# Patient Record
Sex: MALE | Race: WHITE | NOT HISPANIC OR LATINO | Employment: UNEMPLOYED | URBAN - METROPOLITAN AREA
[De-identification: names, ages, dates, MRNs, and addresses within clinical notes are randomized per-mention and may not be internally consistent; named-entity substitution may affect disease eponyms.]

---

## 2022-05-13 ENCOUNTER — ANESTHESIA EVENT (OUTPATIENT)
Dept: SURGERY | Facility: OTHER | Age: 82
End: 2022-05-13
Payer: MEDICARE

## 2022-05-13 ENCOUNTER — HOSPITAL ENCOUNTER (OUTPATIENT)
Dept: PREADMISSION TESTING | Facility: OTHER | Age: 82
Discharge: HOME | End: 2022-05-13
Attending: ORTHOPAEDIC SURGERY
Payer: MEDICARE

## 2022-05-13 VITALS
HEIGHT: 70 IN | WEIGHT: 215 LBS | HEART RATE: 68 BPM | OXYGEN SATURATION: 97 % | RESPIRATION RATE: 16 BRPM | SYSTOLIC BLOOD PRESSURE: 139 MMHG | TEMPERATURE: 99 F | BODY MASS INDEX: 30.78 KG/M2 | DIASTOLIC BLOOD PRESSURE: 78 MMHG

## 2022-05-13 RX ORDER — GLIPIZIDE 10 MG/1
10 TABLET ORAL
COMMUNITY
Start: 2022-05-02 | End: 2022-08-08 | Stop reason: SDUPTHER

## 2022-05-13 RX ORDER — SODIUM CHLORIDE, SODIUM LACTATE, POTASSIUM CHLORIDE, CALCIUM CHLORIDE 600; 310; 30; 20 MG/100ML; MG/100ML; MG/100ML; MG/100ML
INJECTION, SOLUTION INTRAVENOUS CONTINUOUS
Status: CANCELLED | OUTPATIENT
Start: 2022-05-13

## 2022-05-13 RX ORDER — TRIAMTERENE AND HYDROCHLOROTHIAZIDE 75; 50 MG/1; MG/1
0.5 TABLET ORAL
COMMUNITY
Start: 2022-05-02 | End: 2022-07-27

## 2022-05-13 RX ORDER — METFORMIN HYDROCHLORIDE 1000 MG/1
1000 TABLET ORAL 2 TIMES DAILY WITH MEALS
COMMUNITY
Start: 2022-05-02 | End: 2022-08-08 | Stop reason: SDUPTHER

## 2022-05-13 RX ORDER — EZETIMIBE 10 MG/1
10 TABLET ORAL
COMMUNITY
Start: 2022-01-26 | End: 2022-07-19

## 2022-05-13 RX ORDER — ACETAMINOPHEN 500 MG
1000 TABLET ORAL
Status: CANCELLED | OUTPATIENT
Start: 2022-05-13 | End: 2022-05-13

## 2022-05-13 RX ORDER — ACETAMINOPHEN 325 MG/1
650 TABLET ORAL EVERY 6 HOURS PRN
Status: ON HOLD | COMMUNITY
End: 2022-09-30 | Stop reason: SDUPTHER

## 2022-05-13 RX ORDER — ASPIRIN 81 MG/1
81 TABLET ORAL
Status: ON HOLD | COMMUNITY
Start: 2022-05-02 | End: 2022-09-28 | Stop reason: HOSPADM

## 2022-05-13 RX ORDER — LIDOCAINE HYDROCHLORIDE 10 MG/ML
0.5 INJECTION, SOLUTION EPIDURAL; INFILTRATION; INTRACAUDAL; PERINEURAL ONCE
Status: CANCELLED | OUTPATIENT
Start: 2022-05-13 | End: 2022-05-13

## 2022-05-13 RX ORDER — ATORVASTATIN CALCIUM 40 MG/1
40 TABLET, FILM COATED ORAL DAILY
COMMUNITY
Start: 2022-01-26

## 2022-05-13 RX ORDER — FEBUXOSTAT 40 MG/1
40 TABLET, FILM COATED ORAL
Status: ON HOLD | COMMUNITY
End: 2022-09-28 | Stop reason: HOSPADM

## 2022-05-13 NOTE — ANESTHESIA PREPROCEDURE EVALUATION
05/13/2022  Jacques Arellano is a 81 y.o., male.      Pre-op Assessment    I have reviewed the Patient Summary Reports.     I have reviewed the Nursing Notes.    I have reviewed the Medications.     Review of Systems  Anesthesia Hx:  Denies Family Hx of Anesthesia complications.   Denies Personal Hx of Anesthesia complications.   Social:  Non-Smoker    Hematology/Oncology:  Hematology Normal   Oncology Normal     EENT/Dental:EENT/Dental Normal   Cardiovascular:   Pacemaker Hypertension Dysrhythmias atrial fibrillation CHF (Diastolic) Watchman device placed at [facility] 3/22    Cardiology rec NOT stopping eliquis   Pulmonary:   Sleep Apnea (not using CPAP)    Renal/:  Renal/ Normal     Hepatic/GI:  Hepatic/GI Normal    Musculoskeletal:  Musculoskeletal Normal    Neurological:   TIA, Denies CVA. Very frequent Falls!!   Endocrine:   Diabetes, well controlled, type 2    Dermatological:  Skin Normal    Psych:  Psychiatric Normal           Physical Exam  General: Cooperative, Alert and Oriented    Airway:  Mallampati: II   TM Distance: Normal  Neck ROM: Normal ROM    Dental:  Dentures        Anesthesia Plan  Type of Anesthesia, risks & benefits discussed:    Anesthesia Type: Regional  Intra-op Monitoring Plan: Standard ASA Monitors  Post Op Pain Control Plan: multimodal analgesia and peripheral nerve block  Informed Consent: Informed consent signed with the Patient and all parties understand the risks and agree with anesthesia plan.  All questions answered.   ASA Score: 3  Anesthesia Plan Notes: Plan regional, but will be on eliquis  GA back up  Labs/EKG in care everywhere    Dr. Wilkins cardiology, rec NOT stopping eliquis or ASA    5/17/22 update:    Dr. Kate does not want block       Ready For Surgery From Anesthesia Perspective.     .

## 2022-05-13 NOTE — PRE ADMISSION SCREENING
Dr Garrett aware of pacemaker-no orders received. Cardiac testing reviewed per Dr Garrett in care everywhere-states OK.

## 2022-05-13 NOTE — DISCHARGE INSTRUCTIONS
Information to Prepare you for your Surgery    PRE-ADMIT TESTING -  [phone]    [address] [facility]          Your surgery has been scheduled at [facility]. We are pleased to have the opportunity to serve you. For Further Information please call [phone].    On the day of surgery please report to the Information Desk on the 1st floor.    CONTACT YOUR PHYSICIAN'S OFFICE THE DAY PRIOR TO YOUR SURGERY TO OBTAIN YOUR ARRIVAL TIME.     The evening before surgery do not eat anything after 9 p.m. ( this includes hard candy, chewing gum and mints).  You may only have GATORADE, POWERADE AND WATER  from 9 p.m. until you leave your home.   DO NOT DRINK ANY LIQUIDS ON THE WAY TO THE HOSPITAL.      Why does your anesthesiologist allow you to drink Gatorade/Powerade before surgery?  Gatorade/Powerade helps to increase your comfort before surgery and to decrease your nausea after surgery. The carbohydrates in Gatorade/Powerade help reduce your body's stress response to surgery.  If you are a diabetic-drink only water prior to surgery.      Current Visitor policy(12/27/2021) - Patients may have 2 visitors pre and post procedure. Only 2 visitors will be allowed in the Surgical building with the patient.     SPECIAL MEDICATION INSTRUCTIONS: TAKE medications checked off by the Anesthesiologist on your Medication List.    Angiogram Patients: Take medications as instructed by your physician, including aspirin.     Surgery Patients:    If you take ASPIRIN - Your PHYSICIAN/SURGEON will need to inform you IF/OR when you need to stop taking aspirin prior to your surgery.     Do Not take any medications containing IBUPROFEN.    Do Not Wear any make-up (especially eye make-up) to surgery. Please remove any false eyelashes or eyelash extensions. If you arrive the day of surgery with makeup/eyelashes on you will be required to remove prior to surgery. (There is a risk of corneal  abrasions if eye makeup/eyelash extensions are not removed)      Leave all valuables at home.   Do Not wear any jewelry or watches, including any metal in body piercings. Jewelry must be removed prior to coming to the hospital.  There is a possibility that rings that are unable to be removed may be cut off if they are on the surgical extremity.    Please remove all hair extensions, wigs, clips and any other metal accessories/ ornaments from your hair.  These items may pose a flammable/fire risk in Surgery and must be removed.    Do not shave your surgical area at least 5 days prior to your surgery. The surgical prep will be performed at the hospital according to Infection Control regulations.    Contact Lens must be removed before surgery. Either do not wear the contact lens or bring a case and solution for storage.  Please bring a container for eyeglasses or dentures as required.  Bring any paperwork your physician has provided, such as consent forms,  history and physicals, doctor's orders, etc.   Bring comfortable clothes that are loose fitting to wear upon discharge. Take into consideration the type of surgery being performed.  Maintain your diet as advised per your physician the day prior to surgery.      Adequate rest the night before surgery is advised.   Park in the Parking lot behind the hospital or in the [facility] across the street from the parking lot. Parking is complimentary.  If you will be discharged the same day as your procedure, please arrange for a responsible adult to drive you home or to accompany you if traveling by taxi.   YOU WILL NOT BE PERMITTED TO DRIVE OR TO LEAVE THE HOSPITAL ALONE AFTER SURGERY.   If you are being discharged the same day, it is strongly recommended that you arrange for someone to remain with you for the first 24 hrs following your surgery.    The Surgeon will speak to your family/visitor after your surgery regarding the outcome of your surgery and post op  care.  The Surgeon may speak to you after your surgery, but there is a possibility you may not remember the details.  Please check with your family members regarding the conversation with the Surgeon.    We strongly recommend whoever is bringing you home be present for discharge instructions.  This will ensure a thorough understanding for your post op home care.    ALL CHILDREN MUST ALWAYS BE ACCOMPANIED BY AN ADULT.    Visitors-Refer to current Visitor policy handouts.    Thank you for your cooperation.  The Staff of [facility].            Bathing Instructions with Hibiclens    Shower the evening before and morning of your procedure with Hibiclens:  Wash your face with water and your regular face wash/soap  Apply Hibiclens directly on your skin or on a wet washcloth and wash gently. When showering: Move away from the shower stream when applying Hibiclens to avoid rinsing off too soon.  Rinse thoroughly with warm water  Do not dilute Hibiclens        Dry off as usual, do not use any deodorant, powder, body lotions, perfume, after shave or cologne.

## 2022-05-17 ENCOUNTER — HOSPITAL ENCOUNTER (OUTPATIENT)
Facility: OTHER | Age: 82
Discharge: HOME | End: 2022-05-17
Attending: ORTHOPAEDIC SURGERY | Admitting: ORTHOPAEDIC SURGERY
Payer: MEDICARE

## 2022-05-17 ENCOUNTER — ANESTHESIA (OUTPATIENT)
Dept: SURGERY | Facility: OTHER | Age: 82
End: 2022-05-17
Payer: MEDICARE

## 2022-05-17 VITALS
SYSTOLIC BLOOD PRESSURE: 103 MMHG | TEMPERATURE: 97 F | BODY MASS INDEX: 30.78 KG/M2 | OXYGEN SATURATION: 97 % | DIASTOLIC BLOOD PRESSURE: 55 MMHG | HEIGHT: 70 IN | HEART RATE: 69 BPM | WEIGHT: 215 LBS | RESPIRATION RATE: 15 BRPM

## 2022-05-17 DIAGNOSIS — S52.501A CLOSED FRACTURE OF DISTAL END OF RIGHT RADIUS, UNSPECIFIED FRACTURE MORPHOLOGY, INITIAL ENCOUNTER: Primary | ICD-10-CM

## 2022-05-17 DIAGNOSIS — S52.501A CLOSED FRACTURE OF RIGHT DISTAL RADIUS: ICD-10-CM

## 2022-05-17 PROCEDURE — 63600175 PHARM REV CODE 636 W HCPCS: Performed by: ANESTHESIOLOGY

## 2022-05-17 PROCEDURE — 25000003 PHARM REV CODE 250: Performed by: NURSE ANESTHETIST, CERTIFIED REGISTERED

## 2022-05-17 PROCEDURE — 25000003 PHARM REV CODE 250: Performed by: ANESTHESIOLOGY

## 2022-05-17 PROCEDURE — 63600175 PHARM REV CODE 636 W HCPCS: Performed by: ORTHOPAEDIC SURGERY

## 2022-05-17 PROCEDURE — 71000033 HC RECOVERY, INTIAL HOUR: Performed by: ORTHOPAEDIC SURGERY

## 2022-05-17 PROCEDURE — 71000015 HC POSTOP RECOV 1ST HR: Performed by: ORTHOPAEDIC SURGERY

## 2022-05-17 PROCEDURE — 37000008 HC ANESTHESIA 1ST 15 MINUTES: Performed by: ORTHOPAEDIC SURGERY

## 2022-05-17 PROCEDURE — 71000016 HC POSTOP RECOV ADDL HR: Performed by: ORTHOPAEDIC SURGERY

## 2022-05-17 PROCEDURE — 36000710: Performed by: ORTHOPAEDIC SURGERY

## 2022-05-17 PROCEDURE — 37000009 HC ANESTHESIA EA ADD 15 MINS: Performed by: ORTHOPAEDIC SURGERY

## 2022-05-17 PROCEDURE — 27201423 OPTIME MED/SURG SUP & DEVICES STERILE SUPPLY: Performed by: ORTHOPAEDIC SURGERY

## 2022-05-17 PROCEDURE — 71000039 HC RECOVERY, EACH ADD'L HOUR: Performed by: ORTHOPAEDIC SURGERY

## 2022-05-17 PROCEDURE — C1713 ANCHOR/SCREW BN/BN,TIS/BN: HCPCS | Performed by: ORTHOPAEDIC SURGERY

## 2022-05-17 PROCEDURE — 36000711: Performed by: ORTHOPAEDIC SURGERY

## 2022-05-17 PROCEDURE — 63600175 PHARM REV CODE 636 W HCPCS: Performed by: NURSE ANESTHETIST, CERTIFIED REGISTERED

## 2022-05-17 PROCEDURE — 25000003 PHARM REV CODE 250: Performed by: ORTHOPAEDIC SURGERY

## 2022-05-17 DEVICE — SCREW CORTICAL 3.5 X 14: Type: IMPLANTABLE DEVICE | Site: WRIST | Status: FUNCTIONAL

## 2022-05-17 DEVICE — PEG THREADED 2.5 X 24: Type: IMPLANTABLE DEVICE | Site: WRIST | Status: FUNCTIONAL

## 2022-05-17 DEVICE — PLATE DISTAL VOLAR SHORT RT: Type: IMPLANTABLE DEVICE | Site: WRIST | Status: FUNCTIONAL

## 2022-05-17 DEVICE — PEG THREADED 2.5 X 20: Type: IMPLANTABLE DEVICE | Site: WRIST | Status: FUNCTIONAL

## 2022-05-17 DEVICE — IMPLANTABLE DEVICE: Type: IMPLANTABLE DEVICE | Site: WRIST | Status: FUNCTIONAL

## 2022-05-17 DEVICE — PEG SUPPORT SUBCHONDRAL 2.0X24: Type: IMPLANTABLE DEVICE | Site: WRIST | Status: FUNCTIONAL

## 2022-05-17 DEVICE — PEG FULLY THRD 2.5X20: Type: IMPLANTABLE DEVICE | Site: WRIST | Status: FUNCTIONAL

## 2022-05-17 RX ORDER — BUPIVACAINE HYDROCHLORIDE AND EPINEPHRINE 2.5; 5 MG/ML; UG/ML
INJECTION, SOLUTION EPIDURAL; INFILTRATION; INTRACAUDAL; PERINEURAL
Status: DISCONTINUED | OUTPATIENT
Start: 2022-05-17 | End: 2022-05-17 | Stop reason: HOSPADM

## 2022-05-17 RX ORDER — SODIUM CHLORIDE 0.9 % (FLUSH) 0.9 %
3 SYRINGE (ML) INJECTION
Status: DISCONTINUED | OUTPATIENT
Start: 2022-05-17 | End: 2022-05-17 | Stop reason: HOSPADM

## 2022-05-17 RX ORDER — HYDROMORPHONE HYDROCHLORIDE 2 MG/ML
0.4 INJECTION, SOLUTION INTRAMUSCULAR; INTRAVENOUS; SUBCUTANEOUS EVERY 5 MIN PRN
Status: DISCONTINUED | OUTPATIENT
Start: 2022-05-17 | End: 2022-05-17 | Stop reason: HOSPADM

## 2022-05-17 RX ORDER — HYDROCODONE BITARTRATE AND ACETAMINOPHEN 5; 325 MG/1; MG/1
1 TABLET ORAL EVERY 6 HOURS PRN
Qty: 20 TABLET | Refills: 0 | Status: ON HOLD | OUTPATIENT
Start: 2022-05-17 | End: 2022-09-30 | Stop reason: HOSPADM

## 2022-05-17 RX ORDER — PROCHLORPERAZINE EDISYLATE 5 MG/ML
5 INJECTION INTRAMUSCULAR; INTRAVENOUS EVERY 30 MIN PRN
Status: DISCONTINUED | OUTPATIENT
Start: 2022-05-17 | End: 2022-05-17 | Stop reason: HOSPADM

## 2022-05-17 RX ORDER — IBUPROFEN 400 MG/1
400 TABLET ORAL ONCE
Status: COMPLETED | OUTPATIENT
Start: 2022-05-17 | End: 2022-05-17

## 2022-05-17 RX ORDER — PHENYLEPHRINE HYDROCHLORIDE 10 MG/ML
INJECTION INTRAVENOUS
Status: DISCONTINUED | OUTPATIENT
Start: 2022-05-17 | End: 2022-05-17

## 2022-05-17 RX ORDER — ACETAMINOPHEN 500 MG
1000 TABLET ORAL
Status: COMPLETED | OUTPATIENT
Start: 2022-05-17 | End: 2022-05-17

## 2022-05-17 RX ORDER — LIDOCAINE HYDROCHLORIDE 10 MG/ML
0.5 INJECTION, SOLUTION EPIDURAL; INFILTRATION; INTRACAUDAL; PERINEURAL ONCE
Status: DISCONTINUED | OUTPATIENT
Start: 2022-05-17 | End: 2022-05-17 | Stop reason: HOSPADM

## 2022-05-17 RX ORDER — CEFAZOLIN SODIUM 1 G/3ML
2 INJECTION, POWDER, FOR SOLUTION INTRAMUSCULAR; INTRAVENOUS
Status: COMPLETED | OUTPATIENT
Start: 2022-05-17 | End: 2022-05-17

## 2022-05-17 RX ORDER — PROPOFOL 10 MG/ML
VIAL (ML) INTRAVENOUS
Status: DISCONTINUED | OUTPATIENT
Start: 2022-05-17 | End: 2022-05-17

## 2022-05-17 RX ORDER — SODIUM CHLORIDE, SODIUM LACTATE, POTASSIUM CHLORIDE, CALCIUM CHLORIDE 600; 310; 30; 20 MG/100ML; MG/100ML; MG/100ML; MG/100ML
INJECTION, SOLUTION INTRAVENOUS CONTINUOUS
Status: DISCONTINUED | OUTPATIENT
Start: 2022-05-17 | End: 2022-05-17 | Stop reason: HOSPADM

## 2022-05-17 RX ORDER — FENTANYL CITRATE 50 UG/ML
INJECTION, SOLUTION INTRAMUSCULAR; INTRAVENOUS
Status: DISCONTINUED | OUTPATIENT
Start: 2022-05-17 | End: 2022-05-17

## 2022-05-17 RX ORDER — LIDOCAINE HYDROCHLORIDE 20 MG/ML
INJECTION INTRAVENOUS
Status: DISCONTINUED | OUTPATIENT
Start: 2022-05-17 | End: 2022-05-17

## 2022-05-17 RX ORDER — ONDANSETRON 2 MG/ML
INJECTION INTRAMUSCULAR; INTRAVENOUS
Status: DISCONTINUED | OUTPATIENT
Start: 2022-05-17 | End: 2022-05-17

## 2022-05-17 RX ORDER — DIPHENHYDRAMINE HYDROCHLORIDE 50 MG/ML
25 INJECTION INTRAMUSCULAR; INTRAVENOUS EVERY 6 HOURS PRN
Status: DISCONTINUED | OUTPATIENT
Start: 2022-05-17 | End: 2022-05-17 | Stop reason: HOSPADM

## 2022-05-17 RX ORDER — OXYCODONE HYDROCHLORIDE 5 MG/1
5 TABLET ORAL
Status: DISCONTINUED | OUTPATIENT
Start: 2022-05-17 | End: 2022-05-17 | Stop reason: HOSPADM

## 2022-05-17 RX ORDER — MEPERIDINE HYDROCHLORIDE 25 MG/ML
12.5 INJECTION INTRAMUSCULAR; INTRAVENOUS; SUBCUTANEOUS ONCE AS NEEDED
Status: DISCONTINUED | OUTPATIENT
Start: 2022-05-17 | End: 2022-05-17 | Stop reason: HOSPADM

## 2022-05-17 RX ADMIN — PROPOFOL 50 MG: 10 INJECTION, EMULSION INTRAVENOUS at 03:05

## 2022-05-17 RX ADMIN — ACETAMINOPHEN 1000 MG: 500 TABLET, FILM COATED ORAL at 12:05

## 2022-05-17 RX ADMIN — IBUPROFEN 400 MG: 400 TABLET, FILM COATED ORAL at 07:05

## 2022-05-17 RX ADMIN — PROPOFOL 150 MG: 10 INJECTION, EMULSION INTRAVENOUS at 03:05

## 2022-05-17 RX ADMIN — PHENYLEPHRINE HYDROCHLORIDE 100 MCG: 10 INJECTION INTRAVENOUS at 04:05

## 2022-05-17 RX ADMIN — LIDOCAINE HYDROCHLORIDE 50 MG: 20 INJECTION, SOLUTION INTRAVENOUS at 04:05

## 2022-05-17 RX ADMIN — CEFAZOLIN 2 G: 330 INJECTION, POWDER, FOR SOLUTION INTRAMUSCULAR; INTRAVENOUS at 03:05

## 2022-05-17 RX ADMIN — PHENYLEPHRINE HYDROCHLORIDE 200 MCG: 10 INJECTION INTRAVENOUS at 03:05

## 2022-05-17 RX ADMIN — LIDOCAINE HYDROCHLORIDE 100 MG: 20 INJECTION, SOLUTION INTRAVENOUS at 03:05

## 2022-05-17 RX ADMIN — FENTANYL CITRATE 50 MCG: 50 INJECTION, SOLUTION INTRAMUSCULAR; INTRAVENOUS at 03:05

## 2022-05-17 RX ADMIN — SODIUM CHLORIDE, SODIUM LACTATE, POTASSIUM CHLORIDE, AND CALCIUM CHLORIDE: .6; .31; .03; .02 INJECTION, SOLUTION INTRAVENOUS at 02:05

## 2022-05-17 RX ADMIN — PHENYLEPHRINE HYDROCHLORIDE 0.5 MCG/KG/MIN: 10 INJECTION INTRAVENOUS at 03:05

## 2022-05-17 RX ADMIN — ONDANSETRON HYDROCHLORIDE 4 MG: 2 INJECTION INTRAMUSCULAR; INTRAVENOUS at 04:05

## 2022-05-17 NOTE — OR NURSING
VSS on RA. Pt denies pain. Dressings and PIV C/D/I. Meets Phase I discharge criteria. Ready for transfer to Phase II.

## 2022-05-17 NOTE — INTERVAL H&P NOTE
The patient has been examined and the H&P has been reviewed:    I concur with the findings and no changes have occurred since H&P was written.    Surgery risks, benefits and alternative options discussed and understood by patient/family. Specifically risks of comorbidities, bleeding, hematoma, infection. Need for reoperation.

## 2022-05-17 NOTE — ANESTHESIA PROCEDURE NOTES
Intubation    Date/Time: 5/17/2022 2:50 PM  Performed by: Rachel Ya CRNA  Authorized by: Gregory Beck MD     Intubation:     Induction:  Intravenous    Intubated:  Postinduction    Mask Ventilation:  Moderately difficult with oral airway    Attempts:  1    Attempted By:  CRNA    Difficult Airway Encountered?: No      Complications:  None    Airway Device:  Supraglottic airway/LMA (inflatable)    Airway Device Size:  4.0    Secured at:  The lips    Placement Verified By:  Capnometry    Findings Post-Intubation:  Atraumatic/condition of teeth unchanged

## 2022-05-17 NOTE — TRANSFER OF CARE
"Anesthesia Transfer of Care Note    Patient: Jacques Arellano    Procedure(s) Performed: Procedure(s) (LRB):  ORIF, FRACTURE, RADIUS, DISTAL (Right)    Patient location: PACU    Anesthesia Type: general    Transport from OR: Transported from OR on 2-3 L/min O2 by NC with adequate spontaneous ventilation    Post pain: adequate analgesia    Post assessment: no apparent anesthetic complications    Post vital signs: stable    Level of consciousness: awake    Nausea/Vomiting: no nausea/vomiting    Complications: none    Transfer of care protocol was followed      Last vitals:   Visit Vitals  BP (!) 142/72 (BP Location: Left arm, Patient Position: Lying)   Pulse 72   Temp 36.8 °C (98.3 °F) (Skin)   Resp 16   Ht 5' 10" (1.778 m)   Wt 97.5 kg (215 lb)   SpO2 100%   BMI 30.85 kg/m²     "

## 2022-05-17 NOTE — ANESTHESIA POSTPROCEDURE EVALUATION
Anesthesia Post Evaluation    Patient: Jacques Arellano    Procedure(s) Performed: Procedure(s) (LRB):  ORIF, FRACTURE, RADIUS, DISTAL (Right)    Final Anesthesia Type: general      Patient location during evaluation: PACU  Patient participation: Yes- Able to Participate  Level of consciousness: awake and alert  Post-procedure vital signs: reviewed and stable  Pain management: adequate  Airway patency: patent    PONV status at discharge: No PONV  Anesthetic complications: no      Cardiovascular status: blood pressure returned to baseline and stable  Respiratory status: room air  Hydration status: euvolemic  Follow-up not needed.          Vitals Value Taken Time   /63 05/17/22 1735   Temp 36.8 °C (98.3 °F) 05/17/22 1702   Pulse 70 05/17/22 1745   Resp 16 05/17/22 1745   SpO2 100 % 05/17/22 1745   Vitals shown include unvalidated device data.      No case tracking events are documented in the log.      Pain/Juanjose Score: Pain Rating Prior to Med Admin: 0 (5/17/2022 12:35 PM)  Juanjose Score: 10 (5/17/2022  5:30 PM)

## 2022-05-17 NOTE — BRIEF OP NOTE
DATE OF PROCEDURE: 5/17/22  PREOPERATIVE DIAGNOSIS: Distal radius fracture, right  wrist.   POSTOPERATIVE DIAGNOSIS: Distal radius fracture, rught wrist.   PROCEDURE PERFORMED: Open reduction and internal fixation of right distal   radius fracture.   INDICATIONS: This patient is a 80yo man who sustained an injury the left   wrist. Radiographs have demonstrated a fracture of the distal radius with some   displacement. After the pros, cons, risks and benefits of operative   intervention were reviewed, the patient has elected to undergo the above procedure.     PROCEDURE IN DETAIL: The patient was brought to the Operating Suite, placed   supine on the operating table. General endotracheal anesthesia was administered   per the anesthesiologist without difficulty. The right upper extremity was   thoroughly prepped with alcohol and Betadine and draped in the usual sterile   fashion.   A longitudinal incision was then made over the volar aspect of the right wrist   and careful dissection was carried down through the subdermal tissue using   bipolar cautery for hemostasis. The flexor carpi radialis tendon was identified   and it was retracted toward the ulnar aspect of the wrist. The pronator   quadratus was taken down from the distal radius and the fracture site was   identified, cleaned of soft tissue and periosteum and was then reduced   anatomically. Definitive fixation was then carried out using a DVR distal   radius locking plate, which was placed in appropriate position and secured to   the shaft with a bicortical screw of appropriate length. The fracture was then   reduced. Using the image intensifier, it was confirmed to be in appropriate   position and the distal peg holes were filled with a combination of smooth and   partially threaded locking pegs of appropriate length. The remainder of the   proximal holes was then filled with bicortical screws and the tourniquet was   deflated. Hemostasis was confirmed. The  image intensifier confirmed again in   the AP and lateral planes appropriate position of the fracture and hardware.   The wound was then closed in layers with Vicryl and nylon suture. A sterile   soft dressing and volar splint was then applied. The patient was awakened in   the Operating Suite and taken to the recovery area in stable condition. She   tolerated the procedure very well. Lap, instrument and needle counts were   correct at the end of the procedure x2.   BLOOD LOSS: Minimal.   COMPLICATIONS: None.     Discharge Note    SUMMARY     Admit Date: 5/17/2022    Discharge Date and Time:  05/17/2022 4:57 PM    Hospital Course (synopsis of major diagnoses, care, treatment, and services provided during the course of the hospital stay): uneventful     Final Diagnosis: Post-Op Diagnosis Codes:     * Closed fracture of distal end of right radius, unspecified fracture morphology, initial encounter [S52.501A]    Disposition: Home or Self Care    Follow Up/Patient Instructions:     Medications:  Reconciled Home Medications:      Medication List      START taking these medications    HYDROcodone-acetaminophen 5-325 mg per tablet  Commonly known as: NORCO  Take 1 tablet by mouth every 6 (six) hours as needed for Pain.        CONTINUE taking these medications    acetaminophen 325 MG tablet  Commonly known as: TYLENOL  Take 650 mg by mouth every 6 (six) hours as needed.     apixaban 5 mg Tab  Commonly known as: ELIQUIS  Take 5 mg by mouth 2 (two) times daily.     aspirin 81 MG EC tablet  Commonly known as: ECOTRIN  Take 81 mg by mouth.     atorvastatin 40 MG tablet  Commonly known as: LIPITOR  Take 40 mg by mouth once daily.     ezetimibe 10 mg tablet  Commonly known as: ZETIA  Take 10 mg by mouth.     febuxostat 40 mg Tab  Commonly known as: ULORIC  Take 40 mg by mouth.     glipiZIDE 10 MG tablet  Commonly known as: GLUCOTROL  Take 10 mg by mouth.     metFORMIN 1000 MG tablet  Commonly known as: GLUCOPHAGE  Take 1,000 mg  by mouth 2 (two) times daily with meals.     triamterene-hydrochlorothiazide 75-50 mg 75-50 mg per tablet  Commonly known as: MAXZIDE  Take 0.5 tablets by mouth.          Discharge Procedure Orders   Diet general     Leave dressing on - Keep it clean, dry, and intact until clinic visit     Call MD for:  temperature >100.4     Call MD for:  persistent nausea and vomiting     Call MD for:  severe uncontrolled pain     Call MD for:  difficulty breathing, headache or visual disturbances     Call MD for:  redness, tenderness, or signs of infection (pain, swelling, redness, odor or green/yellow discharge around incision site)     Call MD for:  hives     Call MD for:  persistent dizziness or light-headedness     Call MD for:  extreme fatigue     Keep surgical extremity elevated     Lifting restrictions     No driving, operating heavy equipment or signing legal documents while taking pain medication      Follow-up Information     Claude S. Williams Iv, MD Follow up.    Specialty: Orthopedic Surgery  Contact information:  [address]  [facility] [address]  [phone]                           Claude S. Williams, MD

## 2022-05-17 NOTE — DISCHARGE INSTRUCTIONS

## 2022-05-18 LAB — POCT GLUCOSE: 74 MG/DL (ref 70–110)

## 2022-05-18 NOTE — PLAN OF CARE
Jacques Arellano has met all discharge criteria from Phase II. Vital Signs are stable, ambulating  without difficulty. Discharge instructions given, patient verbalized understanding. Discharged from facility via wheelchair in stable condition.

## 2022-07-13 ENCOUNTER — TELEPHONE (OUTPATIENT)
Dept: PRIMARY CARE CLINIC | Facility: CLINIC | Age: 82
End: 2022-07-13
Payer: MEDICARE

## 2022-07-13 NOTE — TELEPHONE ENCOUNTER
----- Message from [facility] sent at 7/13/2022  2:42 PM CDT -----  Type:  Needs Medical Advice    Who Called: PT   Would the patient rather a call back or a response via [facility]? Callback   Best Call Back Number: [phone]  Additional Information: Pt requesting callback from office to discuss possibly moving appt up. Pt is complaining of extreme fluid retention.

## 2022-07-19 ENCOUNTER — LAB VISIT (OUTPATIENT)
Dept: LAB | Facility: HOSPITAL | Age: 82
End: 2022-07-19
Attending: INTERNAL MEDICINE
Payer: MEDICARE

## 2022-07-19 ENCOUNTER — OFFICE VISIT (OUTPATIENT)
Dept: PRIMARY CARE CLINIC | Facility: CLINIC | Age: 82
End: 2022-07-19
Payer: MEDICARE

## 2022-07-19 VITALS
OXYGEN SATURATION: 97 % | BODY MASS INDEX: 31.35 KG/M2 | SYSTOLIC BLOOD PRESSURE: 100 MMHG | RESPIRATION RATE: 18 BRPM | DIASTOLIC BLOOD PRESSURE: 58 MMHG | HEART RATE: 72 BPM | TEMPERATURE: 98 F | WEIGHT: 219 LBS | HEIGHT: 70 IN

## 2022-07-19 DIAGNOSIS — D64.9 ANEMIA, UNSPECIFIED TYPE: Primary | ICD-10-CM

## 2022-07-19 DIAGNOSIS — E11.9 TYPE 2 DIABETES MELLITUS WITHOUT COMPLICATION, WITHOUT LONG-TERM CURRENT USE OF INSULIN: ICD-10-CM

## 2022-07-19 DIAGNOSIS — E78.2 MIXED HYPERLIPIDEMIA: ICD-10-CM

## 2022-07-19 DIAGNOSIS — Z95.818 PRESENCE OF WATCHMAN LEFT ATRIAL APPENDAGE CLOSURE DEVICE: ICD-10-CM

## 2022-07-19 DIAGNOSIS — I65.23 BILATERAL CAROTID ARTERY STENOSIS: ICD-10-CM

## 2022-07-19 DIAGNOSIS — I50.32 CHRONIC DIASTOLIC HEART FAILURE: ICD-10-CM

## 2022-07-19 DIAGNOSIS — I10 HYPERTENSION, UNSPECIFIED TYPE: ICD-10-CM

## 2022-07-19 DIAGNOSIS — I50.33 ACUTE ON CHRONIC DIASTOLIC HEART FAILURE: ICD-10-CM

## 2022-07-19 DIAGNOSIS — D64.9 ANEMIA, UNSPECIFIED TYPE: ICD-10-CM

## 2022-07-19 DIAGNOSIS — I48.21 PERMANENT ATRIAL FIBRILLATION: ICD-10-CM

## 2022-07-19 DIAGNOSIS — Z79.01 CHRONIC ANTICOAGULATION: ICD-10-CM

## 2022-07-19 PROBLEM — G93.9 BRAIN LESION: Status: ACTIVE | Noted: 2022-04-04

## 2022-07-19 PROBLEM — Z95.0 PRESENCE OF CARDIAC PACEMAKER: Status: ACTIVE | Noted: 2022-03-24

## 2022-07-19 PROBLEM — I50.30 DIASTOLIC HEART FAILURE: Status: ACTIVE | Noted: 2022-03-24

## 2022-07-19 PROBLEM — R29.6 FALLS FREQUENTLY: Status: ACTIVE | Noted: 2022-02-15

## 2022-07-19 PROBLEM — M1A.9XX0 CHRONIC GOUT: Status: ACTIVE | Noted: 2022-03-24

## 2022-07-19 LAB
ALBUMIN SERPL BCP-MCNC: 2.3 G/DL (ref 3.5–5.2)
ALP SERPL-CCNC: 172 U/L (ref 55–135)
ALT SERPL W/O P-5'-P-CCNC: 14 U/L (ref 10–44)
ANION GAP SERPL CALC-SCNC: 11 MMOL/L (ref 8–16)
AST SERPL-CCNC: 18 U/L (ref 10–40)
BASOPHILS # BLD AUTO: 0.03 K/UL (ref 0–0.2)
BASOPHILS NFR BLD: 0.1 % (ref 0–1.9)
BILIRUB SERPL-MCNC: 1.1 MG/DL (ref 0.1–1)
BUN SERPL-MCNC: 31 MG/DL (ref 8–23)
CALCIUM SERPL-MCNC: 8.6 MG/DL (ref 8.7–10.5)
CHLORIDE SERPL-SCNC: 98 MMOL/L (ref 95–110)
CO2 SERPL-SCNC: 24 MMOL/L (ref 23–29)
CREAT SERPL-MCNC: 1.1 MG/DL (ref 0.5–1.4)
DIFFERENTIAL METHOD: ABNORMAL
EOSINOPHIL # BLD AUTO: 0 K/UL (ref 0–0.5)
EOSINOPHIL NFR BLD: 0 % (ref 0–8)
ERYTHROCYTE [DISTWIDTH] IN BLOOD BY AUTOMATED COUNT: 15.6 % (ref 11.5–14.5)
EST. GFR  (AFRICAN AMERICAN): >60 ML/MIN/1.73 M^2
EST. GFR  (NON AFRICAN AMERICAN): >60 ML/MIN/1.73 M^2
ESTIMATED AVG GLUCOSE: 128 MG/DL (ref 68–131)
FERRITIN SERPL-MCNC: 157 NG/ML (ref 20–300)
GLUCOSE SERPL-MCNC: 183 MG/DL (ref 70–110)
HBA1C MFR BLD: 6.1 % (ref 4–5.6)
HCT VFR BLD AUTO: 30.5 % (ref 40–54)
HGB BLD-MCNC: 9.9 G/DL (ref 14–18)
IMM GRANULOCYTES # BLD AUTO: 0.22 K/UL (ref 0–0.04)
IMM GRANULOCYTES NFR BLD AUTO: 1 % (ref 0–0.5)
IRON SERPL-MCNC: 10 UG/DL (ref 45–160)
LYMPHOCYTES # BLD AUTO: 0.8 K/UL (ref 1–4.8)
LYMPHOCYTES NFR BLD: 3.6 % (ref 18–48)
MCH RBC QN AUTO: 28.1 PG (ref 27–31)
MCHC RBC AUTO-ENTMCNC: 32.5 G/DL (ref 32–36)
MCV RBC AUTO: 87 FL (ref 82–98)
MONOCYTES # BLD AUTO: 1 K/UL (ref 0.3–1)
MONOCYTES NFR BLD: 4.8 % (ref 4–15)
NEUTROPHILS # BLD AUTO: 19 K/UL (ref 1.8–7.7)
NEUTROPHILS NFR BLD: 90.5 % (ref 38–73)
NRBC BLD-RTO: 0 /100 WBC
PLATELET # BLD AUTO: 114 K/UL (ref 150–450)
PMV BLD AUTO: 12.8 FL (ref 9.2–12.9)
POTASSIUM SERPL-SCNC: 3.5 MMOL/L (ref 3.5–5.1)
PROT SERPL-MCNC: 5.6 G/DL (ref 6–8.4)
RBC # BLD AUTO: 3.52 M/UL (ref 4.6–6.2)
SATURATED IRON: 4 % (ref 20–50)
SODIUM SERPL-SCNC: 133 MMOL/L (ref 136–145)
TOTAL IRON BINDING CAPACITY: 278 UG/DL (ref 250–450)
TRANSFERRIN SERPL-MCNC: 188 MG/DL (ref 200–375)
TSH SERPL DL<=0.005 MIU/L-ACNC: 0.56 UIU/ML (ref 0.4–4)
WBC # BLD AUTO: 21.05 K/UL (ref 3.9–12.7)

## 2022-07-19 PROCEDURE — 80053 COMPREHEN METABOLIC PANEL: CPT | Performed by: INTERNAL MEDICINE

## 2022-07-19 PROCEDURE — 84443 ASSAY THYROID STIM HORMONE: CPT | Performed by: INTERNAL MEDICINE

## 2022-07-19 PROCEDURE — 99205 PR OFFICE/OUTPT VISIT, NEW, LEVL V, 60-74 MIN: ICD-10-PCS | Mod: S$PBB,,, | Performed by: INTERNAL MEDICINE

## 2022-07-19 PROCEDURE — 99214 OFFICE O/P EST MOD 30 MIN: CPT | Mod: PBBFAC,PN | Performed by: INTERNAL MEDICINE

## 2022-07-19 PROCEDURE — 84466 ASSAY OF TRANSFERRIN: CPT | Performed by: INTERNAL MEDICINE

## 2022-07-19 PROCEDURE — 99999 PR PBB SHADOW E&M-EST. PATIENT-LVL IV: CPT | Mod: PBBFAC,,, | Performed by: INTERNAL MEDICINE

## 2022-07-19 PROCEDURE — 36415 COLL VENOUS BLD VENIPUNCTURE: CPT | Mod: PN | Performed by: INTERNAL MEDICINE

## 2022-07-19 PROCEDURE — 82728 ASSAY OF FERRITIN: CPT | Performed by: INTERNAL MEDICINE

## 2022-07-19 PROCEDURE — 83036 HEMOGLOBIN GLYCOSYLATED A1C: CPT | Performed by: INTERNAL MEDICINE

## 2022-07-19 PROCEDURE — 99205 OFFICE O/P NEW HI 60 MIN: CPT | Mod: S$PBB,,, | Performed by: INTERNAL MEDICINE

## 2022-07-19 PROCEDURE — 85025 COMPLETE CBC W/AUTO DIFF WBC: CPT | Performed by: INTERNAL MEDICINE

## 2022-07-19 PROCEDURE — 99999 PR PBB SHADOW E&M-EST. PATIENT-LVL IV: ICD-10-PCS | Mod: PBBFAC,,, | Performed by: INTERNAL MEDICINE

## 2022-07-19 RX ORDER — ESCITALOPRAM OXALATE 10 MG/1
10 TABLET ORAL DAILY
Qty: 30 TABLET | Refills: 5 | Status: SHIPPED | OUTPATIENT
Start: 2022-07-19 | End: 2023-07-19

## 2022-07-19 RX ORDER — POTASSIUM CHLORIDE 750 MG/1
10 CAPSULE, EXTENDED RELEASE ORAL 2 TIMES DAILY
Qty: 60 CAPSULE | Refills: 2 | Status: ON HOLD | OUTPATIENT
Start: 2022-07-19 | End: 2022-09-28 | Stop reason: HOSPADM

## 2022-07-19 RX ORDER — FUROSEMIDE 20 MG/1
20 TABLET ORAL 2 TIMES DAILY
Qty: 60 TABLET | Refills: 2 | Status: SHIPPED | OUTPATIENT
Start: 2022-07-19 | End: 2022-08-09 | Stop reason: SDUPTHER

## 2022-07-19 NOTE — PROGRESS NOTES
[facility] Progress Note  7/19/2022          Reason for Visit:      had concerns including Establish Care (2-3 falls within the month. /Injured right wrist /Stinging in both legs/Fluid in feet is making him hard to move around /Low blood pressure/After he eats pt have extreme body chills /Tried putting heating pad and it didn't warm patient up at all. /Dizzy spells/Constantly falling /All of this has just started ( daughter)/See significant change in patient / patient was fine driving (2months)/Pt would like medication for anxiety ).       History of Present Illness:     50 minutes spent face to face today  30 minutes spent in documentation and review of records in care everywhere today  Total time spent on visit today was 80 minutes    Pt is here today to establish care.  Today's visit was quite long, and challenging in that we did not have full previous records for review, and pt has quite extensive past medical history including several significant recent events.  Daughter from [place] was here with patient to help provide some clarification of history.    The biggest complaint today is bilateral leg swelling.  There is discrepancy in how long this has been going on.  Patient says it has been ongoing many months, but daughter reports it has became very significant in the past 2 weeks, and has gained about 20 pounds per daily weights at home during that time.  There is a diagnosis of diastolic heart failure in patient's problem list from [facility], but we are unable to see full records to clarify/elaborate on this.  I can see an echocardiogram done on 3/31/22 that showed EF of 55%, but diastolic function was indeterminate.  Pt reports he was on lasix up until a few months ago, when it was stopped, but he is unsure why.  He is still on maxzide.  HE is not having shortness of breath or orthopnea.  The leg swelling is causing some weeping of the extremities.  Also it appears that patient has a  pacemaker as I can see a pacemaker interrogation done on 4/27/22.  Unsure of initial indication for pacemaker.  He follows with Dr. Wilkins, but patient has not followed up with him regarding this recent worsening of leg swelling.  Pt states he has a friend who is a retired ER physician from [facility] who told him he should get all new doctors at [facility] rather than continue at [facility], and that is why he is here and also requesting referral to cardiology at [facility].  We encouraged him to follow up with his cardiology at [facility] in meantime given this new concerning development.    Pt also has atrial fibrillation.  He recently had closure of his TANYA via Watchman device.  He is still on eliquis for anticoagulation.  There appears to have been some concern for a TIA after a hospitalization in April for expressive aphasia (which it says resolved during admission).  Carotid US at that time showed only 0-49% stenosis, scattered plaque.   A CT scan at that time showed a lesion in the right frontal bone that pt states was related to an older injury when he was in the .    Pt reports he has had some recent anxiety/confusion at times.  He has been dealing with a lot- selling 2 houses in [place] and also some cars.  Daughter states he has always handled his own affairs and done a good job, but in the past few weeks his mental status has seemed off - confused at times.   She and he both think this is related to anxiety.  He is interested in taking anxiety.  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  He apparently still has a supply of this and takes it perioidcally as needed for anxiety.  We discussed concerns about use of benzodiazepines in older patients/risks and recommended against taking this.  After discussing SSRI, he was agreeable to try starting lexapro.    He has had some falls.  One fall in May resulted in a wrist fracture requiring surgery.  He had another more recent fall in July which  resulted in pain in back and hip pains and fracture of fingers on the right hand.  He had initial xray that was concerning for fracture of right hip, but subsequent CT scan did not demonstrate fracture.  An xray of lumbar spine showed possible old compression fracture at T12, but nothing acute.    His labs from [facility] show a normocytic anemia that appears to be slightly worse lately.    Pt also has diabetes.  Previously had been relatively well controlled, but recently sugars have been elevated.  Daughter says that after he eats lately, he has been having shaking/convulsions - he is not febrile at those times, and she checked blood sugar and it was in 300s (rather than low which was her concern).    His last hemoglobin a1c there was 6.4 on 4/5/22 but we discussed that the anemia may make the interpretation of this difficult.         Problem List:     Patient Active Problem List   Diagnosis    Permanent atrial fibrillation    Diastolic heart failure    Falls frequently    Hypertension    Mixed hyperlipidemia    Presence of cardiac pacemaker    Presence of Watchman left atrial appendage closure device    Type 2 diabetes mellitus    Chronic gout    Chronic anticoagulation    Brain lesion    Bilateral carotid artery stenosis         Surgical History:     He has a past surgical history that includes Insertion of pacemaker; Tonsillectomy; Cardiac surgery (03/30/2022); Kidney stone surgery; and Open reduction and internal fixation (ORIF) of fracture of distal radius (Right, 5/17/2022).      Family History:      His family history is not on file.      Social History:     He reports that he has never smoked. He has never used smokeless tobacco. He reports previous alcohol use.      Medications:       Current Outpatient Medications:     acetaminophen (TYLENOL) 325 MG tablet, Take 650 mg by mouth every 6 (six) hours as needed., Disp: , Rfl:     apixaban (ELIQUIS) 5 mg Tab, Take 5 mg by mouth 2 (two) times daily.,  Disp: , Rfl:     aspirin (ECOTRIN) 81 MG EC tablet, Take 81 mg by mouth., Disp: , Rfl:     atorvastatin (LIPITOR) 40 MG tablet, Take 40 mg by mouth once daily., Disp: , Rfl:     EScitalopram oxalate (LEXAPRO) 10 MG tablet, Take 1 tablet (10 mg total) by mouth once daily., Disp: 30 tablet, Rfl: 5    febuxostat (ULORIC) 40 mg Tab, Take 40 mg by mouth., Disp: , Rfl:     furosemide (LASIX) 20 MG tablet, Take 1 tablet (20 mg total) by mouth 2 (two) times daily., Disp: 60 tablet, Rfl: 2    glipiZIDE (GLUCOTROL) 10 MG tablet, Take 10 mg by mouth., Disp: , Rfl:     HYDROcodone-acetaminophen (NORCO) 5-325 mg per tablet, Take 1 tablet by mouth every 6 (six) hours as needed for Pain., Disp: 20 tablet, Rfl: 0    metFORMIN (GLUCOPHAGE) 1000 MG tablet, Take 1,000 mg by mouth 2 (two) times daily with meals., Disp: , Rfl:     potassium chloride (MICRO-K) 10 MEQ CpSR, Take 1 capsule (10 mEq total) by mouth 2 (two) times daily., Disp: 60 capsule, Rfl: 2    triamterene-hydrochlorothiazide 75-50 mg (MAXZIDE) 75-50 mg per tablet, Take 0.5 tablets by mouth., Disp: , Rfl:         Allergies:   He has No Known Allergies.      Review of Systems:     Review of Systems   Constitutional: Positive for fatigue. Negative for chills and fever.   HENT: Negative for ear pain and sore throat.    Respiratory: Negative for cough, shortness of breath and wheezing.    Cardiovascular: Positive for leg swelling. Negative for chest pain.   Gastrointestinal: Negative for constipation, diarrhea, nausea and vomiting.   Genitourinary: Negative for dysuria and hematuria.   Musculoskeletal: Positive for joint swelling and leg pain.   Neurological: Positive for tremors, light-headedness, disturbances in coordination and coordination difficulties. Negative for dizziness and weakness.   Psychiatric/Behavioral: Positive for confusion. The patient is nervous/anxious.            Physical Exam:     Temp:    98.1 °F (36.7 °C) (Oral)        Blood Pressure:  (!)  "100/58        Pulse:   72     Respirations:  18  Weight:   99.3 kg (219 lb)  Height:   5' 10" (1.778 m)  BMI:     Body mass index is 31.42 kg/m².    Physical Exam  Constitutional:       General: He is not in acute distress.  HENT:      Nose: No congestion or rhinorrhea.      Mouth/Throat:      Pharynx: No oropharyngeal exudate or posterior oropharyngeal erythema.   Cardiovascular:      Rate and Rhythm: Normal rate.      Comments: Irregulalry irregular  Pulmonary:      Effort: Pulmonary effort is normal. No respiratory distress.      Breath sounds: No wheezing.   Abdominal:      Palpations: Abdomen is soft.      Tenderness: There is no abdominal tenderness.   Musculoskeletal:      Right lower leg: Edema present.      Left lower leg: Edema present.      Comments: Severe edema bilateral LE extending to thighs.  There is some mild stasis change of skin of the shins.  There is a little weeping from this area.     Skin:     General: Skin is warm.   Neurological:      General: No focal deficit present.      Mental Status: He is alert and oriented to person, place, and time.           Labs/Imaging/Testing:     Reviewed labs from care everywhere  Lab Results   Component Value Date   WBC 4.9 04/05/2022   HGB 10.5 (L) 04/05/2022   MCV 89.1 04/05/2022    (L) 04/05/2022    04/05/2022   K 3.9 04/05/2022    04/05/2022   BUN 11.0 04/05/2022   CREATININE 0.76 04/05/2022   GFRNONAA 85 04/05/2022   GFRAA 98 04/05/2022           Assessment and Plan:     1. Acute on chronic diastolic heart failure  Start lasix 20 bid + potassium 10 meq bid  Check daily weights.  Will schedule 1 week telemedicine visit to review response  Labs today - check kidney function - on most recent labs visible from Dr. Ko, patient's creatinine had increased from baseline 0.7 to 1.2  [facility] home health  Outpatient case management requested (pt living with son, but daughter will soon return to [place] - daughter is concerned about " how to address needs once she leaves)    2. Anemia, unspecified type  Normocytic  Recheck labs including iron studies  Suspect probably anemia of chronic disease    - CBC Auto Differential; Future  - Ferritin; Future  - Iron and TIBC; Future    3. Type 2 diabetes mellitus without complication, without long-term current use of insulin  A1c has been ok, but anemic so interpretation in that light  Sugar 300s recnetly (post-prandial) - but unsure if this is just a fluke  Recheck labs  - Comprehensive Metabolic Panel; Future  - Hemoglobin A1C; Future  - TSH; Future    4. Mixed hyperlipidemia  Continue atorvastatin    5. Hypertension, unspecified type  Has been running low - unsure if possible due to volume overload.  Will monitor after starting diruetic    6. Chronic diastolic heart failure  - Ambulatory referral/consult to Cardiology; Future  - Ambulatory referral/consult to Home Health; Future  - Ambulatory referral/consult to Outpatient Case Management    7. Permanent atrial fibrillation  S/p watchman device for close of TANYA  Still on eliquis for now    8. Chronic anticoagulation    9. Presence of Watchman left atrial appendage closure device    10. Bilateral carotid artery stenosis  0-49% on most recnet US     1 week telemed follow up to review daily weights, response to devan Garsia MD  7/19/2022    This note was prepared using voice-recognition software.  Although efforts are made to proofread the note, some errors may persist in the final document.

## 2022-07-21 DIAGNOSIS — D72.829 LEUKOCYTOSIS, UNSPECIFIED TYPE: Primary | ICD-10-CM

## 2022-07-21 DIAGNOSIS — R30.0 DYSURIA: ICD-10-CM

## 2022-07-22 ENCOUNTER — TELEPHONE (OUTPATIENT)
Dept: PRIMARY CARE CLINIC | Facility: CLINIC | Age: 82
End: 2022-07-22
Payer: MEDICARE

## 2022-07-22 ENCOUNTER — LAB VISIT (OUTPATIENT)
Dept: LAB | Facility: HOSPITAL | Age: 82
End: 2022-07-22
Attending: INTERNAL MEDICINE
Payer: MEDICARE

## 2022-07-22 ENCOUNTER — PATIENT MESSAGE (OUTPATIENT)
Dept: PRIMARY CARE CLINIC | Facility: CLINIC | Age: 82
End: 2022-07-22
Payer: MEDICARE

## 2022-07-22 DIAGNOSIS — E11.9 DIABETES MELLITUS WITHOUT COMPLICATION: Primary | ICD-10-CM

## 2022-07-22 PROCEDURE — 87186 SC STD MICRODIL/AGAR DIL: CPT | Performed by: INTERNAL MEDICINE

## 2022-07-22 PROCEDURE — 87088 URINE BACTERIA CULTURE: CPT | Performed by: INTERNAL MEDICINE

## 2022-07-22 PROCEDURE — 87086 URINE CULTURE/COLONY COUNT: CPT | Performed by: INTERNAL MEDICINE

## 2022-07-22 PROCEDURE — 87077 CULTURE AEROBIC IDENTIFY: CPT | Performed by: INTERNAL MEDICINE

## 2022-07-22 NOTE — TELEPHONE ENCOUNTER
----- Message from Matty Garsia MD sent at 7/21/2022 12:47 PM CDT -----  I spoke to patient about lab results.  I would like to arrange Urinalysis/culture.  Pt is not a great historian.  Son will be home at 3 PM - can you call him to try to arrange after that time.  Please also let him know that if the urine is unremarkable and he continues to have the chills after eating and/or diarrhea, I may want to get a CT scan of his abdomen.  GIO

## 2022-07-25 ENCOUNTER — PATIENT MESSAGE (OUTPATIENT)
Dept: PRIMARY CARE CLINIC | Facility: CLINIC | Age: 82
End: 2022-07-25
Payer: MEDICARE

## 2022-07-25 DIAGNOSIS — D72.829 LEUKOCYTOSIS, UNSPECIFIED TYPE: Primary | ICD-10-CM

## 2022-07-25 LAB — BACTERIA UR CULT: ABNORMAL

## 2022-07-25 RX ORDER — AMOXICILLIN 500 MG/1
500 CAPSULE ORAL 3 TIMES DAILY
Qty: 21 CAPSULE | Refills: 0 | Status: SHIPPED | OUTPATIENT
Start: 2022-07-25 | End: 2022-07-25 | Stop reason: SDUPTHER

## 2022-07-25 RX ORDER — AMOXICILLIN 500 MG/1
500 CAPSULE ORAL 3 TIMES DAILY
Qty: 21 CAPSULE | Refills: 0 | Status: ON HOLD | OUTPATIENT
Start: 2022-07-25 | End: 2022-09-28 | Stop reason: HOSPADM

## 2022-07-25 NOTE — TELEPHONE ENCOUNTER
Spoke with pts daughter Марина on the phone. Pt cant use [facility] bc of insurance would like it resent to [facility].    New rx pended, [facility] removed from chart

## 2022-07-27 ENCOUNTER — OFFICE VISIT (OUTPATIENT)
Dept: CARDIOLOGY | Facility: CLINIC | Age: 82
End: 2022-07-27
Payer: MEDICARE

## 2022-07-27 VITALS
HEIGHT: 70 IN | BODY MASS INDEX: 31.35 KG/M2 | DIASTOLIC BLOOD PRESSURE: 79 MMHG | WEIGHT: 219 LBS | SYSTOLIC BLOOD PRESSURE: 132 MMHG | HEART RATE: 72 BPM | RESPIRATION RATE: 20 BRPM

## 2022-07-27 DIAGNOSIS — Z95.0 PRESENCE OF CARDIAC PACEMAKER: Primary | ICD-10-CM

## 2022-07-27 DIAGNOSIS — I10 HYPERTENSION, UNSPECIFIED TYPE: ICD-10-CM

## 2022-07-27 DIAGNOSIS — Z95.818 PRESENCE OF WATCHMAN LEFT ATRIAL APPENDAGE CLOSURE DEVICE: ICD-10-CM

## 2022-07-27 DIAGNOSIS — R29.6 FALLS FREQUENTLY: ICD-10-CM

## 2022-07-27 DIAGNOSIS — I87.2 VENOUS INSUFFICIENCY: ICD-10-CM

## 2022-07-27 DIAGNOSIS — R53.81 DEBILITY: ICD-10-CM

## 2022-07-27 DIAGNOSIS — I50.32 CHRONIC DIASTOLIC HEART FAILURE: ICD-10-CM

## 2022-07-27 DIAGNOSIS — E11.9 TYPE 2 DIABETES MELLITUS WITHOUT COMPLICATION, WITHOUT LONG-TERM CURRENT USE OF INSULIN: ICD-10-CM

## 2022-07-27 DIAGNOSIS — I48.21 PERMANENT ATRIAL FIBRILLATION: ICD-10-CM

## 2022-07-27 DIAGNOSIS — E78.2 MIXED HYPERLIPIDEMIA: ICD-10-CM

## 2022-07-27 PROCEDURE — 93010 EKG 12-LEAD: ICD-10-PCS | Mod: S$PBB,,, | Performed by: INTERNAL MEDICINE

## 2022-07-27 PROCEDURE — 93005 ELECTROCARDIOGRAM TRACING: CPT | Mod: PBBFAC,PO | Performed by: INTERNAL MEDICINE

## 2022-07-27 PROCEDURE — 99999 PR PBB SHADOW E&M-EST. PATIENT-LVL IV: ICD-10-PCS | Mod: PBBFAC,,, | Performed by: INTERNAL MEDICINE

## 2022-07-27 PROCEDURE — 99999 PR PBB SHADOW E&M-EST. PATIENT-LVL IV: CPT | Mod: PBBFAC,,, | Performed by: INTERNAL MEDICINE

## 2022-07-27 PROCEDURE — 93010 ELECTROCARDIOGRAM REPORT: CPT | Mod: S$PBB,,, | Performed by: INTERNAL MEDICINE

## 2022-07-27 PROCEDURE — 99205 OFFICE O/P NEW HI 60 MIN: CPT | Mod: S$PBB,,, | Performed by: INTERNAL MEDICINE

## 2022-07-27 PROCEDURE — 99214 OFFICE O/P EST MOD 30 MIN: CPT | Mod: PBBFAC,PO | Performed by: INTERNAL MEDICINE

## 2022-07-27 PROCEDURE — 99205 PR OFFICE/OUTPT VISIT, NEW, LEVL V, 60-74 MIN: ICD-10-PCS | Mod: S$PBB,,, | Performed by: INTERNAL MEDICINE

## 2022-07-27 NOTE — ASSESSMENT & PLAN NOTE
Bps controlled with significant orthostatic symptoms. Okay to stop HCTZ-triamterene. Continue furosemide. Permissive hypertension acceptable.

## 2022-07-27 NOTE — ASSESSMENT & PLAN NOTE
Back pain x months that has resulted in significant debility. Pt is non-ambulatory at this time. This is a marked change from a year ago.

## 2022-07-27 NOTE — ASSESSMENT & PLAN NOTE
The patient appears to have permanent afib s/p PM placement. Low heart rates off of AVB agents. Patient also s/p Watchman and now off of apixiban. Curiously, he seemed to have a TIA presentation in April.     Will schedule for a ANNE to assess for complete closure. Will also refer to device clinic for PM interrogation.

## 2022-07-27 NOTE — ASSESSMENT & PLAN NOTE
Not clear to me that he is in HF at this time. Okay to stay on furosemide 20x2. LVEF preserved. Will repeat TTE.

## 2022-07-27 NOTE — PROGRESS NOTES
HPI:  This is an 81-year-old male with a history of chronic atrial fibrillation status post Watchman March 2022, pacemaker placement '19,  hypertension, hyperlipidemia, possible TIA April 2022, back pain, right wrist fracture, and frequent falls presenting for initial evaluation by me.    Patient comes in to establish care with a new cardiologist.  His cardiology care was previously at [facility].    The patient denies any chest pain, SOB, or STRATTON. Activity levels are minimal and limited by balance issues/dizziness with resultant falls as of late. He does not appear to ambulate by himself. He gives a h/o orthostatic symptoms mainly. These symptoms have been present for months.     He currently tolerates triameterene-hctz 75-50, furosemide 20x2, atorvastatin 40x1, and asa 81x1.     He denies any h/o MI, CAD, PCI, CHF, or CMP. He has chronic afib and had a Watchman placed in March for what sounds like elevated bleeding risk. Patient is not sure. He did have a presentation to the  ED in April and a possible TIA with a negative CT head was documented. He has no residual/focal neuro deficit. Unclear when apixiban was stopped.    PHYSICAL EXAM:  Vitals:    07/27/22 1452   BP: 132/79   Pulse: 72   Resp: 20       Physical Exam  Constitutional:       Appearance: Normal appearance.   Neck:      Vascular: No carotid bruit or JVD.   Cardiovascular:      Rate and Rhythm: Normal rate and regular rhythm.      Pulses:           Radial pulses are 2+ on the right side and 2+ on the left side.        Dorsalis pedis pulses are 1+ on the right side and 1+ on the left side.      Heart sounds: S1 normal and S2 normal. No murmur heard.    No S3 sounds.      Comments: Bilateral lower extremity edema.  Pulmonary:      Effort: Pulmonary effort is normal.      Breath sounds: Normal breath sounds. No rales.   Neurological:      Mental Status: He is alert.         LABS/CARDIAC TESTS (imaging reviewed during clinic visit):  July 2022  hemoglobin 9.9 with MCV of 87. Platelets 114. WBC 21. Iron 10/TIBC 278 with a ferritin of 157. CMP with a creatinine 1.1 and a BUN of 31. Albumin 2.3.  A1c 6.1 and TSH normal.  EKG today atrial fibrillation with a ventricularly paced rhythm.    Outside hospital reports  TTE March 2022 normal LV size and systolic function.  Normal RV systolic function.  Mildly elevated pulmonary artery pressure.  Mild MAC.  Atrial fibrillation.  TTE March 2022 atrial fibrillation with normal LV size and function.  No left atrial appendage thrombus.  Postprocedure ANNE demonstrates a well-seated left atrial appendage occluder device without leak.  Cardiac catheterization March 2022 status post 27 mm watchman implant.  Carotid ultrasound April 2022 shows no evidence of significant ICA disease bilaterally.    ASSESSMENT & PLAN:    Permanent atrial fibrillation  The patient appears to have permanent afib s/p PM placement. Low heart rates off of AVB agents. Patient also s/p Watchman and now off of apixiban. Curiously, he seemed to have a TIA presentation in April.     Will schedule for a ANNE to assess for complete closure. Will also refer to device clinic for PM interrogation.      Hypertension  Bps controlled with significant orthostatic symptoms. Okay to stop HCTZ-triamterene. Continue furosemide. Permissive hypertension acceptable.     Mixed hyperlipidemia  On atorvastatin 40x1.     Diastolic heart failure  Not clear to me that he is in HF at this time. Okay to stay on furosemide 20x2. LVEF preserved. Will repeat TTE.     Debility  Back pain x months that has resulted in significant debility. Pt is non-ambulatory at this time. This is a marked change from a year ago.     Venous insufficiency  Patient with bilateral lower extremity edema likely in large part related to immobility.       Presence of cardiac pacemaker    Chronic diastolic heart failure  -     Ambulatory referral/consult to Cardiology  -     Echo; Future  -     X-Ray Chest  PA And Lateral; Future; Expected date: 07/27/2022    Permanent atrial fibrillation  -     Transesophageal echo (ANNE); Future    Hypertension, unspecified type    Mixed hyperlipidemia    Presence of Watchman left atrial appendage closure device  -     Transesophageal echo (ANNE); Future    Type 2 diabetes mellitus without complication, without long-term current use of insulin    Falls frequently    Debility    Venous insufficiency        Kalyani Bowen MD

## 2022-07-28 ENCOUNTER — TELEPHONE (OUTPATIENT)
Dept: CARDIOLOGY | Facility: CLINIC | Age: 82
End: 2022-07-28
Payer: MEDICARE

## 2022-07-28 DIAGNOSIS — I48.21 PERMANENT ATRIAL FIBRILLATION: Primary | ICD-10-CM

## 2022-07-28 NOTE — TELEPHONE ENCOUNTER
----- Message from Flory Moeller MA sent at 7/28/2022  3:29 PM CDT -----  Regarding: FW: 7/27 EKG Order    ----- Message -----  From: Richelle Adams RN  Sent: 7/28/2022   3:21 PM CDT  To: [facility] Clinical Support Staff  Subject: 7/27 EKG Order                                   The EKG performed on 7/27/22 is missing an order.  Please place an order so that the EKG can be read.    Thank You,  Richelle Adams RN  [facility]  [phone]

## 2022-08-05 ENCOUNTER — PATIENT MESSAGE (OUTPATIENT)
Dept: ADMINISTRATIVE | Facility: HOSPITAL | Age: 82
End: 2022-08-05
Payer: MEDICARE

## 2022-08-05 ENCOUNTER — PATIENT OUTREACH (OUTPATIENT)
Dept: ADMINISTRATIVE | Facility: HOSPITAL | Age: 82
End: 2022-08-05
Payer: MEDICARE

## 2022-08-05 NOTE — LETTER
AUTHORIZATION FOR RELEASE OF   CONFIDENTIAL INFORMATION      We are seeing Jacques Arellano, date of birth 1940, in the clinic at [facility]. Matty Garsia MD is the patient's PCP. Jacques Arellano has an outstanding lab/procedure at the time we reviewed his chart. In order to help keep his health information updated, he has authorized us to request the following medical record(s):        (  )  MAMMOGRAM                                      (  )  COLONOSCOPY      (  )  PAP SMEAR                                          (  )  OUTSIDE LAB RESULTS     (  )  DEXA SCAN                                          (  )  EYE EXAM            (  )  FOOT EXAM                                          ( x )  ENTIRE RECORD     (  )  OUTSIDE IMMUNIZATIONS                 (  )  _______________         Please fax records to [facility], Joshua E Mizell, MD, [phone]     If you have any questions, please contact Alem at [phone]          Patient Name: Jacques Arellano  : 1940  Patient Phone #: [phone]

## 2022-08-05 NOTE — LETTER
AUTHORIZATION FOR RELEASE OF   CONFIDENTIAL INFORMATION        We are seeing Jacques Arellano, date of birth 1940, in the clinic at [facility]. Matty Garsia MD is the patient's PCP. Jacques Arellano has an outstanding lab/procedure at the time we reviewed his chart. In order to help keep his health information updated, he has authorized us to request the following medical record(s):        (  )  MAMMOGRAM                                      ( x )  COLONOSCOPY      (  )  PAP SMEAR                                          (  )  OUTSIDE LAB RESULTS     (  )  DEXA SCAN                                          (  )  EYE EXAM            (  )  FOOT EXAM                                          (  )  ENTIRE RECORD     (  )  OUTSIDE IMMUNIZATIONS                 (  )  _______________         Please fax records to [facility], Joshua E Mizell, MD, [phone]     If you have any questions, please contact Alem at [phone]          Patient Name: Jacques Arellano  : 1940  Patient Phone #: [phone]

## 2022-08-08 ENCOUNTER — EXTERNAL HOME HEALTH (OUTPATIENT)
Dept: HOME HEALTH SERVICES | Facility: HOSPITAL | Age: 82
End: 2022-08-08
Payer: MEDICARE

## 2022-08-09 ENCOUNTER — TELEPHONE (OUTPATIENT)
Dept: PRIMARY CARE CLINIC | Facility: CLINIC | Age: 82
End: 2022-08-09
Payer: MEDICARE

## 2022-08-09 ENCOUNTER — OFFICE VISIT (OUTPATIENT)
Dept: PRIMARY CARE CLINIC | Facility: CLINIC | Age: 82
End: 2022-08-09
Payer: MEDICARE

## 2022-08-09 VITALS — HEIGHT: 70 IN | WEIGHT: 204 LBS | BODY MASS INDEX: 29.2 KG/M2

## 2022-08-09 DIAGNOSIS — I10 HYPERTENSION, UNSPECIFIED TYPE: Primary | ICD-10-CM

## 2022-08-09 DIAGNOSIS — R60.0 PERIPHERAL EDEMA: ICD-10-CM

## 2022-08-09 DIAGNOSIS — E11.9 TYPE 2 DIABETES MELLITUS WITHOUT COMPLICATION, WITHOUT LONG-TERM CURRENT USE OF INSULIN: ICD-10-CM

## 2022-08-09 PROCEDURE — 99213 OFFICE O/P EST LOW 20 MIN: CPT | Mod: 95,,, | Performed by: INTERNAL MEDICINE

## 2022-08-09 PROCEDURE — 99213 PR OFFICE/OUTPT VISIT, EST, LEVL III, 20-29 MIN: ICD-10-PCS | Mod: 95,,, | Performed by: INTERNAL MEDICINE

## 2022-08-09 RX ORDER — FUROSEMIDE 20 MG/1
20 TABLET ORAL 2 TIMES DAILY
Qty: 60 TABLET | Refills: 4 | Status: ON HOLD | OUTPATIENT
Start: 2022-08-09 | End: 2022-09-28 | Stop reason: HOSPADM

## 2022-08-09 RX ORDER — CLORAZEPATE DIPOTASSIUM 7.5 MG/1
7.5 TABLET ORAL 3 TIMES DAILY
Status: ON HOLD | COMMUNITY
End: 2022-09-28 | Stop reason: HOSPADM

## 2022-08-09 RX ORDER — TRIAMTERENE AND HYDROCHLOROTHIAZIDE 75; 50 MG/1; MG/1
TABLET ORAL
Status: ON HOLD | COMMUNITY
Start: 2022-07-29 | End: 2022-09-28 | Stop reason: HOSPADM

## 2022-08-09 RX ORDER — GLIPIZIDE 10 MG/1
10 TABLET ORAL
Qty: 90 TABLET | Refills: 3 | Status: ON HOLD | OUTPATIENT
Start: 2022-08-09 | End: 2022-09-30 | Stop reason: HOSPADM

## 2022-08-09 RX ORDER — METFORMIN HYDROCHLORIDE 1000 MG/1
1000 TABLET ORAL 2 TIMES DAILY WITH MEALS
Qty: 180 TABLET | Refills: 3 | Status: ON HOLD | OUTPATIENT
Start: 2022-08-09 | End: 2022-09-30 | Stop reason: HOSPADM

## 2022-08-09 NOTE — PROGRESS NOTES
[facility] Progress Note  8/9/2022    This was a virtual visit conducted via webcam.      Reason for Visit:      had concerns including Follow-up (Tried going over medication with patient, pt was unknown of medication ).      History of Present Illness:     Pt is seen today as a follow up from his recent visit.  There were several concerns at that visit we are following up on.    The biggest concern was weight gain and increased swelling of his legs.  He was started on lasix at that time, and he says the swelling has improved a great deal.  His legs are no longer weeping.  He is unsure if his weight has decreased.      He was concerned about some unclear thinking/confusion at the last viist.  This has also improved.  At the time of that visit, his urine showed a UTI which has been treated with antibitoics.  We speculated this might be the cause of that confusion.     He says that he has started home health - we are going to try to see if they can repeat a urinalysis and culture on him.  He did develop some diarrhea while on the antibiotics but that has improved in the past 2 days since he has finished them.    He has no other new complaints today.  We reviewed the cardiology note from the visit that occurred since his last visit with us -      Problem List:     Problem List:  2022-07: Debility  2022-07: Venous insufficiency  2022-07: Hypertension  2022-07: Bilateral carotid artery stenosis  2022-04: Presence of Watchman left atrial appendage closure device  2022-04: Chronic anticoagulation  2022-04: Brain lesion  2022-03: Diastolic heart failure  2022-03: Mixed hyperlipidemia  2022-03: Presence of cardiac pacemaker  2022-03: Type 2 diabetes mellitus  2022-03: Chronic gout  2022-02: Permanent atrial fibrillation  2022-02: Falls frequently        Surgical History:     He has a past surgical history that includes Insertion of pacemaker; Tonsillectomy; Cardiac surgery (03/30/2022); Kidney stone  surgery; and Open reduction and internal fixation (ORIF) of fracture of distal radius (Right, 5/17/2022).      Family History      His family history is not on file.      Social History:     He reports that he has never smoked. He has never used smokeless tobacco. He reports previous alcohol use. He reports that he does not use drugs.      Medications:       Current Outpatient Medications:     clorazepate (TRANXENE) 7.5 MG Tab, Take 7.5 mg by mouth 3 (three) times daily., Disp: , Rfl:     febuxostat (ULORIC) 40 mg Tab, Take 40 mg by mouth., Disp: , Rfl:     HYDROcodone-acetaminophen (NORCO) 5-325 mg per tablet, Take 1 tablet by mouth every 6 (six) hours as needed for Pain., Disp: 20 tablet, Rfl: 0    triamterene-hydrochlorothiazide 75-50 mg (MAXZIDE) 75-50 mg per tablet, TAKE 1/2 (ONE-HALF) TABLET BY MOUTH TWICE DAILY, Disp: , Rfl:     acetaminophen (TYLENOL) 325 MG tablet, Take 650 mg by mouth every 6 (six) hours as needed., Disp: , Rfl:     amoxicillin (AMOXIL) 500 MG capsule, Take 1 capsule (500 mg total) by mouth 3 (three) times daily., Disp: 21 capsule, Rfl: 0    aspirin (ECOTRIN) 81 MG EC tablet, Take 81 mg by mouth., Disp: , Rfl:     atorvastatin (LIPITOR) 40 MG tablet, Take 40 mg by mouth once daily., Disp: , Rfl:     EScitalopram oxalate (LEXAPRO) 10 MG tablet, Take 1 tablet (10 mg total) by mouth once daily., Disp: 30 tablet, Rfl: 5    furosemide (LASIX) 20 MG tablet, Take 1 tablet (20 mg total) by mouth 2 (two) times daily., Disp: 60 tablet, Rfl: 4    glipiZIDE (GLUCOTROL) 10 MG tablet, Take 1 tablet (10 mg total) by mouth daily with breakfast., Disp: 90 tablet, Rfl: 3    metFORMIN (GLUCOPHAGE) 1000 MG tablet, Take 1 tablet (1,000 mg total) by mouth 2 (two) times daily with meals., Disp: 180 tablet, Rfl: 3    potassium chloride (MICRO-K) 10 MEQ CpSR, Take 1 capsule (10 mEq total) by mouth 2 (two) times daily., Disp: 60 capsule, Rfl: 2        Allergies:   He has No Known Allergies.      Review  of Systems:     Review of Systems   Constitutional: Positive for unexpected weight change. Negative for activity change.   HENT: Negative for hearing loss, rhinorrhea and trouble swallowing.    Eyes: Positive for visual disturbance. Negative for discharge.   Respiratory: Negative for chest tightness and wheezing.    Cardiovascular: Negative for chest pain and palpitations.   Gastrointestinal: Negative for blood in stool, constipation and vomiting.   Endocrine: Negative for polydipsia and polyuria.   Genitourinary: Negative for difficulty urinating, hematuria and urgency.   Musculoskeletal: Positive for arthralgias. Negative for joint swelling and neck pain.   Neurological: Positive for weakness. Negative for headaches.   Psychiatric/Behavioral: Positive for confusion and dysphoric mood.     Physical Exam:     Pt is alert and oriented.  Speech is clear and coherent.  Speaking in complete sentences.  No distress.  Mood and affect are normal.      Labs/Imaging/Testing:     Most recent CBC:  Lab Results   Component Value Date    WBC 21.05 (H) 07/19/2022    HGB 9.9 (L) 07/19/2022     (L) 07/19/2022    MCV 87 07/19/2022       Most recent Lipids:  No results found for: CHOL, HDL, LDLCALC, TRIG    Most recent Chemistry:  Lab Results   Component Value Date     (L) 07/19/2022    K 3.5 07/19/2022    CL 98 07/19/2022    CO2 24 07/19/2022    BUN 31 (H) 07/19/2022    CREATININE 1.1 07/19/2022     (H) 07/19/2022    CALCIUM 8.6 (L) 07/19/2022    ALT 14 07/19/2022    AST 18 07/19/2022    ALKPHOS 172 (H) 07/19/2022    PROT 5.6 (L) 07/19/2022    ALBUMIN 2.3 (L) 07/19/2022       Other tests:  Lab Results   Component Value Date    TSH 0.555 07/19/2022    HGBA1C 6.1 (H) 07/19/2022    IRON 10 (L) 07/19/2022    FERRITIN 157 07/19/2022             Assessment and Plan:     1. Peripheral edema  Improved after starting lasix-  Will continue current dose  - furosemide (LASIX) 20 MG tablet; Take 1 tablet (20 mg total) by  mouth 2 (two) times daily.  Dispense: 60 tablet; Refill: 4  2. Type 2 diabetes mellitus without complic  ation, without long-term current use of insulin  Continue current meds  He hasn't been checking sugars regularly at home, but his recent a1c was 6.1 - the anemia may affect this some.    - glipiZIDE (GLUCOTROL) 10 MG tablet; Take 1 tablet (10 mg total) by mouth daily with breakfast.  Dispense: 90 tablet; Refill: 3  - metFORMIN (GLUCOPHAGE) 1000 MG tablet; Take 1 tablet (1,000 mg total) by mouth 2 (two) times daily with meals.  Dispense: 180 tablet; Refill: 3    3. Hypertension, unspecified type  Stable    RTC 1 month or sooner prn  Will request home health nurse to collect a UA and culture.    Matty Garsia MD  8/9/2022    This note was prepared using voice-recognition software.  Although efforts are made to proofread the note, some errors may persist in the final document.    Dr. Garsia's [place] License number is [id number]  This was a virtual (audio/video visit) in lieu of in-person visit in context of the coronavirus emergency.      Patient/Family members identity was confirmed, and confidentiality/privacy confirmed prior to visit. Verbal informed consent was obtained from the patient's legal guardian or patient when appropriate to conduct this virtual visit.  They authorized me to provide medical care and voiced understanding of the risks, benefits, and alternatives of virtual care.  Guardian understands the limitations inherent of a virtual visit, that they may choose to be seen in person if desired or needed, and that they may halt the virtual visit at any time for any reason.     Originating Site: Patient's home   Distant Site:  [facility]     I certify that this visit was done via secure two-way simultaneous audio and video transmission with informed consent of the patient and/or guardian. Over 50% of the time was counseling or coordinating care.

## 2022-08-09 NOTE — TELEPHONE ENCOUNTER
Spoke with Ramila at [facility].    They will be out to see Mr. Arellano on Thursday and will collect a UA AND CULTURE from pt.

## 2022-08-16 ENCOUNTER — TELEPHONE (OUTPATIENT)
Dept: CARDIOLOGY | Facility: CLINIC | Age: 82
End: 2022-08-16
Payer: MEDICARE

## 2022-08-18 ENCOUNTER — TELEPHONE (OUTPATIENT)
Dept: PRIMARY CARE CLINIC | Facility: CLINIC | Age: 82
End: 2022-08-18
Payer: MEDICARE

## 2022-08-18 NOTE — TELEPHONE ENCOUNTER
Spoke with pts daughter, she said he lost his balance did not hit his head. Has a small tear on his shoulder. No drainage.     She sent a picture of it to the  nurse. She will reach out if they need wound care orders.

## 2022-08-18 NOTE — TELEPHONE ENCOUNTER
----- Message from Mary Campbell sent at 8/18/2022  3:35 PM CDT -----  Contact: Ashely/[facility]/ [phone]  FYI: Patient had a fall about 3 days ago  in his home skin tear over left shoulder blade .

## 2022-08-23 ENCOUNTER — TELEPHONE (OUTPATIENT)
Dept: PRIMARY CARE CLINIC | Facility: CLINIC | Age: 82
End: 2022-08-23
Payer: MEDICARE

## 2022-08-23 ENCOUNTER — TELEPHONE (OUTPATIENT)
Dept: CARDIOLOGY | Facility: CLINIC | Age: 82
End: 2022-08-23
Payer: MEDICARE

## 2022-08-23 NOTE — TELEPHONE ENCOUNTER
Serjio from  called, pt would like to know can he take anything OTC over the counter? I dont know of any.    Also states the patient is non compliant with taking his lasix and triamterene-hctz and his legs are swollen. I asked if anyone was assisting him with taking medication. She said yes, he just refuses.      Cayv-[phone]

## 2022-08-23 NOTE — TELEPHONE ENCOUNTER
----- Message from Yuliya Mercado sent at 8/23/2022  2:44 PM CDT -----  Contact: Serjio with [facility] @ [phone]  Patient fell and had a skin tear. She requesting wound care orders.

## 2022-08-25 NOTE — TELEPHONE ENCOUNTER
There is an over the counter medication (emitrol) that can be used, but i'm not sure how helpful that would be.  If they want me to send in some zofran I can do that.

## 2022-09-14 ENCOUNTER — PATIENT MESSAGE (OUTPATIENT)
Dept: PRIMARY CARE CLINIC | Facility: CLINIC | Age: 82
End: 2022-09-14
Payer: MEDICARE

## 2022-09-15 ENCOUNTER — OFFICE VISIT (OUTPATIENT)
Dept: URGENT CARE | Facility: CLINIC | Age: 82
End: 2022-09-15
Payer: MEDICARE

## 2022-09-15 VITALS
HEIGHT: 70 IN | BODY MASS INDEX: 29.2 KG/M2 | WEIGHT: 204 LBS | HEART RATE: 72 BPM | SYSTOLIC BLOOD PRESSURE: 147 MMHG | TEMPERATURE: 99 F | OXYGEN SATURATION: 99 % | DIASTOLIC BLOOD PRESSURE: 76 MMHG | RESPIRATION RATE: 20 BRPM

## 2022-09-15 DIAGNOSIS — R11.14 BILIOUS VOMITING WITH NAUSEA: ICD-10-CM

## 2022-09-15 DIAGNOSIS — N39.0 COMPLICATED UTI (URINARY TRACT INFECTION): Primary | ICD-10-CM

## 2022-09-15 LAB
BILIRUB UR QL STRIP: NEGATIVE
GLUCOSE UR QL STRIP: NEGATIVE
KETONES UR QL STRIP: NEGATIVE
LEUKOCYTE ESTERASE UR QL STRIP: POSITIVE
PH, POC UA: 5
POC BLOOD, URINE: POSITIVE
POC NITRATES, URINE: NEGATIVE
PROT UR QL STRIP: POSITIVE
SP GR UR STRIP: 1.02 (ref 1–1.03)
UROBILINOGEN UR STRIP-ACNC: ABNORMAL (ref 0.3–2.2)

## 2022-09-15 PROCEDURE — 96372 PR INJECTION,THERAP/PROPH/DIAG2ST, IM OR SUBCUT: ICD-10-PCS | Mod: S$GLB,,, | Performed by: STUDENT IN AN ORGANIZED HEALTH CARE EDUCATION/TRAINING PROGRAM

## 2022-09-15 PROCEDURE — 96372 THER/PROPH/DIAG INJ SC/IM: CPT | Mod: S$GLB,,, | Performed by: STUDENT IN AN ORGANIZED HEALTH CARE EDUCATION/TRAINING PROGRAM

## 2022-09-15 PROCEDURE — 74018 XR KUB: ICD-10-PCS | Mod: FY,S$GLB,, | Performed by: RADIOLOGY

## 2022-09-15 PROCEDURE — 74018 RADEX ABDOMEN 1 VIEW: CPT | Mod: FY,S$GLB,, | Performed by: RADIOLOGY

## 2022-09-15 PROCEDURE — 99215 PR OFFICE/OUTPT VISIT, EST, LEVL V, 40-54 MIN: ICD-10-PCS | Mod: 25,S$GLB,, | Performed by: STUDENT IN AN ORGANIZED HEALTH CARE EDUCATION/TRAINING PROGRAM

## 2022-09-15 PROCEDURE — 87186 SC STD MICRODIL/AGAR DIL: CPT | Performed by: STUDENT IN AN ORGANIZED HEALTH CARE EDUCATION/TRAINING PROGRAM

## 2022-09-15 PROCEDURE — 81003 POCT URINALYSIS, DIPSTICK, AUTOMATED, W/O SCOPE: ICD-10-PCS | Mod: QW,S$GLB,, | Performed by: STUDENT IN AN ORGANIZED HEALTH CARE EDUCATION/TRAINING PROGRAM

## 2022-09-15 PROCEDURE — 81003 URINALYSIS AUTO W/O SCOPE: CPT | Mod: QW,S$GLB,, | Performed by: STUDENT IN AN ORGANIZED HEALTH CARE EDUCATION/TRAINING PROGRAM

## 2022-09-15 PROCEDURE — 87077 CULTURE AEROBIC IDENTIFY: CPT | Performed by: STUDENT IN AN ORGANIZED HEALTH CARE EDUCATION/TRAINING PROGRAM

## 2022-09-15 PROCEDURE — 87088 URINE BACTERIA CULTURE: CPT | Performed by: STUDENT IN AN ORGANIZED HEALTH CARE EDUCATION/TRAINING PROGRAM

## 2022-09-15 PROCEDURE — 87086 URINE CULTURE/COLONY COUNT: CPT | Performed by: STUDENT IN AN ORGANIZED HEALTH CARE EDUCATION/TRAINING PROGRAM

## 2022-09-15 PROCEDURE — 99215 OFFICE O/P EST HI 40 MIN: CPT | Mod: 25,S$GLB,, | Performed by: STUDENT IN AN ORGANIZED HEALTH CARE EDUCATION/TRAINING PROGRAM

## 2022-09-15 RX ORDER — CIPROFLOXACIN 500 MG/1
500 TABLET ORAL EVERY 12 HOURS
Qty: 20 TABLET | Refills: 0 | Status: ON HOLD | OUTPATIENT
Start: 2022-09-15 | End: 2022-09-28 | Stop reason: HOSPADM

## 2022-09-15 RX ORDER — ONDANSETRON 4 MG/1
4 TABLET, FILM COATED ORAL EVERY 6 HOURS PRN
Qty: 15 TABLET | Refills: 0 | Status: ON HOLD | OUTPATIENT
Start: 2022-09-15 | End: 2022-09-28 | Stop reason: HOSPADM

## 2022-09-15 RX ORDER — ONDANSETRON 4 MG/1
4 TABLET, ORALLY DISINTEGRATING ORAL
Status: COMPLETED | OUTPATIENT
Start: 2022-09-15 | End: 2022-09-15

## 2022-09-15 RX ORDER — CEFTRIAXONE 1 G/1
1 INJECTION, POWDER, FOR SOLUTION INTRAMUSCULAR; INTRAVENOUS
Status: COMPLETED | OUTPATIENT
Start: 2022-09-15 | End: 2022-09-15

## 2022-09-15 RX ADMIN — CEFTRIAXONE 1 G: 1 INJECTION, POWDER, FOR SOLUTION INTRAMUSCULAR; INTRAVENOUS at 06:09

## 2022-09-15 RX ADMIN — ONDANSETRON 4 MG: 4 TABLET, ORALLY DISINTEGRATING ORAL at 04:09

## 2022-09-15 NOTE — PROGRESS NOTES
"Subjective:       Patient ID: Jacques Arellano is a 82 y.o. male.    Vitals:  height is 5' 10" (1.778 m) and weight is 92.5 kg (204 lb). His temperature is 98.8 °F (37.1 °C). His blood pressure is 147/76 (abnormal) and his pulse is 72. His respiration is 20 and oxygen saturation is 99%.     Chief Complaint: Emesis    Pt reports nausea and vomiting that started 4 days ago. He states it is watery and dark dark brown. He feels nauseas right now. He has thrown up once yesterday and twice today. Also, patient's urine has been dark brown for the past few days. He had a UTI approx 2 months ago. He has not taken any OTC meds. He has not eaten the last 4 days. He denies eating anything that made him ill. No recent travel or known sick contacts. No abdominal pain, fever, or chills.     Emesis   This is a new problem. The current episode started in the past 7 days. The problem has been unchanged. The emesis has an appearance of stomach contents. There has been no fever. Pertinent negatives include no abdominal pain, chills or fever. He has tried nothing for the symptoms.     Constitution: Negative for chills and fever.   Gastrointestinal:  Positive for vomiting. Negative for abdominal pain and nausea.   Genitourinary:  Negative for dysuria.        Chronic urinary hesitancy and incontinence intermittently. New onset dark brown urine     Objective:      Physical Exam   Constitutional: He is oriented to person, place, and time. No distress.   Pulmonary/Chest: No respiratory distress.   Abdominal: Soft. Bowel sounds are decreased. protuberant There is no abdominal tenderness. A hernia is present. Hernia confirmed positive in the ventral area.   Neurological: He is alert and oriented to person, place, and time.   Skin: Skin is warm and dry.   Nursing note and vitals reviewed.      Assessment:       1. Complicated UTI (urinary tract infection)    2. Bilious vomiting with nausea        Results for orders placed or performed in visit " on 09/15/22   POCT Urinalysis, Dipstick, Automated, W/O Scope   Result Value Ref Range    POC Blood, Urine Positive (A) Negative    POC Bilirubin, Urine Negative Negative    POC Urobilinogen, Urine Abnormal 0.3 - 2.2    POC Ketones, Urine Negative Negative    POC Protein, Urine Positive (A) Negative    POC Nitrates, Urine Negative Negative    POC Glucose, Urine Negative Negative    pH, UA 5.0     POC Specific Gravity, Urine 1.020 1.003 - 1.029    POC Leukocytes, Urine Positive (A) Negative     EXAMINATION:  XR KUB     CLINICAL HISTORY:  Abdominal pain.     TECHNIQUE:  Single KUB view submitted.     COMPARISON:  None.     FINDINGS:  A nonspecific bowel gas pattern is detected. There are advanced vascular calcifications involving the splenic artery.  There is a 7 x 16 mm ovoid density projecting over the left lower abdomen, which is nonspecific.  There are scoliotic and spondylitic changes.  Advanced vascular calcifications are seen.  At the lung bases, pacer leads are seen projecting over the right atrium and right ventricle.     Impression:     Nonspecific bowel gas pattern.     Nonspecific ovoid hyperdensity projecting over the left lower abdomen.        Electronically signed by: Bebe Smyth  Date:                                            09/15/2022  Time:                                           17:27  Plan:         Complicated UTI (urinary tract infection)  -     CULTURE, URINE  -     cefTRIAXone injection 1 g  -     ciprofloxacin HCl (CIPRO) 500 MG tablet; Take 1 tablet (500 mg total) by mouth every 12 (twelve) hours. for 10 days  Dispense: 20 tablet; Refill: 0    Bilious vomiting with nausea  -     XR KUB; Future; Expected date: 09/15/2022  -     POCT Urinalysis, Dipstick, Automated, W/O Scope  -     ondansetron disintegrating tablet 4 mg  -     ondansetron (ZOFRAN) 4 MG tablet; Take 1 tablet (4 mg total) by mouth every 6 (six) hours as needed for Nausea.  Dispense: 15 tablet; Refill: 0       Patient  refuses COVID and influenza testing.  Results as documented.  Due to concern for urinary tract infection based on urinalysis result, patient was treated with IM Rocephin and a 10 day course of ciprofloxacin was prescribed.  Urine culture ordered and pending.  I discussed with the patient that other diagnoses (including acute intra-abdominal pathology) are not completely ruled out and he should go to the emergency department for any severe or worsening symptoms.  Patient verbalized understanding of the current diagnosis and plan of care.  He did not have any questions or concerns prior to discharge.

## 2022-09-15 NOTE — PATIENT INSTRUCTIONS
Your urinalysis was abnormal and concerning for a urinary tract infection.  Therefore, you have been treated with an antibiotic injection today.  Antibiotic pills were sent to your pharmacy.  You can begin these pills tomorrow.  Please complete all of the medication.    I also sent to a medication to help with the nausea and vomiting.  However, a short supply was sent.  If you continue to have nausea and vomiting, or developed any severe symptoms like fever, chills, back pain, confusion, etc. you should go to the emergency department or call 911 for further evaluation.    The urine culture has been ordered and the results will be available within the next 2-4 days.  You will be notified via [employer] or phone call when results are back.  If your medication needs to be changed for any reason based on the report, we will discuss this with you.

## 2022-09-17 LAB — BACTERIA UR CULT: ABNORMAL

## 2022-09-20 ENCOUNTER — TELEPHONE (OUTPATIENT)
Dept: URGENT CARE | Facility: CLINIC | Age: 82
End: 2022-09-20
Payer: MEDICARE

## 2022-09-20 DIAGNOSIS — N39.0 URINARY TRACT INFECTION WITH HEMATURIA, SITE UNSPECIFIED: Primary | ICD-10-CM

## 2022-09-20 DIAGNOSIS — R31.9 URINARY TRACT INFECTION WITH HEMATURIA, SITE UNSPECIFIED: Primary | ICD-10-CM

## 2022-09-20 PROCEDURE — G0179 PR HOME HEALTH MD RECERTIFICATION: ICD-10-PCS | Mod: ,,, | Performed by: INTERNAL MEDICINE

## 2022-09-20 PROCEDURE — G0179 MD RECERTIFICATION HHA PT: HCPCS | Mod: ,,, | Performed by: INTERNAL MEDICINE

## 2022-09-20 NOTE — TELEPHONE ENCOUNTER
Spoke to pt's daughter, who reports pt has slightly improved. No further vomiting. Dysuria slowly improving. Pt has h/o fall with pain--in which he does not go to bathroom as often as he used to. Also has h/o hernia and renal calcifications, per daughter.  Will place referral for urology.

## 2022-09-21 ENCOUNTER — TELEPHONE (OUTPATIENT)
Dept: PRIMARY CARE CLINIC | Facility: CLINIC | Age: 82
End: 2022-09-21
Payer: MEDICARE

## 2022-09-21 NOTE — TELEPHONE ENCOUNTER
Jemma from  [phone] called, pt had a fall and has a tear to his left arm and knee. Needed a verbal to start wound care and will fax over an order to be signed.

## 2022-09-23 ENCOUNTER — HOSPITAL ENCOUNTER (INPATIENT)
Facility: HOSPITAL | Age: 82
LOS: 9 days | Discharge: NURSING, REHAB OR LONG-TERM CARE | DRG: 871 | End: 2022-10-03
Attending: STUDENT IN AN ORGANIZED HEALTH CARE EDUCATION/TRAINING PROGRAM | Admitting: INTERNAL MEDICINE
Payer: MEDICARE

## 2022-09-23 DIAGNOSIS — I10 HYPERTENSION, UNSPECIFIED TYPE: Chronic | ICD-10-CM

## 2022-09-23 DIAGNOSIS — R07.9 CHEST PAIN: ICD-10-CM

## 2022-09-23 DIAGNOSIS — T14.8XXA MULTIPLE SKIN TEARS: ICD-10-CM

## 2022-09-23 DIAGNOSIS — Z91.89 AT RISK FOR PROLONGED QT INTERVAL SYNDROME: ICD-10-CM

## 2022-09-23 DIAGNOSIS — R00.1 BRADYCARDIA: ICD-10-CM

## 2022-09-23 DIAGNOSIS — S59.901A ELBOW INJURY, RIGHT, INITIAL ENCOUNTER: ICD-10-CM

## 2022-09-23 DIAGNOSIS — R29.6 FALLS FREQUENTLY: Chronic | ICD-10-CM

## 2022-09-23 DIAGNOSIS — N39.0 COMPLICATED UTI (URINARY TRACT INFECTION): ICD-10-CM

## 2022-09-23 DIAGNOSIS — I49.9 ARRHYTHMIA: ICD-10-CM

## 2022-09-23 DIAGNOSIS — E87.20 LACTIC ACIDOSIS: ICD-10-CM

## 2022-09-23 DIAGNOSIS — A41.9 SEPSIS: ICD-10-CM

## 2022-09-23 DIAGNOSIS — R42 LIGHTHEADEDNESS: Primary | ICD-10-CM

## 2022-09-23 DIAGNOSIS — G93.9 BRAIN LESION: ICD-10-CM

## 2022-09-23 DIAGNOSIS — R18.8 OTHER ASCITES: ICD-10-CM

## 2022-09-23 DIAGNOSIS — S22.41XA CLOSED FRACTURE OF MULTIPLE RIBS OF RIGHT SIDE, INITIAL ENCOUNTER: ICD-10-CM

## 2022-09-23 LAB
ALBUMIN SERPL BCP-MCNC: 2.3 G/DL (ref 3.5–5.2)
ALP SERPL-CCNC: 99 U/L (ref 55–135)
ALT SERPL W/O P-5'-P-CCNC: 17 U/L (ref 10–44)
ANION GAP SERPL CALC-SCNC: 10 MMOL/L (ref 8–16)
AST SERPL-CCNC: 41 U/L (ref 10–40)
BASOPHILS # BLD AUTO: 0.02 K/UL (ref 0–0.2)
BASOPHILS NFR BLD: 0.2 % (ref 0–1.9)
BILIRUB SERPL-MCNC: 1.7 MG/DL (ref 0.1–1)
BUN SERPL-MCNC: 23 MG/DL (ref 8–23)
CALCIUM SERPL-MCNC: 8.3 MG/DL (ref 8.7–10.5)
CHLORIDE SERPL-SCNC: 101 MMOL/L (ref 95–110)
CO2 SERPL-SCNC: 23 MMOL/L (ref 23–29)
CREAT SERPL-MCNC: 1.6 MG/DL (ref 0.5–1.4)
DIFFERENTIAL METHOD: ABNORMAL
EOSINOPHIL # BLD AUTO: 0 K/UL (ref 0–0.5)
EOSINOPHIL NFR BLD: 0.1 % (ref 0–8)
ERYTHROCYTE [DISTWIDTH] IN BLOOD BY AUTOMATED COUNT: 17.2 % (ref 11.5–14.5)
EST. GFR  (NO RACE VARIABLE): 42.5 ML/MIN/1.73 M^2
GLUCOSE SERPL-MCNC: 79 MG/DL (ref 70–110)
HCT VFR BLD AUTO: 35 % (ref 40–54)
HCV AB SERPL QL IA: NORMAL
HGB BLD-MCNC: 11.6 G/DL (ref 14–18)
HIV 1+2 AB+HIV1 P24 AG SERPL QL IA: NORMAL
IMM GRANULOCYTES # BLD AUTO: 0.1 K/UL (ref 0–0.04)
IMM GRANULOCYTES NFR BLD AUTO: 0.8 % (ref 0–0.5)
INR PPP: 1.5 (ref 0.8–1.2)
LACTATE SERPL-SCNC: 3.2 MMOL/L (ref 0.5–2.2)
LYMPHOCYTES # BLD AUTO: 0.8 K/UL (ref 1–4.8)
LYMPHOCYTES NFR BLD: 6 % (ref 18–48)
MCH RBC QN AUTO: 31 PG (ref 27–31)
MCHC RBC AUTO-ENTMCNC: 33.1 G/DL (ref 32–36)
MCV RBC AUTO: 94 FL (ref 82–98)
MONOCYTES # BLD AUTO: 0.8 K/UL (ref 0.3–1)
MONOCYTES NFR BLD: 6.1 % (ref 4–15)
NEUTROPHILS # BLD AUTO: 11.1 K/UL (ref 1.8–7.7)
NEUTROPHILS NFR BLD: 86.8 % (ref 38–73)
NRBC BLD-RTO: 0 /100 WBC
PLATELET # BLD AUTO: 102 K/UL (ref 150–450)
PMV BLD AUTO: 10.1 FL (ref 9.2–12.9)
POCT GLUCOSE: 85 MG/DL (ref 70–110)
POTASSIUM SERPL-SCNC: 3.1 MMOL/L (ref 3.5–5.1)
PROT SERPL-MCNC: 5.1 G/DL (ref 6–8.4)
PROTHROMBIN TIME: 15.5 SEC (ref 9–12.5)
RBC # BLD AUTO: 3.74 M/UL (ref 4.6–6.2)
SODIUM SERPL-SCNC: 134 MMOL/L (ref 136–145)
TROPONIN I SERPL DL<=0.01 NG/ML-MCNC: 0.17 NG/ML (ref 0–0.03)
WBC # BLD AUTO: 12.76 K/UL (ref 3.9–12.7)

## 2022-09-23 PROCEDURE — 84484 ASSAY OF TROPONIN QUANT: CPT | Performed by: STUDENT IN AN ORGANIZED HEALTH CARE EDUCATION/TRAINING PROGRAM

## 2022-09-23 PROCEDURE — 85025 COMPLETE CBC W/AUTO DIFF WBC: CPT | Performed by: STUDENT IN AN ORGANIZED HEALTH CARE EDUCATION/TRAINING PROGRAM

## 2022-09-23 PROCEDURE — 96375 TX/PRO/DX INJ NEW DRUG ADDON: CPT

## 2022-09-23 PROCEDURE — 94761 N-INVAS EAR/PLS OXIMETRY MLT: CPT

## 2022-09-23 PROCEDURE — 63600175 PHARM REV CODE 636 W HCPCS: Performed by: STUDENT IN AN ORGANIZED HEALTH CARE EDUCATION/TRAINING PROGRAM

## 2022-09-23 PROCEDURE — 96367 TX/PROPH/DG ADDL SEQ IV INF: CPT

## 2022-09-23 PROCEDURE — 80053 COMPREHEN METABOLIC PANEL: CPT | Performed by: STUDENT IN AN ORGANIZED HEALTH CARE EDUCATION/TRAINING PROGRAM

## 2022-09-23 PROCEDURE — 87150 DNA/RNA AMPLIFIED PROBE: CPT | Performed by: STUDENT IN AN ORGANIZED HEALTH CARE EDUCATION/TRAINING PROGRAM

## 2022-09-23 PROCEDURE — 99285 PR EMERGENCY DEPT VISIT,LEVEL V: ICD-10-PCS | Mod: GC,,, | Performed by: STUDENT IN AN ORGANIZED HEALTH CARE EDUCATION/TRAINING PROGRAM

## 2022-09-23 PROCEDURE — 99285 EMERGENCY DEPT VISIT HI MDM: CPT | Mod: 25

## 2022-09-23 PROCEDURE — 86803 HEPATITIS C AB TEST: CPT | Performed by: PHYSICIAN ASSISTANT

## 2022-09-23 PROCEDURE — 96361 HYDRATE IV INFUSION ADD-ON: CPT

## 2022-09-23 PROCEDURE — 99285 EMERGENCY DEPT VISIT HI MDM: CPT | Mod: GC,,, | Performed by: STUDENT IN AN ORGANIZED HEALTH CARE EDUCATION/TRAINING PROGRAM

## 2022-09-23 PROCEDURE — 96365 THER/PROPH/DIAG IV INF INIT: CPT

## 2022-09-23 PROCEDURE — 25000003 PHARM REV CODE 250: Performed by: STUDENT IN AN ORGANIZED HEALTH CARE EDUCATION/TRAINING PROGRAM

## 2022-09-23 PROCEDURE — 96366 THER/PROPH/DIAG IV INF ADDON: CPT

## 2022-09-23 PROCEDURE — 87389 HIV-1 AG W/HIV-1&-2 AB AG IA: CPT | Performed by: PHYSICIAN ASSISTANT

## 2022-09-23 PROCEDURE — 85610 PROTHROMBIN TIME: CPT | Performed by: STUDENT IN AN ORGANIZED HEALTH CARE EDUCATION/TRAINING PROGRAM

## 2022-09-23 PROCEDURE — 93010 ELECTROCARDIOGRAM REPORT: CPT | Mod: ,,, | Performed by: INTERNAL MEDICINE

## 2022-09-23 PROCEDURE — 87040 BLOOD CULTURE FOR BACTERIA: CPT | Mod: 59 | Performed by: STUDENT IN AN ORGANIZED HEALTH CARE EDUCATION/TRAINING PROGRAM

## 2022-09-23 PROCEDURE — 93010 EKG 12-LEAD: ICD-10-PCS | Mod: ,,, | Performed by: INTERNAL MEDICINE

## 2022-09-23 PROCEDURE — 83605 ASSAY OF LACTIC ACID: CPT | Performed by: STUDENT IN AN ORGANIZED HEALTH CARE EDUCATION/TRAINING PROGRAM

## 2022-09-23 PROCEDURE — 93005 ELECTROCARDIOGRAM TRACING: CPT

## 2022-09-23 RX ORDER — ACETAMINOPHEN 500 MG
1000 TABLET ORAL
Status: COMPLETED | OUTPATIENT
Start: 2022-09-23 | End: 2022-09-23

## 2022-09-23 RX ADMIN — SODIUM CHLORIDE 250 ML: 0.9 INJECTION, SOLUTION INTRAVENOUS at 10:09

## 2022-09-23 RX ADMIN — ACETAMINOPHEN 1000 MG: 500 TABLET ORAL at 10:09

## 2022-09-23 RX ADMIN — PIPERACILLIN SODIUM AND TAZOBACTAM SODIUM 4.5 G: 4; .5 INJECTION, POWDER, LYOPHILIZED, FOR SOLUTION INTRAVENOUS at 11:09

## 2022-09-23 RX ADMIN — DEXTROSE 250 ML: 10 SOLUTION INTRAVENOUS at 11:09

## 2022-09-23 NOTE — Clinical Note
Diagnosis: Lightheadedness [958194]   Admitting Provider:: MYAH UMANA [[id number]]   Future Attending Provider: MYAH UMANA [[id number]]   Reason for IP Medical Treatment  (Clinical interventions that can only be accomplished in the IP setting? ) :: rib fractures, lactatic acidosis   Estimated Length of Stay:: 2 midnights   I certify that Inpatient services for greater than or equal to 2 midnights are medically necessary:: Yes   Plans for Post-Acute care--if anticipated (pick the single best option):: A. No post acute care anticipated at this time

## 2022-09-24 PROBLEM — A41.9 SEPSIS: Status: ACTIVE | Noted: 2022-09-24

## 2022-09-24 PROBLEM — R57.9 SHOCK: Status: ACTIVE | Noted: 2022-09-24

## 2022-09-24 PROBLEM — E87.6 HYPOKALEMIA: Status: ACTIVE | Noted: 2022-09-24

## 2022-09-24 PROBLEM — E78.2 MIXED HYPERLIPIDEMIA: Chronic | Status: ACTIVE | Noted: 2022-03-24

## 2022-09-24 PROBLEM — I10 HYPERTENSION: Chronic | Status: ACTIVE | Noted: 2022-07-19

## 2022-09-24 PROBLEM — R29.6 FALLS FREQUENTLY: Chronic | Status: ACTIVE | Noted: 2022-02-15

## 2022-09-24 PROBLEM — S22.39XA RIB FRACTURE: Status: ACTIVE | Noted: 2022-09-24

## 2022-09-24 PROBLEM — I48.21 PERMANENT ATRIAL FIBRILLATION: Chronic | Status: ACTIVE | Noted: 2022-02-15

## 2022-09-24 PROBLEM — R53.81 DEBILITY: Chronic | Status: ACTIVE | Noted: 2022-07-27

## 2022-09-24 PROBLEM — M1A.9XX0 CHRONIC GOUT: Chronic | Status: ACTIVE | Noted: 2022-03-24

## 2022-09-24 PROBLEM — Z95.818 PRESENCE OF WATCHMAN LEFT ATRIAL APPENDAGE CLOSURE DEVICE: Chronic | Status: ACTIVE | Noted: 2022-04-08

## 2022-09-24 PROBLEM — N17.9 AKI (ACUTE KIDNEY INJURY): Status: ACTIVE | Noted: 2022-09-24

## 2022-09-24 LAB
ALBUMIN SERPL BCP-MCNC: 2.2 G/DL (ref 3.5–5.2)
ALP SERPL-CCNC: 90 U/L (ref 55–135)
ALT SERPL W/O P-5'-P-CCNC: 16 U/L (ref 10–44)
ANION GAP SERPL CALC-SCNC: 10 MMOL/L (ref 8–16)
ANION GAP SERPL CALC-SCNC: 16 MMOL/L (ref 8–16)
AST SERPL-CCNC: 39 U/L (ref 10–40)
BACTERIA #/AREA URNS AUTO: ABNORMAL /HPF
BASOPHILS # BLD AUTO: 0.02 K/UL (ref 0–0.2)
BASOPHILS NFR BLD: 0.2 % (ref 0–1.9)
BILIRUB SERPL-MCNC: 1.5 MG/DL (ref 0.1–1)
BILIRUB UR QL STRIP: NEGATIVE
BUN SERPL-MCNC: 23 MG/DL (ref 8–23)
BUN SERPL-MCNC: 23 MG/DL (ref 8–23)
CA PHOS CRY UR QL COMP ASSIST: ABNORMAL
CALCIUM SERPL-MCNC: 7.3 MG/DL (ref 8.7–10.5)
CALCIUM SERPL-MCNC: 8 MG/DL (ref 8.7–10.5)
CHLORIDE SERPL-SCNC: 103 MMOL/L (ref 95–110)
CHLORIDE SERPL-SCNC: 104 MMOL/L (ref 95–110)
CLARITY UR REFRACT.AUTO: ABNORMAL
CO2 SERPL-SCNC: 22 MMOL/L (ref 23–29)
CO2 SERPL-SCNC: 22 MMOL/L (ref 23–29)
COLOR UR AUTO: YELLOW
CREAT SERPL-MCNC: 1.4 MG/DL (ref 0.5–1.4)
CREAT SERPL-MCNC: 1.4 MG/DL (ref 0.5–1.4)
DIFFERENTIAL METHOD: ABNORMAL
EOSINOPHIL # BLD AUTO: 0 K/UL (ref 0–0.5)
EOSINOPHIL NFR BLD: 0.2 % (ref 0–8)
ERYTHROCYTE [DISTWIDTH] IN BLOOD BY AUTOMATED COUNT: 17.2 % (ref 11.5–14.5)
EST. GFR  (NO RACE VARIABLE): 49.9 ML/MIN/1.73 M^2
EST. GFR  (NO RACE VARIABLE): 49.9 ML/MIN/1.73 M^2
GLUCOSE SERPL-MCNC: 60 MG/DL (ref 70–110)
GLUCOSE SERPL-MCNC: 97 MG/DL (ref 70–110)
GLUCOSE UR QL STRIP: NEGATIVE
HCT VFR BLD AUTO: 34.1 % (ref 40–54)
HGB BLD-MCNC: 11.1 G/DL (ref 14–18)
HGB UR QL STRIP: ABNORMAL
IMM GRANULOCYTES # BLD AUTO: 0.04 K/UL (ref 0–0.04)
IMM GRANULOCYTES NFR BLD AUTO: 0.5 % (ref 0–0.5)
KETONES UR QL STRIP: NEGATIVE
LACTATE SERPL-SCNC: 2 MMOL/L (ref 0.5–2.2)
LEUKOCYTE ESTERASE UR QL STRIP: ABNORMAL
LYMPHOCYTES # BLD AUTO: 0.8 K/UL (ref 1–4.8)
LYMPHOCYTES NFR BLD: 9.1 % (ref 18–48)
MAGNESIUM SERPL-MCNC: 1.2 MG/DL (ref 1.6–2.6)
MCH RBC QN AUTO: 30.7 PG (ref 27–31)
MCHC RBC AUTO-ENTMCNC: 32.6 G/DL (ref 32–36)
MCV RBC AUTO: 95 FL (ref 82–98)
MICROSCOPIC COMMENT: ABNORMAL
MONOCYTES # BLD AUTO: 0.6 K/UL (ref 0.3–1)
MONOCYTES NFR BLD: 7.6 % (ref 4–15)
MRSA ID BY PCR: NEGATIVE
NEUTROPHILS # BLD AUTO: 6.8 K/UL (ref 1.8–7.7)
NEUTROPHILS NFR BLD: 82.4 % (ref 38–73)
NITRITE UR QL STRIP: NEGATIVE
NRBC BLD-RTO: 0 /100 WBC
PH UR STRIP: 7 [PH] (ref 5–8)
PHOSPHATE SERPL-MCNC: 2.1 MG/DL (ref 2.7–4.5)
PLATELET # BLD AUTO: 74 K/UL (ref 150–450)
PMV BLD AUTO: 10.8 FL (ref 9.2–12.9)
POCT GLUCOSE: 101 MG/DL (ref 70–110)
POCT GLUCOSE: 103 MG/DL (ref 70–110)
POCT GLUCOSE: 106 MG/DL (ref 70–110)
POCT GLUCOSE: 50 MG/DL (ref 70–110)
POCT GLUCOSE: 55 MG/DL (ref 70–110)
POCT GLUCOSE: 56 MG/DL (ref 70–110)
POTASSIUM SERPL-SCNC: 2.9 MMOL/L (ref 3.5–5.1)
POTASSIUM SERPL-SCNC: 3.3 MMOL/L (ref 3.5–5.1)
PROCALCITONIN SERPL IA-MCNC: 0.14 NG/ML
PROT SERPL-MCNC: 4.8 G/DL (ref 6–8.4)
PROT UR QL STRIP: ABNORMAL
RBC # BLD AUTO: 3.61 M/UL (ref 4.6–6.2)
RBC #/AREA URNS AUTO: >100 /HPF (ref 0–4)
SODIUM SERPL-SCNC: 135 MMOL/L (ref 136–145)
SODIUM SERPL-SCNC: 142 MMOL/L (ref 136–145)
SP GR UR STRIP: 1.02 (ref 1–1.03)
STAPH AUREUS ID BY PCR: NEGATIVE
TROPONIN I SERPL DL<=0.01 NG/ML-MCNC: 0.15 NG/ML (ref 0–0.03)
URN SPEC COLLECT METH UR: ABNORMAL
WBC # BLD AUTO: 8.27 K/UL (ref 3.9–12.7)
WBC #/AREA URNS AUTO: >100 /HPF (ref 0–5)

## 2022-09-24 PROCEDURE — 25000003 PHARM REV CODE 250: Performed by: EMERGENCY MEDICINE

## 2022-09-24 PROCEDURE — 80048 BASIC METABOLIC PNL TOTAL CA: CPT | Mod: XB | Performed by: STUDENT IN AN ORGANIZED HEALTH CARE EDUCATION/TRAINING PROGRAM

## 2022-09-24 PROCEDURE — 84145 PROCALCITONIN (PCT): CPT

## 2022-09-24 PROCEDURE — 83605 ASSAY OF LACTIC ACID: CPT | Performed by: EMERGENCY MEDICINE

## 2022-09-24 PROCEDURE — 99291 PR CRITICAL CARE, E/M 30-74 MINUTES: ICD-10-PCS | Mod: GC,,, | Performed by: INTERNAL MEDICINE

## 2022-09-24 PROCEDURE — 94761 N-INVAS EAR/PLS OXIMETRY MLT: CPT

## 2022-09-24 PROCEDURE — 87086 URINE CULTURE/COLONY COUNT: CPT | Performed by: STUDENT IN AN ORGANIZED HEALTH CARE EDUCATION/TRAINING PROGRAM

## 2022-09-24 PROCEDURE — 99291 CRITICAL CARE FIRST HOUR: CPT | Mod: GC,,, | Performed by: INTERNAL MEDICINE

## 2022-09-24 PROCEDURE — 84100 ASSAY OF PHOSPHORUS: CPT

## 2022-09-24 PROCEDURE — 25500020 PHARM REV CODE 255: Performed by: STUDENT IN AN ORGANIZED HEALTH CARE EDUCATION/TRAINING PROGRAM

## 2022-09-24 PROCEDURE — 83735 ASSAY OF MAGNESIUM: CPT

## 2022-09-24 PROCEDURE — 25000003 PHARM REV CODE 250

## 2022-09-24 PROCEDURE — 63600175 PHARM REV CODE 636 W HCPCS

## 2022-09-24 PROCEDURE — 87040 BLOOD CULTURE FOR BACTERIA: CPT

## 2022-09-24 PROCEDURE — 25000003 PHARM REV CODE 250: Performed by: STUDENT IN AN ORGANIZED HEALTH CARE EDUCATION/TRAINING PROGRAM

## 2022-09-24 PROCEDURE — 63600175 PHARM REV CODE 636 W HCPCS: Performed by: STUDENT IN AN ORGANIZED HEALTH CARE EDUCATION/TRAINING PROGRAM

## 2022-09-24 PROCEDURE — 84484 ASSAY OF TROPONIN QUANT: CPT | Performed by: STUDENT IN AN ORGANIZED HEALTH CARE EDUCATION/TRAINING PROGRAM

## 2022-09-24 PROCEDURE — 20000000 HC ICU ROOM

## 2022-09-24 PROCEDURE — 85025 COMPLETE CBC W/AUTO DIFF WBC: CPT

## 2022-09-24 PROCEDURE — 81001 URINALYSIS AUTO W/SCOPE: CPT | Performed by: STUDENT IN AN ORGANIZED HEALTH CARE EDUCATION/TRAINING PROGRAM

## 2022-09-24 PROCEDURE — 80053 COMPREHEN METABOLIC PANEL: CPT

## 2022-09-24 RX ORDER — GLUCAGON 1 MG
1 KIT INJECTION
Status: DISCONTINUED | OUTPATIENT
Start: 2022-09-24 | End: 2022-10-03 | Stop reason: HOSPADM

## 2022-09-24 RX ORDER — INSULIN ASPART 100 [IU]/ML
0-5 INJECTION, SOLUTION INTRAVENOUS; SUBCUTANEOUS
Status: DISCONTINUED | OUTPATIENT
Start: 2022-09-24 | End: 2022-10-03 | Stop reason: HOSPADM

## 2022-09-24 RX ORDER — PROCHLORPERAZINE MALEATE 5 MG
10 TABLET ORAL EVERY 6 HOURS PRN
Status: DISCONTINUED | OUTPATIENT
Start: 2022-09-24 | End: 2022-09-24

## 2022-09-24 RX ORDER — SODIUM CHLORIDE 0.9 % (FLUSH) 0.9 %
10 SYRINGE (ML) INJECTION EVERY 12 HOURS PRN
Status: DISCONTINUED | OUTPATIENT
Start: 2022-09-24 | End: 2022-10-03 | Stop reason: HOSPADM

## 2022-09-24 RX ORDER — TALC
6 POWDER (GRAM) TOPICAL NIGHTLY PRN
Status: DISCONTINUED | OUTPATIENT
Start: 2022-09-24 | End: 2022-10-03 | Stop reason: HOSPADM

## 2022-09-24 RX ORDER — ATORVASTATIN CALCIUM 20 MG/1
40 TABLET, FILM COATED ORAL DAILY
Status: DISCONTINUED | OUTPATIENT
Start: 2022-09-24 | End: 2022-10-03 | Stop reason: HOSPADM

## 2022-09-24 RX ORDER — POLYETHYLENE GLYCOL 3350 17 G/17G
17 POWDER, FOR SOLUTION ORAL 2 TIMES DAILY PRN
Status: DISCONTINUED | OUTPATIENT
Start: 2022-09-24 | End: 2022-09-24

## 2022-09-24 RX ORDER — NOREPINEPHRINE BITARTRATE/D5W 4MG/250ML
0-.2 PLASTIC BAG, INJECTION (ML) INTRAVENOUS CONTINUOUS
Status: DISCONTINUED | OUTPATIENT
Start: 2022-09-24 | End: 2022-09-25

## 2022-09-24 RX ORDER — ACETAMINOPHEN 325 MG/1
650 TABLET ORAL EVERY 4 HOURS PRN
Status: DISCONTINUED | OUTPATIENT
Start: 2022-09-24 | End: 2022-10-03 | Stop reason: HOSPADM

## 2022-09-24 RX ORDER — MORPHINE SULFATE 4 MG/ML
4 INJECTION, SOLUTION INTRAMUSCULAR; INTRAVENOUS
Status: DISCONTINUED | OUTPATIENT
Start: 2022-09-24 | End: 2022-09-24

## 2022-09-24 RX ORDER — POTASSIUM CHLORIDE 20 MEQ/1
40 TABLET, EXTENDED RELEASE ORAL 2 TIMES DAILY
Status: COMPLETED | OUTPATIENT
Start: 2022-09-24 | End: 2022-09-25

## 2022-09-24 RX ORDER — LIDOCAINE HYDROCHLORIDE 10 MG/ML
20 INJECTION INFILTRATION; PERINEURAL ONCE
Status: DISCONTINUED | OUTPATIENT
Start: 2022-09-24 | End: 2022-10-03

## 2022-09-24 RX ORDER — ALLOPURINOL 100 MG/1
100 TABLET ORAL DAILY
Status: DISCONTINUED | OUTPATIENT
Start: 2022-09-24 | End: 2022-10-03 | Stop reason: HOSPADM

## 2022-09-24 RX ORDER — POTASSIUM CHLORIDE 20 MEQ/1
40 TABLET, EXTENDED RELEASE ORAL
Status: DISCONTINUED | OUTPATIENT
Start: 2022-09-24 | End: 2022-09-24

## 2022-09-24 RX ORDER — ESCITALOPRAM OXALATE 10 MG/1
10 TABLET ORAL DAILY
Status: DISCONTINUED | OUTPATIENT
Start: 2022-09-24 | End: 2022-10-03 | Stop reason: HOSPADM

## 2022-09-24 RX ORDER — NOREPINEPHRINE BITARTRATE/D5W 4MG/250ML
0-.2 PLASTIC BAG, INJECTION (ML) INTRAVENOUS CONTINUOUS
Status: DISCONTINUED | OUTPATIENT
Start: 2022-09-24 | End: 2022-09-24

## 2022-09-24 RX ORDER — IBUPROFEN 200 MG
24 TABLET ORAL
Status: DISCONTINUED | OUTPATIENT
Start: 2022-09-24 | End: 2022-10-03 | Stop reason: HOSPADM

## 2022-09-24 RX ORDER — LIDOCAINE 50 MG/G
1 PATCH TOPICAL
Status: DISCONTINUED | OUTPATIENT
Start: 2022-09-24 | End: 2022-10-03 | Stop reason: HOSPADM

## 2022-09-24 RX ORDER — NALOXONE HCL 0.4 MG/ML
0.02 VIAL (ML) INJECTION
Status: DISCONTINUED | OUTPATIENT
Start: 2022-09-24 | End: 2022-09-24

## 2022-09-24 RX ORDER — MAG HYDROX/ALUMINUM HYD/SIMETH 200-200-20
30 SUSPENSION, ORAL (FINAL DOSE FORM) ORAL 4 TIMES DAILY PRN
Status: DISCONTINUED | OUTPATIENT
Start: 2022-09-24 | End: 2022-09-24

## 2022-09-24 RX ORDER — NOREPINEPHRINE BITARTRATE/D5W 4MG/250ML
0-3 PLASTIC BAG, INJECTION (ML) INTRAVENOUS CONTINUOUS
Status: DISCONTINUED | OUTPATIENT
Start: 2022-09-24 | End: 2022-09-24 | Stop reason: SDUPTHER

## 2022-09-24 RX ORDER — MAGNESIUM SULFATE HEPTAHYDRATE 40 MG/ML
2 INJECTION, SOLUTION INTRAVENOUS ONCE
Status: COMPLETED | OUTPATIENT
Start: 2022-09-24 | End: 2022-09-24

## 2022-09-24 RX ORDER — AMOXICILLIN 250 MG
1 CAPSULE ORAL DAILY PRN
Status: DISCONTINUED | OUTPATIENT
Start: 2022-09-24 | End: 2022-09-24

## 2022-09-24 RX ORDER — IBUPROFEN 200 MG
16 TABLET ORAL
Status: DISCONTINUED | OUTPATIENT
Start: 2022-09-24 | End: 2022-10-03 | Stop reason: HOSPADM

## 2022-09-24 RX ORDER — SODIUM,POTASSIUM PHOSPHATES 280-250MG
2 POWDER IN PACKET (EA) ORAL
Status: COMPLETED | OUTPATIENT
Start: 2022-09-24 | End: 2022-09-24

## 2022-09-24 RX ORDER — IPRATROPIUM BROMIDE AND ALBUTEROL SULFATE 2.5; .5 MG/3ML; MG/3ML
3 SOLUTION RESPIRATORY (INHALATION) EVERY 6 HOURS PRN
Status: DISCONTINUED | OUTPATIENT
Start: 2022-09-24 | End: 2022-09-24

## 2022-09-24 RX ORDER — LIDOCAINE HYDROCHLORIDE 10 MG/ML
INJECTION INFILTRATION; PERINEURAL
Status: DISCONTINUED
Start: 2022-09-24 | End: 2022-09-24 | Stop reason: WASHOUT

## 2022-09-24 RX ADMIN — SODIUM CHLORIDE 500 ML: 0.9 INJECTION, SOLUTION INTRAVENOUS at 03:09

## 2022-09-24 RX ADMIN — POTASSIUM BICARBONATE 50 MEQ: 977.5 TABLET, EFFERVESCENT ORAL at 10:09

## 2022-09-24 RX ADMIN — POTASSIUM & SODIUM PHOSPHATES POWDER PACK 280-160-250 MG 2 PACKET: 280-160-250 PACK at 06:09

## 2022-09-24 RX ADMIN — MAGNESIUM SULFATE 2 G: 2 INJECTION INTRAVENOUS at 06:09

## 2022-09-24 RX ADMIN — Medication 16 G: at 06:09

## 2022-09-24 RX ADMIN — PIPERACILLIN SODIUM AND TAZOBACTAM SODIUM 4.5 G: 4; .5 INJECTION, POWDER, LYOPHILIZED, FOR SOLUTION INTRAVENOUS at 06:09

## 2022-09-24 RX ADMIN — PIPERACILLIN SODIUM AND TAZOBACTAM SODIUM 4.5 G: 4; .5 INJECTION, POWDER, LYOPHILIZED, FOR SOLUTION INTRAVENOUS at 09:09

## 2022-09-24 RX ADMIN — ALLOPURINOL 100 MG: 100 TABLET ORAL at 10:09

## 2022-09-24 RX ADMIN — IOHEXOL 100 ML: 350 INJECTION, SOLUTION INTRAVENOUS at 12:09

## 2022-09-24 RX ADMIN — POTASSIUM & SODIUM PHOSPHATES POWDER PACK 280-160-250 MG 2 PACKET: 280-160-250 PACK at 10:09

## 2022-09-24 RX ADMIN — POTASSIUM CHLORIDE 40 MEQ: 1500 TABLET, EXTENDED RELEASE ORAL at 10:09

## 2022-09-24 RX ADMIN — PIPERACILLIN SODIUM AND TAZOBACTAM SODIUM 4.5 G: 4; .5 INJECTION, POWDER, LYOPHILIZED, FOR SOLUTION INTRAVENOUS at 02:09

## 2022-09-24 RX ADMIN — SODIUM CHLORIDE 1000 ML: 0.9 INJECTION, SOLUTION INTRAVENOUS at 05:09

## 2022-09-24 RX ADMIN — NOREPINEPHRINE BITARTRATE 0.02 MCG/KG/MIN: 4 INJECTION, SOLUTION INTRAVENOUS at 05:09

## 2022-09-24 RX ADMIN — SODIUM CHLORIDE 250 ML: 0.9 INJECTION, SOLUTION INTRAVENOUS at 01:09

## 2022-09-24 RX ADMIN — VANCOMYCIN HYDROCHLORIDE 2000 MG: 500 INJECTION, POWDER, LYOPHILIZED, FOR SOLUTION INTRAVENOUS at 01:09

## 2022-09-24 RX ADMIN — Medication 6 MG: at 08:09

## 2022-09-24 RX ADMIN — DEXTROSE 125 ML: 10 SOLUTION INTRAVENOUS at 08:09

## 2022-09-24 RX ADMIN — SODIUM CHLORIDE 250 ML: 0.9 INJECTION, SOLUTION INTRAVENOUS at 02:09

## 2022-09-24 RX ADMIN — POTASSIUM BICARBONATE 50 MEQ: 977.5 TABLET, EFFERVESCENT ORAL at 05:09

## 2022-09-24 RX ADMIN — SODIUM CHLORIDE 500 ML: 0.9 INJECTION, SOLUTION INTRAVENOUS at 04:09

## 2022-09-24 RX ADMIN — ESCITALOPRAM 10 MG: 5 TABLET, FILM COATED ORAL at 10:09

## 2022-09-24 RX ADMIN — ATORVASTATIN CALCIUM 40 MG: 40 TABLET, FILM COATED ORAL at 10:09

## 2022-09-24 NOTE — ASSESSMENT & PLAN NOTE
81 yo M presenting to ED with multiple history of falls w/o LOC / head trauma, light-headedness. Arrived with slight leukocytosis 12.7, tachycardia, hypotension (MAP low 60s), lactate 3.2. CXR unremarkable, CT without acute pulmonary or abdominal findings to suggest infection. UA pending collection. Patient appears hypovolemic on arrival. Mentation intact and at baseline. S/P sepsis fluid bolus (combined). Previously seen outpatient with pan-sensitive E Coli UTI for which he was prescribed a ciprofloxacin. CT head negative for acute abnormality (though suspicious bone finding needs OP f/u), CTAP with myriad of findings without acute infectious nidus, though cirrhotic morphology.     Etiology: Hypotension may be 2/2 infection (though unclear source as patient would have been adequately treated with OP abx for UTI) vs cardiac dysfunction given syncope and pacemaker placement. Recent EKG without pacer spikes concerning his pacer is not capturing.      -- Vanc / Zosyn empiric abx  -- Trend fever curve, trend CBC  -- Maintain MAP > 65  -- UA / UCx pending  -- BCx pending  -- Lactate now normalized following fluid bolus  -- Investigate for further cause of falls including: cardiac telemetry

## 2022-09-24 NOTE — ASSESSMENT & PLAN NOTE
Recurrent Falls  Previous H/o UTI  Light-headedness  Lactic acidosis    83 yo M presenting to ED with multiple history of falls w/o LOC / head trauma, light-headedness. Arrived with slight leukocytosis 12.7, tachycardia, hypotension (MAP low 60s), lactate 3.2. CXR unremarkable, CT without acute pulmonary or abdominal findings to suggest infection. UA pending collection. Patient appears hypovolemic on arrival. Mentation intact and at baseline. S/P sepsis fluid bolus (combined). Previously seen outpatient with pan-sensitive E Coli UTI for which he was prescribed a ciprofloxacin. CT head negative for acute abnormality (though suspicious bone finding needs OP f/u), CTAP with myriad of findings without acute infectious nidus.    Etiology: Hypotension may be 2/2 infection (though unclear source as patient would have been adequately treated with OP abx for UTI) vs HRS vs hypovolemia d/t questionable PO intake?     -- Vanc / Zosyn empiric abx  -- Trend fever curve, trend CBC  -- Maintain MAP > 65; IVF PRN, patient is very fluid responsive  -- UA / UCx pending  -- BCx showed contaminate of CN Staph   -- Lactate now normalized following fluid bolus  -- Investigate for further cause of falls including: orthostatic vitals, cardiac telemetry

## 2022-09-24 NOTE — SUBJECTIVE & OBJECTIVE
Past Medical History:   Diagnosis Date    Anticoagulant long-term use     Atrial fibrillation     Diabetes mellitus     Diastolic heart failure     Frequent falls     Hypertension     Renal disorder     eswl    Sleep apnea     20 yrs ago    Stroke     TIA    UTI (urinary tract infection)     Vertigo        Past Surgical History:   Procedure Laterality Date    CARDIAC SURGERY  03/30/2022    watchmen    INSERTION OF PACEMAKER      KIDNEY STONE SURGERY      OPEN REDUCTION AND INTERNAL FIXATION (ORIF) OF FRACTURE OF DISTAL RADIUS Right 5/17/2022    Procedure: ORIF, FRACTURE, RADIUS, DISTAL;  Surgeon: Claude S. Williams IV, MD;  Location: [facility];  Service: Orthopedics;  Laterality: Right;    TONSILLECTOMY         Review of patient's allergies indicates:  No Known Allergies    No current facility-administered medications on file prior to encounter.     Current Outpatient Medications on File Prior to Encounter   Medication Sig    acetaminophen (TYLENOL) 325 MG tablet Take 650 mg by mouth every 6 (six) hours as needed.    amoxicillin (AMOXIL) 500 MG capsule Take 1 capsule (500 mg total) by mouth 3 (three) times daily. (Patient not taking: Reported on 9/15/2022)    aspirin (ECOTRIN) 81 MG EC tablet Take 81 mg by mouth.    atorvastatin (LIPITOR) 40 MG tablet Take 40 mg by mouth once daily.    ciprofloxacin HCl (CIPRO) 500 MG tablet Take 1 tablet (500 mg total) by mouth every 12 (twelve) hours. for 10 days    clorazepate (TRANXENE) 7.5 MG Tab Take 7.5 mg by mouth 3 (three) times daily.    EScitalopram oxalate (LEXAPRO) 10 MG tablet Take 1 tablet (10 mg total) by mouth once daily.    febuxostat (ULORIC) 40 mg Tab Take 40 mg by mouth.    furosemide (LASIX) 20 MG tablet Take 1 tablet (20 mg total) by mouth 2 (two) times daily. (Patient not taking: Reported on 9/15/2022)    glipiZIDE (GLUCOTROL) 10 MG tablet Take 1 tablet (10 mg total) by mouth daily with breakfast.    HYDROcodone-acetaminophen (NORCO) 5-325 mg per tablet Take 1  tablet by mouth every 6 (six) hours as needed for Pain.    metFORMIN (GLUCOPHAGE) 1000 MG tablet Take 1 tablet (1,000 mg total) by mouth 2 (two) times daily with meals.    ondansetron (ZOFRAN) 4 MG tablet Take 1 tablet (4 mg total) by mouth every 6 (six) hours as needed for Nausea.    potassium chloride (MICRO-K) 10 MEQ CpSR Take 1 capsule (10 mEq total) by mouth 2 (two) times daily.    triamterene-hydrochlorothiazide 75-50 mg (MAXZIDE) 75-50 mg per tablet TAKE 1/2 (ONE-HALF) TABLET BY MOUTH TWICE DAILY     Family History    None       Tobacco Use    Smoking status: Never    Smokeless tobacco: Never   Substance and Sexual Activity    Alcohol use: Not Currently     Comment: SOCIAL    Drug use: Never    Sexual activity: Never     Review of Systems   Constitutional:  Positive for fatigue. Negative for chills, diaphoresis and fever.   HENT:  Negative for rhinorrhea, sneezing, sore throat and trouble swallowing.    Respiratory:  Negative for cough and shortness of breath.    Cardiovascular:  Negative for chest pain and palpitations.   Gastrointestinal:  Negative for constipation, diarrhea, nausea and vomiting.   Genitourinary:  Negative for dysuria, frequency and urgency.   Musculoskeletal:  Negative for arthralgias and myalgias.   Skin:  Positive for wound. Negative for rash.   Neurological:  Positive for light-headedness (w/ fall). Negative for weakness and headaches.   Psychiatric/Behavioral:  Negative for agitation and confusion.    Objective:     Vital Signs (Most Recent):  Temp: 98 °F (36.7 °C) (09/24/22 0330)  Pulse: 69 (09/24/22 0354)  Resp: 15 (09/24/22 0350)  BP: (!) 93/52 (09/24/22 0354)  SpO2: 100 % (09/24/22 0354)   Vital Signs (24h Range):  Temp:  [98 °F (36.7 °C)-98.8 °F (37.1 °C)] 98 °F (36.7 °C)  Pulse:  [] 69  Resp:  [15-24] 15  SpO2:  [98 %-100 %] 100 %  BP: ()/(42-69) 93/52     Weight: 93 kg (205 lb)  Body mass index is 29.41 kg/m².    Physical Exam  Constitutional:       Appearance:  Normal appearance.   HENT:      Head: Normocephalic.   Eyes:      Extraocular Movements: Extraocular movements intact.      Pupils: Pupils are equal, round, and reactive to light.   Cardiovascular:      Rate and Rhythm: Normal rate and regular rhythm.      Pulses: Normal pulses.      Heart sounds: Normal heart sounds. No murmur heard.    No friction rub. No gallop.   Pulmonary:      Effort: Pulmonary effort is normal. No respiratory distress.      Breath sounds: No rales.   Chest:      Chest wall: No tenderness.   Abdominal:      General: Abdomen is flat.      Palpations: Abdomen is soft.      Tenderness: There is no abdominal tenderness. There is no guarding or rebound.   Musculoskeletal:      Cervical back: Normal range of motion and neck supple.      Right lower leg: No edema.      Left lower leg: No edema.   Skin:     General: Skin is warm and dry.      Findings: Bruising (Diffuse bruising to upper extremities) and lesion (Multiple skin abrasions to bilateral knees, lower extremities, arms) present.   Neurological:      General: No focal deficit present.      Mental Status: He is alert and oriented to person, place, and time.      Comments: Mentation intact, though will lose track of conversation and be distracted by other conversation topics         CRANIAL NERVES     CN III, IV, VI   Pupils are equal, round, and reactive to light.     Significant Labs: All pertinent labs within the past 24 hours have been reviewed.    Significant Imaging: I have reviewed all pertinent imaging results/findings within the past 24 hours.

## 2022-09-24 NOTE — HPI
82 y.o. male with documented notation of CHF but TTE 03/2022 demonstrated preserved EF without diastolic dysfunction, HTN, AF on Eliquis, history of TIA, recently diagnosed UTI presents via EMS for multiple falls. Patient states he has been unsteady on his feet and lightheaded for several weeks, with multiple falls and resulting abrasions on bilateral knees, arms, lower legs. He denies fever, but has had a few episodes of chills. Patient denies LOC or head trauma, but has scraped his head against the wall while trying to get up. Denies changes in medication recently. Denies chest pain, shortness of breath, abdominal pain, constipation, dysuria, changes in urinary frequency, cough, fever, malaise. Overall, feels well but for his intermittent light-headedness. Denies new substance use. Lives at home with son, who was at bedside on initial evaluation by ED and stated patient was at baseline mentation.    On EMS arrival, patient was hypotensive in 70s / 40s, with a blood glucose of 54. S/P 1.5 L total in ED with recovery of SBP, very fluid responsive. Patient tachycardic with slight leukocytosis, lactate 3.2. Troponin 0.166 without chest pain. Given dextrose to recover BG. Admitted to Hospital Medicine for concerns of sepsis of unknown source.

## 2022-09-24 NOTE — ASSESSMENT & PLAN NOTE
Patient with documented history of permanent Afib s/p watchman, no longer on AC or rate controlling agents.  [id number] 5   HAS-BLED 4     -- Patient rate controlled, no indication for further agents at this time  -- No full AC required s/p Watchman; will hold AC ppx and APT s/o active bleeding from abrasion sites  -- Cardiac telemetry  -- Maintain K > 4, Mg > 2, Ca wnl; replete PRN  -- STAT cardiology consult if hemodynamic instability for DCCV evaluation

## 2022-09-24 NOTE — ASSESSMENT & PLAN NOTE
Patient with documented history of permanent Afib s/p watchman, no longer on AC or rate controlling agents.  [id number] 5   HAS-BLED 4      -- Patient rate controlled, no indication for further agents at this time  -- No full AC required s/p Watchman; will hold AC ppx and APT s/o active bleeding from abrasion sites  -- Cardiac telemetry  -- Maintain K > 4, Mg > 2, Ca wnl; replete PRN

## 2022-09-24 NOTE — PLAN OF CARE
CM[facility] DAILY GOALS       A: Awake    RASS: Goal -    Actual -     Restraint necessity:    B: Breathe   SBT: Not intubated   C: Coordinate A & B, analgesics/sedatives   Pain: managed    SAT: Not intubated  D: Delirium   CAM-ICU: Overall CAM-ICU: Negative  E: Early(intubated/ Progressive (non-intubated) Mobility   MOVE Screen: Pass   Activity: Activity Management: Arm raise - L1  FAS: Feeding/Nutrition   Diet order: Diet/Nutrition Received: consistent carb/diabetic diet,    T: Thrombus   DVT prophylaxis: VTE Required Core Measure: Pharmacological prophylaxis initiated/maintained  H: HOB Elevation   Head of Bed (HOB) Positioning: HOB at 30 degrees  U: Ulcer Prophylaxis   GI: no  G: Glucose control   managed Glycemic Management: blood glucose monitored  S: Skin   Bathing/Skin Care: bath, complete, dressed/undressed, electrode patches/site rotation, incontinence care, linen changed  Device Skin Pressure Protection: absorbent pad utilized/changed, adhesive use limited  Pressure Reduction Devices: foam padding utilized, heel offloading device utilized, specialty bed utilized  Pressure Reduction Techniques: frequent weight shift encouraged  Skin Protection: adhesive use limited, tubing/devices free from skin contact  B: Bowel Function   no issues   I: Indwelling Catheters   Childers necessity:     CVC necessity: No  D: De-escalation Antibiotics   Yes    Family/Goals of care/Code Status   Code Status: Full Code    24H Vital Sign Range  Temp:  [96.9 °F (36.1 °C)-98.8 °F (37.1 °C)]   Pulse:  []   Resp:  [6-24]   BP: ()/(42-74)   SpO2:  [98 %-100 %]      Shift Events   No acute events throughout shift. Levophed gtt stopped at 1130. MAP>65 throughout the rest of the shift. Attempted paracentesis postponed due to fluid site not being adequately accessible. Son at bedside throughout day, updated regarding plan of care.     VS and assessment per flow sheet, patient progressing towards goals as tolerated, plan of care  reviewed with family, all concerns addressed, will continue to monitor.    Husam Johns

## 2022-09-24 NOTE — CONSULTS
Pt seen and examined. Will be admitted to the MICU for shock. Full H&P to follow.     Daisy Moyer MD  Internal Medicine, PGY-3  [facility]

## 2022-09-24 NOTE — ED NOTES
Attending and resident notified of pt hypotensive episode and RN interventions. No new orders at this time.

## 2022-09-24 NOTE — PROGRESS NOTES
Pharmacokinetic Initial Assessment: IV Vancomycin    Assessment/Plan:    Mr. Reis received vancomycin with loading dose of 2000 mg once in the ED.  - He has an TRENT. Scr down-trending from initial Scr on admit, but still elevated above baseline. Will pulse dose for now given TRENT.   - Will not re-dose now given that he just received his LD, but will re-dose with subsequent doses when random concentrations are less than 20 mcg/mL,  - Desired empiric serum trough concentration is 10 to 20 mcg/mL.  - Draw vancomycin random level on 9/25 with AM labs.    Pharmacy will continue to follow and monitor vancomycin.      Please contact pharmacy at extension [phone] with any questions regarding this assessment.     Thank you for the consult,   Rashida Azar       Patient brief summary:  Jacques Arellano is a 82 y.o. male initiated on antimicrobial therapy with IV Vancomycin for treatment of suspected urinary tract infection    Actual Body Weight:   93 kg    Renal Function:   Estimated Creatinine Clearance: 45.8 mL/min (based on SCr of 1.4 mg/dL).     Dialysis Method (if applicable):  N/A

## 2022-09-24 NOTE — ASSESSMENT & PLAN NOTE
Patient presenting with TRENT with admission sCr 1.6 (baseline sCr wnl).   DDx: Pre-renal azotemia s/o sepsis vs questionable PO intake    Serum creatinine: 1.6 mg/dL (H) 09/23/22 2220  Estimated creatinine clearance: 40.1 mL/min (A)    -- IVF per Sepsis A/P  -- Trend sCr  -- Strict I&Os  -- Avoid nephrotoxic agents  -- Renally adjust medications

## 2022-09-24 NOTE — PROVIDER PROGRESS NOTES - EMERGENCY DEPT.
"Encounter Date: 9/23/2022    ED Physician Progress Notes        ED Resident HAND-OFF NOTE:  2:02 AM 9/24/2022  Jacques Arellano is a 82 y.o. male who presented to the ED on 9/24/2022 and has been managed by , who reports patient C/O AMS. I assumed care of patient from off-going ED physician team at 11:00 PM pending labs, and trauma work-up.    On my evaluation, Jacques Arellano appears well, hemodynamically stable and in NAD. Thus far, Jacques Arellano has received:  Medications   vancomycin 2 g in dextrose 5 % 500 mL IVPB (2,000 mg Intravenous New Bag 9/24/22 0127)   Tdap (BOOSTRIX) vaccine injection 0.5 mL (has no administration in time range)   acetaminophen tablet 1,000 mg (1,000 mg Oral Given 9/23/22 2255)   sodium chloride 0.9% bolus 250 mL (0 mLs Intravenous Stopped 9/23/22 2303)   dextrose 10% bolus 250 mL (0 mLs Intravenous Stopped 9/24/22 0100)   piperacillin-tazobactam 4.5 g in sodium chloride 0.9% 100 mL IVPB (ready to mix system) (0 g Intravenous Stopped 9/23/22 2330)   iohexoL (OMNIPAQUE 350) injection 100 mL (100 mLs Intravenous Given 9/24/22 0021)   sodium chloride 0.9% bolus 250 mL (0 mLs Intravenous Stopped 9/24/22 0152)       On my exam, I appreciate:  /61   Pulse 70   Temp 98.8 °F (37.1 °C) (Axillary)   Resp 16   Ht 5' 10" (1.778 m)   Wt 93 kg (205 lb)   SpO2 100%   BMI 29.41 kg/m²     ED Course as of 09/24/22 0202   Sat Sep 24, 2022   0011  Patient care handed off to oncoming team pending trauma evaluation and likely admit [OK]      ED Course User Index  [OK] Pardeep Steen MD        Disposition: Admit to hospital medicine  I have discussed and counseled Jacques Arellano regarding exam, results, diagnosis, treatment, and plan.  ______________________  Jhonathan Lopez MD   Emergency Medicine Resident  9/24/2022       "

## 2022-09-24 NOTE — ASSESSMENT & PLAN NOTE
Questionable history of diastolic CHF. Documented in chart, but previous TTE in Care Everywhere 03/2022 demonstrated normal EF without noted diastolic dysfunction. Patient also not complaining of HF symptoms (STRATTON, orthopnea, new BLE swelling, anasarca).     -- Repeat TTE

## 2022-09-24 NOTE — ASSESSMENT & PLAN NOTE
Patient presenting with TRENT with admission sCr 1.6 (baseline sCr wnl).      Serum creatinine: 1.6 mg/dL (H) 09/23/22 2220  Estimated creatinine clearance: 40.1 mL/min (A)     -- s/p IVFs with improvement in renal function  -- Trend sCr  -- Strict I&Os  -- Avoid nephrotoxic agents  -- Renally adjust medications

## 2022-09-24 NOTE — NURSING
POCT BG 50, repeated after some juice and 15 minutes resulted as 55.   Pt asymptomatic.  4 x 4mg glucose tabs given to patient. Will re-check glucose.

## 2022-09-24 NOTE — ASSESSMENT & PLAN NOTE
2x acute mildly displaced right anterior ribs fractures seen on CT chest  Analgesia prn  Incentive spirometry

## 2022-09-24 NOTE — ED NOTES
Care assumed from AYLA Herring    LOC/ APPEARANCE: Pt is AAOx4, but confused. Pt is speaking and following commands appropriately, no slurred speech. Pt changed into hospital gown. Remains on cont cardiac monitor, cont pulse ox, and auto BP cuff. Pt updated on POC. Bed low and locked with side rails up x2, call light in pt reach and instructed on how to use.   SKIN: Diffuse skin tears, rash, and ecchymosis noted to bue, bilateral knees, and bilateral heels secondary to pt's multiple falls. 18G PIV at left AC; 20G PIV @ left hand.  RESPIRATORY: Airway is open and patent w/ no c/o SOB. RR even, unlabored, equal bilaterally on inspiration and expiration. Sating 98% on RA.   CARDIAC: Pt has regular HR - pacemaker @ left chest wall. Denies c/o chest pain or discomfort. +1 peripheral edema noted to ble. +1 PT pulses bilaterally; +2 DP pulses bilaterally.   ABDOMEN: Soft and non-tender to palpation with no distention noted. Denies c/o N/V/D.   NEUROLOGIC: Eyes open spontaneously and facial expression symmetrical. Pt behavior appropriate to situation, and pt follows commands. Pt reports sensation present in all extremities when touched with a finger, denies any numbness or tingling.   MUSCULOSKELETAL: Generalized weakness. No visible swelling or deformities noted to extremities.   : Pt voids independently; expresses recent difficulty voiding.

## 2022-09-24 NOTE — H&P
[facility] - Emergency Dept  Hospital Medicine  History & Physical    Patient Name: Jacques Arellano  MRN: [id number]  Patient Class: IP- Inpatient  Admission Date: 9/23/2022  Attending Physician: Cali Fatima MD   Primary Care Provider: Matty Garsia MD         Patient information was obtained from patient and ER records.     Subjective:     Principal Problem:Sepsis    Chief Complaint:   Chief Complaint   Patient presents with    Altered Mental Status     Pt from home via EMS for AMS and multiple falls. Initial CBG with EMS 54, given amp D50, recovered to 138. Pt also hypotensive on EMS arrival 72/48. Numerous skin tears incurred from falls.        HPI: 82 y.o. male with documented notation of CHF but TTE 03/2022 demonstrated preserved EF without diastolic dysfunction, HTN, AF on Eliquis, history of TIA, recently diagnosed UTI presents via EMS for multiple falls. Patient states he has been unsteady on his feet and lightheaded for several weeks, with multiple falls and resulting abrasions on bilateral knees, arms, lower legs. He denies fever, but has had a few episodes of chills. Patient denies LOC or head trauma, but has scraped his head against the wall while trying to get up. Denies changes in medication recently. Denies chest pain, shortness of breath, abdominal pain, constipation, dysuria, changes in urinary frequency, cough, fever, malaise. Overall, feels well but for his intermittent light-headedness. Denies new substance use. Lives at home with son, who was at bedside on initial evaluation by ED and stated patient was at baseline mentation.    On EMS arrival, patient was hypotensive in 70s / 40s, with a blood glucose of 54. S/P 1.5 L total in ED with recovery of SBP, very fluid responsive. Patient tachycardic with slight leukocytosis, lactate 3.2. Troponin 0.166 without chest pain. Given dextrose to recover BG. Admitted to Hospital Medicine for concerns of sepsis of unknown source.      Past Medical  History:   Diagnosis Date    Anticoagulant long-term use     Atrial fibrillation     Diabetes mellitus     Diastolic heart failure     Frequent falls     Hypertension     Renal disorder     eswl    Sleep apnea     20 yrs ago    Stroke     TIA    UTI (urinary tract infection)     Vertigo        Past Surgical History:   Procedure Laterality Date    CARDIAC SURGERY  03/30/2022    watchmen    INSERTION OF PACEMAKER      KIDNEY STONE SURGERY      OPEN REDUCTION AND INTERNAL FIXATION (ORIF) OF FRACTURE OF DISTAL RADIUS Right 5/17/2022    Procedure: ORIF, FRACTURE, RADIUS, DISTAL;  Surgeon: Claude S. Williams IV, MD;  Location: [facility];  Service: Orthopedics;  Laterality: Right;    TONSILLECTOMY         Review of patient's allergies indicates:  No Known Allergies    No current facility-administered medications on file prior to encounter.     Current Outpatient Medications on File Prior to Encounter   Medication Sig    acetaminophen (TYLENOL) 325 MG tablet Take 650 mg by mouth every 6 (six) hours as needed.    amoxicillin (AMOXIL) 500 MG capsule Take 1 capsule (500 mg total) by mouth 3 (three) times daily. (Patient not taking: Reported on 9/15/2022)    aspirin (ECOTRIN) 81 MG EC tablet Take 81 mg by mouth.    atorvastatin (LIPITOR) 40 MG tablet Take 40 mg by mouth once daily.    ciprofloxacin HCl (CIPRO) 500 MG tablet Take 1 tablet (500 mg total) by mouth every 12 (twelve) hours. for 10 days    clorazepate (TRANXENE) 7.5 MG Tab Take 7.5 mg by mouth 3 (three) times daily.    EScitalopram oxalate (LEXAPRO) 10 MG tablet Take 1 tablet (10 mg total) by mouth once daily.    febuxostat (ULORIC) 40 mg Tab Take 40 mg by mouth.    furosemide (LASIX) 20 MG tablet Take 1 tablet (20 mg total) by mouth 2 (two) times daily. (Patient not taking: Reported on 9/15/2022)    glipiZIDE (GLUCOTROL) 10 MG tablet Take 1 tablet (10 mg total) by mouth daily with breakfast.    HYDROcodone-acetaminophen (NORCO) 5-325 mg per tablet Take 1 tablet by  mouth every 6 (six) hours as needed for Pain.    metFORMIN (GLUCOPHAGE) 1000 MG tablet Take 1 tablet (1,000 mg total) by mouth 2 (two) times daily with meals.    ondansetron (ZOFRAN) 4 MG tablet Take 1 tablet (4 mg total) by mouth every 6 (six) hours as needed for Nausea.    potassium chloride (MICRO-K) 10 MEQ CpSR Take 1 capsule (10 mEq total) by mouth 2 (two) times daily.    triamterene-hydrochlorothiazide 75-50 mg (MAXZIDE) 75-50 mg per tablet TAKE 1/2 (ONE-HALF) TABLET BY MOUTH TWICE DAILY     Family History    None       Tobacco Use    Smoking status: Never    Smokeless tobacco: Never   Substance and Sexual Activity    Alcohol use: Not Currently     Comment: SOCIAL    Drug use: Never    Sexual activity: Never     Review of Systems   Constitutional:  Positive for fatigue. Negative for chills, diaphoresis and fever.   HENT:  Negative for rhinorrhea, sneezing, sore throat and trouble swallowing.    Respiratory:  Negative for cough and shortness of breath.    Cardiovascular:  Negative for chest pain and palpitations.   Gastrointestinal:  Negative for constipation, diarrhea, nausea and vomiting.   Genitourinary:  Negative for dysuria, frequency and urgency.   Musculoskeletal:  Negative for arthralgias and myalgias.   Skin:  Positive for wound. Negative for rash.   Neurological:  Positive for light-headedness (w/ fall). Negative for weakness and headaches.   Psychiatric/Behavioral:  Negative for agitation and confusion.    Objective:     Vital Signs (Most Recent):  Temp: 98 °F (36.7 °C) (09/24/22 0330)  Pulse: 69 (09/24/22 0354)  Resp: 15 (09/24/22 0350)  BP: (!) 93/52 (09/24/22 0354)  SpO2: 100 % (09/24/22 0354)   Vital Signs (24h Range):  Temp:  [98 °F (36.7 °C)-98.8 °F (37.1 °C)] 98 °F (36.7 °C)  Pulse:  [] 69  Resp:  [15-24] 15  SpO2:  [98 %-100 %] 100 %  BP: ()/(42-69) 93/52     Weight: 93 kg (205 lb)  Body mass index is 29.41 kg/m².    Physical Exam  Constitutional:       Appearance: Normal  appearance.   HENT:      Head: Normocephalic.   Eyes:      Extraocular Movements: Extraocular movements intact.      Pupils: Pupils are equal, round, and reactive to light.   Cardiovascular:      Rate and Rhythm: Normal rate and regular rhythm.      Pulses: Normal pulses.      Heart sounds: Normal heart sounds. No murmur heard.    No friction rub. No gallop.   Pulmonary:      Effort: Pulmonary effort is normal. No respiratory distress.      Breath sounds: No rales.   Chest:      Chest wall: No tenderness.   Abdominal:      General: Abdomen is flat.      Palpations: Abdomen is soft.      Tenderness: There is no abdominal tenderness. There is no guarding or rebound.   Musculoskeletal:      Cervical back: Normal range of motion and neck supple.      Right lower leg: No edema.      Left lower leg: No edema.   Skin:     General: Skin is warm and dry.      Findings: Bruising (Diffuse bruising to upper extremities) and lesion (Multiple skin abrasions to bilateral knees, lower extremities, arms) present.   Neurological:      General: No focal deficit present.      Mental Status: He is alert and oriented to person, place, and time.      Comments: Mentation intact, though will lose track of conversation and be distracted by other conversation topics         CRANIAL NERVES     CN III, IV, VI   Pupils are equal, round, and reactive to light.     Significant Labs: All pertinent labs within the past 24 hours have been reviewed.    Significant Imaging: I have reviewed all pertinent imaging results/findings within the past 24 hours.    Assessment/Plan:     * Sepsis  Recurrent Falls  Previous H/o UTI  Light-headedness  Lactic acidosis    81 yo M presenting to ED with multiple history of falls w/o LOC / head trauma, light-headedness. Arrived with slight leukocytosis 12.7, tachycardia, hypotension (MAP low 60s), lactate 3.2. CXR unremarkable, CT without acute pulmonary or abdominal findings to suggest infection. UA pending collection.  Patient appears hypovolemic on arrival. Mentation intact and at baseline. S/P sepsis fluid bolus (combined). Previously seen outpatient with pan-sensitive E Coli UTI for which he was prescribed a ciprofloxacin. CT head negative for acute abnormality (though suspicious bone finding needs OP f/u), CTAP with myriad of findings without acute infectious nidus, though cirrhotic morphology.    Etiology: Hypotension may be 2/2 infection (though unclear source as patient would have been adequately treated with OP abx for UTI) vs HRS vs hypovolemia d/t questionable PO intake?     -- Vanc / Zosyn empiric abx  -- Trend fever curve, trend CBC  -- Maintain MAP > 65; IVF PRN, patient is very fluid responsive  -- UA / UCx pending  -- BCx pending  -- Lactate now normalized following fluid bolus  -- Investigate for further cause of falls including: orthostatic vitals, cardiac telemetry    TRENT (acute kidney injury)  Patient presenting with TRENT with admission sCr 1.6 (baseline sCr wnl).   DDx: Pre-renal azotemia s/o sepsis vs questionable PO intake    Serum creatinine: 1.6 mg/dL (H) 09/23/22 2220  Estimated creatinine clearance: 40.1 mL/min (A)    -- IVF per Sepsis A/P  -- Trend sCr  -- Strict I&Os  -- Avoid nephrotoxic agents  -- Renally adjust medications      Mixed hyperlipidemia  -- Continue home statin      Hypertension    Home medications: triamterene-HCTZ, Lasix    -- Hold home medications s/o hypotension    Diastolic heart failure  Questionable history of diastolic CHF. Documented in chart, but previous TTE in Care Everywhere 03/2022 demonstrated normal EF without noted diastolic dysfunction. Patient also not complaining of HF symptoms (STRATTON, orthopnea, new BLE swelling, anasarca).    -- Repeat TTE    Permanent atrial fibrillation  Patient with documented history of permanent Afib s/p watchman, no longer on AC or rate controlling agents.  [id number] 5   HAS-BLED 4     -- Patient rate controlled, no indication for further agents  at this time  -- No full AC required s/p Watchman; will hold AC ppx and APT s/o active bleeding from abrasion sites  -- Cardiac telemetry  -- Maintain K > 4, Mg > 2, Ca wnl; replete PRN  -- STAT cardiology consult if hemodynamic instability for DCCV evaluation    VTE Risk Mitigation (From admission, onward)           Ordered     Reason for No Pharmacological VTE Prophylaxis  Once        Question:  Reasons:  Answer:  Active Bleeding    09/24/22 0332     IP VTE HIGH RISK PATIENT  Once         09/24/22 0332     Place sequential compression device  Until discontinued         09/24/22 0332                       Rajinder-Edil Saavedra MD  Department of Hospital Medicine   [facility] - Emergency Dept

## 2022-09-24 NOTE — ASSESSMENT & PLAN NOTE
K 2.9 on admission with concomitant hypomagnesemia.     -- Replete potassium to achieve goal of >4  --CMP daily  -- Replete Mg

## 2022-09-24 NOTE — SUBJECTIVE & OBJECTIVE
Past Medical History:   Diagnosis Date    Anticoagulant long-term use     Atrial fibrillation     Diabetes mellitus     Diastolic heart failure     Frequent falls     Hypertension     Renal disorder     eswl    Sleep apnea     20 yrs ago    Stroke     TIA    UTI (urinary tract infection)     Vertigo      Past Surgical History:   Procedure Laterality Date    CARDIAC SURGERY  03/30/2022    watchmen    INSERTION OF PACEMAKER      KIDNEY STONE SURGERY      OPEN REDUCTION AND INTERNAL FIXATION (ORIF) OF FRACTURE OF DISTAL RADIUS Right 5/17/2022    Procedure: ORIF, FRACTURE, RADIUS, DISTAL;  Surgeon: Claude S. Williams IV, MD;  Location: [facility];  Service: Orthopedics;  Laterality: Right;    TONSILLECTOMY       Review of patient's allergies indicates:  No Known Allergies    Family History    None       Tobacco Use    Smoking status: Never    Smokeless tobacco: Never   Substance and Sexual Activity    Alcohol use: Not Currently     Comment: SOCIAL    Drug use: Never    Sexual activity: Never      Review of Systems   Constitutional:  Positive for activity change. Negative for appetite change, chills, diaphoresis and fever.   HENT:  Negative for congestion and rhinorrhea.    Eyes:  Negative for photophobia and visual disturbance.   Respiratory:  Negative for cough, shortness of breath and wheezing.    Cardiovascular:  Negative for chest pain, palpitations and leg swelling.   Gastrointestinal:  Positive for diarrhea. Negative for abdominal pain, nausea and vomiting.   Genitourinary:  Negative for difficulty urinating, dysuria and hematuria.   Musculoskeletal:  Positive for arthralgias (left big toe). Negative for myalgias.   Skin:  Positive for wound. Negative for rash.   Neurological:  Negative for dizziness, light-headedness and headaches.   Hematological:  Bruises/bleeds easily.   Psychiatric/Behavioral:  Negative for agitation and confusion.    Objective:     Vital Signs (Most Recent):  Temp: 98 °F (36.7 °C) (09/24/22  0330)  Pulse: 69 (09/24/22 0536)  Resp: (!) 8 (09/24/22 0536)  BP: (!) 80/50 (09/24/22 0536)  SpO2: 100 % (09/24/22 0536)   Vital Signs (24h Range):  Temp:  [98 °F (36.7 °C)-98.8 °F (37.1 °C)] 98 °F (36.7 °C)  Pulse:  [] 69  Resp:  [6-24] 8  SpO2:  [98 %-100 %] 100 %  BP: ()/(42-69) 80/50   Weight: 93 kg (205 lb)  Body mass index is 29.41 kg/m².      Intake/Output Summary (Last 24 hours) at 9/24/2022 0557  Last data filed at 9/24/2022 0516  Gross per 24 hour   Intake 3350 ml   Output --   Net 3350 ml       Physical Exam  Vitals and nursing note reviewed.   Constitutional:       General: He is not in acute distress.  HENT:      Head: Normocephalic and atraumatic.      Comments: Bilateral temporal wasting.     Mouth/Throat:      Mouth: Mucous membranes are moist.   Eyes:      General: No scleral icterus.     Extraocular Movements: Extraocular movements intact.      Pupils: Pupils are equal, round, and reactive to light.   Cardiovascular:      Rate and Rhythm: Normal rate and regular rhythm.      Pulses: Normal pulses.      Heart sounds: Normal heart sounds. No murmur heard.  Pulmonary:      Effort: Pulmonary effort is normal.      Breath sounds: Normal breath sounds. No wheezing, rhonchi or rales.   Abdominal:      General: Bowel sounds are normal. There is no distension.      Palpations: Abdomen is soft.      Tenderness: There is no abdominal tenderness.   Musculoskeletal:         General: Signs of injury present. No tenderness.      Right lower leg: No edema.      Left lower leg: No edema.   Skin:     General: Skin is warm.      Capillary Refill: Capillary refill takes less than 2 seconds.      Findings: Bruising present.   Neurological:      General: No focal deficit present.      Mental Status: He is alert and oriented to person, place, and time.   Psychiatric:         Mood and Affect: Mood normal.         Thought Content: Thought content normal.       Vents:     Lines/Drains/Airways       Peripheral  Intravenous Line  Duration                  Peripheral IV - Single Lumen 09/23/22 18 G Left Antecubital 1 day         Peripheral IV - Single Lumen 09/24/22 0445 20 G Left;Posterior Hand <1 day                  Significant Labs:    CBC/Anemia Profile:  Recent Labs   Lab 09/23/22 2220 09/24/22  0440   WBC 12.76* 8.27   HGB 11.6* 11.1*   HCT 35.0* 34.1*   * 74*   MCV 94 95   RDW 17.2* 17.2*        Chemistries:  Recent Labs   Lab 09/23/22 2220 09/24/22  0440   * 135*   K 3.1* 2.9*    103   CO2 23 22*   BUN 23 23   CREATININE 1.6* 1.4   CALCIUM 8.3* 8.0*   ALBUMIN 2.3* 2.2*   PROT 5.1* 4.8*   BILITOT 1.7* 1.5*   ALKPHOS 99 90   ALT 17 16   AST 41* 39   MG  --  1.2*   PHOS  --  2.1*       Blood Culture:   Recent Labs   Lab 09/23/22 2257   LABBLOO No Growth to date     CMP:   Recent Labs   Lab 09/23/22 2220 09/24/22  0440   * 135*   K 3.1* 2.9*    103   CO2 23 22*   GLU 79 60*   BUN 23 23   CREATININE 1.6* 1.4   CALCIUM 8.3* 8.0*   PROT 5.1* 4.8*   ALBUMIN 2.3* 2.2*   BILITOT 1.7* 1.5*   ALKPHOS 99 90   AST 41* 39   ALT 17 16   ANIONGAP 10 10     Lactic Acid:   Recent Labs   Lab 09/23/22 2220 09/24/22  0232   LACTATE 3.2* 2.0     Troponin:   Recent Labs   Lab 09/23/22 2220   TROPONINI 0.166*     Urine Studies: No results for input(s): COLORU, APPEARANCEUA, PHUR, SPECGRAV, PROTEINUA, GLUCUA, KETONESU, BILIRUBINUA, OCCULTUA, NITRITE, UROBILINOGEN, LEUKOCYTESUR, RBCUA, WBCUA, BACTERIA, SQUAMEPITHEL, HYALINECASTS in the last 48 hours.    Invalid input(s): WRIGHTSUR  Recent Lab Results  (Last 5 results in the past 24 hours)        09/24/22  0440 09/24/22  0232   09/24/22  0122   09/23/22  2257   09/23/22  2220        Procalcitonin 0.14  Comment: A concentration < 0.25 ng/mL represents a low risk of bacterial   infection.  Procalcitonin may not be accurate among patients with localized   infection, recent trauma or major surgery, immunosuppressed state,   invasive fungal infection, renal  dysfunction. Decisions regarding   initiation or continuation of antibiotic therapy should not be based   solely on procalcitonin levels.                 Albumin 2.2         2.3       Alkaline Phosphatase 90         99       ALT 16         17       Anion Gap 10         10       AST 39         41       Baso # 0.02         0.02       Basophil % 0.2         0.2       BILIRUBIN TOTAL 1.5  Comment: For infants and newborns, interpretation of results should be based  on gestational age, weight and in agreement with clinical  observations.    Premature Infant recommended reference ranges:  Up to 24 hours.............<8.0 mg/dL  Up to 48 hours............<12.0 mg/dL  3-5 days..................<15.0 mg/dL  6-29 days.................<15.0 mg/dL           1.7  Comment: For infants and newborns, interpretation of results should be based  on gestational age, weight and in agreement with clinical  observations.    Premature Infant recommended reference ranges:  Up to 24 hours.............<8.0 mg/dL  Up to 48 hours............<12.0 mg/dL  3-5 days..................<15.0 mg/dL  6-29 days.................<15.0 mg/dL         Blood Culture, Routine       No Growth to date  [P]         BUN 23         23       Calcium 8.0         8.3       Chloride 103         101       CO2 22         23       Creatinine 1.4         1.6       Differential Method Automated         Automated       eGFR 49.9         42.5       Eos # 0.0         0.0       Eosinophil % 0.2         0.1       Glucose 60         79       Gran # (ANC) 6.8         11.1       Gran % 82.4         86.8       Hematocrit 34.1         35.0       Hemoglobin 11.1         11.6       Hepatitis C Ab         Non-reactive       HIV 1/2 Ag/Ab         Non-reactive       Immature Grans (Abs) 0.04  Comment: Mild elevation in immature granulocytes is non specific and   can be seen in a variety of conditions including stress response,   acute inflammation, trauma and pregnancy. Correlation with  other   laboratory and clinical findings is essential.           0.10  Comment: Mild elevation in immature granulocytes is non specific and   can be seen in a variety of conditions including stress response,   acute inflammation, trauma and pregnancy. Correlation with other   laboratory and clinical findings is essential.         Immature Granulocytes 0.5         0.8       INR         1.5  Comment: Coumadin Therapy:  2.0 - 3.0 for INR for all indicators except mechanical heart valves  and antiphospholipid syndromes which should use 2.5 - 3.5.         Lactate, Mohamud   2.0  Comment: Falsely low lactic acid results can be found in samples   containing >=13.0 mg/dL total bilirubin and/or >=3.5 mg/dL   direct bilirubin.         3.2  Comment: Falsely low lactic acid results can be found in samples   containing >=13.0 mg/dL total bilirubin and/or >=3.5 mg/dL   direct bilirubin.         Lymph # 0.8         0.8       Lymph % 9.1         6.0       Magnesium 1.2               MCH 30.7         31.0       MCHC 32.6         33.1       MCV 95         94       Mono # 0.6         0.8       Mono % 7.6         6.1       MPV 10.8         10.1       nRBC 0         0       Phosphorus 2.1               Platelets 74         102       POCT Glucose     103           Potassium 2.9         3.1       PROTEIN TOTAL 4.8         5.1       Protime         15.5       RBC 3.61         3.74       RDW 17.2         17.2       Sodium 135         134       Troponin I         0.166  Comment: The reference interval for Troponin I represents the 99th percentile   cutoff   for our facility and is consistent with 3rd generation assay   performance.         WBC 8.27         12.76                               [P] - Preliminary Result               Significant Imaging: I have reviewed all pertinent imaging results/findings within the past 24 hours.

## 2022-09-24 NOTE — HPI
82 y.o. male with documented notation of CHF but TTE 03/2022 demonstrated preserved EF without diastolic dysfunction, HTN, AF status post Watchman March 2022, and TIA who presents via EMS for multiple falls. States he has been unsteady and feeling lightheaded for several weeks; subsequently has had multiple falls resulting abrasions on bilateral knees, arms, lower legs. Last fall was two days ago. Denies fever, but has had a few episodes of chills and was recently treated for a pan-sensitive e coli UTI with ciprofloxacin. Denies chest pain or dyspnea. Has intermittent palpitations. Denies abdominal pain, nausea, or vomiting but has had watery stools for the past month. Denies dysuria, hematuria, or frequency. Denies cough or rhinorrhea. Lives at home with son and ambulates with a walker. Pt reportedly at baseline mental status per son via HM note. Denies substance use. Denies recent medication changes.     On EMS arrival, patient was hypotensive in 70s/40s, with a blood glucose of 54. On arrival to ED, afebrile, tachycardic HR 96, /66, RR 22 saturating 98% on RA. Hemoglobin 11.6, leukocytosis WBC 12.7, Plt 102. Na 134, K 3.1, sCr 1.6 (improved to 1.4 post fluids). No transaminitis, INR 1.3. Initial troponin 0.166, repeat pending. Initial lacate 3.2 (improved to 2.0 post fluids). Procalcitonin negative. BCx pending. CXR showing some perihilar opacities but no obvious consolidation. 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 CT cervical spine without fracture. 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 Given 3L IV fluids and started on broad spectrum antibiotics. Admitted to Hospital Medicine for concerns of sepsis of unknown source. [facility] consulted for shock.

## 2022-09-24 NOTE — ED NOTES
Pt refused SC at this time. States he had surgery via urethra and is unable to be catheterized due to the trauma from previous procedure. Pt verbalized understanding of need for urine sample. Urinal at bedside.

## 2022-09-24 NOTE — ASSESSMENT & PLAN NOTE
Last A1c 6.1 2 months ago.     -- Home home medications while inpatient  -- POCT glucose ACHS   -- LDSSI to maintain -180

## 2022-09-24 NOTE — ASSESSMENT & PLAN NOTE
Recurrent Falls  Previous H/o UTI  Light-headedness  Lactic acidosis    81 yo M presenting to ED with multiple history of falls w/o LOC / head trauma, light-headedness. Arrived with slight leukocytosis 12.7, tachycardia, hypotension (MAP low 60s), lactate 3.2. CXR unremarkable, CT without acute pulmonary or abdominal findings to suggest infection. UA pending collection. Patient appears hypovolemic on arrival. Mentation intact and at baseline. S/P sepsis fluid bolus (combined). Previously seen outpatient with pan-sensitive E Coli UTI for which he was prescribed a ciprofloxacin.     Etiology: Suspect ongoing UTI vs hypoperfusion due to hypovolemia s/p questionable PO intake. WBC elevation may be reactive or hemoconcentration.    -- Vanc / Zosyn empiric abx  -- Trend fever curve, trend CBC  -- Maintain MAP > 65; IVF PRN, patient is very fluid responsive  -- UA / UCx pending  -- BCx pending  -- Lactate now normalized following fluid bolus  -- Investigate for further cause

## 2022-09-24 NOTE — H&P
[facility] - Emergency Dept  Critical Care Medicine  History & Physical    Patient Name: Jacques Arellano  MRN: [id number]  Admission Date: 9/23/2022  Hospital Length of Stay: 0 days  Code Status: No Order  Attending Physician: Neftaly Lopez*   Primary Care Provider: Matty Garsia MD   Principal Problem: Shock    Subjective:     HPI:  82 y.o. male with documented notation of CHF but TTE 03/2022 demonstrated preserved EF without diastolic dysfunction, HTN, AF status post Watchman March 2022, and TIA who presents via EMS for multiple falls. States he has been unsteady and feeling lightheaded for several weeks; subsequently has had multiple falls resulting abrasions on bilateral knees, arms, lower legs. Last fall was two days ago. Denies fever, but has had a few episodes of chills and was recently treated for a pan-sensitive e coli UTI with ciprofloxacin. Denies chest pain or dyspnea. Has intermittent palpitations. Denies abdominal pain, nausea, or vomiting but has had watery stools for the past month. Denies dysuria, hematuria, or frequency. Denies cough or rhinorrhea. Lives at home with son and ambulates with a walker. Pt reportedly at baseline mental status per son via HM note. Denies substance use. Denies recent medication changes.     On EMS arrival, patient was hypotensive in 70s/40s, with a blood glucose of 54. On arrival to ED, afebrile, tachycardic HR 96, /66, RR 22 saturating 98% on RA. Hemoglobin 11.6, leukocytosis WBC 12.7, Plt 102. Na 134, K 3.1, sCr 1.6 (improved to 1.4 post fluids). No transaminitis, INR 1.3. Initial troponin 0.166, repeat pending. Initial lacate 3.2 (improved to 2.0 post fluids). Procalcitonin negative. BCx pending. CXR showing some perihilar opacities but no obvious consolidation. 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 CT cervical spine without fracture. 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 Given 3L IV fluids and started on broad spectrum antibiotics. Admitted to Hospital Medicine for concerns of sepsis of unknown source. [facility] consulted for shock.       Hospital/ICU Course:  No notes on file     Past Medical History:   Diagnosis Date    Anticoagulant long-term use     Atrial fibrillation     Diabetes mellitus     Diastolic heart failure     Frequent falls     Hypertension     Renal disorder     eswl    Sleep apnea     20 yrs ago    Stroke     TIA    UTI (urinary tract infection)     Vertigo      Past Surgical History:   Procedure Laterality Date    CARDIAC SURGERY  03/30/2022    watchmen    INSERTION OF PACEMAKER      KIDNEY STONE SURGERY      OPEN REDUCTION AND INTERNAL FIXATION (ORIF) OF FRACTURE OF DISTAL RADIUS Right 5/17/2022    Procedure: ORIF, FRACTURE, RADIUS, DISTAL;  Surgeon: Claude S. Williams IV, MD;  Location: [facility];  Service: Orthopedics;  Laterality: Right;    TONSILLECTOMY       Review of patient's allergies indicates:  No Known Allergies    Family History    None       Tobacco Use    Smoking status: Never    Smokeless tobacco: Never   Substance and Sexual Activity    Alcohol use: Not Currently     Comment: SOCIAL    Drug use: Never    Sexual activity: Never      Review of Systems   Constitutional:  Positive for activity change. Negative for appetite change, chills, diaphoresis and fever.   HENT:  Negative for congestion and rhinorrhea.    Eyes:  Negative for photophobia and visual disturbance.   Respiratory:  Negative for cough, shortness of breath and wheezing.    Cardiovascular:  Negative for chest  pain, palpitations and leg swelling.   Gastrointestinal:  Positive for diarrhea. Negative for abdominal pain, nausea and vomiting.   Genitourinary:  Negative for difficulty urinating, dysuria and hematuria.   Musculoskeletal:  Positive for arthralgias (left big toe). Negative for myalgias.   Skin:  Positive for wound. Negative for rash.   Neurological:  Negative for dizziness, light-headedness and headaches.   Hematological:  Bruises/bleeds easily.   Psychiatric/Behavioral:  Negative for agitation and confusion.    Objective:     Vital Signs (Most Recent):  Temp: 98 °F (36.7 °C) (09/24/22 0330)  Pulse: 69 (09/24/22 0536)  Resp: (!) 8 (09/24/22 0536)  BP: (!) 80/50 (09/24/22 0536)  SpO2: 100 % (09/24/22 0536)   Vital Signs (24h Range):  Temp:  [98 °F (36.7 °C)-98.8 °F (37.1 °C)] 98 °F (36.7 °C)  Pulse:  [] 69  Resp:  [6-24] 8  SpO2:  [98 %-100 %] 100 %  BP: ()/(42-69) 80/50   Weight: 93 kg (205 lb)  Body mass index is 29.41 kg/m².      Intake/Output Summary (Last 24 hours) at 9/24/2022 0557  Last data filed at 9/24/2022 0516  Gross per 24 hour   Intake 3350 ml   Output --   Net 3350 ml       Physical Exam  Vitals and nursing note reviewed.   Constitutional:       General: He is not in acute distress.  HENT:      Head: Normocephalic and atraumatic.      Comments: Bilateral temporal wasting.     Mouth/Throat:      Mouth: Mucous membranes are moist.   Eyes:      General: No scleral icterus.     Extraocular Movements: Extraocular movements intact.      Pupils: Pupils are equal, round, and reactive to light.   Cardiovascular:      Rate and Rhythm: Normal rate and regular rhythm.      Pulses: Normal pulses.      Heart sounds: Normal heart sounds. No murmur heard.  Pulmonary:      Effort: Pulmonary effort is normal.      Breath sounds: Normal breath sounds. No wheezing, rhonchi or rales.   Abdominal:      General: Bowel sounds are normal. There is no distension.      Palpations: Abdomen is soft.      Tenderness:  There is no abdominal tenderness.   Musculoskeletal:         General: Signs of injury present. No tenderness.      Right lower leg: No edema.      Left lower leg: No edema.   Skin:     General: Skin is warm.      Capillary Refill: Capillary refill takes less than 2 seconds.      Findings: Bruising present.   Neurological:      General: No focal deficit present.      Mental Status: He is alert and oriented to person, place, and time.   Psychiatric:         Mood and Affect: Mood normal.         Thought Content: Thought content normal.       Vents:     Lines/Drains/Airways       Peripheral Intravenous Line  Duration                  Peripheral IV - Single Lumen 09/23/22 18 G Left Antecubital 1 day         Peripheral IV - Single Lumen 09/24/22 0445 20 G Left;Posterior Hand <1 day                  Significant Labs:    CBC/Anemia Profile:  Recent Labs   Lab 09/23/22 2220 09/24/22  0440   WBC 12.76* 8.27   HGB 11.6* 11.1*   HCT 35.0* 34.1*   * 74*   MCV 94 95   RDW 17.2* 17.2*        Chemistries:  Recent Labs   Lab 09/23/22 2220 09/24/22  0440   * 135*   K 3.1* 2.9*    103   CO2 23 22*   BUN 23 23   CREATININE 1.6* 1.4   CALCIUM 8.3* 8.0*   ALBUMIN 2.3* 2.2*   PROT 5.1* 4.8*   BILITOT 1.7* 1.5*   ALKPHOS 99 90   ALT 17 16   AST 41* 39   MG  --  1.2*   PHOS  --  2.1*       Blood Culture:   Recent Labs   Lab 09/23/22 2257   LABBLOO No Growth to date     CMP:   Recent Labs   Lab 09/23/22 2220 09/24/22  0440   * 135*   K 3.1* 2.9*    103   CO2 23 22*   GLU 79 60*   BUN 23 23   CREATININE 1.6* 1.4   CALCIUM 8.3* 8.0*   PROT 5.1* 4.8*   ALBUMIN 2.3* 2.2*   BILITOT 1.7* 1.5*   ALKPHOS 99 90   AST 41* 39   ALT 17 16   ANIONGAP 10 10     Lactic Acid:   Recent Labs   Lab 09/23/22 2220 09/24/22  0232   LACTATE 3.2* 2.0     Troponin:   Recent Labs   Lab 09/23/22  2220   TROPONINI 0.166*     Urine Studies: No results for input(s): COLORU, APPEARANCEUA, PHUR, SPECGRAV, PROTEINUA, GLUCUA, KETONESU,  BILIRUBINUA, OCCULTUA, NITRITE, UROBILINOGEN, LEUKOCYTESUR, RBCUA, WBCUA, BACTERIA, SQUAMEPITHEL, HYALINECASTS in the last 48 hours.    Invalid input(s): ROMERO  Recent Lab Results  (Last 5 results in the past 24 hours)        09/24/22  0440   09/24/22  0232   09/24/22  0122   09/23/22  2257   09/23/22  2220        Procalcitonin 0.14  Comment: A concentration < 0.25 ng/mL represents a low risk of bacterial   infection.  Procalcitonin may not be accurate among patients with localized   infection, recent trauma or major surgery, immunosuppressed state,   invasive fungal infection, renal dysfunction. Decisions regarding   initiation or continuation of antibiotic therapy should not be based   solely on procalcitonin levels.                 Albumin 2.2         2.3       Alkaline Phosphatase 90         99       ALT 16         17       Anion Gap 10         10       AST 39         41       Baso # 0.02         0.02       Basophil % 0.2         0.2       BILIRUBIN TOTAL 1.5  Comment: For infants and newborns, interpretation of results should be based  on gestational age, weight and in agreement with clinical  observations.    Premature Infant recommended reference ranges:  Up to 24 hours.............<8.0 mg/dL  Up to 48 hours............<12.0 mg/dL  3-5 days..................<15.0 mg/dL  6-29 days.................<15.0 mg/dL           1.7  Comment: For infants and newborns, interpretation of results should be based  on gestational age, weight and in agreement with clinical  observations.    Premature Infant recommended reference ranges:  Up to 24 hours.............<8.0 mg/dL  Up to 48 hours............<12.0 mg/dL  3-5 days..................<15.0 mg/dL  6-29 days.................<15.0 mg/dL         Blood Culture, Routine       No Growth to date  [P]         BUN 23         23       Calcium 8.0         8.3       Chloride 103         101       CO2 22         23       Creatinine 1.4         1.6       Differential Method  Automated         Automated       eGFR 49.9         42.5       Eos # 0.0         0.0       Eosinophil % 0.2         0.1       Glucose 60         79       Gran # (ANC) 6.8         11.1       Gran % 82.4         86.8       Hematocrit 34.1         35.0       Hemoglobin 11.1         11.6       Hepatitis C Ab         Non-reactive       HIV 1/2 Ag/Ab         Non-reactive       Immature Grans (Abs) 0.04  Comment: Mild elevation in immature granulocytes is non specific and   can be seen in a variety of conditions including stress response,   acute inflammation, trauma and pregnancy. Correlation with other   laboratory and clinical findings is essential.           0.10  Comment: Mild elevation in immature granulocytes is non specific and   can be seen in a variety of conditions including stress response,   acute inflammation, trauma and pregnancy. Correlation with other   laboratory and clinical findings is essential.         Immature Granulocytes 0.5         0.8       INR         1.5  Comment: Coumadin Therapy:  2.0 - 3.0 for INR for all indicators except mechanical heart valves  and antiphospholipid syndromes which should use 2.5 - 3.5.         Lactate, Mohamud   2.0  Comment: Falsely low lactic acid results can be found in samples   containing >=13.0 mg/dL total bilirubin and/or >=3.5 mg/dL   direct bilirubin.         3.2  Comment: Falsely low lactic acid results can be found in samples   containing >=13.0 mg/dL total bilirubin and/or >=3.5 mg/dL   direct bilirubin.         Lymph # 0.8         0.8       Lymph % 9.1         6.0       Magnesium 1.2               MCH 30.7         31.0       MCHC 32.6         33.1       MCV 95         94       Mono # 0.6         0.8       Mono % 7.6         6.1       MPV 10.8         10.1       nRBC 0         0       Phosphorus 2.1               Platelets 74         102       POCT Glucose     103           Potassium 2.9         3.1       PROTEIN TOTAL 4.8         5.1       Protime         15.5        RBC 3.61         3.74       RDW 17.2         17.2       Sodium 135         134       Troponin I         0.166  Comment: The reference interval for Troponin I represents the 99th percentile   cutoff   for our facility and is consistent with 3rd generation assay   performance.         WBC 8.27         12.76                               [P] - Preliminary Result               Significant Imaging: I have reviewed all pertinent imaging results/findings within the past 24 hours.    Assessment/Plan:     Cardiac/Vascular  * Shock  81 yo M presenting to ED with multiple history of falls w/o LOC / head trauma, light-headedness. Arrived with slight leukocytosis 12.7, tachycardia, hypotension (MAP low 60s), lactate 3.2. CXR unremarkable, CT without acute pulmonary or abdominal findings to suggest infection. UA pending collection. Patient appears hypovolemic on arrival. Mentation intact and at baseline. S/P sepsis fluid bolus (combined). Previously seen outpatient with pan-sensitive E Coli UTI for which he was prescribed a ciprofloxacin. CT head negative for acute abnormality (though suspicious bone finding needs OP f/u), CTAP with myriad of findings without acute infectious nidus, though cirrhotic morphology.     Etiology: Hypotension may be 2/2 infection (though unclear source as patient would have been adequately treated with OP abx for UTI) vs cardiac dysfunction given syncope and pacemaker placement. Recent EKG without pacer spikes concerning his pacer is not capturing.      -- Vanc / Zosyn empiric abx  -- Trend fever curve, trend CBC  -- Maintain MAP > 65  -- UA / UCx pending  -- BCx pending  -- Lactate now normalized following fluid bolus  -- Investigate for further cause of falls including: cardiac telemetry    Mixed hyperlipidemia  -- Continue home statin    Hypertension  Home medications: triamterene-HCTZ, Lasix     -- Hold home medications in the setting of shock    Diastolic heart failure  Questionable history of  diastolic CHF. Documented in chart, but previous TTE in Care Everywhere 03/2022 demonstrated normal EF without noted diastolic dysfunction. Patient also not complaining of HF symptoms (STRATTON, orthopnea, new BLE swelling, anasarca).     -- Repeat TTE      Permanent atrial fibrillation  Patient with documented history of permanent Afib s/p watchman, no longer on AC or rate controlling agents.  [id number]   HAS-BLED 4      -- Patient rate controlled, no indication for further agents at this time  -- No full AC required s/p Watchman; will hold AC ppx and APT s/o active bleeding from abrasion sites  -- Cardiac telemetry  -- Maintain K > 4, Mg > 2, Ca wnl; replete PRN      Renal/  Hypokalemia  K 2.9 on admission with concomitant hypomagnesemia.     -- Replete potassium to achieve goal of >4  --CMP daily  -- Replete Mg    TRENT (acute kidney injury)  Patient presenting with TRENT with admission sCr 1.6 (baseline sCr wnl).      Serum creatinine: 1.6 mg/dL (H) 09/23/22 2220  Estimated creatinine clearance: 40.1 mL/min (A)     -- s/p IVFs with improvement in renal function  -- Trend sCr  -- Strict I&Os  -- Avoid nephrotoxic agents  -- Renally adjust medications         Endocrine  Type 2 diabetes mellitus, without long-term current use of insulin  Last A1c 6.1 2 months ago.     -- Home home medications while inpatient  -- POCT glucose ACHS   -- LDSSI to maintain -180    Orthopedic  Rib fracture  2x acute mildly displaced right anterior ribs fractures seen on CT chest  Analgesia prn  Incentive spirometry    Other  Falls frequently  -- PT/OT consulted        Critical Care Daily Checklist:    A: Awake: RASS Goal/Actual Goal:    Actual:     B: Spontaneous Breathing Trial Performed?     C: SAT & SBT Coordinated?  N/A                      D: Delirium: CAM-ICU     E: Early Mobility Performed? No   F: Feeding Goal:    Status:     Current Diet Order   Procedures    Diet diabetic [facility]; 2000 Calorie; Cardiac (Low  Na/Chol)     Order Specific Question:   Indicate patient location for additional diet options:     Answer:   [facility]     Order Specific Question:   Total calories:     Answer:   2000 Calorie     Order Specific Question:   Additional Diet Options:     Answer:   Cardiac (Low Na/Chol)      AS: Analgesia/Sedation N/A   T: Thromboembolic Prophylaxis Hold for now   H: HOB > 300 Yes   U: Stress Ulcer Prophylaxis (if needed) N/A   G: Glucose Control improved   B: Bowel Function     I: Indwelling Catheter (Lines & Childers) Necessity PIVC   D: De-escalation of Antimicrobials/Pharmacotherapies Broad spectrum abx    Plan for the day/ETD Pressors, monitor BP, TTE, cardiac device check    Code Status:  Family/Goals of Care: Full Code       Critical secondary to Patient has a condition that poses threat to life and bodily function: Shock     Critical care was time spent personally by me on the following activities: development of treatment plan with patient or surrogate and bedside caregivers, discussions with consultants, evaluation of patient's response to treatment, examination of patient, ordering and performing treatments and interventions, ordering and review of laboratory studies, ordering and review of radiographic studies, pulse oximetry, re-evaluation of patient's condition. This critical care time did not overlap with that of any other provider or involve time for any procedures.     Andie Soliman MD  Critical Care Medicine  [facility] - Emergency Dept

## 2022-09-24 NOTE — ED NOTES
Jacques Arellano, a 82 y.o. male presents to the ED w/ complaint of multiple falls resulting in many skin  tears on right forearm and some through out right side. Raised area with bruising noted on right side of forehead. Pt family states pt recently dx with UTI and is taking abx. Report confusion and lightheadedness prior to fall. Per pt and family, pt intermittent confusion is baseline at time of ED arrival. Pt denied hitting head or LOC. H/o cardiac procedure 3/2022. PERRLA intact an 3mm bilaterally    Triage note:  Chief Complaint   Patient presents with    Altered Mental Status     Pt from home via EMS for AMS and multiple falls. Initial CBG with EMS 54, given amp D50, recovered to 138. Pt also hypotensive on EMS arrival 72/48. Numerous skin tears incurred from falls.     Review of patient's allergies indicates:  No Known Allergies  Past Medical History:   Diagnosis Date    Anticoagulant long-term use     Atrial fibrillation     Diabetes mellitus     Diastolic heart failure     Frequent falls     Hypertension     Renal disorder     eswl    Sleep apnea     20 yrs ago    Stroke     TIA    UTI (urinary tract infection)     Vertigo

## 2022-09-24 NOTE — ED NOTES
Pt educated on need for urine specimen- pt and caregiver/family verbalized understanding. Urinal provided.

## 2022-09-24 NOTE — ED PROVIDER NOTES
Encounter Date: 9/23/2022       History     Chief Complaint   Patient presents with    Altered Mental Status     Pt from home via EMS for AMS and multiple falls. Initial CBG with EMS 54, given amp D50, recovered to 138. Pt also hypotensive on EMS arrival 72/48. Numerous skin tears incurred from falls.     82 y.o. male with  CHF, HTN, Afib on Eliquis, history of TIA, recently diagnosed UTI presents via EMS for multiple falls.  On EMS arrival, patient was hypotensive in 70s over 40s, with a blood glucose of 54.  Patient was given D50 with improvement in his blood glucose, he also received approximately 400 cc bolus with improvement in his blood pressure to 102/62 prior to arrival.  Patient reports he feels lightheaded which has been intermittent for the last several weeks.  Patient denies loss of consciousness.  Patient complains of multiple skin tears and wounds to the upper and lower extremities. Patient denies fever/ chills, chest pain,  headache.  There was some concern for confusion, however son at bedside reports that patient is behaving as his normal self    The history is provided by the patient, the EMS personnel and medical records.   Review of patient's allergies indicates:  No Known Allergies  Past Medical History:   Diagnosis Date    Anticoagulant long-term use     Atrial fibrillation     Diabetes mellitus     Diastolic heart failure     Frequent falls     Hypertension     Renal disorder     eswl    Sleep apnea     20 yrs ago    Stroke     TIA    UTI (urinary tract infection)     Vertigo      Past Surgical History:   Procedure Laterality Date    CARDIAC SURGERY  03/30/2022    [facility]    INSERTION OF PACEMAKER      KIDNEY STONE SURGERY      OPEN REDUCTION AND INTERNAL FIXATION (ORIF) OF FRACTURE OF DISTAL RADIUS Right 5/17/2022    Procedure: ORIF, FRACTURE, RADIUS, DISTAL;  Surgeon: Claude S. Williams IV, MD;  Location: [facility];  Service: Orthopedics;  Laterality: Right;    TONSILLECTOMY       Family  History   Problem Relation Age of Onset    Fainting Neg Hx     Heart attack Neg Hx     Heart disease Neg Hx     Heart failure Neg Hx     Hypertension Neg Hx     Atrial Septal Defect Neg Hx      Social History     Tobacco Use    Smoking status: Never    Smokeless tobacco: Never   Substance Use Topics    Alcohol use: Not Currently     Comment: SOCIAL    Drug use: Never     Review of Systems   Constitutional:  Negative for fatigue and fever.   HENT:  Negative for rhinorrhea and sore throat.    Eyes:  Negative for discharge and redness.   Respiratory:  Negative for cough and shortness of breath.    Cardiovascular:  Negative for chest pain and palpitations.   Gastrointestinal:  Negative for diarrhea, nausea and vomiting.   Endocrine: Negative for cold intolerance and heat intolerance.   Genitourinary:  Negative for dysuria and frequency.   Musculoskeletal:  Positive for arthralgias and myalgias. Negative for neck stiffness.   Skin:  Positive for rash and wound. Negative for pallor.   Neurological:  Positive for dizziness. Negative for headaches.   Psychiatric/Behavioral:  Negative for agitation and confusion.      Physical Exam     Initial Vitals [09/23/22 2132]   BP Pulse Resp Temp SpO2   108/66 96 (!) 22 98.1 °F (36.7 °C) 98 %      MAP       --         Physical Exam    Nursing note and vitals reviewed.  Constitutional:    Alert, frail appearing, speaking full sentences, no acute distress   HENT:   Head: Normocephalic and atraumatic.   Oropharynx dry  Bony prominence of the right brow   No facial bone tenderness or deformity   Eyes: Conjunctivae and EOM are normal. Pupils are equal, round, and reactive to light. No scleral icterus.   Cardiovascular:  Normal rate, regular rhythm, normal heart sounds and intact distal pulses.           Pulmonary/Chest: Breath sounds normal. No stridor. No respiratory distress.   Abdominal: Abdomen is soft. He exhibits no distension. There is no abdominal tenderness.   Musculoskeletal:          General: No tenderness or edema.      Comments: No tenderness, step-offs, deformities, or tenderness to the C, T, or L-spine  Normal range of motion of all extremities without pain  No bony tenderness or deformity of the trunk or extremities  Mild discomfort with range of motion of the right elbow  Innumerable skin tears and ecchymoses to bilateral upper and lower extremities       Neurological: He is alert and oriented to person, place, and time. He has normal strength. No cranial nerve deficit or sensory deficit.   Skin: Skin is warm and dry. No rash noted.   Psychiatric: He has a normal mood and affect. Thought content normal.       ED Course   Procedures  Labs Reviewed   CBC W/ AUTO DIFFERENTIAL - Abnormal; Notable for the following components:       Result Value    WBC 12.76 (*)     RBC 3.74 (*)     Hemoglobin 11.6 (*)     Hematocrit 35.0 (*)     RDW 17.2 (*)     Platelets 102 (*)     Immature Granulocytes 0.8 (*)     Gran # (ANC) 11.1 (*)     Immature Grans (Abs) 0.10 (*)     Lymph # 0.8 (*)     Gran % 86.8 (*)     Lymph % 6.0 (*)     All other components within normal limits   COMPREHENSIVE METABOLIC PANEL - Abnormal; Notable for the following components:    Sodium 134 (*)     Potassium 3.1 (*)     Creatinine 1.6 (*)     Calcium 8.3 (*)     Total Protein 5.1 (*)     Albumin 2.3 (*)     Total Bilirubin 1.7 (*)     AST 41 (*)     eGFR 42.5 (*)     All other components within normal limits   TROPONIN I - Abnormal; Notable for the following components:    Troponin I 0.166 (*)     All other components within normal limits   PROTIME-INR - Abnormal; Notable for the following components:    Prothrombin Time 15.5 (*)     INR 1.5 (*)     All other components within normal limits   LACTIC ACID, PLASMA - Abnormal; Notable for the following components:    Lactate (Lactic Acid) 3.2 (*)     All other components within normal limits   CULTURE, BLOOD   CULTURE, BLOOD   HIV 1 / 2 ANTIBODY    Narrative:     Release to  patient->Immediate   HEPATITIS C ANTIBODY    Narrative:     Release to patient->Immediate   B-TYPE NATRIURETIC PEPTIDE   URINALYSIS, REFLEX TO URINE CULTURE   POCT GLUCOSE   ISTAT CHEM8     EKG Readings: (Independently Interpreted)    Left bundle-branch morphology with no discernible pacer spikes, wide complex, regular, rate of 73, Sgarbossa negative, overall similar to previous     Imaging Results              CT Chest Abdomen Pelvis With Contrast (xpd) (In process)                      CT Head Without Contrast (In process)                      CT Cervical Spine Without Contrast (In process)                      X-Ray Elbow Complete Right (Final result)  Result time 09/23/22 23:03:24      Final result by Mark Salinas DO (09/23/22 23:03:24)                   Impression:      No acute osseous abnormality of the right elbow.      Electronically signed by: Mark Salinas  Date:    09/23/2022  Time:    23:03               Narrative:    EXAMINATION:  XR ELBOW COMPLETE 3 VIEW RIGHT    CLINICAL HISTORY:  . Unspecified injury of right elbow, initial encounter    TECHNIQUE:  AP, lateral, and oblique views of the right elbow were performed.    COMPARISON:  None    FINDINGS:  There is diffuse osteopenia.  There is no acute fracture or dislocation of the right elbow.  Alignment is normal.  Joint spaces are preserved.  There is no elbow joint effusion.                                       X-Ray Chest AP Portable (Final result)  Result time 09/23/22 22:56:36      Final result by Gabe Duke MD (09/23/22 22:56:36)                   Impression:      Mild diminished depth of inspiration without evidence for superimposed acute intrathoracic process.      Electronically signed by: Gabe Duke  Date:    09/23/2022  Time:    22:56               Narrative:    EXAMINATION:  XR CHEST AP PORTABLE    CLINICAL HISTORY:  CHF;    TECHNIQUE:  Single frontal view of the chest was performed.    COMPARISON:  None    FINDINGS:  Single  portable chest view is submitted.  Cardiac pacemaker is noted.  There is mild diminished depth of inspiration.  Aortic atherosclerotic change noted.    Mild accentuation of pulmonary bronchovascular markings consistent with diminished depth of inspiration noted.  There is no evidence for confluent infiltrate or consolidation, significant pleural effusion or pneumothorax.  The osseous structures demonstrate chronic change.                                       Medications   vancomycin 2 g in dextrose 5 % 500 mL IVPB (has no administration in time range)   Tdap (BOOSTRIX) vaccine injection 0.5 mL (has no administration in time range)   iohexoL (OMNIPAQUE 350) injection 100 mL (has no administration in time range)   sodium chloride 0.9% bolus 250 mL (has no administration in time range)   acetaminophen tablet 1,000 mg (1,000 mg Oral Given 9/23/22 2255)   sodium chloride 0.9% bolus 250 mL (0 mLs Intravenous Stopped 9/23/22 2303)   dextrose 10% bolus 250 mL (250 mLs Intravenous New Bag 9/23/22 2305)   piperacillin-tazobactam 4.5 g in sodium chloride 0.9% 100 mL IVPB (ready to mix system) (0 g Intravenous Stopped 9/23/22 2330)     Medical Decision Making:   History:   I obtained history from: someone other than patient and EMS provider.  Old Medical Records: I decided to obtain old medical records.  Old Records Summarized: records from previous admission(s) and records from clinic visits.  Initial Assessment:   82 y.o. male with  CHF, HTN, Afib on Eliquis, history of TIA, recently diagnosed UTI presents via EMS for multiple falls  Airway, breathing, circulation intact, pulses intact to all extremities.  He is mildly hypotensive 102/62 on arrival after a  400 cc bolus with EMS as well as amp of D50 for blood glucose of 50 for.  Patient  alert without focal neurological deficits  Patient is on blood thinners  Cause of injury is  fall  in context of intermittent lightheadedness without evidence of LOC  Injuries on secondary  assessment include many skin tears, right elbow with range of motion without deformity, and chest wall tenderness   Differentials include  sepsis/UTI,metabolic derangement, arrhythmia, pneumonia, intracranial injury, C-spine injury, less likely thoracic injury  Imaging ordered includes  extensive CT imaging as patient is on Eliquis and has had multiple falls, does have chest wall tenderness   Tylenol ordered for pain, labs were drawn    Independently Interpreted Test(s):   I have ordered and independently interpreted EKG Reading(s) - see prior notes  Clinical Tests:   Lab Tests: Ordered and Reviewed  Radiological Study: Ordered and Reviewed  Medical Tests: Ordered and Reviewed           ED Course as of 09/24/22 0013   Sat Sep 24, 2022   0011  Patient care handed off to oncoming team pending trauma evaluation and likely admit [OK]      ED Course User Index  [OK] Pardeep Steen MD                 Clinical Impression:   Final diagnoses:  [R42] Lightheadedness (Primary)  [S59.901A] Elbow injury, right, initial encounter  [E87.2] Lactic acidosis  [T14.8XXA] Multiple skin tears        ED Disposition Condition    Admit Stable                Pardeep Steen MD  Resident  09/24/22 0013

## 2022-09-25 PROBLEM — R18.8 ASCITES: Status: ACTIVE | Noted: 2022-09-25

## 2022-09-25 LAB
AFP SERPL-MCNC: 2.3 NG/ML (ref 0–8.4)
ALBUMIN SERPL BCP-MCNC: 2.2 G/DL (ref 3.5–5.2)
ALP SERPL-CCNC: 94 U/L (ref 55–135)
ALT SERPL W/O P-5'-P-CCNC: 18 U/L (ref 10–44)
ANION GAP SERPL CALC-SCNC: 10 MMOL/L (ref 8–16)
AST SERPL-CCNC: 39 U/L (ref 10–40)
BACTERIA UR CULT: NO GROWTH
BASOPHILS # BLD AUTO: 0.02 K/UL (ref 0–0.2)
BASOPHILS NFR BLD: 0.2 % (ref 0–1.9)
BILIRUB SERPL-MCNC: 1.3 MG/DL (ref 0.1–1)
BUN SERPL-MCNC: 24 MG/DL (ref 8–23)
CALCIUM SERPL-MCNC: 7.8 MG/DL (ref 8.7–10.5)
CHLORIDE SERPL-SCNC: 102 MMOL/L (ref 95–110)
CO2 SERPL-SCNC: 22 MMOL/L (ref 23–29)
CORTIS SERPL-MCNC: 4.5 UG/DL (ref 4.3–22.4)
CREAT SERPL-MCNC: 1.4 MG/DL (ref 0.5–1.4)
DIFFERENTIAL METHOD: ABNORMAL
EOSINOPHIL # BLD AUTO: 0.1 K/UL (ref 0–0.5)
EOSINOPHIL NFR BLD: 0.8 % (ref 0–8)
ERYTHROCYTE [DISTWIDTH] IN BLOOD BY AUTOMATED COUNT: 17.2 % (ref 11.5–14.5)
EST. GFR  (NO RACE VARIABLE): 49.9 ML/MIN/1.73 M^2
GLUCOSE SERPL-MCNC: 92 MG/DL (ref 70–110)
HCT VFR BLD AUTO: 33.1 % (ref 40–54)
HGB BLD-MCNC: 10.6 G/DL (ref 14–18)
IMM GRANULOCYTES # BLD AUTO: 0.05 K/UL (ref 0–0.04)
IMM GRANULOCYTES NFR BLD AUTO: 0.6 % (ref 0–0.5)
LYMPHOCYTES # BLD AUTO: 0.7 K/UL (ref 1–4.8)
LYMPHOCYTES NFR BLD: 8.4 % (ref 18–48)
MAGNESIUM SERPL-MCNC: 1.6 MG/DL (ref 1.6–2.6)
MCH RBC QN AUTO: 29.8 PG (ref 27–31)
MCHC RBC AUTO-ENTMCNC: 32 G/DL (ref 32–36)
MCV RBC AUTO: 93 FL (ref 82–98)
MONOCYTES # BLD AUTO: 0.7 K/UL (ref 0.3–1)
MONOCYTES NFR BLD: 8 % (ref 4–15)
NEUTROPHILS # BLD AUTO: 7.2 K/UL (ref 1.8–7.7)
NEUTROPHILS NFR BLD: 82 % (ref 38–73)
NRBC BLD-RTO: 0 /100 WBC
PHOSPHATE SERPL-MCNC: 2.5 MG/DL (ref 2.7–4.5)
PLATELET # BLD AUTO: 75 K/UL (ref 150–450)
PMV BLD AUTO: 10.8 FL (ref 9.2–12.9)
POCT GLUCOSE: 153 MG/DL (ref 70–110)
POCT GLUCOSE: 22 MG/DL (ref 70–110)
POCT GLUCOSE: 79 MG/DL (ref 70–110)
POCT GLUCOSE: 85 MG/DL (ref 70–110)
POTASSIUM SERPL-SCNC: 3.4 MMOL/L (ref 3.5–5.1)
PROT SERPL-MCNC: 4.8 G/DL (ref 6–8.4)
RBC # BLD AUTO: 3.56 M/UL (ref 4.6–6.2)
SODIUM SERPL-SCNC: 134 MMOL/L (ref 136–145)
VANCOMYCIN SERPL-MCNC: 12.7 UG/ML
WBC # BLD AUTO: 8.74 K/UL (ref 3.9–12.7)

## 2022-09-25 PROCEDURE — 20000000 HC ICU ROOM

## 2022-09-25 PROCEDURE — 82533 TOTAL CORTISOL: CPT

## 2022-09-25 PROCEDURE — 83735 ASSAY OF MAGNESIUM: CPT

## 2022-09-25 PROCEDURE — 25000003 PHARM REV CODE 250: Performed by: STUDENT IN AN ORGANIZED HEALTH CARE EDUCATION/TRAINING PROGRAM

## 2022-09-25 PROCEDURE — 82105 ALPHA-FETOPROTEIN SERUM: CPT | Performed by: STUDENT IN AN ORGANIZED HEALTH CARE EDUCATION/TRAINING PROGRAM

## 2022-09-25 PROCEDURE — 97167 OT EVAL HIGH COMPLEX 60 MIN: CPT

## 2022-09-25 PROCEDURE — 97162 PT EVAL MOD COMPLEX 30 MIN: CPT

## 2022-09-25 PROCEDURE — 85025 COMPLETE CBC W/AUTO DIFF WBC: CPT

## 2022-09-25 PROCEDURE — 97535 SELF CARE MNGMENT TRAINING: CPT

## 2022-09-25 PROCEDURE — 80053 COMPREHEN METABOLIC PANEL: CPT

## 2022-09-25 PROCEDURE — 99291 CRITICAL CARE FIRST HOUR: CPT | Mod: GC,,, | Performed by: INTERNAL MEDICINE

## 2022-09-25 PROCEDURE — 80202 ASSAY OF VANCOMYCIN: CPT | Performed by: INTERNAL MEDICINE

## 2022-09-25 PROCEDURE — 94761 N-INVAS EAR/PLS OXIMETRY MLT: CPT

## 2022-09-25 PROCEDURE — 84100 ASSAY OF PHOSPHORUS: CPT

## 2022-09-25 PROCEDURE — 63600175 PHARM REV CODE 636 W HCPCS

## 2022-09-25 PROCEDURE — 25000003 PHARM REV CODE 250

## 2022-09-25 PROCEDURE — 63600175 PHARM REV CODE 636 W HCPCS: Performed by: INTERNAL MEDICINE

## 2022-09-25 PROCEDURE — 97530 THERAPEUTIC ACTIVITIES: CPT

## 2022-09-25 PROCEDURE — 99291 PR CRITICAL CARE, E/M 30-74 MINUTES: ICD-10-PCS | Mod: GC,,, | Performed by: INTERNAL MEDICINE

## 2022-09-25 PROCEDURE — 63600175 PHARM REV CODE 636 W HCPCS: Performed by: STUDENT IN AN ORGANIZED HEALTH CARE EDUCATION/TRAINING PROGRAM

## 2022-09-25 PROCEDURE — 25000003 PHARM REV CODE 250: Performed by: INTERNAL MEDICINE

## 2022-09-25 RX ORDER — LIDOCAINE HYDROCHLORIDE 10 MG/ML
20 INJECTION INFILTRATION; PERINEURAL ONCE
Status: DISCONTINUED | OUTPATIENT
Start: 2022-09-25 | End: 2022-10-03

## 2022-09-25 RX ORDER — MAGNESIUM SULFATE HEPTAHYDRATE 40 MG/ML
2 INJECTION, SOLUTION INTRAVENOUS ONCE
Status: COMPLETED | OUTPATIENT
Start: 2022-09-25 | End: 2022-09-25

## 2022-09-25 RX ADMIN — ATORVASTATIN CALCIUM 40 MG: 40 TABLET, FILM COATED ORAL at 08:09

## 2022-09-25 RX ADMIN — VANCOMYCIN HYDROCHLORIDE 1250 MG: 1.25 INJECTION, POWDER, LYOPHILIZED, FOR SOLUTION INTRAVENOUS at 10:09

## 2022-09-25 RX ADMIN — LIDOCAINE 1 PATCH: 50 PATCH CUTANEOUS at 06:09

## 2022-09-25 RX ADMIN — PIPERACILLIN SODIUM AND TAZOBACTAM SODIUM 4.5 G: 4; .5 INJECTION, POWDER, LYOPHILIZED, FOR SOLUTION INTRAVENOUS at 06:09

## 2022-09-25 RX ADMIN — MAGNESIUM SULFATE 2 G: 2 INJECTION INTRAVENOUS at 08:09

## 2022-09-25 RX ADMIN — POTASSIUM CHLORIDE 40 MEQ: 1500 TABLET, EXTENDED RELEASE ORAL at 08:09

## 2022-09-25 RX ADMIN — PIPERACILLIN SODIUM AND TAZOBACTAM SODIUM 4.5 G: 4; .5 INJECTION, POWDER, LYOPHILIZED, FOR SOLUTION INTRAVENOUS at 10:09

## 2022-09-25 RX ADMIN — ESCITALOPRAM 10 MG: 5 TABLET, FILM COATED ORAL at 08:09

## 2022-09-25 RX ADMIN — ALLOPURINOL 100 MG: 100 TABLET ORAL at 08:09

## 2022-09-25 RX ADMIN — PIPERACILLIN SODIUM AND TAZOBACTAM SODIUM 4.5 G: 4; .5 INJECTION, POWDER, LYOPHILIZED, FOR SOLUTION INTRAVENOUS at 03:09

## 2022-09-25 NOTE — PT/OT/SLP EVAL
Occupational Therapy   Evaluation & Treatment     Name: Jacques Arellano  MRN: [id number]  Admitting Diagnosis:  Shock  Recent Surgery: * No surgery found *      Recommendations:     Discharge Recommendations: nursing facility, skilled  Discharge Equipment Recommendations:   (tbd)  Barriers to discharge:  None    Assessment:     Jacques Arellano is a 82 y.o. male with a medical diagnosis of Shock.  He presents with impairments listed below. Pt displayed limited overall tolerance for therapy on this date. Pt is functioning below baseline at this time and is requiring increased overall assist. Pt is noted w/ impaired cognition causing him to be a high fall risk and overall safety concern. Pt displayed global deconditioning requiring increased assist for ADLs and mobility at this time. Pt would benefit from skilled OT services to improve independence and overall occupational functioning.     Performance deficits affecting function: impaired endurance, weakness, impaired self care skills, impaired functional mobility, gait instability, impaired balance, impaired cognition, decreased coordination, decreased upper extremity function, decreased lower extremity function, decreased safety awareness.      Rehab Prognosis: Good; patient would benefit from acute skilled OT services to address these deficits and reach maximum level of function.       Plan:     Patient to be seen 3 x/week to address the above listed problems via self-care/home management, therapeutic activities, therapeutic exercises, neuromuscular re-education  Plan of Care Expires: 10/25/22  Plan of Care Reviewed with: patient    Subjective     Chief Complaint: No complaints   Patient/Family Comments/goals: Return home    Occupational Profile:  Living Environment: Pt lives in a 2sh w/ son w/ 2 steps to enter.   Previous level of function: Assist w/ ADLs and MOD I for mobility  (RW, w/c).   Roles and Routines: N/A  Equipment Used at Home:  wheelchair,  rollator  Assistance upon Discharge: Pt has assistance upon D/C.    Pain/Comfort:  Pain Rating 1: 0/10  Pain Rating Post-Intervention 1: 0/10    Patients cultural, spiritual, Moravian conflicts given the current situation:      Objective:     Communicated with: RN prior to session.  Patient found HOB elevated with telemetry, pulse ox (continuous) upon OT entry to room.    General Precautions: Standard, fall   Orthopedic Precautions:N/A   Braces: N/A  Respiratory Status: Room air    Occupational Performance:    Bed Mobility:    Patient completed Rolling/Turning to Left with  maximal assistance    Functional Mobility/Transfers:  Attempted mobility but pt w/ 2 bowel movements during trials.     Activities of Daily Living:  Upper Body Dressing: maximal assistance gown management in bed  Toileting: total assistance keri-care in  bed    Cognitive/Visual Perceptual:  Cognitive/Psychosocial Skills:  -       Oriented to: Person and Place   -       Follows Commands/attention:Follows multistep  commands  -       Communication: clear/fluent  -       Memory: pt appears confused  -       Safety awareness/insight to disability: impaired   -       Mood/Affect/Coping skills/emotional control: Appropriate to situation  Visual/Perceptual:  -Intact      Physical Exam:  Balance:    -       unable to formally test due to multiple bowel movements during mobility trials  Postural examination/scapula alignment: -       Rounded shoulders  Skin integrity: Visible skin intact  Upper Extremity Range of Motion:  -       Right Upper Extremity: WFL  -       Left Upper Extremity: WFL  Upper Extremity Strength: -       Right Upper Extremity: WFL  -       Left Upper Extremity: WFL   Strength: -       Right Upper Extremity: WFL  -       Left Upper Extremity: WFL  Fine Motor Coordination: -       Intact  Gross motor coordination: WFL    AMPAC 6 Click ADL:  AMPAC Total Score: 10    Treatment & Education:  Pt educated on:    POC & overall  coordination of care   D/C recs  Early mobility  Importance of oob activities  Importance of participating in therapy  Possible benefits of therapy    Patient left HOB elevated with all lines intact, call button in reach, and RN notified    GOALS:   Multidisciplinary Problems       Occupational Therapy Goals          Problem: Occupational Therapy    Goal Priority Disciplines Outcome Interventions   Occupational Therapy Goal     OT, PT/OT Ongoing, Progressing    Description: Goals to be met by: 10/14/2022     Patient will increase functional independence with ADLs by performing:    UE Dressing with Minimal Assistance.  LE Dressing with Moderate Assistance.  Grooming while seated with Set-up Assistance.  Toileting from bedside commode with Moderate Assistance for hygiene and clothing management.   Toilet transfer to bedside commode with Moderate Assistance.                         History:     Past Medical History:   Diagnosis Date    Anticoagulant long-term use     Atrial fibrillation     Diabetes mellitus     Diastolic heart failure     Frequent falls     Hypertension     Renal disorder     eswl    Sleep apnea     20 yrs ago    Stroke     TIA    UTI (urinary tract infection)     Vertigo          Past Surgical History:   Procedure Laterality Date    CARDIAC SURGERY  03/30/2022    [facility]    INSERTION OF PACEMAKER      KIDNEY STONE SURGERY      OPEN REDUCTION AND INTERNAL FIXATION (ORIF) OF FRACTURE OF DISTAL RADIUS Right 5/17/2022    Procedure: ORIF, FRACTURE, RADIUS, DISTAL;  Surgeon: Claude S. Williams IV, MD;  Location: [facility];  Service: Orthopedics;  Laterality: Right;    TONSILLECTOMY         Time Tracking:     OT Date of Treatment: 09/25/22  OT Start Time: 1031  OT Stop Time: 1055  OT Total Time (min): 24 min    Billable Minutes:Evaluation 16 minutes  Self Care/Home Management 8 minutes    9/25/2022

## 2022-09-25 NOTE — ASSESSMENT & PLAN NOTE
Last A1c 6.1 2 months ago.     -- Hold home medications while inpatient  -- POCT glucose ACHS   -- LDSSI to maintain -180

## 2022-09-25 NOTE — PROGRESS NOTES
[facility] - Cardiac Medical ICU  Critical Care Medicine  Progress Note    Patient Name: Jacques Arellano  MRN: [id number]  Admission Date: 9/23/2022  Hospital Length of Stay: 1 days  Code Status: Full Code  Attending Provider: Neftaly Lopez*  Primary Care Provider: Matty Garsia MD   Principal Problem: Shock    Subjective:     HPI:  82 y.o. male with documented notation of CHF but TTE 03/2022 demonstrated preserved EF without diastolic dysfunction, HTN, AF status post Watchman March 2022, and TIA who presents via EMS for multiple falls. States he has been unsteady and feeling lightheaded for several weeks; subsequently has had multiple falls resulting abrasions on bilateral knees, arms, lower legs. Last fall was two days ago. Denies fever, but has had a few episodes of chills and was recently treated for a pan-sensitive e coli UTI with ciprofloxacin. Denies chest pain or dyspnea. Has intermittent palpitations. Denies abdominal pain, nausea, or vomiting but has had watery stools for the past month. Denies dysuria, hematuria, or frequency. Denies cough or rhinorrhea. Lives at home with son and ambulates with a walker. Pt reportedly at baseline mental status per son via HM note. Denies substance use. Denies recent medication changes.     On EMS arrival, patient was hypotensive in 70s/40s, with a blood glucose of 54. On arrival to ED, afebrile, tachycardic HR 96, /66, RR 22 saturating 98% on RA. Hemoglobin 11.6, leukocytosis WBC 12.7, Plt 102. Na 134, K 3.1, sCr 1.6 (improved to 1.4 post fluids). No transaminitis, INR 1.3. Initial troponin 0.166, repeat pending. Initial lacate 3.2 (improved to 2.0 post fluids). Procalcitonin negative. BCx pending. CXR showing some perihilar opacities but no obvious consolidation. 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 CT cervical spine without fracture. 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 Given 3L IV fluids and started on broad spectrum antibiotics. Admitted to Hospital Medicine for concerns of sepsis of unknown source. [facility] consulted for shock.       Hospital/ICU Course:  No notes on file    Interval History/Significant Events    NAEO, however patients blood culture revealed gram positive cocci in clusters. Repeat Blood cx sent.     This morning, afebrile and HDS. Denies fever, chills, chest pain, shortness of breath. No other complaints.     Review of Systems   Constitutional:  Positive for activity change. Negative for appetite change, chills, diaphoresis and fever.   HENT:  Negative for congestion and rhinorrhea.    Eyes:  Negative for photophobia and visual disturbance.   Respiratory:  Negative for cough, shortness of breath and wheezing.    Cardiovascular:  Negative for chest pain, palpitations and leg swelling.   Gastrointestinal:  Negative for abdominal pain, diarrhea, nausea and vomiting.   Genitourinary:  Negative for difficulty urinating, dysuria and hematuria.   Musculoskeletal:  Negative for arthralgias (left big toe) and myalgias.   Skin:  Positive for wound. Negative for rash.   Neurological:  Negative for dizziness, light-headedness and headaches.   Hematological:  Bruises/bleeds easily.   Psychiatric/Behavioral:  Negative for agitation and confusion.    Objective:     Vital Signs (Most Recent):  Temp: 97.7 °F (36.5 °C) (09/25/22 0400)  Pulse: 69 (09/25/22 0600)  Resp: 14 (09/25/22 0600)  BP: 124/74 (09/25/22 0600)  SpO2: 100 % (09/25/22 0600) Vital Signs (24h Range):  Temp:  [96.9 °F (36.1 °C)-98.4 °F (36.9 °C)] 97.7 °F (36.5  °C)  Pulse:  [69-72] 69  Resp:  [11-25] 14  SpO2:  [98 %-100 %] 100 %  BP: ()/(55-75) 124/74   Weight: 93 kg (205 lb)  Body mass index is 29.41 kg/m².      Intake/Output Summary (Last 24 hours) at 9/25/2022 0718  Last data filed at 9/25/2022 0600  Gross per 24 hour   Intake 747.04 ml   Output 730 ml   Net 17.04 ml       Physical Exam  Vitals and nursing note reviewed.   Constitutional:       General: He is not in acute distress.  HENT:      Head: Normocephalic and atraumatic.      Comments: Bilateral temporal wasting.     Mouth/Throat:      Mouth: Mucous membranes are moist.   Eyes:      General: No scleral icterus.     Extraocular Movements: Extraocular movements intact.      Pupils: Pupils are equal, round, and reactive to light.   Cardiovascular:      Rate and Rhythm: Normal rate and regular rhythm.      Pulses: Normal pulses.      Heart sounds: Normal heart sounds. No murmur heard.  Pulmonary:      Effort: Pulmonary effort is normal.      Breath sounds: Normal breath sounds. No wheezing, rhonchi or rales.   Abdominal:      General: Bowel sounds are normal. There is no distension.      Palpations: Abdomen is soft.      Tenderness: There is no abdominal tenderness.   Musculoskeletal:         General: Signs of injury present. No tenderness.      Right lower leg: No edema.      Left lower leg: No edema.   Skin:     General: Skin is warm.      Capillary Refill: Capillary refill takes less than 2 seconds.      Findings: Bruising present.   Neurological:      General: No focal deficit present.      Mental Status: He is alert and oriented to person, place, and time.   Psychiatric:         Mood and Affect: Mood normal.         Thought Content: Thought content normal.       Vents:     Lines/Drains/Airways       Peripheral Intravenous Line  Duration                  Peripheral IV - Single Lumen 09/24/22 0445 20 G Left;Posterior Hand 1 day         Peripheral IV - Single Lumen 09/24/22 2100 20 G Anterior;Left Forearm  <1 day                  Significant Labs:    CBC/Anemia Profile:  Recent Labs   Lab 09/23/22 2220 09/24/22  0440 09/25/22  0310   WBC 12.76* 8.27 8.74   HGB 11.6* 11.1* 10.6*   HCT 35.0* 34.1* 33.1*   * 74* 75*   MCV 94 95 93   RDW 17.2* 17.2* 17.2*        Chemistries:  Recent Labs   Lab 09/23/22 2220 09/24/22 0440 09/24/22  2148 09/25/22  0310   * 135* 142 134*   K 3.1* 2.9* 3.3* 3.4*    103 104 102   CO2 23 22* 22* 22*   BUN 23 23 23 24*   CREATININE 1.6* 1.4 1.4 1.4   CALCIUM 8.3* 8.0* 7.3* 7.8*   ALBUMIN 2.3* 2.2*  --  2.2*   PROT 5.1* 4.8*  --  4.8*   BILITOT 1.7* 1.5*  --  1.3*   ALKPHOS 99 90  --  94   ALT 17 16  --  18   AST 41* 39  --  39   MG  --  1.2*  --  1.6   PHOS  --  2.1*  --  2.5*       All pertinent labs within the past 24 hours have been reviewed.    Significant Imaging:  I have reviewed all pertinent imaging results/findings within the past 24 hours.      ABG  No results for input(s): PH, PO2, PCO2, HCO3, BE in the last 168 hours.  Assessment/Plan:     Cardiac/Vascular  * Shock  83 yo M presenting to ED with multiple history of falls w/o LOC / head trauma, light-headedness. Arrived with slight leukocytosis 12.7, tachycardia, hypotension (MAP low 60s), lactate 3.2. CXR unremarkable, CT without acute pulmonary or abdominal findings to suggest infection. UA pending collection. Patient appears hypovolemic on arrival. Mentation intact and at baseline. S/P sepsis fluid bolus (combined). Previously seen outpatient with pan-sensitive E Coli UTI for which he was prescribed a ciprofloxacin. CT head negative for acute abnormality (though suspicious bone finding needs OP f/u), CTAP with myriad of findings without acute infectious nidus, though cirrhotic morphology.     Etiology: Hypotension may be 2/2 infection (though unclear source as patient would have been adequately treated with OP abx for UTI) vs cardiac dysfunction given syncope and pacemaker placement. Recent EKG without pacer  spikes concerning his pacer is not capturing in the ER, however upon arrival in the ICU, pace spikes are captured.     Patient with gram positive cocci in clusters on Blood Cx. Attempt to perform diagnostic paracentesis yesterday, however bowel is very close to the abdominal wall. IR consult placed.      -- Vanc / Zosyn empiric abx  -- Trend fever curve, trend CBC  -- Maintain MAP > 65  -- UCx pending  -- 9/23 BCx: gram positive cocci in clusters, repeat Blood cx ordered.  -- Lactate now normalized following fluid bolus  -- Investigate for further cause of falls including: cardiology consulted for pacemaker interrogation. No issues were cardiology fellow    Mixed hyperlipidemia  -- Continue home statin    Hypertension  Home medications: triamterene-HCTZ, Lasix     -- Hold home medications in the setting of shock    Diastolic heart failure  Questionable history of diastolic CHF. Documented in chart, but previous TTE in Care Everywhere 03/2022 demonstrated normal EF without noted diastolic dysfunction. Patient also not complaining of HF symptoms (STRATTON, orthopnea, new BLE swelling, anasarca).     -- Repeat TTE      Permanent atrial fibrillation  Patient with documented history of permanent Afib s/p watchman, no longer on AC or rate controlling agents.  [id number] 5   HAS-BLED 4      -- Patient rate controlled, no indication for further agents at this time  -- No full AC required s/p Watchman; will hold AC ppx and APT s/o active bleeding from abrasion sites  -- Cardiac telemetry  -- Maintain K > 4, Mg > 2, Ca wnl; replete PRN      Renal/  Hypokalemia  K 2.9 on admission with concomitant hypomagnesemia.     -- Replete potassium to achieve goal of >4  --CMP daily  -- Replete Mg    TRENT (acute kidney injury)  Patient presenting with TRENT with admission sCr 1.6 (baseline sCr wnl).      Serum creatinine: 1.6 mg/dL (H) 09/23/22 2220  Estimated creatinine clearance: 40.1 mL/min (A)     -- s/p IVFs with improvement in renal  function  -- Trend sCr  -- Strict I&Os  -- Avoid nephrotoxic agents  -- Renally adjust medications         Endocrine  Type 2 diabetes mellitus, without long-term current use of insulin  Last A1c 6.1 2 months ago.     -- Hold home medications while inpatient  -- POCT glucose ACHS   -- LDSSI to maintain -180    GI  Ascites  --9/23: Ct A/P:Cirrhotic morphology of the liver with sequelae of portal hypertension as evidence by splenomegaly, intra-abdominal ascites, and possible portal hypertensive enteropathy.  --Patient denies hx of alcohol abuse   --LFT with out elevation  --Patient with ascites: diagnostic paracentetics to r/o SBP: unable to perform. IR consulted   --Continue zosyn.     Orthopedic  Rib fracture  2x acute mildly displaced right anterior ribs fractures seen on CT chest  Analgesia prn  Incentive spirometry    Other  Falls frequently  -- PT/OT consulted     Critical Care Daily Checklist:    A: Awake: RASS Goal/Actual Goal: RASS Goal: 0-->alert and calm  Actual: [place] Agitation Sedation Scale (RASS): Alert and calm   B: Spontaneous Breathing Trial Performed?  none   C: SAT & SBT Coordinated?  none                      D: Delirium: CAM-ICU Overall CAM-ICU: Negative   E: Early Mobility Performed? Yes   F: Feeding Goal:    Status:     Current Diet Order   Procedures    Diet diabetic [facility]; 2000 Calorie; Cardiac (Low Na/Chol)     Order Specific Question:   Indicate patient location for additional diet options:     Answer:   [facility]     Order Specific Question:   Total calories:     Answer:   2000 Calorie     Order Specific Question:   Additional Diet Options:     Answer:   Cardiac (Low Na/Chol)      AS: Analgesia/Sedation none   T: Thromboembolic Prophylaxis none   H: HOB > 300 Yes   U: Stress Ulcer Prophylaxis (if needed) none   G: Glucose Control Goal < 180   B: Bowel Function Stool Occurrence: 1   I: Indwelling Catheter (Lines & Childers) Necessity PIV x2, Urinary catheter x 1   D:  De-escalation of Antimicrobials/Pharmacotherapies Vancomycin and zosyn    Plan for the day/ETD Deescalate when able    Code Status:  Family/Goals of Care: Full Code  na       Critical secondary to Patient has a condition that poses threat to life and bodily function: Sepsis of unknown origin     Critical care was time spent personally by me on the following activities: development of treatment plan with patient or surrogate and bedside caregivers, discussions with consultants, evaluation of patient's response to treatment, examination of patient, ordering and performing treatments and interventions, ordering and review of laboratory studies, ordering and review of radiographic studies, pulse oximetry, re-evaluation of patient's condition. This critical care time did not overlap with that of any other provider or involve time for any procedures.     Anthony Stafford MD  Critical Care Medicine  [facility] - Cardiac Medical ICU

## 2022-09-25 NOTE — ASSESSMENT & PLAN NOTE
81 yo M presenting to ED with multiple history of falls w/o LOC / head trauma, light-headedness. Arrived with slight leukocytosis 12.7, tachycardia, hypotension (MAP low 60s), lactate 3.2. CXR unremarkable, CT without acute pulmonary or abdominal findings to suggest infection. UA pending collection. Patient appears hypovolemic on arrival. Mentation intact and at baseline. S/P sepsis fluid bolus (combined). Previously seen outpatient with pan-sensitive E Coli UTI for which he was prescribed a ciprofloxacin. CT head negative for acute abnormality (though suspicious bone finding needs OP f/u), CTAP with myriad of findings without acute infectious nidus, though cirrhotic morphology.     Etiology: Hypotension may be 2/2 infection (though unclear source as patient would have been adequately treated with OP abx for UTI) vs cardiac dysfunction given syncope and pacemaker placement. Recent EKG without pacer spikes concerning his pacer is not capturing in the ER, however upon arrival in the ICU, pace spikes are captured.     Patient with gram positive cocci in clusters on Blood Cx. Attempt to perform diagnostic paracentesis yesterday, however bowel is very close to the abdominal wall. IR consult placed.      -- Vanc / Zosyn empiric abx  -- Trend fever curve, trend CBC  -- Maintain MAP > 65  -- UCx pending  -- 9/23 BCx: gram positive cocci in clusters, repeat Blood cx ordered.  -- Lactate now normalized following fluid bolus  -- Investigate for further cause of falls including: cardiology consulted for pacemaker interrogation. No issues were cardiology fellow

## 2022-09-25 NOTE — ASSESSMENT & PLAN NOTE
Patient with documented history of permanent Afib s/p watchman, no longer on AC or rate controlling agents.  [id number] 5   HAS-BLED 4      -- Patient rate controlled, no indication for further agents at this time  -- No full AC required s/p Watchman; will hold AC ppx and APT s/o active bleeding from abrasion sites  -- Cardiac telemetry  -- Maintain K > 4, Mg > 2, Ca wnl; replete PRN

## 2022-09-25 NOTE — ASSESSMENT & PLAN NOTE
--9/23: Ct A/P:Cirrhotic morphology of the liver with sequelae of portal hypertension as evidence by splenomegaly, intra-abdominal ascites, and possible portal hypertensive enteropathy.  --Patient denies hx of alcohol abuse   --LFT with out elevation  --Patient with ascites: diagnostic paracentetics to r/o SBP: unable to perform. IR consulted   --Continue zosyn.

## 2022-09-25 NOTE — PROGRESS NOTES
Pharmacokinetic Initial Assessment: IV Vancomycin    Assessment/Plan:    Initiate intravenous vancomycin with loading dose of 2000 mg once followed by a maintenance dose of vancomycin 1500mg IV every 24 hours  Desired empiric serum trough concentration is 15 to 20 mcg/mL  Draw vancomycin trough level 60 min prior to third dose on 9/26 at approximately 0700  Pharmacy will continue to follow and monitor vancomycin.      Please contact pharmacy with any questions regarding this assessment.     Thank you for the consult,   JT ARREOLA       Patient brief summary:  Jacques Arellano is a 82 y.o. male initiated on antimicrobial therapy with IV Vancomycin for treatment of suspected bacteremia    Drug Allergies:   Review of patient's allergies indicates:  No Known Allergies    Actual Body Weight:   93kg    Renal Function:   Estimated Creatinine Clearance: 45.8 mL/min (based on SCr of 1.4 mg/dL).,     Dialysis Method (if applicable):  N/A    CBC (last 72 hours):  Recent Labs   Lab Result Units 09/23/22 2220 09/24/22 0440 09/25/22  0310   WBC K/uL 12.76* 8.27 8.74   Hemoglobin g/dL 11.6* 11.1* 10.6*   Hematocrit % 35.0* 34.1* 33.1*   Platelets K/uL 102* 74* 75*   Gran % % 86.8* 82.4* 82.0*   Lymph % % 6.0* 9.1* 8.4*   Mono % % 6.1 7.6 8.0   Eosinophil % % 0.1 0.2 0.8   Basophil % % 0.2 0.2 0.2   Differential Method  Automated Automated Automated       Metabolic Panel (last 72 hours):  Recent Labs   Lab Result Units 09/23/22 2220 09/24/22  0440 09/24/22  1006 09/24/22  2148 09/25/22  0310   Sodium mmol/L 134* 135*  --  142 134*   Potassium mmol/L 3.1* 2.9*  --  3.3* 3.4*   Chloride mmol/L 101 103  --  104 102   CO2 mmol/L 23 22*  --  22* 22*   Glucose mg/dL 79 60*  --  97 92   Glucose, UA   --   --  Negative  --   --    BUN mg/dL 23 23  --  23 24*   Creatinine mg/dL 1.6* 1.4  --  1.4 1.4   Albumin g/dL 2.3* 2.2*  --   --  2.2*   Total Bilirubin mg/dL 1.7* 1.5*  --   --  1.3*   Alkaline Phosphatase U/L 99 90  --   --  94    AST U/L 41* 39  --   --  39   ALT U/L 17 16  --   --  18   Magnesium mg/dL  --  1.2*  --   --  1.6   Phosphorus mg/dL  --  2.1*  --   --  2.5*       Drug levels (last 3 results):  Recent Labs   Lab Result Units 09/25/22  0310   Vancomycin, Random ug/mL 12.7       Microbiologic Results:  Microbiology Results (last 7 days)       Procedure Component Value Units Date/Time    Blood culture #1 **CANNOT BE ORDERED STAT** [[id number]] Collected: 09/23/22 2257    Order Status: Completed Specimen: Blood from Peripheral, Upper Arm, Left Updated: 09/25/22 0613     Blood Culture, Routine No Growth to date      No Growth to date    Blood culture [[id number]] Collected: 09/24/22 2148    Order Status: Completed Specimen: Blood from Peripheral, Hand, Left Updated: 09/25/22 0515     Blood Culture, Routine No Growth to date    Blood culture [[id number]] Collected: 09/24/22 2148    Order Status: Completed Specimen: Blood from Peripheral, Antecubital, Left Updated: 09/25/22 0515     Blood Culture, Routine No Growth to date    MRSA/SA Rapid ID by PCR from Blood culture [[id number]] Collected: 09/23/22 2257    Order Status: Completed Updated: 09/24/22 2136     Staph aureus ID by PCR Negative     MRSA ID by PCR Negative    Blood culture #2 **CANNOT BE ORDERED STAT** [[id number]] Collected: 09/23/22 2257    Order Status: Completed Specimen: Blood from Peripheral, Upper Arm, Left Updated: 09/24/22 2025     Blood Culture, Routine Gram stain aer bottle: Gram positive cocci in clusters resembling Staph      Results called to and read back by:Anton Archuleta RN 09/24/2022  20:24    Urine culture [[id number]] Collected: 09/24/22 1006    Order Status: No result Specimen: Urine Updated: 09/24/22 1110

## 2022-09-25 NOTE — PLAN OF CARE
Problem: Occupational Therapy  Goal: Occupational Therapy Goal  Description: Goals to be met by: 10/14/2022     Patient will increase functional independence with ADLs by performing:    UE Dressing with Minimal Assistance.  LE Dressing with Moderate Assistance.  Grooming while seated with Set-up Assistance.  Toileting from bedside commode with Moderate Assistance for hygiene and clothing management.   Toilet transfer to bedside commode with Moderate Assistance.    Outcome: Ongoing, Progressing    Josiah Valentine OTR/L  9/25/2022

## 2022-09-25 NOTE — PLAN OF CARE
PT eval complete and POC established.    2022    Problem: Physical Therapy  Goal: Physical Therapy Goal  Description: Goals to be met by: 10/9/2022     Patient will increase functional independence with mobility by performin. Supine to sit with Contact Guard Assistance  2. Sit to supine with Contact Guard Assistance  3. Sit to stand transfer with Minimal Assistance and RW  4. Bed to chair transfer with Minimal Assistance using Rolling Walker  5. Gait  x 100 feet with Minimal Assistance using Rolling Walker.   6. Ascend/descend 3 stair with no Handrails Minimal Assistance using No Assistive Device.     Outcome: Ongoing, Progressing

## 2022-09-25 NOTE — PT/OT/SLP EVAL
Physical Therapy Co-Evaluation  Co-evaluation and treatment performed due to acuity and complexity of pt's medical status with 2 skilled disciplines needed to optimize pts functional performance in ICU setting.   Patient Name:  Jacques Arellano   MRN:  [id number]    Recommendations:     Discharge Recommendations:  nursing facility, skilled   Discharge Equipment Recommendations:  (TBD by next level of care)   Barriers to discharge: Inaccessible home and Decreased caregiver support    Assessment:     Jacques Arellano is a 82 y.o. male admitted with a medical diagnosis of Shock.  He presents with the following impairments/functional limitations:  weakness, impaired endurance, impaired self care skills, impaired functional mobility, gait instability, impaired balance, impaired cognition, decreased upper extremity function, decreased lower extremity function, decreased safety awareness, impaired fine motor, impaired coordination. Evaluation limited by large bowel movement and pt reporting he is still going. Pt would benefit from a skilled nursing facility for: Dynamic/static standing/sitting balance through skilled balance training, strengthening with the use of skilled therapeutic exercises interventions, and mobility through adaptive equipment training. Pt continues to benefit from a collaborative PT/OT program to improve quality of life and focus on recovery of impairments.      Rehab Prognosis: Good; patient would benefit from acute skilled PT services to address these deficits and reach maximum level of function.    Recent Surgery: * No surgery found *      Plan:     During this hospitalization, patient to be seen 3 x/week to address the identified rehab impairments via gait training, therapeutic activities, therapeutic exercises, neuromuscular re-education and progress toward the following goals:    Plan of Care Expires:  10/25/22    Subjective     Chief Complaint: none reported  Patient/Family Comments/goals: pt  "reporting a large bowel movement once attempting to mobilize  Pain/Comfort:  Pain Rating 1: 0/10  Pain Rating Post-Intervention 1: 0/10    Patients cultural, spiritual, Uatsdin conflicts given the current situation: no    Living Environment/Occupational Profile: pt questionable historian  Living Environment: Pt lives in a 2sh w/ son w/ 3 steps to enter.   Previous level of function: Assist w/ ADLs and MOD I for mobility  (RW, w/c).   Roles and Routines: N/A  Equipment Used at Home:  wheelchair, rollator  Assistance upon Discharge: Pt has assistance upon D/C.  Objective:     Communicated with RN prior to session.  Patient found HOB elevated with telemetry, pulse ox (continuous), peripheral IV, Condom Catheter, blood pressure cuff  upon PT entry to room.    General Precautions: Standard, fall   Orthopedic Precautions:N/A   Braces: N/A  Respiratory Status: Room air    Exams:  Cognitive Exam:  Patient is oriented to Person and Place  Pt reporting it is the year "2200" and that he is at the hoptal for an "experimental surgery"   Command follow: requires commands to be repeated several times for pt to respond and follow them  Gross Motor Coordination:  WFL  RLE ROM: WFL  RLE Strength: Deficits: 2 to 3/5 observed; command follow decreased  LLE ROM: WFL  LLE Strength: Deficits: 2 to 3/5 observed; command follow decreased    Functional Mobility:  Bed Mobility:     Rolling Left:  maximal assistance  Rolling Right: maximal assistance  Supine to Sit: deferred 2/2 pt continuing to have a bowel movement    Therapeutic Activities and Exercises:  Patient educated on role of therapy, goals of session, and benefits of mobilizing.   Discussed PT plan of care during hospitalization.   Patient educated on calling for assistance.   Patient educated on how their diagnosis impacts their mobility within PT scope of practice.   Communication board up to date.  All questions answered within PT scope of practice.    AM-PAC 6 CLICK " MOBILITY  Total Score:7     Patient left HOB elevated with all lines intact, call button in reach, and RN notified.    GOALS:   Multidisciplinary Problems       Physical Therapy Goals          Problem: Physical Therapy    Goal Priority Disciplines Outcome Goal Variances Interventions   Physical Therapy Goal     PT, PT/OT Ongoing, Progressing     Description: Goals to be met by: 10/9/2022     Patient will increase functional independence with mobility by performin. Supine to sit with Contact Guard Assistance  2. Sit to supine with Contact Guard Assistance  3. Sit to stand transfer with Minimal Assistance and RW  4. Bed to chair transfer with Minimal Assistance using Rolling Walker  5. Gait  x 100 feet with Minimal Assistance using Rolling Walker.   6. Ascend/descend 3 stair with no Handrails Minimal Assistance using No Assistive Device.                          History:     Past Medical History:   Diagnosis Date    Anticoagulant long-term use     Atrial fibrillation     Diabetes mellitus     Diastolic heart failure     Frequent falls     Hypertension     Renal disorder     eswl    Sleep apnea     20 yrs ago    Stroke     TIA    UTI (urinary tract infection)     Vertigo        Past Surgical History:   Procedure Laterality Date    CARDIAC SURGERY  2022    watchmen    INSERTION OF PACEMAKER      KIDNEY STONE SURGERY      OPEN REDUCTION AND INTERNAL FIXATION (ORIF) OF FRACTURE OF DISTAL RADIUS Right 2022    Procedure: ORIF, FRACTURE, RADIUS, DISTAL;  Surgeon: Claude S. Williams IV, MD;  Location: [facility];  Service: Orthopedics;  Laterality: Right;    TONSILLECTOMY         Time Tracking:     PT Received On: 22  PT Start Time: 1030     PT Stop Time: 1053  PT Total Time (min): 23 min     Billable Minutes: Evaluation 10 and Therapeutic Activity 8      2022

## 2022-09-25 NOTE — SUBJECTIVE & OBJECTIVE
Interval History/Significant Events    NAEO, however patients blood culture revealed gram positive cocci in clusters. Repeat Blood cx sent.     This morning, afebrile and HDS. Denies fever, chills, chest pain, shortness of breath. No other complaints.     Review of Systems   Constitutional:  Positive for activity change. Negative for appetite change, chills, diaphoresis and fever.   HENT:  Negative for congestion and rhinorrhea.    Eyes:  Negative for photophobia and visual disturbance.   Respiratory:  Negative for cough, shortness of breath and wheezing.    Cardiovascular:  Negative for chest pain, palpitations and leg swelling.   Gastrointestinal:  Negative for abdominal pain, diarrhea, nausea and vomiting.   Genitourinary:  Negative for difficulty urinating, dysuria and hematuria.   Musculoskeletal:  Negative for arthralgias (left big toe) and myalgias.   Skin:  Positive for wound. Negative for rash.   Neurological:  Negative for dizziness, light-headedness and headaches.   Hematological:  Bruises/bleeds easily.   Psychiatric/Behavioral:  Negative for agitation and confusion.    Objective:     Vital Signs (Most Recent):  Temp: 97.7 °F (36.5 °C) (09/25/22 0400)  Pulse: 69 (09/25/22 0600)  Resp: 14 (09/25/22 0600)  BP: 124/74 (09/25/22 0600)  SpO2: 100 % (09/25/22 0600) Vital Signs (24h Range):  Temp:  [96.9 °F (36.1 °C)-98.4 °F (36.9 °C)] 97.7 °F (36.5 °C)  Pulse:  [69-72] 69  Resp:  [11-25] 14  SpO2:  [98 %-100 %] 100 %  BP: ()/(55-75) 124/74   Weight: 93 kg (205 lb)  Body mass index is 29.41 kg/m².      Intake/Output Summary (Last 24 hours) at 9/25/2022 0718  Last data filed at 9/25/2022 0600  Gross per 24 hour   Intake 747.04 ml   Output 730 ml   Net 17.04 ml       Physical Exam  Vitals and nursing note reviewed.   Constitutional:       General: He is not in acute distress.  HENT:      Head: Normocephalic and atraumatic.      Comments: Bilateral temporal wasting.     Mouth/Throat:      Mouth: Mucous  membranes are moist.   Eyes:      General: No scleral icterus.     Extraocular Movements: Extraocular movements intact.      Pupils: Pupils are equal, round, and reactive to light.   Cardiovascular:      Rate and Rhythm: Normal rate and regular rhythm.      Pulses: Normal pulses.      Heart sounds: Normal heart sounds. No murmur heard.  Pulmonary:      Effort: Pulmonary effort is normal.      Breath sounds: Normal breath sounds. No wheezing, rhonchi or rales.   Abdominal:      General: Bowel sounds are normal. There is no distension.      Palpations: Abdomen is soft.      Tenderness: There is no abdominal tenderness.   Musculoskeletal:         General: Signs of injury present. No tenderness.      Right lower leg: No edema.      Left lower leg: No edema.   Skin:     General: Skin is warm.      Capillary Refill: Capillary refill takes less than 2 seconds.      Findings: Bruising present.   Neurological:      General: No focal deficit present.      Mental Status: He is alert and oriented to person, place, and time.   Psychiatric:         Mood and Affect: Mood normal.         Thought Content: Thought content normal.       Vents:     Lines/Drains/Airways       Peripheral Intravenous Line  Duration                  Peripheral IV - Single Lumen 09/24/22 0445 20 G Left;Posterior Hand 1 day         Peripheral IV - Single Lumen 09/24/22 2100 20 G Anterior;Left Forearm <1 day                  Significant Labs:    CBC/Anemia Profile:  Recent Labs   Lab 09/23/22 2220 09/24/22  0440 09/25/22  0310   WBC 12.76* 8.27 8.74   HGB 11.6* 11.1* 10.6*   HCT 35.0* 34.1* 33.1*   * 74* 75*   MCV 94 95 93   RDW 17.2* 17.2* 17.2*        Chemistries:  Recent Labs   Lab 09/23/22  2220 09/24/22  0440 09/24/22  2148 09/25/22  0310   * 135* 142 134*   K 3.1* 2.9* 3.3* 3.4*    103 104 102   CO2 23 22* 22* 22*   BUN 23 23 23 24*   CREATININE 1.6* 1.4 1.4 1.4   CALCIUM 8.3* 8.0* 7.3* 7.8*   ALBUMIN 2.3* 2.2*  --  2.2*   PROT 5.1*  4.8*  --  4.8*   BILITOT 1.7* 1.5*  --  1.3*   ALKPHOS 99 90  --  94   ALT 17 16  --  18   AST 41* 39  --  39   MG  --  1.2*  --  1.6   PHOS  --  2.1*  --  2.5*       All pertinent labs within the past 24 hours have been reviewed.    Significant Imaging:  I have reviewed all pertinent imaging results/findings within the past 24 hours.

## 2022-09-25 NOTE — RESIDENT HANDOFF
Handoff     Primary Team: [facility] MEDICINE TEAM 2 Room Number: 6095/6095 A     Patient Name: Jacques Arellano MRN: [id number]     Date of Birth: [id number] Allergies: Patient has no known allergies.     Age: 82 y.o. Admit Date: 9/23/2022     Sex: male  BMI: Body mass index is 29.41 kg/m².     Code Status: Full Code        Illness Level (current clinical status): Watcher - Yes    Reason for Admission: Shock    Brief HPI (pertinent PMH and diagnosis or differential diagnosis):   82 y.o. male with documented notation of CHF but TTE 03/2022 demonstrated preserved EF without diastolic dysfunction, HTN, AF status post Watchman March 2022, and TIA who presents via EMS for multiple falls. States he has been unsteady and feeling lightheaded for several weeks; subsequently has had multiple falls resulting abrasions on bilateral knees, arms, lower legs. Last fall was two days ago. Denies fever, but has had a few episodes of chills and was recently treated for a pan-sensitive e coli UTI with ciprofloxacin. Denies chest pain or dyspnea. Has intermittent palpitations. Denies abdominal pain, nausea, or vomiting but has had watery stools for the past month. Denies dysuria, hematuria, or frequency. Denies cough or rhinorrhea. Lives at home with son and ambulates with a walker. Pt reportedly at baseline mental status per son via HM note. Denies substance use. Denies recent medication changes.     On EMS arrival, patient was hypotensive in 70s/40s, with a blood glucose of 54. On arrival to ED, afebrile, tachycardic HR 96, /66, RR 22 saturating 98% on RA. Hemoglobin 11.6, leukocytosis WBC 12.7, Plt 102. Na 134, K 3.1, sCr 1.6 (improved to 1.4 post fluids). No transaminitis, INR 1.3. Initial troponin 0.166, repeat pending. Initial lacate 3.2 (improved to 2.0 post fluids). Procalcitonin negative. BCx pending. CXR showing some perihilar opacities but no obvious consolidation. 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 CT cervical spine without fracture. 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 Given 3L IV fluids and started on broad spectrum antibiotics. Admitted to Hospital Medicine for concerns of sepsis of unknown source. [facility] consulted for shock.     Procedure Date:   Will attempt paracentesis today.    Hospital Course:  Patient admitted to the ICU and started on norepinephrine for pressure control. Initially it was thought that his pacemaker was not functioning correctly given that no pacer spikes were seen on the monitor in the ED, however upon presentation to the ICU, pacer spikes were noted. Cardiology consulted and interrogated the device and reported that there were no issues with the pacemaker. Patient started on empiric antibiotic coverage for sepsis of unknown origin. CT were performed revealing a myriad of findings including cirrhotic morphology. Patient initially presented secondary to falls and has seen PT/OT today. Most recently, patient's blood culture grew gram positive cocci, awaiting speciation and susceptibility. Given that the patient's is no longer requiring pressors to keep his Maps greater than 65, patient stable for step down to the floor     Tasks (specific, using if-then statements): identify cause of sepsis, follow up on blood culture speciation, sensitivities, PT/OT, wound care, RUQ US for, neurosurgery consult for findings on head CT. May need US and paracentesis if unable to perform today.       Contingency Plan (special circumstances anticipated and plan): TBD    Estimated  Discharge Date: TBD    Discharge Disposition: TBD    Mentored By: Dr. Lopez

## 2022-09-25 NOTE — HOSPITAL COURSE
Patient admitted to the MICU secondary to concerns for sepsis of unknown source. Patient required norepinephrine to keep Maps elevated. Patient started on zosyn and vancomycin for empiric coverage.Given his presentation of multiple falls, patient was scanned in the ER. No acute intracranial process was found but patient did have an expansile calvarial lesion involving the right frontal bone. CT A/P reveals multiple findings including displaced right rib fx, dilated ascending aortic aneurysm, and cirrhotic liver with ascites. An attempt to perform a diagnostic paracentesis was tried on 9/24/22, however the procedure was not performed given close proximity of bowel to the surface. No deep enough pocket was noted to be safe for the procedure. Patient's blood culture then grew GPC in clusters. Patient continued on IV abx. A second attempt to  perform paracentesis on 9/25 however patient reported that he was eating his lunch when I presented in his room. Furthermore, when I presented again patient was receiving his RUQ US. Patient was eventually weaned off norepinephrine and stepped down to the floor. Paracentesis has not yet been performed.

## 2022-09-25 NOTE — PROGRESS NOTES
Pharmacokinetic Assessment: IV Vancomycin    Vancomycin serum concentration assessment(s):    - Random ~26 hr level is subtherapeutic at 12.7 mcg/mL  - Goal trough 15-20 mcg/mL for possible bacteremia.   - Admitted with TRENT, now slightly improved. Baseline Scr ~1.1. Adequate UOP.  -  Rapid accumulation after loading dose, hesitant to initiate scheduled regimen d/t renal fx and age  - Will pulse dose today to determine vanc clearance. Give 1250 mg x1 today  - Collect random level on 9/26 at 0300. Redose based on level and renal function    Drug levels (last 3 results):  Recent Labs   Lab Result Units 09/25/22  0310   Vancomycin, Random ug/mL 12.7       Pharmacy will continue to follow and monitor vancomycin.    Please contact pharmacy at extension [phone] for questions regarding this assessment.        Juliette Mead, PharmD, BCCCP  Neurocritical Care Clinical Pharmacist  Ext. [phone]         Patient brief summary:  Jacques Arellano is a 82 y.o. male initiated on antimicrobial therapy with IV Vancomycin for treatment of bacteremia      Drug Allergies:   Review of patient's allergies indicates:  No Known Allergies      Renal Function:   Estimated Creatinine Clearance: 45.8 mL/min (based on SCr of 1.4 mg/dL).,     Dialysis Method (if applicable):  N/A

## 2022-09-26 ENCOUNTER — DOCUMENTATION ONLY (OUTPATIENT)
Dept: CARDIOLOGY | Facility: HOSPITAL | Age: 82
End: 2022-09-26
Payer: MEDICARE

## 2022-09-26 PROBLEM — Z51.5 PALLIATIVE CARE ENCOUNTER: Status: ACTIVE | Noted: 2022-09-26

## 2022-09-26 PROBLEM — R26.89 BALANCE DISORDER: Status: ACTIVE | Noted: 2022-09-26

## 2022-09-26 LAB
ALBUMIN SERPL BCP-MCNC: 2.2 G/DL (ref 3.5–5.2)
ALP SERPL-CCNC: 106 U/L (ref 55–135)
ALT SERPL W/O P-5'-P-CCNC: 20 U/L (ref 10–44)
ANION GAP SERPL CALC-SCNC: 10 MMOL/L (ref 8–16)
ASCENDING AORTA: 3.42 CM
AST SERPL-CCNC: 35 U/L (ref 10–40)
AV INDEX (PROSTH): 0.7
AV MEAN GRADIENT: 3 MMHG
AV PEAK GRADIENT: 6 MMHG
AV VALVE AREA: 3.11 CM2
AV VELOCITY RATIO: 0.61
BASOPHILS # BLD AUTO: 0.02 K/UL (ref 0–0.2)
BASOPHILS NFR BLD: 0.2 % (ref 0–1.9)
BILIRUB SERPL-MCNC: 1.1 MG/DL (ref 0.1–1)
BSA FOR ECHO PROCEDURE: 2.14 M2
BUN SERPL-MCNC: 23 MG/DL (ref 8–23)
CALCIUM SERPL-MCNC: 8.1 MG/DL (ref 8.7–10.5)
CHLORIDE SERPL-SCNC: 104 MMOL/L (ref 95–110)
CO2 SERPL-SCNC: 23 MMOL/L (ref 23–29)
CREAT SERPL-MCNC: 1.6 MG/DL (ref 0.5–1.4)
CV ECHO LV RWT: 0.32 CM
DIFFERENTIAL METHOD: ABNORMAL
DOP CALC AO PEAK VEL: 1.18 M/S
DOP CALC AO VTI: 22.02 CM
DOP CALC LVOT AREA: 4.4 CM2
DOP CALC LVOT DIAMETER: 2.38 CM
DOP CALC LVOT PEAK VEL: 0.72 M/S
DOP CALC LVOT STROKE VOLUME: 68.57 CM3
DOP CALCLVOT PEAK VEL VTI: 15.42 CM
E/E' RATIO: 9.87 M/S
ECHO LV POSTERIOR WALL: 0.84 CM (ref 0.6–1.1)
EJECTION FRACTION: 50 %
EOSINOPHIL # BLD AUTO: 0.1 K/UL (ref 0–0.5)
EOSINOPHIL NFR BLD: 0.7 % (ref 0–8)
ERYTHROCYTE [DISTWIDTH] IN BLOOD BY AUTOMATED COUNT: 17.4 % (ref 11.5–14.5)
EST. GFR  (NO RACE VARIABLE): 42.5 ML/MIN/1.73 M^2
FRACTIONAL SHORTENING: 23 % (ref 28–44)
GLUCOSE SERPL-MCNC: 155 MG/DL (ref 70–110)
HCT VFR BLD AUTO: 34.5 % (ref 40–54)
HGB BLD-MCNC: 11 G/DL (ref 14–18)
IMM GRANULOCYTES # BLD AUTO: 0.06 K/UL (ref 0–0.04)
IMM GRANULOCYTES NFR BLD AUTO: 0.7 % (ref 0–0.5)
INTERVENTRICULAR SEPTUM: 1.12 CM (ref 0.6–1.1)
LA MAJOR: 6.37 CM
LA MINOR: 5.87 CM
LA WIDTH: 4.58 CM
LEFT ATRIUM SIZE: 4.21 CM
LEFT ATRIUM VOLUME INDEX MOD: 42.6 ML/M2
LEFT ATRIUM VOLUME INDEX: 47.5 ML/M2
LEFT ATRIUM VOLUME MOD: 89.85 CM3
LEFT ATRIUM VOLUME: 100.14 CM3
LEFT INTERNAL DIMENSION IN SYSTOLE: 3.98 CM (ref 2.1–4)
LEFT VENTRICLE DIASTOLIC VOLUME INDEX: 61.23 ML/M2
LEFT VENTRICLE DIASTOLIC VOLUME: 129.19 ML
LEFT VENTRICLE MASS INDEX: 89 G/M2
LEFT VENTRICLE SYSTOLIC VOLUME INDEX: 32.7 ML/M2
LEFT VENTRICLE SYSTOLIC VOLUME: 69.1 ML
LEFT VENTRICULAR INTERNAL DIMENSION IN DIASTOLE: 5.19 CM (ref 3.5–6)
LEFT VENTRICULAR MASS: 188.41 G
LV LATERAL E/E' RATIO: 6.73 M/S
LV SEPTAL E/E' RATIO: 18.5 M/S
LYMPHOCYTES # BLD AUTO: 0.6 K/UL (ref 1–4.8)
LYMPHOCYTES NFR BLD: 6.7 % (ref 18–48)
MAGNESIUM SERPL-MCNC: 1.8 MG/DL (ref 1.6–2.6)
MCH RBC QN AUTO: 30.4 PG (ref 27–31)
MCHC RBC AUTO-ENTMCNC: 31.9 G/DL (ref 32–36)
MCV RBC AUTO: 95 FL (ref 82–98)
MONOCYTES # BLD AUTO: 0.7 K/UL (ref 0.3–1)
MONOCYTES NFR BLD: 7.5 % (ref 4–15)
MV PEAK E VEL: 0.74 M/S
NEUTROPHILS # BLD AUTO: 7.6 K/UL (ref 1.8–7.7)
NEUTROPHILS NFR BLD: 84.2 % (ref 38–73)
NRBC BLD-RTO: 0 /100 WBC
PHOSPHATE SERPL-MCNC: 2.6 MG/DL (ref 2.7–4.5)
PISA TR MAX VEL: 2.56 M/S
PLATELET # BLD AUTO: 75 K/UL (ref 150–450)
PMV BLD AUTO: 10.2 FL (ref 9.2–12.9)
POCT GLUCOSE: 130 MG/DL (ref 70–110)
POCT GLUCOSE: 131 MG/DL (ref 70–110)
POTASSIUM SERPL-SCNC: 4.2 MMOL/L (ref 3.5–5.1)
PROT SERPL-MCNC: 4.7 G/DL (ref 6–8.4)
RA MAJOR: 5.96 CM
RA WIDTH: 3.94 CM
RBC # BLD AUTO: 3.62 M/UL (ref 4.6–6.2)
RIGHT VENTRICULAR END-DIASTOLIC DIMENSION: 4.31 CM
RV TISSUE DOPPLER FREE WALL SYSTOLIC VELOCITY 1 (APICAL 4 CHAMBER VIEW): 7.95 CM/S
SINUS: 4.14 CM
SODIUM SERPL-SCNC: 137 MMOL/L (ref 136–145)
STJ: 3.17 CM
TDI LATERAL: 0.11 M/S
TDI SEPTAL: 0.04 M/S
TDI: 0.08 M/S
TR MAX PG: 26 MMHG
TRICUSPID ANNULAR PLANE SYSTOLIC EXCURSION: 1.35 CM
VANCOMYCIN SERPL-MCNC: 19.4 UG/ML
WBC # BLD AUTO: 8.97 K/UL (ref 3.9–12.7)

## 2022-09-26 PROCEDURE — 25000003 PHARM REV CODE 250

## 2022-09-26 PROCEDURE — 99223 PR INITIAL HOSPITAL CARE,LEVL III: ICD-10-PCS | Mod: GC,,, | Performed by: EMERGENCY MEDICINE

## 2022-09-26 PROCEDURE — 25000003 PHARM REV CODE 250: Performed by: INTERNAL MEDICINE

## 2022-09-26 PROCEDURE — 63600175 PHARM REV CODE 636 W HCPCS: Performed by: INTERNAL MEDICINE

## 2022-09-26 PROCEDURE — 99233 SBSQ HOSP IP/OBS HIGH 50: CPT | Mod: GC,,, | Performed by: INTERNAL MEDICINE

## 2022-09-26 PROCEDURE — 25000003 PHARM REV CODE 250: Performed by: STUDENT IN AN ORGANIZED HEALTH CARE EDUCATION/TRAINING PROGRAM

## 2022-09-26 PROCEDURE — 99223 1ST HOSP IP/OBS HIGH 75: CPT | Mod: GC,,, | Performed by: EMERGENCY MEDICINE

## 2022-09-26 PROCEDURE — 63600175 PHARM REV CODE 636 W HCPCS: Performed by: STUDENT IN AN ORGANIZED HEALTH CARE EDUCATION/TRAINING PROGRAM

## 2022-09-26 PROCEDURE — 30200315 PPD INTRADERMAL TEST REV CODE 302: Performed by: INTERNAL MEDICINE

## 2022-09-26 PROCEDURE — 83735 ASSAY OF MAGNESIUM: CPT

## 2022-09-26 PROCEDURE — 94761 N-INVAS EAR/PLS OXIMETRY MLT: CPT

## 2022-09-26 PROCEDURE — 86580 TB INTRADERMAL TEST: CPT | Performed by: INTERNAL MEDICINE

## 2022-09-26 PROCEDURE — 80053 COMPREHEN METABOLIC PANEL: CPT

## 2022-09-26 PROCEDURE — 63600175 PHARM REV CODE 636 W HCPCS

## 2022-09-26 PROCEDURE — 85025 COMPLETE CBC W/AUTO DIFF WBC: CPT

## 2022-09-26 PROCEDURE — 80202 ASSAY OF VANCOMYCIN: CPT | Performed by: INTERNAL MEDICINE

## 2022-09-26 PROCEDURE — 99233 PR SUBSEQUENT HOSPITAL CARE,LEVL III: ICD-10-PCS | Mod: GC,,, | Performed by: INTERNAL MEDICINE

## 2022-09-26 PROCEDURE — 84100 ASSAY OF PHOSPHORUS: CPT

## 2022-09-26 PROCEDURE — 20600001 HC STEP DOWN PRIVATE ROOM

## 2022-09-26 RX ORDER — ENOXAPARIN SODIUM 100 MG/ML
40 INJECTION SUBCUTANEOUS EVERY 24 HOURS
Status: DISCONTINUED | OUTPATIENT
Start: 2022-09-26 | End: 2022-10-03 | Stop reason: HOSPADM

## 2022-09-26 RX ORDER — SODIUM,POTASSIUM PHOSPHATES 280-250MG
1 POWDER IN PACKET (EA) ORAL ONCE
Status: COMPLETED | OUTPATIENT
Start: 2022-09-26 | End: 2022-09-26

## 2022-09-26 RX ORDER — SODIUM CHLORIDE, SODIUM LACTATE, POTASSIUM CHLORIDE, CALCIUM CHLORIDE 600; 310; 30; 20 MG/100ML; MG/100ML; MG/100ML; MG/100ML
INJECTION, SOLUTION INTRAVENOUS CONTINUOUS
Status: ACTIVE | OUTPATIENT
Start: 2022-09-26 | End: 2022-09-27

## 2022-09-26 RX ADMIN — SODIUM CHLORIDE, SODIUM LACTATE, POTASSIUM CHLORIDE, AND CALCIUM CHLORIDE: 600; 310; 30; 20 INJECTION, SOLUTION INTRAVENOUS at 09:09

## 2022-09-26 RX ADMIN — ENOXAPARIN SODIUM 40 MG: 100 INJECTION SUBCUTANEOUS at 06:09

## 2022-09-26 RX ADMIN — PIPERACILLIN SODIUM AND TAZOBACTAM SODIUM 4.5 G: 4; .5 INJECTION, POWDER, LYOPHILIZED, FOR SOLUTION INTRAVENOUS at 05:09

## 2022-09-26 RX ADMIN — ATORVASTATIN CALCIUM 40 MG: 40 TABLET, FILM COATED ORAL at 08:09

## 2022-09-26 RX ADMIN — ESCITALOPRAM 10 MG: 5 TABLET, FILM COATED ORAL at 08:09

## 2022-09-26 RX ADMIN — PIPERACILLIN SODIUM AND TAZOBACTAM SODIUM 4.5 G: 4; .5 INJECTION, POWDER, LYOPHILIZED, FOR SOLUTION INTRAVENOUS at 03:09

## 2022-09-26 RX ADMIN — LIDOCAINE 1 PATCH: 50 PATCH CUTANEOUS at 05:09

## 2022-09-26 RX ADMIN — TUBERCULIN PURIFIED PROTEIN DERIVATIVE 5 UNITS: 5 INJECTION, SOLUTION INTRADERMAL at 03:09

## 2022-09-26 RX ADMIN — ALLOPURINOL 100 MG: 100 TABLET ORAL at 09:09

## 2022-09-26 RX ADMIN — POTASSIUM & SODIUM PHOSPHATES POWDER PACK 280-160-250 MG 1 PACKET: 280-160-250 PACK at 08:09

## 2022-09-26 NOTE — SUBJECTIVE & OBJECTIVE
Past Medical History:   Diagnosis Date    Anticoagulant long-term use     Atrial fibrillation     Diabetes mellitus     Diastolic heart failure     Frequent falls     Hypertension     Renal disorder     eswl    Sleep apnea     20 yrs ago    Stroke     TIA    UTI (urinary tract infection)     Vertigo        Past Surgical History:   Procedure Laterality Date    CARDIAC SURGERY  03/30/2022    watchmen    INSERTION OF PACEMAKER      KIDNEY STONE SURGERY      OPEN REDUCTION AND INTERNAL FIXATION (ORIF) OF FRACTURE OF DISTAL RADIUS Right 5/17/2022    Procedure: ORIF, FRACTURE, RADIUS, DISTAL;  Surgeon: Claude S. Williams IV, MD;  Location: [facility];  Service: Orthopedics;  Laterality: Right;    TONSILLECTOMY         Review of patient's allergies indicates:  No Known Allergies    Medications:  Continuous Infusions:   lactated ringers 75 mL/hr at 09/26/22 1400     Scheduled Meds:   allopurinoL  100 mg Oral Daily    atorvastatin  40 mg Oral Daily    enoxaparin  40 mg Subcutaneous Daily    EScitalopram oxalate  10 mg Oral Daily    LIDOcaine  1 patch Transdermal Q24H    LIDOcaine HCL 10 mg/ml (1%)  20 mL Other Once    LIDOcaine HCL 10 mg/ml (1%)  20 mL Other Once    piperacillin-tazobactam (ZOSYN) IVPB  4.5 g Intravenous Once    [START ON 9/27/2022] piperacillin-tazobactam (ZOSYN) IVPB  4.5 g Intravenous Q8H     PRN Meds:acetaminophen, dextrose 10%, dextrose 10%, glucagon (human recombinant), glucose, glucose, insulin aspart U-100, melatonin, sodium chloride 0.9%    Family History    None       Tobacco Use    Smoking status: Never    Smokeless tobacco: Never   Substance and Sexual Activity    Alcohol use: Not Currently     Comment: SOCIAL    Drug use: Never    Sexual activity: Never       Review of Systems   Constitutional:  Positive for activity change, appetite change and fatigue.   HENT:  Negative for congestion.    Respiratory:  Negative for cough and shortness of breath.    Cardiovascular:  Positive for leg swelling.  Negative for chest pain.   Gastrointestinal:  Positive for abdominal distention and nausea. Negative for diarrhea and vomiting.   Musculoskeletal:  Positive for myalgias. Negative for back pain and neck pain.   Skin:  Positive for wound.   Neurological:  Positive for light-headedness.   Psychiatric/Behavioral:  Negative for agitation and behavioral problems.    Objective:     Vital Signs (Most Recent):  Temp: 98 °F (36.7 °C) (09/26/22 1100)  Pulse: 66 (09/26/22 1300)  Resp: 19 (09/26/22 1300)  BP: 121/69 (09/26/22 1200)  SpO2: (!) 73 % (09/26/22 1300)   Vital Signs (24h Range):  Temp:  [97.4 °F (36.3 °C)-98 °F (36.7 °C)] 98 °F (36.7 °C)  Pulse:  [66-75] 66  Resp:  [12-24] 19  SpO2:  [73 %-100 %] 73 %  BP: ()/() 121/69     Weight: 93 kg (205 lb)  Body mass index is 29.41 kg/m².    Physical Exam  Vitals and nursing note reviewed.   Constitutional:       Appearance: He is ill-appearing.   HENT:      Head: Normocephalic and atraumatic.      Nose: Nose normal.      Mouth/Throat:      Mouth: Mucous membranes are moist.      Pharynx: No oropharyngeal exudate or posterior oropharyngeal erythema.   Eyes:      General: No scleral icterus.     Conjunctiva/sclera: Conjunctivae normal.      Pupils: Pupils are equal, round, and reactive to light.   Cardiovascular:      Rate and Rhythm: Normal rate and regular rhythm.      Pulses: Normal pulses.      Heart sounds: No murmur heard.  Pulmonary:      Effort: Pulmonary effort is normal. No respiratory distress.      Breath sounds: Normal breath sounds.   Abdominal:      General: Abdomen is flat. There is distension.      Palpations: Abdomen is soft.      Tenderness: There is no abdominal tenderness.   Musculoskeletal:      Cervical back: No tenderness.      Right lower leg: Edema present.      Left lower leg: Edema present.   Skin:     Capillary Refill: Capillary refill takes less than 2 seconds.      Coloration: Skin is pale.      Findings: Bruising present.       "Comments: Significant bruising present in his upper extremities    Neurological:      Mental Status: He is alert and oriented to person, place, and time. Mental status is at baseline.       Review of Symptoms      Symptom Assessment (ESAS 0-10 Scale)  Pain:  6  Dyspnea:  1  Anxiety:  3  Nausea:  5  Depression:  0  Anorexia:  8  Fatigue:  5  Insomnia:  1  Restlessness:  2  Agitation:  2         Pain Assessment:    Location(s): leg, wrist(s), hand(s) and arm    Arm       Location: bilateral        Quality: Aching and sharp        Quantity: 7/10 in intensity        Chronicity: Onset 2 month(s) ago, unchanged        Aggravating Factors: Activity and walking        Alleviating Factors: None        Associated Symptoms: None  Wrist       Location: generalized        Quality: Aching        Quantity: 5/10 in intensity        Chronicity: Onset 2 month(s) ago, stable        Aggravating Factors: Activity        Alleviating Factors: None        Associated Symptoms: None  Hand       Location: right        Quality: Aching and sharp        Quantity: 7/10 in intensity        Chronicity: Onset 2 month(s) ago, stable        Aggravating Factors: Activity        Alleviating Factors: None        Associated Symptoms: None  Leg       Location: bilateral        Quality: Pressure-like and aching        Quantity: 4/10 in intensity        Chronicity: Onset 6 month(s) ago, unchanged        Aggravating Factors: Activity, standing and walking        Alleviating Factors: Sitting up        Associated Symptoms: None and arthralgias    Performance Status:  60    Living Arrangements:  Lives with family and Lives in home    Psychosocial/Cultural: The patient lives in a 2 story house with his son (son stays upstairs). Pt states that he doesn't feel that him and his son do very well at home as he has to constantly worry about his son "bumping into walls" and himself falling. Has 3 children- Sons in [place] and [place], Daughter in [place], [place]. Sees " his other children twice a year. Currently - wife of 63 years passed away approx 2.5 years ago (was on Peritoneal dialysis for years).     Spiritual:  F - Jayshree and Belief:  Reports being spiritual  I - Importance:  Fairly important     Time-Based Charting:  No      Advance Care Planning   Advance Directives:   Living Will: No    LaPOST: No    Do Not Resuscitate Status: No    Medical Power of : No      Decision Making:  Patient answered questions       Significant Labs: CBC:   Recent Labs   Lab 09/25/22 0310 09/26/22 0307   WBC 8.74 8.97   HGB 10.6* 11.0*   HCT 33.1* 34.5*   PLT 75* 75*     CMP:   Recent Labs   Lab 09/24/22  2148 09/25/22 0310 09/26/22  0307    134* 137   K 3.3* 3.4* 4.2    102 104   CO2 22* 22* 23   GLU 97 92 155*   BUN 23 24* 23   CREATININE 1.4 1.4 1.6*   CALCIUM 7.3* 7.8* 8.1*   PROT  --  4.8* 4.7*   ALBUMIN  --  2.2* 2.2*   BILITOT  --  1.3* 1.1*   ALKPHOS  --  94 106   AST  --  39 35   ALT  --  18 20   ANIONGAP 16 10 10     CBC:   Recent Labs   Lab 09/26/22 0307   WBC 8.97   HGB 11.0*   HCT 34.5*   MCV 95   PLT 75*     BMP:  Recent Labs   Lab 09/26/22 0307   *      K 4.2      CO2 23   BUN 23   CREATININE 1.6*   CALCIUM 8.1*   MG 1.8     LFT:  Lab Results   Component Value Date    AST 35 09/26/2022    ALKPHOS 106 09/26/2022    BILITOT 1.1 (H) 09/26/2022     Albumin:   Albumin   Date Value Ref Range Status   09/26/2022 2.2 (L) 3.5 - 5.2 g/dL Final     Protein:   Total Protein   Date Value Ref Range Status   09/26/2022 4.7 (L) 6.0 - 8.4 g/dL Final     Lactic acid:   Lab Results   Component Value Date    LACTATE 2.0 09/24/2022    LACTATE 3.2 (H) 09/23/2022       Significant Imaging: I have reviewed all pertinent imaging results/findings within the past 24 hours.

## 2022-09-26 NOTE — CONSULTS
RD consulted for skin tears. At this time, skins tears do not provide any nutritional concerns. Please re-consult RD if any other nutritional concerns arise before next RD f/u.     Thanks!    Valerie CADETN

## 2022-09-26 NOTE — CONSULTS
[facility] - Cardiac Medical [facility]  Wound Care    Patient Name:  Jacques Arellano   MRN:  [id number]  Date: 9/26/2022  Diagnosis: Shock    History:     Past Medical History:   Diagnosis Date    Anticoagulant long-term use     Atrial fibrillation     Diabetes mellitus     Diastolic heart failure     Frequent falls     Hypertension     Renal disorder     eswl    Sleep apnea     20 yrs ago    Stroke     TIA    UTI (urinary tract infection)     Vertigo        Social History     Socioeconomic History    Marital status:    Tobacco Use    Smoking status: Never    Smokeless tobacco: Never   Substance and Sexual Activity    Alcohol use: Not Currently     Comment: SOCIAL    Drug use: Never    Sexual activity: Never     Social Determinants of Health     Financial Resource Strain: Low Risk     Difficulty of Paying Living Expenses: Not very hard   Food Insecurity: No Food Insecurity    Worried About Running Out of Food in the Last Year: Never true    Ran Out of Food in the Last Year: Never true   Transportation Needs: No Transportation Needs    Lack of Transportation (Medical): No    Lack of Transportation (Non-Medical): No   Physical Activity: Unknown    Days of Exercise per Week: 0 days   Stress: Stress Concern Present    Feeling of Stress : Very much   Social Connections: Unknown    Frequency of Social Gatherings with Friends and Family: Never    Active Member of Clubs or Organizations: Yes    Attends Club or Organization Meetings: Never    Marital Status:    Housing Stability: Low Risk     Unable to Pay for Housing in the Last Year: No    Number of Places Lived in the Last Year: 1    Unstable Housing in the Last Year: No       Precautions:     Allergies as of 09/23/2022    (No Known Allergies)       [facility] Assessment Details/Treatment   Wound consult for multiple skin tears that were present on admit.  Primary nurse recently changed foams.  Pictures available in media.   Recommendations made to primary team for foam  dressings to all . Orders placed.  Will follow.    09/26/2022

## 2022-09-26 NOTE — ASSESSMENT & PLAN NOTE
Patient with documented history of permanent Afib s/p watchman, no longer on AC or rate controlling agents.  [id number] 5   HAS-BLED 4      -- Patient rate controlled, no indication for further agents at this time  -- No full AC required s/p Watchman; will hold AC ppx and APT s/o active bleeding from abrasion sites  -- Cardiac telemetry  -- Maintain K > 4, Mg > 2, Ca wnl; replete PRN

## 2022-09-26 NOTE — PLAN OF CARE
CMICU DAILY GOALS       A: Awake    RASS: Goal - RASS Goal: 0-->alert and calm  Actual - RASS ([place] Agitation-Sedation Scale): 0-->alert and calm   Restraint necessity:    B: Breathe   SBT: Not intubated   C: Coordinate A & B, analgesics/sedatives   Pain: managed    SAT: Not intubated  D: Delirium   CAM-ICU: Overall CAM-ICU: Negative  E: Early(intubated/ Progressive (non-intubated) Mobility   MOVE Screen: Pass   Activity: Activity Management: Rolling - L1  FAS: Feeding/Nutrition   Diet order: Diet/Nutrition Received: consistent carb/diabetic diet,    T: Thrombus   DVT prophylaxis: VTE Required Core Measure: Pharmacological prophylaxis initiated/maintained  H: HOB Elevation   Head of Bed (HOB) Positioning: HOB at 30-45 degrees  U: Ulcer Prophylaxis   GI: yes  G: Glucose control   managed Glycemic Management: blood glucose monitored  S: Skin   Bathing/Skin Care: incontinence care  Device Skin Pressure Protection: absorbent pad utilized/changed, adhesive use limited, pressure points protected  Pressure Reduction Devices: specialty bed utilized, foam padding utilized  Pressure Reduction Techniques: weight shift assistance provided  Skin Protection: adhesive use limited, incontinence pads utilized, transparent dressing maintained, tubing/devices free from skin contact  B: Bowel Function   no issues   I: Indwelling Catheters   Childers necessity:     CVC necessity: No  D: De-escalation Antibiotics   Yes    Family/Goals of care/Code Status   Code Status: Full Code    24H Vital Sign Range  Temp:  [97 °F (36.1 °C)-97.8 °F (36.6 °C)]   Pulse:  [68-73]   Resp:  [12-24]   BP: ()/(59-78)   SpO2:  [92 %-100 %]      Shift Events   No acute events throughout shift    VS and assessment per flow sheet, patient progressing towards goals as tolerated, plan of care reviewed, all concerns addressed, will continue to monitor.    Anton Archuleta

## 2022-09-26 NOTE — ASSESSMENT & PLAN NOTE
--9/23: Ct A/P:Cirrhotic morphology of the liver with sequelae of portal hypertension as evidence by splenomegaly, intra-abdominal ascites, and possible portal hypertensive enteropathy.  -- Liver US reveals liver lesions and cirrhosis. AFP WNL. Defer non-emergent MRI /CT to better characterize liver lesions.   -- Consult palliative for GOC.   --Patient denies hx of alcohol abuse   --LFT with out elevation  --Patient with ascites: diagnostic paracentetics to r/o SBP: unable to perform. IR consulted, pending  -- No s/sx of SBP. Very low suspicion  -- Referral to hepatology on d/c given new cirrhosis

## 2022-09-26 NOTE — PROGRESS NOTES
Therapy with vancomycin complete and/or consult discontinued by provider.  Pharmacy will sign off, please re-consult as needed.     Matty Connelly, PharmD  Ext: [phone]

## 2022-09-26 NOTE — ASSESSMENT & PLAN NOTE
81 yo M presenting to ED with multiple history of falls w/o LOC / head trauma, light-headedness. Arrived with slight leukocytosis 12.7, tachycardia, hypotension (MAP low 60s), lactate 3.2. CXR unremarkable, CT without acute pulmonary or abdominal findings to suggest infection. UA pending collection. Patient appears hypovolemic on arrival. Mentation intact and at baseline. S/P sepsis fluid bolus (combined). Previously seen outpatient with pan-sensitive E Coli UTI for which he was prescribed a ciprofloxacin. CT head negative for acute abnormality (though suspicious bone finding needs OP f/u), CTAP with myriad of findings without acute infectious nidus, though cirrhotic morphology.     Etiology: Hypotension may be 2/2 infection (though unclear source as patient would have been adequately treated with OP abx for UTI) vs cardiac dysfunction given syncope and pacemaker placement. Recent EKG without pacer spikes concerning his pacer is not capturing in the ER, however upon arrival in the ICU, pace spikes are captured.     Patient with gram positive cocci in clusters on Blood Cx. Attempt to perform diagnostic paracentesis yesterday, however bowel is very close to the abdominal wall. IR consult placed.      -- Continue Vanc / Zosyn empiric abx  -- Bcx GPC. F/u ID and sensitivites pending.   -- Repeat Bcx NGTD.   -- Trend fever curve, trend CBC  -- Maintain MAP > 65  -- UCx pending  -- 9/23 BCx: gram positive cocci in clusters, repeat Blood cx ordered.  -- Lactate now normalized following fluid bolus  -- Investigate for further cause of falls including: cardiology consulted for pacemaker interrogation. No issues were cardiology fellow

## 2022-09-26 NOTE — ASSESSMENT & PLAN NOTE
"Assessment: Mr. Arellano is an 83 yo M with multiple comorbidities     1) Symptoms: 5 falls over the past 6 months associated with dizziness/lightheadedness, appetite loss for the past few years. Reports not being able to enjoy food as much due to a loss of taste- 125 lbs wt loss over the past year. Reports eating 1 meal every 2 days for the past 6 months. Reports nausea contributing to lack of appetite.   Lack of energy, lack of exercise, and increased anxiety. Reports increasing anxiety about his safety at home.   Increased bilateral lower extremity swelling for the past 2 years      2) Code status: Full Code    3) Psychosocial: The patient lives in a 2 story house with his son (son stays upstairs). Pt states that he doesn't feel that him and his son do very well at home as he has to constantly worry about his son "bumping into walls" and himself falling. Reports that his son works as a  and has poor vision.    Has 3 children- Sons in [place] and [place], Daughter in [place], [place]. Sees his other children twice a year. Currently - wife of 63 years passed away approx 2.5 years ago (was on Peritoneal dialysis for years).    4) Medicolegal: No ACP documents on file. Will attempt to speak with patient and patient's son tomorrow to establish     5) Spiritual: Is spiritual. Will assess community upon interview tomorrow.     6) Goals of care/discussion: At the current moment, the patient's goal is to get better from his condition and go back home.     Experience with critical illness: Patient's wife was a kidney transplant recipient. He reports seeing his wife do hemodialysis and peritoneal dialysis for over 20 years.     Understanding of illness: Pt understands that he is getting worse. He stated that this was all from his old age and recognized that his functional status has greatly deteriorated over the past year due to his lack of appetite, nausea, and frequent falls.     Present for " discussion: Dr. Reg Lorenzana    Goals of Care: Today, we attempted to establish rapport with Mr. Arellano. He was able to give us insight into his understanding of illness and further provide us with details on his support system.     7) Disposition plan: Not established at the current time    8)Summary of recommendations and follow up plan:  -Most important goals at this time are curative/life-prolongation (regardless of treatment burdens), improvement in condition but with limits to invasive therapies, or comfort and QOL  -Code status: Full  -Palliative care to provide emotional support and assist with communication regarding disease expectations and trajectory, and with medical decision-making, at the appropriate time  -Most appropriate disposition: continue with the current LOC for now      Thank you for the opportunity to care for this patient and family.     Please call with questions.     Omer Garcia MD PGY-2  Palliative Medicine   [facility]

## 2022-09-26 NOTE — ASSESSMENT & PLAN NOTE
Questionable history of diastolic CHF. Documented in chart, but previous TTE in Care Everywhere 03/2022 demonstrated normal EF without noted diastolic dysfunction. Patient also not complaining of HF symptoms (STRATTON, orthopnea, new BLE swelling, anasarca).     -- Repeat TTE, pending

## 2022-09-26 NOTE — CONSULTS
"[facility] - Cardiac Medical ICU  Palliative Medicine  Consult Note    Patient Name: Jacques Arellano  MRN: [id number]  Admission Date: 9/23/2022  Hospital Length of Stay: 2 days  Code Status: Full Code   Attending Provider: Robyn Wilkins MD  Consulting Provider: Omer Garcia MD  Primary Care Physician: Matty Garsia MD  Principal Problem:Shock    Patient information was obtained from patient and primary team.      Inpatient consult to Palliative Care  Consult performed by: Omer Garcia MD  Consult ordered by: Ro Justice DO        Assessment/Plan:     Palliative care encounter  Assessment: Mr. Arellano is an 83 yo M with multiple comorbidities     1) Symptoms: 5 falls over the past 6 months associated with dizziness/lightheadedness, appetite loss for the past few years. Reports not being able to enjoy food as much due to a loss of taste- 125 lbs wt loss over the past year. Reports eating 1 meal every 2 days for the past 6 months. Reports nausea contributing to lack of appetite.   Lack of energy, lack of exercise, and increased anxiety. Reports increasing anxiety about his safety at home.   Increased bilateral lower extremity swelling for the past 2 years      2) Code status: Full Code    3) Psychosocial: The patient lives in a 2 story house with his son (son stays upstairs). Pt states that he doesn't feel that him and his son do very well at home as he has to constantly worry about his son "bumping into walls" and himself falling. Reports that his son works as a  and has poor vision.    Has 3 children- Sons in [place] and [place], Daughter in [place], [place]. Sees his other children twice a year. Currently - wife of 63 years passed away approx 2.5 years ago (was on Peritoneal dialysis for years).    4) Medicolegal: No ACP documents on file. Will attempt to speak with patient and patient's son tomorrow to establish     5) Spiritual: Is spiritual. Will assess community upon interview " tomorrow.     6) Goals of care/discussion: At the current moment, the patient's goal is to get better from his condition and go back home.     Experience with critical illness: Patient's wife was a kidney transplant recipient. He reports seeing his wife do hemodialysis and peritoneal dialysis for over 20 years.     Understanding of illness: Pt understands that he is getting worse. He stated that this was all from his old age and recognized that his functional status has greatly deteriorated over the past year due to his lack of appetite, nausea, and frequent falls.     Present for discussion: Dr. Reg Lorenzana    Goals of Care: Today, we attempted to establish rapport with Mr. Arellano. He was able to give us insight into his understanding of illness and further provide us with details on his support system.     7) Disposition plan: Not established at the current time    8)Summary of recommendations and follow up plan:  -Most important goals at this time are curative/life-prolongation (regardless of treatment burdens), improvement in condition but with limits to invasive therapies, or comfort and QOL  -Code status: Full  -Palliative care to provide emotional support and assist with communication regarding disease expectations and trajectory, and with medical decision-making, at the appropriate time  -Most appropriate disposition: continue with the current LOC for now      Thank you for the opportunity to care for this patient and family.     Please call with questions.     Omer Garcia MD PGY-2  Palliative Medicine   [facility]          Thank you for your consult. I will follow-up with patient. Please contact us if you have any additional questions.    Subjective:     HPI:   Mr. Arellano is an 81 yo M with a Mhx of CHF (EF 50%), HTN, Atrial fibrillation s/p Watchman March 2022, and TIA who is admitted to [facility] with septic shock. Palliative care was consulted to assist with goals of care discussion.  States he has been unsteady and feeling lightheaded for the past 6 months and has subsequently had 5 falls resulting abrasions on bilateral knees, arms, lower legs. His last fall occurred right before he was admitted to the hospital two days ago. He was admitted to MICU with concerns of sepsis and required vasopressor support and IV zosyn/vanc. Imaging revealed displaced right rib fractures, dilated ascending aortic aneurysm, and a liver cirrhosis with ascites. He denies any fever, but has had a few episodes of chills and was recently treated for a pan-sensitive e coli UTI with ciprofloxacin. Denies chest pain or dyspnea. Has intermittent palpitations. Denies abdominal pain, nausea, or vomiting but has had watery stools for the past month. Denies dysuria, hematuria, or frequency. Denies cough or rhinorrhea. Lives at home with son and ambulates with a walker. Pt reportedly at baseline mental status per son via HM note. However, patient did have multiple instances of visual hallucinations/delusions while conducting bedside interview. The patient reported seeing a hummingbird flying around and also commented on beer cans on the wall.          Hospital Course:  No notes on file    Past Medical History:   Diagnosis Date    Anticoagulant long-term use     Atrial fibrillation     Diabetes mellitus     Diastolic heart failure     Frequent falls     Hypertension     Renal disorder     eswl    Sleep apnea     20 yrs ago    Stroke     TIA    UTI (urinary tract infection)     Vertigo        Past Surgical History:   Procedure Laterality Date    CARDIAC SURGERY  03/30/2022    watchmen    INSERTION OF PACEMAKER      KIDNEY STONE SURGERY      OPEN REDUCTION AND INTERNAL FIXATION (ORIF) OF FRACTURE OF DISTAL RADIUS Right 5/17/2022    Procedure: ORIF, FRACTURE, RADIUS, DISTAL;  Surgeon: Claude S. Williams IV, MD;  Location[facility];  Service: Orthopedics;  Laterality: Right;    TONSILLECTOMY         Review of  patient's allergies indicates:  No Known Allergies    Medications:  Continuous Infusions:   lactated ringers 75 mL/hr at 09/26/22 1400     Scheduled Meds:   allopurinoL  100 mg Oral Daily    atorvastatin  40 mg Oral Daily    enoxaparin  40 mg Subcutaneous Daily    EScitalopram oxalate  10 mg Oral Daily    LIDOcaine  1 patch Transdermal Q24H    LIDOcaine HCL 10 mg/ml (1%)  20 mL Other Once    LIDOcaine HCL 10 mg/ml (1%)  20 mL Other Once    piperacillin-tazobactam (ZOSYN) IVPB  4.5 g Intravenous Once    [START ON 9/27/2022] piperacillin-tazobactam (ZOSYN) IVPB  4.5 g Intravenous Q8H     PRN Meds:acetaminophen, dextrose 10%, dextrose 10%, glucagon (human recombinant), glucose, glucose, insulin aspart U-100, melatonin, sodium chloride 0.9%    Family History    None       Tobacco Use    Smoking status: Never    Smokeless tobacco: Never   Substance and Sexual Activity    Alcohol use: Not Currently     Comment: SOCIAL    Drug use: Never    Sexual activity: Never       Review of Systems   Constitutional:  Positive for activity change, appetite change and fatigue.   HENT:  Negative for congestion.    Respiratory:  Negative for cough and shortness of breath.    Cardiovascular:  Positive for leg swelling. Negative for chest pain.   Gastrointestinal:  Positive for abdominal distention and nausea. Negative for diarrhea and vomiting.   Musculoskeletal:  Positive for myalgias. Negative for back pain and neck pain.   Skin:  Positive for wound.   Neurological:  Positive for light-headedness.   Psychiatric/Behavioral:  Negative for agitation and behavioral problems.    Objective:     Vital Signs (Most Recent):  Temp: 98 °F (36.7 °C) (09/26/22 1100)  Pulse: 66 (09/26/22 1300)  Resp: 19 (09/26/22 1300)  BP: 121/69 (09/26/22 1200)  SpO2: (!) 73 % (09/26/22 1300)   Vital Signs (24h Range):  Temp:  [97.4 °F (36.3 °C)-98 °F (36.7 °C)] 98 °F (36.7 °C)  Pulse:  [66-75] 66  Resp:  [12-24] 19  SpO2:  [73 %-100 %] 73 %  BP:  ()/() 121/69     Weight: 93 kg (205 lb)  Body mass index is 29.41 kg/m².    Physical Exam  Vitals and nursing note reviewed.   Constitutional:       Appearance: He is ill-appearing.   HENT:      Head: Normocephalic and atraumatic.      Nose: Nose normal.      Mouth/Throat:      Mouth: Mucous membranes are moist.      Pharynx: No oropharyngeal exudate or posterior oropharyngeal erythema.   Eyes:      General: No scleral icterus.     Conjunctiva/sclera: Conjunctivae normal.      Pupils: Pupils are equal, round, and reactive to light.   Cardiovascular:      Rate and Rhythm: Normal rate and regular rhythm.      Pulses: Normal pulses.      Heart sounds: No murmur heard.  Pulmonary:      Effort: Pulmonary effort is normal. No respiratory distress.      Breath sounds: Normal breath sounds.   Abdominal:      General: Abdomen is flat. There is distension.      Palpations: Abdomen is soft.      Tenderness: There is no abdominal tenderness.   Musculoskeletal:      Cervical back: No tenderness.      Right lower leg: Edema present.      Left lower leg: Edema present.   Skin:     Capillary Refill: Capillary refill takes less than 2 seconds.      Coloration: Skin is pale.      Findings: Bruising present.      Comments: Significant bruising present in his upper extremities    Neurological:      Mental Status: He is alert and oriented to person, place, and time. Mental status is at baseline.       Review of Symptoms      Symptom Assessment (ESAS 0-10 Scale)  Pain:  6  Dyspnea:  1  Anxiety:  3  Nausea:  5  Depression:  0  Anorexia:  8  Fatigue:  5  Insomnia:  1  Restlessness:  2  Agitation:  2         Pain Assessment:    Location(s): leg, wrist(s), hand(s) and arm    Arm       Location: bilateral        Quality: Aching and sharp        Quantity: 7/10 in intensity        Chronicity: Onset 2 month(s) ago, unchanged        Aggravating Factors: Activity and walking        Alleviating Factors: None        Associated Symptoms:  "None  Wrist       Location: generalized        Quality: Aching        Quantity: 5/10 in intensity        Chronicity: Onset 2 month(s) ago, stable        Aggravating Factors: Activity        Alleviating Factors: None        Associated Symptoms: None  Hand       Location: right        Quality: Aching and sharp        Quantity: 7/10 in intensity        Chronicity: Onset 2 month(s) ago, stable        Aggravating Factors: Activity        Alleviating Factors: None        Associated Symptoms: None  Leg       Location: bilateral        Quality: Pressure-like and aching        Quantity: 4/10 in intensity        Chronicity: Onset 6 month(s) ago, unchanged        Aggravating Factors: Activity, standing and walking        Alleviating Factors: Sitting up        Associated Symptoms: None and arthralgias    Performance Status:  60    Living Arrangements:  Lives with family and Lives in home    Psychosocial/Cultural: The patient lives in a 2 story house with his son (son stays upstairs). Pt states that he doesn't feel that him and his son do very well at home as he has to constantly worry about his son "bumping into walls" and himself falling. Has 3 children- Sons in [place] and [place], Daughter in [place], [place]. Sees his other children twice a year. Currently - wife of 63 years passed away approx 2.5 years ago (was on Peritoneal dialysis for years).     Spiritual:  F - Jayshree and Belief:  Reports being spiritual  I - Importance:  Fairly important     Time-Based Charting:  No      Advance Care Planning   Advance Directives:   Living Will: No    LaPOST: No    Do Not Resuscitate Status: No    Medical Power of : No      Decision Making:  Patient answered questions       Significant Labs: CBC:   Recent Labs   Lab 09/25/22  0310 09/26/22  0307   WBC 8.74 8.97   HGB 10.6* 11.0*   HCT 33.1* 34.5*   PLT 75* 75*     CMP:   Recent Labs   Lab 09/24/22  2148 09/25/22  0310 09/26/22  0307    134* 137   K 3.3* 3.4* 4.2 "    102 104   CO2 22* 22* 23   GLU 97 92 155*   BUN 23 24* 23   CREATININE 1.4 1.4 1.6*   CALCIUM 7.3* 7.8* 8.1*   PROT  --  4.8* 4.7*   ALBUMIN  --  2.2* 2.2*   BILITOT  --  1.3* 1.1*   ALKPHOS  --  94 106   AST  --  39 35   ALT  --  18 20   ANIONGAP 16 10 10     CBC:   Recent Labs   Lab 09/26/22 0307   WBC 8.97   HGB 11.0*   HCT 34.5*   MCV 95   PLT 75*     BMP:  Recent Labs   Lab 09/26/22 0307   *      K 4.2      CO2 23   BUN 23   CREATININE 1.6*   CALCIUM 8.1*   MG 1.8     LFT:  Lab Results   Component Value Date    AST 35 09/26/2022    ALKPHOS 106 09/26/2022    BILITOT 1.1 (H) 09/26/2022     Albumin:   Albumin   Date Value Ref Range Status   09/26/2022 2.2 (L) 3.5 - 5.2 g/dL Final     Protein:   Total Protein   Date Value Ref Range Status   09/26/2022 4.7 (L) 6.0 - 8.4 g/dL Final     Lactic acid:   Lab Results   Component Value Date    LACTATE 2.0 09/24/2022    LACTATE 3.2 (H) 09/23/2022       Significant Imaging: I have reviewed all pertinent imaging results/findings within the past 24 hours.        > 50% of 60 min visit spent in chart review, face to face discussion of goals of care,  symptom assessment, coordination of care and emotional support.    Omer Garcia MD  Palliative Medicine  [facility] - Cardiac Medical ICU

## 2022-09-26 NOTE — ASSESSMENT & PLAN NOTE
Patient presenting with TRENT with admission sCr 1.6 (baseline sCr wnl).      Serum creatinine: 1.6 mg/dL (H) 09/23/22 2220  Estimated creatinine clearance: 40.1 mL/min (A)     -- s/p IVFs with improvement in renal function  -- Trend sCr  -- Strict I&Os  -- Avoid nephrotoxic agents  -- Renally adjust medications  -- Uptrend today in Cr. Likely pre-renal vs ATN. Fluids today and reassess tomorrow

## 2022-09-26 NOTE — ASSESSMENT & PLAN NOTE
-- Discussed with daughter, (Марина [phone]), who lives in [place] and reports brother locally with own health issues and not always available, with patient living at home solo. Reports not just lightheadedness recently due to hypotension but actually balance issues and falling since January. Reports not eating well since that time and losing weight. Hypoalbuminemia noted. Also issues with medication adherence at home. Requests evaluating possible cause of balance issues /falling and how to mitigate. Consider hypotension vs deconditioning vs anemia contributing vs neuro vs other  -- Iron studies, folate and B12 in AM to workup anemia  -- Initiate Boost  -- Anti-hypertensive regimen management  -- PT/OT and dispo to SNF. Appreciate CM /SW assist in resources to ensure safe discharge plan  -- Consider referral to neuro

## 2022-09-26 NOTE — PLAN OF CARE
CM sent 5 SNF referrals via Careport to:    1) [facility]  2) [facility]  3) [facility]  4) [facility]  5) [facility]     [facility] is family and patient's 1st choice.CM to follow for acceptance.    Juliette Finney RN     [phone]

## 2022-09-26 NOTE — HPI
Mr. Arellano is an 83 yo M with a Mhx of CHF (EF 50%), HTN, Atrial fibrillation s/p Watchman March 2022, and TIA who is admitted to [facility] with septic shock. Palliative care was consulted to assist with goals of care discussion. States he has been unsteady and feeling lightheaded for the past 6 months and has subsequently had 5 falls resulting abrasions on bilateral knees, arms, lower legs. His last fall occurred right before he was admitted to the hospital two days ago. He was admitted to MICU with concerns of sepsis and required vasopressor support and IV zosyn/vanc. Imaging revealed displaced right rib fractures, dilated ascending aortic aneurysm, and a liver cirrhosis with ascites. He denies any fever, but has had a few episodes of chills and was recently treated for a pan-sensitive e coli UTI with ciprofloxacin. Denies chest pain or dyspnea. Has intermittent palpitations. Denies abdominal pain, nausea, or vomiting but has had watery stools for the past month. Denies dysuria, hematuria, or frequency. Denies cough or rhinorrhea. Lives at home with son and ambulates with a walker. Pt reportedly at baseline mental status per son via HM note. However, patient did have multiple instances of visual hallucinations/delusions while conducting bedside interview. The patient reported seeing a hummingbird flying around and also commented on beer cans on the wall.

## 2022-09-26 NOTE — SUBJECTIVE & OBJECTIVE
Interval History/Significant Events  NAEON. Liver US reveals liver lesions. AFP WNL. Defer non-emergent MRI /CT to better characterize liver lesions. Consult palliative for GOC. Repeat TTE pending. Bcx GPC, ID and sensitivites pending. Repeat Bcx NGTD. Continue vanc and Zosyn.    Review of Systems   Constitutional:  Positive for activity change. Negative for appetite change, chills, diaphoresis and fever.   HENT:  Negative for congestion and rhinorrhea.    Eyes:  Negative for photophobia and visual disturbance.   Respiratory:  Negative for cough, shortness of breath and wheezing.    Cardiovascular:  Negative for chest pain, palpitations and leg swelling.   Gastrointestinal:  Negative for abdominal pain, diarrhea, nausea and vomiting.   Genitourinary:  Negative for difficulty urinating, dysuria and hematuria.   Musculoskeletal:  Negative for arthralgias (left big toe) and myalgias.   Skin:  Positive for wound. Negative for rash.   Neurological:  Negative for dizziness, light-headedness and headaches.   Hematological:  Bruises/bleeds easily.   Psychiatric/Behavioral:  Negative for agitation and confusion.    Objective:     Vital Signs (Most Recent):  Temp: 97.9 °F (36.6 °C) (09/26/22 0400)  Pulse: 70 (09/26/22 1137)  Resp: 15 (09/26/22 1137)  BP: 128/67 (09/26/22 1011)  SpO2: 99 % (09/26/22 1137) Vital Signs (24h Range):  Temp:  [97.4 °F (36.3 °C)-97.9 °F (36.6 °C)] 97.9 °F (36.6 °C)  Pulse:  [68-75] 70  Resp:  [12-24] 15  SpO2:  [92 %-100 %] 99 %  BP: ()/(59-78) 128/67   Weight: 93 kg (205 lb)  Body mass index is 29.41 kg/m².      Intake/Output Summary (Last 24 hours) at 9/26/2022 1218  Last data filed at 9/26/2022 0900  Gross per 24 hour   Intake 417.38 ml   Output 700 ml   Net -282.62 ml         Physical Exam  Vitals and nursing note reviewed.   Constitutional:       General: He is not in acute distress.  HENT:      Head: Normocephalic and atraumatic.      Comments: Bilateral temporal wasting.      Mouth/Throat:      Mouth: Mucous membranes are moist.   Eyes:      General: No scleral icterus.     Extraocular Movements: Extraocular movements intact.      Pupils: Pupils are equal, round, and reactive to light.   Cardiovascular:      Rate and Rhythm: Normal rate and regular rhythm.      Pulses: Normal pulses.      Heart sounds: Normal heart sounds. No murmur heard.  Pulmonary:      Effort: Pulmonary effort is normal.      Breath sounds: Normal breath sounds. No wheezing, rhonchi or rales.   Abdominal:      General: Bowel sounds are normal. There is no distension.      Palpations: Abdomen is soft.      Tenderness: There is no abdominal tenderness.   Musculoskeletal:         General: Signs of injury present. No tenderness.      Right lower leg: No edema.      Left lower leg: No edema.   Skin:     General: Skin is warm.      Capillary Refill: Capillary refill takes less than 2 seconds.      Findings: Bruising present.   Neurological:      General: No focal deficit present.      Mental Status: He is alert and oriented to person, place, and time.   Psychiatric:         Mood and Affect: Mood normal.         Thought Content: Thought content normal.       Vents:     Lines/Drains/Airways       Drain  Duration             Male External Urinary Catheter 09/25/22 0000 1 day              Peripheral Intravenous Line  Duration                  Peripheral IV - Single Lumen 09/24/22 0445 20 G Left;Posterior Hand 2 days         Peripheral IV - Single Lumen 09/24/22 2100 20 G Anterior;Left Forearm 1 day                  Significant Labs:    CBC/Anemia Profile:  Recent Labs   Lab 09/25/22  0310 09/26/22  0307   WBC 8.74 8.97   HGB 10.6* 11.0*   HCT 33.1* 34.5*   PLT 75* 75*   MCV 93 95   RDW 17.2* 17.4*          Chemistries:  Recent Labs   Lab 09/24/22  2148 09/25/22  0310 09/26/22  0307    134* 137   K 3.3* 3.4* 4.2    102 104   CO2 22* 22* 23   BUN 23 24* 23   CREATININE 1.4 1.4 1.6*   CALCIUM 7.3* 7.8* 8.1*   ALBUMIN   --  2.2* 2.2*   PROT  --  4.8* 4.7*   BILITOT  --  1.3* 1.1*   ALKPHOS  --  94 106   ALT  --  18 20   AST  --  39 35   MG  --  1.6 1.8   PHOS  --  2.5* 2.6*         All pertinent labs within the past 24 hours have been reviewed.    Significant Imaging:  I have reviewed all pertinent imaging results/findings within the past 24 hours.

## 2022-09-26 NOTE — PROGRESS NOTES
Cardiac device interrogation and/or reprogramming completed by industry representative, Tiffanie with [employer]; please refer to report located in the Media tab.

## 2022-09-27 LAB
ALBUMIN FLD-MCNC: 0.5 G/DL
AMMONIA PLAS-SCNC: 22 UMOL/L (ref 10–50)
ANION GAP SERPL CALC-SCNC: 12 MMOL/L (ref 8–16)
APPEARANCE FLD: NORMAL
BACTERIA BLD CULT: ABNORMAL
BODY FLD TYPE: NORMAL
BODY FLUID SOURCE, LDH: NORMAL
BUN SERPL-MCNC: 21 MG/DL (ref 8–23)
CALCIUM SERPL-MCNC: 8.1 MG/DL (ref 8.7–10.5)
CHLORIDE SERPL-SCNC: 105 MMOL/L (ref 95–110)
CO2 SERPL-SCNC: 18 MMOL/L (ref 23–29)
COLOR FLD: YELLOW
CREAT SERPL-MCNC: 1.5 MG/DL (ref 0.5–1.4)
EST. GFR  (NO RACE VARIABLE): 45.9 ML/MIN/1.73 M^2
FERRITIN SERPL-MCNC: 275 NG/ML (ref 20–300)
FOLATE SERPL-MCNC: 3.2 NG/ML (ref 4–24)
GLUCOSE SERPL-MCNC: 119 MG/DL (ref 70–110)
GRAM STN SPEC: NORMAL
GRAM STN SPEC: NORMAL
IRON SERPL-MCNC: 49 UG/DL (ref 45–160)
LDH FLD L TO P-CCNC: 70 U/L
LYMPHOCYTES NFR FLD MANUAL: 82 %
MESOTHL CELL NFR FLD MANUAL: 2 %
MONOS+MACROS NFR FLD MANUAL: 6 %
NEUTROPHILS NFR FLD MANUAL: 10 %
POCT GLUCOSE: 106 MG/DL (ref 70–110)
POCT GLUCOSE: 106 MG/DL (ref 70–110)
POCT GLUCOSE: 114 MG/DL (ref 70–110)
POCT GLUCOSE: 118 MG/DL (ref 70–110)
POCT GLUCOSE: 142 MG/DL (ref 70–110)
POTASSIUM SERPL-SCNC: 4.2 MMOL/L (ref 3.5–5.1)
PROT FLD-MCNC: <1 G/DL
SATURATED IRON: 26 % (ref 20–50)
SODIUM SERPL-SCNC: 135 MMOL/L (ref 136–145)
SPECIMEN SOURCE: NORMAL
SPECIMEN SOURCE: NORMAL
TOTAL IRON BINDING CAPACITY: 191 UG/DL (ref 250–450)
TRANSFERRIN SERPL-MCNC: 129 MG/DL (ref 200–375)
VIT B12 SERPL-MCNC: 1167 PG/ML (ref 210–950)
WBC # FLD: 130 /CU MM

## 2022-09-27 PROCEDURE — 99233 PR SUBSEQUENT HOSPITAL CARE,LEVL III: ICD-10-PCS | Mod: GC,,, | Performed by: INTERNAL MEDICINE

## 2022-09-27 PROCEDURE — 25000003 PHARM REV CODE 250

## 2022-09-27 PROCEDURE — 97530 THERAPEUTIC ACTIVITIES: CPT

## 2022-09-27 PROCEDURE — 84466 ASSAY OF TRANSFERRIN: CPT | Performed by: STUDENT IN AN ORGANIZED HEALTH CARE EDUCATION/TRAINING PROGRAM

## 2022-09-27 PROCEDURE — 63600175 PHARM REV CODE 636 W HCPCS: Mod: JG

## 2022-09-27 PROCEDURE — 63600175 PHARM REV CODE 636 W HCPCS: Performed by: INTERNAL MEDICINE

## 2022-09-27 PROCEDURE — 25000003 PHARM REV CODE 250: Performed by: INTERNAL MEDICINE

## 2022-09-27 PROCEDURE — 20600001 HC STEP DOWN PRIVATE ROOM

## 2022-09-27 PROCEDURE — 87075 CULTR BACTERIA EXCEPT BLOOD: CPT | Performed by: INTERNAL MEDICINE

## 2022-09-27 PROCEDURE — 63600175 PHARM REV CODE 636 W HCPCS: Performed by: STUDENT IN AN ORGANIZED HEALTH CARE EDUCATION/TRAINING PROGRAM

## 2022-09-27 PROCEDURE — 87205 SMEAR GRAM STAIN: CPT | Performed by: INTERNAL MEDICINE

## 2022-09-27 PROCEDURE — 82042 OTHER SOURCE ALBUMIN QUAN EA: CPT | Performed by: INTERNAL MEDICINE

## 2022-09-27 PROCEDURE — 82140 ASSAY OF AMMONIA: CPT

## 2022-09-27 PROCEDURE — 82746 ASSAY OF FOLIC ACID SERUM: CPT | Performed by: STUDENT IN AN ORGANIZED HEALTH CARE EDUCATION/TRAINING PROGRAM

## 2022-09-27 PROCEDURE — 89051 BODY FLUID CELL COUNT: CPT | Performed by: INTERNAL MEDICINE

## 2022-09-27 PROCEDURE — 87070 CULTURE OTHR SPECIMN AEROBIC: CPT | Performed by: INTERNAL MEDICINE

## 2022-09-27 PROCEDURE — 36415 COLL VENOUS BLD VENIPUNCTURE: CPT | Performed by: STUDENT IN AN ORGANIZED HEALTH CARE EDUCATION/TRAINING PROGRAM

## 2022-09-27 PROCEDURE — 84157 ASSAY OF PROTEIN OTHER: CPT | Performed by: INTERNAL MEDICINE

## 2022-09-27 PROCEDURE — 82607 VITAMIN B-12: CPT | Performed by: STUDENT IN AN ORGANIZED HEALTH CARE EDUCATION/TRAINING PROGRAM

## 2022-09-27 PROCEDURE — 25000003 PHARM REV CODE 250: Performed by: STUDENT IN AN ORGANIZED HEALTH CARE EDUCATION/TRAINING PROGRAM

## 2022-09-27 PROCEDURE — P9047 ALBUMIN (HUMAN), 25%, 50ML: HCPCS | Mod: JG

## 2022-09-27 PROCEDURE — 36415 COLL VENOUS BLD VENIPUNCTURE: CPT

## 2022-09-27 PROCEDURE — 97110 THERAPEUTIC EXERCISES: CPT

## 2022-09-27 PROCEDURE — 99233 SBSQ HOSP IP/OBS HIGH 50: CPT | Mod: GC,,, | Performed by: INTERNAL MEDICINE

## 2022-09-27 PROCEDURE — 82728 ASSAY OF FERRITIN: CPT | Performed by: STUDENT IN AN ORGANIZED HEALTH CARE EDUCATION/TRAINING PROGRAM

## 2022-09-27 PROCEDURE — 83615 LACTATE (LD) (LDH) ENZYME: CPT | Performed by: INTERNAL MEDICINE

## 2022-09-27 PROCEDURE — 80048 BASIC METABOLIC PNL TOTAL CA: CPT

## 2022-09-27 RX ORDER — ALBUMIN HUMAN 250 G/1000ML
SOLUTION INTRAVENOUS
Status: COMPLETED
Start: 2022-09-27 | End: 2022-09-27

## 2022-09-27 RX ORDER — MUPIROCIN 20 MG/G
OINTMENT TOPICAL 2 TIMES DAILY
Status: COMPLETED | OUTPATIENT
Start: 2022-09-27 | End: 2022-10-01

## 2022-09-27 RX ORDER — FOLIC ACID 1 MG/1
1 TABLET ORAL DAILY
Status: DISCONTINUED | OUTPATIENT
Start: 2022-09-27 | End: 2022-10-03 | Stop reason: HOSPADM

## 2022-09-27 RX ADMIN — PIPERACILLIN SODIUM AND TAZOBACTAM SODIUM 4.5 G: 4; .5 INJECTION, POWDER, LYOPHILIZED, FOR SOLUTION INTRAVENOUS at 12:09

## 2022-09-27 RX ADMIN — ALLOPURINOL 100 MG: 100 TABLET ORAL at 09:09

## 2022-09-27 RX ADMIN — ATORVASTATIN CALCIUM 40 MG: 40 TABLET, FILM COATED ORAL at 09:09

## 2022-09-27 RX ADMIN — PIPERACILLIN SODIUM AND TAZOBACTAM SODIUM 4.5 G: 4; .5 INJECTION, POWDER, LYOPHILIZED, FOR SOLUTION INTRAVENOUS at 09:09

## 2022-09-27 RX ADMIN — ESCITALOPRAM 10 MG: 5 TABLET, FILM COATED ORAL at 09:09

## 2022-09-27 RX ADMIN — FOLIC ACID 1 MG: 1 TABLET ORAL at 12:09

## 2022-09-27 RX ADMIN — PIPERACILLIN SODIUM AND TAZOBACTAM SODIUM 4.5 G: 4; .5 INJECTION, POWDER, LYOPHILIZED, FOR SOLUTION INTRAVENOUS at 04:09

## 2022-09-27 RX ADMIN — LIDOCAINE 1 PATCH: 50 PATCH CUTANEOUS at 07:09

## 2022-09-27 RX ADMIN — ALBUMIN (HUMAN): 12.5 SOLUTION INTRAVENOUS at 03:09

## 2022-09-27 RX ADMIN — SODIUM CHLORIDE, SODIUM LACTATE, POTASSIUM CHLORIDE, AND CALCIUM CHLORIDE: 600; 310; 30; 20 INJECTION, SOLUTION INTRAVENOUS at 07:09

## 2022-09-27 RX ADMIN — MUPIROCIN: 20 OINTMENT TOPICAL at 08:09

## 2022-09-27 RX ADMIN — MUPIROCIN: 20 OINTMENT TOPICAL at 12:09

## 2022-09-27 RX ADMIN — ENOXAPARIN SODIUM 40 MG: 100 INJECTION SUBCUTANEOUS at 04:09

## 2022-09-27 NOTE — ASSESSMENT & PLAN NOTE
Patient presenting with TRENT with admission sCr 1.6 (baseline sCr wnl).   DDx: Pre-renal azotemia s/o sepsis vs questionable PO intake    Serum creatinine: 1.5 mg/dL (H) 09/27/22 1015  Estimated creatinine clearance: 42 mL/min (A)    -- IVF per Sepsis A/P  -- Trend sCr  -- Strict I&Os  -- Avoid nephrotoxic agents  -- Renally adjust medications

## 2022-09-27 NOTE — PROGRESS NOTES
[facility] ([address] Medicine  Progress Note    Patient Name: Jacques Arellano  MRN: [id number]  Patient Class: IP- Inpatient   Admission Date: 9/23/2022  Length of Stay: 3 days  Attending Physician: Sona Kaur MD  Primary Care Provider: Matty Garsia MD        Subjective:     Principal Problem:Shock        HPI:  82 y.o. male with documented notation of CHF but TTE 03/2022 demonstrated preserved EF without diastolic dysfunction, HTN, AF on Eliquis, history of TIA, recently diagnosed UTI presents via EMS for multiple falls. Patient states he has been unsteady on his feet and lightheaded for several weeks, with multiple falls and resulting abrasions on bilateral knees, arms, lower legs. He denies fever, but has had a few episodes of chills. Patient denies LOC or head trauma, but has scraped his head against the wall while trying to get up. Denies changes in medication recently. Denies chest pain, shortness of breath, abdominal pain, constipation, dysuria, changes in urinary frequency, cough, fever, malaise. Overall, feels well but for his intermittent light-headedness. Denies new substance use. Lives at home with son, who was at bedside on initial evaluation by ED and stated patient was at baseline mentation.    On EMS arrival, patient was hypotensive in 70s / 40s, with a blood glucose of 54. S/P 1.5 L total in ED with recovery of SBP, very fluid responsive. Patient tachycardic with slight leukocytosis, lactate 3.2. Troponin 0.166 without chest pain. Given dextrose to recover BG. Admitted to Hospital Medicine for concerns of sepsis of unknown source.      Overview/Hospital Course:  Patient admitted to the MICU secondary to concerns for sepsis of unknown source. Patient required norepinephrine to keep Maps elevated. Patient started on zosyn and vancomycin for empiric coverage.Given his presentation of multiple falls, patient was scanned in the ER. No acute intracranial process was  found but patient did have an expansile calvarial lesion involving the right frontal bone. CT A/P reveals multiple findings including displaced right rib fx, dilated ascending aortic aneurysm, and cirrhotic liver with ascites. An attempt to perform a diagnostic paracentesis was tried on 9/24/22, however the procedure was not performed given close proximity of bowel to the surface. No deep enough pocket was noted to be safe for the procedure. Patient's blood culture then grew GPC in clusters. Patient continued on IV abx. A second attempt to  perform paracentesis on 9/25 however patient reported that he was eating his lunch when I presented in his room. Furthermore, when I presented again patient was receiving his RUQ US. Patient was eventually weaned off norepinephrine and stepped down to the floor. IR preformed diagnostic paracentesis on 9/27/22.        Interval History: NAOE. Although pt is AAO X3 and haslinear speech, at times does appear to be confused and states that he sees the ocean and waves outside the window. He denies any fevers, chills, SOB. He is lying comfortably in bed with no acute complaints. He remains afebrile and VSS.     Review of Systems   Constitutional:  Positive for activity change. Negative for appetite change, chills, diaphoresis, fatigue, fever and unexpected weight change.   HENT:  Negative for congestion, hearing loss, rhinorrhea and sore throat.    Eyes:  Negative for photophobia and visual disturbance.   Respiratory:  Negative for cough, shortness of breath and wheezing.    Cardiovascular:  Negative for chest pain, palpitations and leg swelling.   Gastrointestinal:  Negative for abdominal pain, blood in stool, constipation, diarrhea, nausea and vomiting.   Genitourinary:  Negative for difficulty urinating, dysuria and hematuria.   Musculoskeletal:  Negative for arthralgias and myalgias.   Skin:  Positive for wound. Negative for pallor and rash.   Allergic/Immunologic: Negative for  immunocompromised state.   Neurological:  Negative for dizziness, weakness, light-headedness, numbness and headaches.   Hematological:  Does not bruise/bleed easily.   Psychiatric/Behavioral:  Negative for agitation and confusion.    Objective:     Vital Signs (Most Recent):  Temp: 98.6 °F (37 °C) (09/27/22 1410)  Pulse: 69 (09/27/22 1445)  Resp: 18 (09/27/22 1445)  BP: (!) 100/55 (09/27/22 1445)  SpO2: 100 % (09/27/22 1445)   Vital Signs (24h Range):  Temp:  [97.6 °F (36.4 °C)-98.6 °F (37 °C)] 98.6 °F (37 °C)  Pulse:  [68-80] 69  Resp:  [13-31] 18  SpO2:  [90 %-100 %] 100 %  BP: ()/(54-74) 100/55     Weight: 89.5 kg (197 lb 5 oz)  Body mass index is 28.31 kg/m².    Intake/Output Summary (Last 24 hours) at 9/27/2022 1551  Last data filed at 9/27/2022 0921  Gross per 24 hour   Intake 154.4 ml   Output 851 ml   Net -696.6 ml      Physical Exam  Vitals and nursing note reviewed.   Constitutional:       General: He is not in acute distress.  HENT:      Head: Normocephalic and atraumatic.      Comments: Bilateral temporal wasting.     Mouth/Throat:      Mouth: Mucous membranes are moist.   Eyes:      General: No scleral icterus.     Extraocular Movements: Extraocular movements intact.      Pupils: Pupils are equal, round, and reactive to light.   Cardiovascular:      Rate and Rhythm: Normal rate and regular rhythm.      Pulses: Normal pulses.      Heart sounds: Normal heart sounds. No murmur heard.  Pulmonary:      Effort: Pulmonary effort is normal.      Breath sounds: Normal breath sounds. No wheezing, rhonchi or rales.   Abdominal:      General: Bowel sounds are normal. There is no distension.      Palpations: Abdomen is soft.      Tenderness: There is no abdominal tenderness.   Musculoskeletal:         General: Signs of injury present. No tenderness.      Right lower leg: No edema.      Left lower leg: No edema.   Skin:     General: Skin is warm.      Capillary Refill: Capillary refill takes less than 2  seconds.      Findings: Bruising present.   Neurological:      General: No focal deficit present.      Mental Status: He is alert and oriented to person, place, and time.   Psychiatric:         Mood and Affect: Mood normal.         Thought Content: Thought content normal.       Significant Labs: All pertinent labs within the past 24 hours have been reviewed.  Blood Culture: No results for input(s): LABBLOO in the last 48 hours.  CBC:   Recent Labs   Lab 09/26/22  0307   WBC 8.97   HGB 11.0*   HCT 34.5*   PLT 75*     CMP:   Recent Labs   Lab 09/26/22  0307 09/27/22  1015    135*   K 4.2 4.2    105   CO2 23 18*   * 119*   BUN 23 21   CREATININE 1.6* 1.5*   CALCIUM 8.1* 8.1*   PROT 4.7*  --    ALBUMIN 2.2*  --    BILITOT 1.1*  --    ALKPHOS 106  --    AST 35  --    ALT 20  --    ANIONGAP 10 12       Significant Imaging: I have reviewed all pertinent imaging results/findings within the past 24 hours.  Echo  · The left ventricle is normal in size with low normal systolic function.   The estimated ejection fraction is 50%.  · There is abnormal septal wall motion consistent with right ventricular   pacing.  · Mild right ventricular enlargement with mildly reduced right ventricular   systolic function.  · Indeterminate left ventricular diastolic function.  · Moderate left atrial enlargement.  · Mild to moderate tricuspid regurgitation.  · The IVC is not visualized.     US Abdomen Limited  Narrative: EXAMINATION:  US ABDOMEN LIMITED    CLINICAL HISTORY:  RUQ US;.    TECHNIQUE:  Limited ultrasound of the right upper quadrant of the abdomen including pancreas, liver, gallbladder, common bile duct was performed.    COMPARISON:  CT chest abdomen pelvis 09/24/2022    FINDINGS:  Liver: Liver measures 14.6 cm with nodular contour and heterogeneous echotexture consistent with cirrhosis.  2.7 x 3.1 cm lesion at the hepatic dome difficult to fully assess likely corresponding to lesion seen on CT 09/24/2022.   Additional 1.1 x 1.1 x 1.0 cm and 2.2 x 1.3 x 1.7 cm hyperechoic lesions in the right hepatic lobe.    Gallbladder: Gallbladder sludge.  There is no gallbladder wall thickening or pericholecystic fluid.  No sonographic Marr's sign.    Biliary system: The common duct is not dilated, measuring 2 mm.  No intrahepatic ductal dilatation.    Spleen: Enlarged measuring 14.2 x 4.0 cm.    Pancreas: Mostly obscured by overlying bowel gas.  2.3 x 2.8 x 2.2 cm cystic lesion seen in the region of the pancreatic head.    Miscellaneous: Moderate upper abdominal ascites.  Impression: 1. Cirrhosis with sequelae of portal hypertension including ascites and splenomegaly.  2. Multiple incompletely characterized liver lesions.  In this patient with cirrhosis, non emergent CT or MRI liver protocol recommended for further characterization.  3. Gallbladder sludge.  No sonographic evidence of acute cholecystitis.  4. Pancreatic cystic lesion better characterized on recent CT.  Nonemergent contrast enhanced MRI/MRCP is recommended in 6 months.    Electronically signed by resident: Navarro Arthur  Date:    09/25/2022  Time:    18:25    Electronically signed by: Farzad Zeng MD  Date:    09/26/2022  Time:    07:30        Assessment/Plan:      * Shock  Recurrent Falls  Previous H/o UTI  Light-headedness  Lactic acidosis    81 yo M presenting to ED with multiple history of falls w/o LOC / head trauma, light-headedness. Arrived with slight leukocytosis 12.7, tachycardia, hypotension (MAP low 60s), lactate 3.2. CXR unremarkable, CT without acute pulmonary or abdominal findings to suggest infection. UA pending collection. Patient appears hypovolemic on arrival. Mentation intact and at baseline. S/P sepsis fluid bolus (combined). Previously seen outpatient with pan-sensitive E Coli UTI for which he was prescribed a ciprofloxacin. CT head negative for acute abnormality (though suspicious bone finding needs OP f/u), CTAP with myriad of findings  without acute infectious nidus.    Etiology: Hypotension may be 2/2 infection (though unclear source as patient would have been adequately treated with OP abx for UTI) vs HRS vs hypovolemia d/t questionable PO intake?     -- Vanc / Zosyn empiric abx  -- Trend fever curve, trend CBC  -- Maintain MAP > 65; IVF PRN, patient is very fluid responsive  -- UA / UCx pending  -- BCx showed contaminate of CN Staph   -- Lactate now normalized following fluid bolus  -- Investigate for further cause of falls including: orthostatic vitals, cardiac telemetry    Balance disorder  -- Discussed with daughter, (Марина [phone]), who lives in [place] and reports brother locally with own health issues and not always available, with patient living at home solo. Reports not just lightheadedness recently due to hypotension but actually balance issues and falling since January. Reports not eating well since that time and losing weight. Hypoalbuminemia noted. Also issues with medication adherence at home. Requests evaluating possible cause of balance issues /falling and how to mitigate. Consider hypotension vs deconditioning vs anemia contributing vs neuro vs other  -- Iron: 49, Transferrin 129, Ferritin 275, TIBC 191, Saturated Iron 26, folate 3.2 and B12 1167.   -- Initiate Boost  -- Anti-hypertensive regimen management  -- PT/OT and dispo to SNF. Appreciate CM /SW assist in resources to ensure safe discharge plan  -- Consider referral to neuro      Palliative care encounter        Ascites  --9/23: Ct A/P:Cirrhotic morphology of the liver with sequelae of portal hypertension as evidence by splenomegaly, intra-abdominal ascites, and possible portal hypertensive enteropathy.  -- Liver US reveals liver lesions and cirrhosis. AFP WNL. Defer non-emergent MRI /CT to better characterize liver lesions.   -- Consult palliative for GOC.   --Patient denies hx of alcohol abuse   --LFT with out elevation  --Patient with ascites: IR preformed  diagnostic paracentetics on 9/27 to r/o SBP:   -- No sxs of SBP. Very low suspicion  -- Referral to hepatology on d/c given new cirrhosis    Rib fracture  2x acute mildly displaced right anterior ribs fractures seen on CT chest  Analgesia prn  Incentive spirometry    Hypokalemia  K 2.9 on admission with concomitant hypomagnesemia.     -- Replete potassium to achieve goal of >4  --CMP daily  -- Replete Mg    TRENT (acute kidney injury)  Patient presenting with TRENT with admission sCr 1.6 (baseline sCr wnl).   DDx: Pre-renal azotemia s/o sepsis vs questionable PO intake    Serum creatinine: 1.5 mg/dL (H) 09/27/22 1015  Estimated creatinine clearance: 42 mL/min (A)    -- IVF per Sepsis A/P  -- Trend sCr  -- Strict I&Os  -- Avoid nephrotoxic agents  -- Renally adjust medications      Type 2 diabetes mellitus, without long-term current use of insulin  Last A1c 6.1 2 months ago.     -- Hold home medications while inpatient  -- POCT glucose ACHS   -- LDSSI to maintain -180    Mixed hyperlipidemia  -- Continue home statin      Hypertension    Home medications: triamterene-HCTZ, Lasix    -- Hold home medications s/o hypotension    Falls frequently  -- PT/OT consulted    Diastolic heart failure  Questionable history of diastolic CHF. Documented in chart, but previous TTE in Care Everywhere 03/2022 demonstrated normal EF without noted diastolic dysfunction. Patient also not complaining of HF symptoms (STRATTON, orthopnea, new BLE swelling, anasarca).    -- Repeat TTE on 9/26/22- EF is 50%    Permanent atrial fibrillation  Patient with documented history of permanent Afib s/p watchman, no longer on AC or rate controlling agents.  [id number] 5   HAS-BLED 4     -- Patient rate controlled, no indication for further agents at this time  -- No full AC required s/p Watchman; will hold AC ppx and APT s/o active bleeding from abrasion sites  -- Cardiac telemetry  -- Maintain K > 4, Mg > 2, Ca wnl; replete PRN  -- STAT cardiology consult  if hemodynamic instability for DCCV evaluation      VTE Risk Mitigation (From admission, onward)         Ordered     enoxaparin injection 40 mg  Daily         09/26/22 1220     Reason for No Pharmacological VTE Prophylaxis  Once        Question:  Reasons:  Answer:  Active Bleeding    09/24/22 0332     IP VTE HIGH RISK PATIENT  Once         09/24/22 0332     Place sequential compression device  Until discontinued         09/24/22 0332                Discharge Planning   ELMER: 9/29/2022     Code Status: Full Code   Is the patient medically ready for discharge?: No    Reason for patient still in hospital (select all that apply): Patient trending condition  Discharge Plan A: (P) Skilled Nursing Facility                  Brisa Lamar MD  Department of Hospital Medicine   [facility] - Intensive Care ([place]

## 2022-09-27 NOTE — ASSESSMENT & PLAN NOTE
-- Discussed with daughter, (Марина [phone]), who lives in [place] and reports brother locally with own health issues and not always available, with patient living at home solo. Reports not just lightheadedness recently due to hypotension but actually balance issues and falling since January. Reports not eating well since that time and losing weight. Hypoalbuminemia noted. Also issues with medication adherence at home. Requests evaluating possible cause of balance issues /falling and how to mitigate. Consider hypotension vs deconditioning vs anemia contributing vs neuro vs other  -- Iron: 49, Transferrin 129, Ferritin 275, TIBC 191, Saturated Iron 26, folate 3.2 and B12 1167.   -- Initiate Boost  -- Anti-hypertensive regimen management  -- PT/OT and dispo to SNF. Appreciate CM /SW assist in resources to ensure safe discharge plan  -- Consider referral to neuro

## 2022-09-27 NOTE — SUBJECTIVE & OBJECTIVE
Interval History: NAOE. Although pt is AAO X3 and haslinear speech, at times does appear to be confused and states that he sees the ocean and waves outside the window. He denies any fevers, chills, SOB. He is lying comfortably in bed with no acute complaints. He remains afebrile and VSS.     Review of Systems   Constitutional:  Positive for activity change. Negative for appetite change, chills, diaphoresis, fatigue, fever and unexpected weight change.   HENT:  Negative for congestion, hearing loss, rhinorrhea and sore throat.    Eyes:  Negative for photophobia and visual disturbance.   Respiratory:  Negative for cough, shortness of breath and wheezing.    Cardiovascular:  Negative for chest pain, palpitations and leg swelling.   Gastrointestinal:  Negative for abdominal pain, blood in stool, constipation, diarrhea, nausea and vomiting.   Genitourinary:  Negative for difficulty urinating, dysuria and hematuria.   Musculoskeletal:  Negative for arthralgias and myalgias.   Skin:  Positive for wound. Negative for pallor and rash.   Allergic/Immunologic: Negative for immunocompromised state.   Neurological:  Negative for dizziness, weakness, light-headedness, numbness and headaches.   Hematological:  Does not bruise/bleed easily.   Psychiatric/Behavioral:  Negative for agitation and confusion.    Objective:     Vital Signs (Most Recent):  Temp: 98.6 °F (37 °C) (09/27/22 1410)  Pulse: 69 (09/27/22 1445)  Resp: 18 (09/27/22 1445)  BP: (!) 100/55 (09/27/22 1445)  SpO2: 100 % (09/27/22 1445)   Vital Signs (24h Range):  Temp:  [97.6 °F (36.4 °C)-98.6 °F (37 °C)] 98.6 °F (37 °C)  Pulse:  [68-80] 69  Resp:  [13-31] 18  SpO2:  [90 %-100 %] 100 %  BP: ()/(54-74) 100/55     Weight: 89.5 kg (197 lb 5 oz)  Body mass index is 28.31 kg/m².    Intake/Output Summary (Last 24 hours) at 9/27/2022 1551  Last data filed at 9/27/2022 0921  Gross per 24 hour   Intake 154.4 ml   Output 851 ml   Net -696.6 ml      Physical Exam  Vitals  and nursing note reviewed.   Constitutional:       General: He is not in acute distress.  HENT:      Head: Normocephalic and atraumatic.      Comments: Bilateral temporal wasting.     Mouth/Throat:      Mouth: Mucous membranes are moist.   Eyes:      General: No scleral icterus.     Extraocular Movements: Extraocular movements intact.      Pupils: Pupils are equal, round, and reactive to light.   Cardiovascular:      Rate and Rhythm: Normal rate and regular rhythm.      Pulses: Normal pulses.      Heart sounds: Normal heart sounds. No murmur heard.  Pulmonary:      Effort: Pulmonary effort is normal.      Breath sounds: Normal breath sounds. No wheezing, rhonchi or rales.   Abdominal:      General: Bowel sounds are normal. There is no distension.      Palpations: Abdomen is soft.      Tenderness: There is no abdominal tenderness.   Musculoskeletal:         General: Signs of injury present. No tenderness.      Right lower leg: No edema.      Left lower leg: No edema.   Skin:     General: Skin is warm.      Capillary Refill: Capillary refill takes less than 2 seconds.      Findings: Bruising present.   Neurological:      General: No focal deficit present.      Mental Status: He is alert and oriented to person, place, and time.   Psychiatric:         Mood and Affect: Mood normal.         Thought Content: Thought content normal.       Significant Labs: All pertinent labs within the past 24 hours have been reviewed.  Blood Culture: No results for input(s): LABBLOO in the last 48 hours.  CBC:   Recent Labs   Lab 09/26/22  0307   WBC 8.97   HGB 11.0*   HCT 34.5*   PLT 75*     CMP:   Recent Labs   Lab 09/26/22  0307 09/27/22  1015    135*   K 4.2 4.2    105   CO2 23 18*   * 119*   BUN 23 21   CREATININE 1.6* 1.5*   CALCIUM 8.1* 8.1*   PROT 4.7*  --    ALBUMIN 2.2*  --    BILITOT 1.1*  --    ALKPHOS 106  --    AST 35  --    ALT 20  --    ANIONGAP 10 12       Significant Imaging: I have reviewed all  pertinent imaging results/findings within the past 24 hours.  Echo  · The left ventricle is normal in size with low normal systolic function.   The estimated ejection fraction is 50%.  · There is abnormal septal wall motion consistent with right ventricular   pacing.  · Mild right ventricular enlargement with mildly reduced right ventricular   systolic function.  · Indeterminate left ventricular diastolic function.  · Moderate left atrial enlargement.  · Mild to moderate tricuspid regurgitation.  · The IVC is not visualized.     US Abdomen Limited  Narrative: EXAMINATION:  US ABDOMEN LIMITED    CLINICAL HISTORY:  RUQ US;.    TECHNIQUE:  Limited ultrasound of the right upper quadrant of the abdomen including pancreas, liver, gallbladder, common bile duct was performed.    COMPARISON:  CT chest abdomen pelvis 09/24/2022    FINDINGS:  Liver: Liver measures 14.6 cm with nodular contour and heterogeneous echotexture consistent with cirrhosis.  2.7 x 3.1 cm lesion at the hepatic dome difficult to fully assess likely corresponding to lesion seen on CT 09/24/2022.  Additional 1.1 x 1.1 x 1.0 cm and 2.2 x 1.3 x 1.7 cm hyperechoic lesions in the right hepatic lobe.    Gallbladder: Gallbladder sludge.  There is no gallbladder wall thickening or pericholecystic fluid.  No sonographic Marr's sign.    Biliary system: The common duct is not dilated, measuring 2 mm.  No intrahepatic ductal dilatation.    Spleen: Enlarged measuring 14.2 x 4.0 cm.    Pancreas: Mostly obscured by overlying bowel gas.  2.3 x 2.8 x 2.2 cm cystic lesion seen in the region of the pancreatic head.    Miscellaneous: Moderate upper abdominal ascites.  Impression: 1. Cirrhosis with sequelae of portal hypertension including ascites and splenomegaly.  2. Multiple incompletely characterized liver lesions.  In this patient with cirrhosis, non emergent CT or MRI liver protocol recommended for further characterization.  3. Gallbladder sludge.  No sonographic  evidence of acute cholecystitis.  4. Pancreatic cystic lesion better characterized on recent CT.  Nonemergent contrast enhanced MRI/MRCP is recommended in 6 months.    Electronically signed by resident: Navarro Arthur  Date:    09/25/2022  Time:    18:25    Electronically signed by: Farzad Zeng MD  Date:    09/26/2022  Time:    07:30

## 2022-09-27 NOTE — H&P
Inpatient Radiology Pre-procedure Note    History of Present Illness:  Jacques Arellano is a 82 y.o. male who presents for paracentesis.     Admission H&P reviewed.  Past Medical History:   Diagnosis Date    Anticoagulant long-term use     Atrial fibrillation     Diabetes mellitus     Diastolic heart failure     Frequent falls     Hypertension     Renal disorder     eswl    Sleep apnea     20 yrs ago    Stroke     TIA    UTI (urinary tract infection)     Vertigo      Past Surgical History:   Procedure Laterality Date    CARDIAC SURGERY  03/30/2022    watchmen    INSERTION OF PACEMAKER      KIDNEY STONE SURGERY      OPEN REDUCTION AND INTERNAL FIXATION (ORIF) OF FRACTURE OF DISTAL RADIUS Right 5/17/2022    Procedure: ORIF, FRACTURE, RADIUS, DISTAL;  Surgeon: Claude S. Williams IV, MD;  Location: [facility];  Service: Orthopedics;  Laterality: Right;    TONSILLECTOMY         Review of Systems:   As documented in primary team H&P    Home Meds:   Prior to Admission medications    Medication Sig Start Date End Date Taking? Authorizing Provider   acetaminophen (TYLENOL) 325 MG tablet Take 650 mg by mouth every 6 (six) hours as needed.    Historical Provider   amoxicillin (AMOXIL) 500 MG capsule Take 1 capsule (500 mg total) by mouth 3 (three) times daily.  Patient not taking: Reported on 9/15/2022 7/25/22   Matty Garsia MD   aspirin (ECOTRIN) 81 MG EC tablet Take 81 mg by mouth. 5/2/22   Historical Provider   atorvastatin (LIPITOR) 40 MG tablet Take 40 mg by mouth once daily. 1/26/22   Historical Provider   clorazepate (TRANXENE) 7.5 MG Tab Take 7.5 mg by mouth 3 (three) times daily.    Historical Provider   EScitalopram oxalate (LEXAPRO) 10 MG tablet Take 1 tablet (10 mg total) by mouth once daily. 7/19/22 7/19/23  Matty Garsia MD   febuxostat (ULORIC) 40 mg Tab Take 40 mg by mouth.    Historical Provider   furosemide (LASIX) 20 MG tablet Take 1 tablet (20 mg total) by mouth 2 (two) times daily.  Patient not  taking: Reported on 9/15/2022 8/9/22 8/9/23  Matty Garsia MD   glipiZIDE (GLUCOTROL) 10 MG tablet Take 1 tablet (10 mg total) by mouth daily with breakfast. 8/9/22   Matty Garsia MD   HYDROcodone-acetaminophen (NORCO) 5-325 mg per tablet Take 1 tablet by mouth every 6 (six) hours as needed for Pain. 5/17/22   Claude S. Williams IV, MD   metFORMIN (GLUCOPHAGE) 1000 MG tablet Take 1 tablet (1,000 mg total) by mouth 2 (two) times daily with meals. 8/9/22 11/7/22  Matty Garsia MD   ondansetron (ZOFRAN) 4 MG tablet Take 1 tablet (4 mg total) by mouth every 6 (six) hours as needed for Nausea. 9/15/22   Blessing Trevino MD   potassium chloride (MICRO-K) 10 MEQ CpSR Take 1 capsule (10 mEq total) by mouth 2 (two) times daily. 7/19/22   Matty Garsia MD   triamterene-hydrochlorothiazide 75-50 mg (MAXZIDE) 75-50 mg per tablet TAKE 1/2 (ONE-HALF) TABLET BY MOUTH TWICE DAILY 7/29/22   Historical Provider     Scheduled Meds:    allopurinoL  100 mg Oral Daily    atorvastatin  40 mg Oral Daily    enoxaparin  40 mg Subcutaneous Daily    EScitalopram oxalate  10 mg Oral Daily    folic acid  1 mg Oral Daily    LIDOcaine  1 patch Transdermal Q24H    LIDOcaine HCL 10 mg/ml (1%)  20 mL Other Once    LIDOcaine HCL 10 mg/ml (1%)  20 mL Other Once    mupirocin   Nasal BID    piperacillin-tazobactam (ZOSYN) IVPB  4.5 g Intravenous Q8H     Continuous Infusions:   PRN Meds:acetaminophen, dextrose 10%, dextrose 10%, glucagon (human recombinant), glucose, glucose, insulin aspart U-100, melatonin, sodium chloride 0.9%  Anticoagulants/Antiplatelets: no anticoagulation    Allergies: Review of patient's allergies indicates:  No Known Allergies  Sedation Hx: have not been any systemic reactions    Labs:  Recent Labs   Lab 09/23/22  2220   INR 1.5*       Recent Labs   Lab 09/26/22  0307   WBC 8.97   HGB 11.0*   HCT 34.5*   MCV 95   PLT 75*      Recent Labs   Lab 09/26/22  0307 09/27/22  1015   * 119*    135*   K 4.2 4.2     105   CO2 23 18*   BUN 23 21   CREATININE 1.6* 1.5*   CALCIUM 8.1* 8.1*   MG 1.8  --    ALT 20  --    AST 35  --    ALBUMIN 2.2*  --    BILITOT 1.1*  --          Vitals:  Temp: 98.6 °F (37 °C) (09/27/22 1410)  Pulse: 80 (09/27/22 1410)  Resp: 20 (09/27/22 1410)  BP: 119/68 (09/27/22 1410)  SpO2: 100 % (09/27/22 1410)     Physical Exam:  ASA: 3  Mallampati: 2    General: no acute distress  Mental Status: alert and oriented to person, place and time  HEENT: normocephalic, atraumatic  Chest: unlabored breathing  Heart: regular heart rate  Abdomen: nondistended  Extremity: moves all extremities    Plan: paracentesis.  Sedation Plan: local anesthesia.    Minerva Guajardo

## 2022-09-27 NOTE — HOSPITAL COURSE
Patient admitted to the MICU secondary to concerns for sepsis of unknown source. Patient required norepinephrine to keep Maps elevated. Patient started on zosyn and vancomycin for empiric coverage.Given his presentation of multiple falls, patient was scanned in the ER. No acute intracranial process was found but patient did have an expansile calvarial lesion involving the right frontal bone. CT A/P reveals multiple findings including displaced right rib fx, dilated ascending aortic aneurysm, and cirrhotic liver with ascites. An attempt to perform a diagnostic paracentesis was tried on 9/24/22, however the procedure was not performed given close proximity of bowel to the surface. No deep enough pocket was noted to be safe for the procedure. Patient's blood culture then grew GPC in clusters. Patient continued on IV abx. A second attempt to  perform paracentesis on 9/25 however patient reported that he was eating his lunch when I presented in his room. Furthermore, when I presented again patient was receiving his RUQ US. Patient was eventually weaned off norepinephrine and stepped down to the floor. IR preformed diagnostic paracentesis on 9/27/22 and negative for SBP. Deescalate abx to cefpodoxime. QTC elevated and will consider ppx outpatient. Pending SNF decision by family.

## 2022-09-27 NOTE — PT/OT/SLP PROGRESS
Physical Therapy Treatment    Patient Name:  Jacques Arellano   MRN:  [id number]    Recommendations:     Discharge Recommendations:  nursing facility, skilled   Discharge Equipment Recommendations:  (TBD closer to discharge)   Barriers to discharge: Decreased caregiver support    Assessment:     Jacques Arellano is a 82 y.o. male admitted with a medical diagnosis of Shock.  He presents with the following impairments/functional limitations:  weakness, impaired endurance, impaired self care skills, impaired functional mobility, impaired cognition, gait instability, decreased safety awareness, decreased lower extremity function Pt tolerated treatment well as pt was able to do a stand pivot transfer with max A x 2 persons. Pt was AxOx 3: self, place, time but not situation. Pt followed commands but was confused. Pt needed frequent cueing for tasks. Pt will benefit from skilled PT 3x/ wk in order to increase functional mobility. Pt when medically stable should discharge to SNF.    Rehab Prognosis: Good; patient would benefit from acute skilled PT services to address these deficits and reach maximum level of function.    Recent Surgery: * No surgery found *      Plan:     During this hospitalization, patient to be seen 3 x/week to address the identified rehab impairments via gait training, therapeutic activities, therapeutic exercises, neuromuscular re-education and progress toward the following goals:    Plan of Care Expires:  10/25/22    Subjective     Chief Complaint: Pt was afraid of following.  Patient/Family Comments/goals: Pt wanted to be able to walk with a walker  Pain/Comfort:  Pain Rating 1: 0/10  Pain Rating Post-Intervention 1: 0/10      Objective:     Communicated with nurse prior to session.  Patient found supine with pulse ox (continuous), telemetry, blood pressure cuff, PureWick upon PT entry to room.     General Precautions: Standard, fall   Orthopedic Precautions:N/A   Braces:    Respiratory Status: Room  air     Functional Mobility:  Bed Mobility:   Pt needed frequent cueing for task.  Supine to Sit: maximal assistance and of 2 persons  Sit to Supine: maximal assistance and of 2 persons    Transfers:     Bed to Stretcher: maximal assistance and of 2 persons with  no AD  using  Stand Pivot. Pt was given verbal and tactile cues to bring hips forward to stand upright.  Sit to stand: max A x 2 persons. Pt was given verbal and tactile cues to stand upright.    Balance:  Pt sat EOB with CGA for 5 min. Pt had chin to chest head posture. Pt was verbally cue to look forward and keep head up tall.      AM-PAC 6 CLICK MOBILITY  Turning over in bed (including adjusting bedclothes, sheets and blankets)?: 2  Sitting down on and standing up from a chair with arms (e.g., wheelchair, bedside commode, etc.): 2  Moving from lying on back to sitting on the side of the bed?: 2  Moving to and from a bed to a chair (including a wheelchair)?: 2  Need to walk in hospital room?: 1  Climbing 3-5 steps with a railing?: 1  Basic Mobility Total Score: 10       Therapeutic Activities and Exercises:   Pt was verbally educated on PT POC. Pt expressed verbal understanding.    Patient left  transport stretcher  with all lines intact, call button in reach, and nurse present..    GOALS:   Multidisciplinary Problems       Physical Therapy Goals          Problem: Physical Therapy    Goal Priority Disciplines Outcome Goal Variances Interventions   Physical Therapy Goal     PT, PT/OT Ongoing, Progressing     Description: Goals to be met by: 10/9/2022     Patient will increase functional independence with mobility by performin. Supine to sit with Contact Guard Assistance  2. Sit to supine with Contact Guard Assistance  3. Sit to stand transfer with Minimal Assistance and RW  4. Bed to chair transfer with Minimal Assistance using Rolling Walker  5. Gait  x 100 feet with Minimal Assistance using Rolling Walker.   6. Ascend/descend 3 stair with no  Handrails Minimal Assistance using No Assistive Device.                          Time Tracking:     PT Received On: 09/27/22  PT Start Time: 1324     PT Stop Time: 1343  PT Total Time (min): 19 min     Billable Minutes: Therapeutic Activity 19 min    Treatment Type: Treatment  PT/PTA: PT     PTA Visit Number: 0     09/27/2022

## 2022-09-27 NOTE — CARE UPDATE
RAPID RESPONSE NURSE PROACTIVE ROUNDING NOTE       Time of Visit:     Admit Date: 2022  LOS: 3  Code Status: Full Code   Date of Visit: 2022  : 1940  Age: 82 y.o.  Sex: male  Race: White  Bed: 80120/01463 A:   MRN: [id number]  Was the patient discharged from an ICU this admission? Yes   Was the patient discharged from a PACU within last 24 hours? No   Did the patient receive conscious sedation/general anesthesia in last 24 hours? No  Was the patient in the ED within the past 24 hours? No  Was the patient on NIPPV within the past 24 hours? No   Attending Physician: Robyn Wilkins MD  Primary Service: Pulmonary Disease   Time spent at the bedside: < 15 min    SITUATION    Notified by charge RN during rounding.  Reason for alert: New SD from ICU, Septic shock  Called to evaluate the patient for Circulatory    BACKGROUND     Why is the patient in the hospital?: Shock    Patient has a past medical history of Anticoagulant long-term use, Atrial fibrillation, Diabetes mellitus, Diastolic heart failure, Frequent falls, Hypertension, Renal disorder, Sleep apnea, Stroke, UTI (urinary tract infection), and Vertigo.    Last Vitals:  Temp: 97.6 °F (36.4 °C) (2345)  Pulse: 69 (2345)  Resp: 14 (2345)  BP: 120/68 (2345)  SpO2: 98 % (208)    24 Hours Vitals Range:  Temp:  [97.6 °F (36.4 °C)-98.7 °F (37.1 °C)]   Pulse:  [66-75]   Resp:  [12-19]   BP: ()/()   SpO2:  [73 %-100 %]     Labs:  Recent Labs     22  0440 22  0310 22  0307   WBC 8.27 8.74 8.97   HGB 11.1* 10.6* 11.0*   HCT 34.1* 33.1* 34.5*   PLT 74* 75* 75*       Recent Labs     22  0440 22  2148 22  0310 22  0307   * 142 134* 137   K 2.9* 3.3* 3.4* 4.2    104 102 104   CO2 22* 22* 22* 23   CREATININE 1.4 1.4 1.4 1.6*   GLU 60* 97 92 155*   PHOS 2.1*  --  2.5* 2.6*   MG 1.2*  --  1.6 1.8        No results for input(s): PH, PCO2, PO2, HCO3, POCSATURATED, BE in  "the last 72 hours.     ASSESSMENT    Physical Exam Asked to round on pt newly SD from MICU. Pt awake alert, talkative.  Does seem to be disoriented to place and time, but did just recently moved from MICU.  Asked for wife, stating that "she was just here 15 minutes ago".  Unfortunately his spouse is . Palliative care saw pt today and pt also was having episodes of hallucination then as well.  He is pleasant and cooperative.  His VS are stable.  Multiple drsg to both arms noted from falls prior to admission. All CDI.  No acute issues at this time.     INTERVENTIONS    The patient was seen for MICU SD, septic shock    RECOMMENDATIONS    Continue to monitor    PROVIDER ESCALATION    Yes/No  no    Orders received and case discussed with NA.    Disposition: Remain in room 16715.    FOLLOW-UP    Charge Batsheva AGUILA  updated on plan of care. Instructed to call the Rapid Response Nurse, Joanne Goel RN at [phone] for additional questions or concerns.            "

## 2022-09-27 NOTE — ASSESSMENT & PLAN NOTE
--9/23: Ct A/P:Cirrhotic morphology of the liver with sequelae of portal hypertension as evidence by splenomegaly, intra-abdominal ascites, and possible portal hypertensive enteropathy.  -- Liver US reveals liver lesions and cirrhosis. AFP WNL. Defer non-emergent MRI /CT to better characterize liver lesions.   -- Consult palliative for GOC.   --Patient denies hx of alcohol abuse   --LFT with out elevation  --Patient with ascites: IR preformed diagnostic paracentetics on 9/27 to r/o SBP:   -- No sxs of SBP. Very low suspicion  -- Referral to hepatology on d/c given new cirrhosis

## 2022-09-27 NOTE — NURSING
Pt arrives to unit.  Transferred to bed with staff assist x 2. Denies any pain or discomfort at this time.

## 2022-09-27 NOTE — PLAN OF CARE
TISH spoke with daughter Марина Ruiz [phone] this morning. She advised that they would like [facility] if possible. TISH spoke with Kerry Dutta with [facility]. She advised she would not have any beds till Friday 9/30/2022. But will follow patient. Patient is not medically ready to discharge per MD at this time.    Patient stepped down to 23073 yesteday evening. CM will do locet on patient.    Juliette Fineny RN     [phone]

## 2022-09-27 NOTE — PROCEDURES
Radiology Post-Procedure Note    Pre Op Diagnosis: Ascites  Post Op Diagnosis: Same    Procedure: Ultrasound Guided Paracentesis    Procedure performed by: Minerva Guajardo MD.    Written Informed Consent Obtained: Yes  Specimen Removed: YES 3.1 L.   Estimated Blood Loss: Minimal    Findings:   Successful paracentesis.  Albumin administered PRN per protocol.    Patient tolerated procedure well.    Minerva Guajardo

## 2022-09-27 NOTE — PLAN OF CARE
[facility] - [facility] ([place] [address])  Initial Discharge Assessment       Primary Care Provider: Matty Garsia MD    Admission Diagnosis: Lactic acidosis [E87.2]  Lightheadedness [R42]  Bradycardia [R00.1]  Multiple skin tears [T14.8XXA]  Chest pain [R07.9]  Sepsis [A41.9]  Elbow injury, right, initial encounter [S59.901A]  Closed fracture of multiple ribs of right side, initial encounter [S22.41XA]    Admission Date: 9/23/2022  Expected Discharge Date: 9/29/2022    Discharge Barriers Identified: (P) None    Payor: MEDICARE / Plan: MEDICARE PART A & B / Product Type: Government /     Extended Emergency Contact Information  Primary Emergency Contact: Reji Arellano  Mobile Phone: [phone]  Relation: Son  Preferred language: English   needed? No    Discharge Plan A: (P) Skilled Nursing Facility  Discharge Plan B: (P) Home Health      [id number] - [place] - [facility] [address]  [facility]  [address] [place] [address]  Phone: [phone] Fax: [phone]      Initial Assessment (most recent)       Adult Discharge Assessment - 09/27/22 1614          Discharge Assessment    Assessment Type Discharge Planning Assessment (P)      Confirmed/corrected address, phone number and insurance Yes (P)      Confirmed Demographics Correct on Facesheet (P)      Source of Information family (P)      When was your last doctors appointment? -- (P)    unknown    Communicated ELMER with patient/caregiver Date not available/Unable to determine (P)      Reason For Admission See admit diagnosis (P)      Lives With child(judi), adult (P)      Do you expect to return to your current living situation? No (P)      Do you have help at home or someone to help you manage your care at home? No (P)    Son works and is not at home all of the time.    Prior to hospitilization cognitive status: Unable to Assess (P)      Current cognitive status: Unable to Assess (P)      Walking or  Climbing Stairs Difficulty ambulation difficulty, requires equipment (P)      Mobility Management has RW and WC (P)      Dressing/Bathing Difficulty bathing difficulty, requires equipment (P)      Equipment Currently Used at Home walker, rolling;wheelchair (P)      Readmission within 30 days? No (P)      Patient currently being followed by outpatient case management? No (P)      Do you currently have service(s) that help you manage your care at home? No (P)      Do you take prescription medications? Yes (P)      Do you have prescription coverage? Yes (P)      Coverage Medicare and  (P)      Do you have any problems affording any of your prescribed medications? No (P)      Who is going to help you get home at discharge? son - Reji Arellano - [phone] (P)      How do you get to doctors appointments? family or friend will provide (P)      Are you on dialysis? No (P)      Do you take coumadin? No (P)      Discharge Plan A Skilled Nursing Facility (P)      Discharge Plan B Home Health (P)      DME Needed Upon Discharge  other (see comments) (P)    TBD    Discharge Plan discussed with: Caregiver (P)      Name(s) and Number(s) Марина Ruiz - dtr - [phone] (P)      Discharge Barriers Identified None (P)         Relationship/Environment    Name(s) of Who Lives With Patient Reji Arellano - son (P)                       SW attempted to complete the pt's assessment but he was having a procedure and was not in the room.  STEPHANIE contacted the pt's dtr.  Pt's dtr had spoken with the CM that had the pt yesterday about SNF.  Pt lives with his son that works.  Pt has used [facility] in the past but is not current with them.  Pt's son was bringing him to appts but he was starting to have trouble doing it lately due to the pt's debility.  Pt uses a RW and WC.  He is not on HD.  He was on Eliquis in the past but had a heart procedure and no longer takes it.  STEPHANIE spoke about SNF with the daughter.  She thinks that the pt will be  resistant to going to SNF but would consider [facility] as it is not in a NH.  SW explained that they did not have a bed today and one may not open up for a few days.  Pt is being followed by other SNFs.  SW will f/u tomorrow.    Olive Nova, RACHELW   PRN

## 2022-09-27 NOTE — PLAN OF CARE
Problem: Physical Therapy  Goal: Physical Therapy Goal  Description: Goals to be met by: 10/9/2022     Patient will increase functional independence with mobility by performin. Supine to sit with Contact Guard Assistance  2. Sit to supine with Contact Guard Assistance  3. Sit to stand transfer with Minimal Assistance and RW  4. Bed to chair transfer with Minimal Assistance using Rolling Walker  5. Gait  x 100 feet with Minimal Assistance using Rolling Walker.   6. Ascend/descend 3 stair with no Handrails Minimal Assistance using No Assistive Device.     Outcome: Ongoing, Progressing  Goals remain appropriate 2022

## 2022-09-27 NOTE — PLAN OF CARE
Problem: Adult Inpatient Plan of Care  Goal: Plan of Care Review  Outcome: Ongoing, Progressing  Goal: Patient-Specific Goal (Individualized)  Outcome: Ongoing, Progressing     Problem: Fluid and Electrolyte Imbalance (Acute Kidney Injury/Impairment)  Goal: Fluid and Electrolyte Balance  Outcome: Ongoing, Progressing     Problem: Impaired Wound Healing  Goal: Optimal Wound Healing  Outcome: Ongoing, Progressing

## 2022-09-27 NOTE — PT/OT/SLP PROGRESS
Occupational Therapy   Treatment    Name: Jacques Arellano  MRN: [id number]  Admitting Diagnosis:  Shock       Recommendations:     Discharge Recommendations: nursing facility, skilled  Discharge Equipment Recommendations:   (tbd)  Barriers to discharge:  None    Assessment:     Jacques Arellano is a 82 y.o. male with a medical diagnosis of Shock.  He presents with impairments listed below. Pt did well to tolerate and participate in the session. Pt displayed increased functioning on this date, but is still functioning below baseline. Pt is requiring increased overall assist. Pt displayed global deconditioning requiring increased assist for ADLs and mobility at this time. Pt would benefit from skilled OT services to improve independence and overall occupational functioning.     Performance deficits affecting function are weakness, impaired endurance, impaired self care skills, impaired functional mobility, gait instability, impaired balance, decreased upper extremity function, decreased lower extremity function, decreased ROM, impaired cardiopulmonary response to activity, impaired skin.     Rehab Prognosis:  Good; patient would benefit from acute skilled OT services to address these deficits and reach maximum level of function.       Plan:     Patient to be seen 3 x/week to address the above listed problems via self-care/home management, therapeutic activities, therapeutic exercises, neuromuscular re-education  Plan of Care Expires: 10/25/22  Plan of Care Reviewed with: patient    Subjective     Pain/Comfort:  Pain Rating 1: 0/10  Pain Rating Post-Intervention 1: 0/10    Objective:     Communicated with: RN prior to session.  Patient found HOB elevated with pulse ox (continuous), telemetry, PureWick, blood pressure cuff upon OT entry to room.    General Precautions: Standard, fall   Orthopedic Precautions:N/A   Braces: N/A  Respiratory Status: Room air     Occupational Performance:     Bed Mobility:    Patient  completed Scooting/Bridging with maximal assistance  Patient completed Supine to Sit with maximal assistance  Patient completed Sit to Supine with maximal assistance     Functional Mobility/Transfers:  Not performed on this date.     Activities of Daily Living:  Lower Body Dressing: total assistance donned sock seated EOB      [facility] 6 Click ADL: 16    Treatment & Education:  Pt sat EOB at SBA for ~30 minutes.  Pt completed seated exercises of kicks, marches, ankle pumps, calf raises, rows, and shoulder flexion (AAROM for RUE) for 12 reps.  Pt was educated on POC and overall coordination of care.     Patient left HOB elevated with all lines intact and call button in reach    GOALS:   Multidisciplinary Problems       Occupational Therapy Goals          Problem: Occupational Therapy    Goal Priority Disciplines Outcome Interventions   Occupational Therapy Goal     OT, PT/OT Ongoing, Progressing    Description: Goals to be met by: 10/14/2022     Patient will increase functional independence with ADLs by performing:    UE Dressing with Minimal Assistance.  LE Dressing with Moderate Assistance.  Grooming while seated with Set-up Assistance.  Toileting from bedside commode with Moderate Assistance for hygiene and clothing management.   Toilet transfer to bedside commode with Moderate Assistance.                         Time Tracking:     OT Date of Treatment: 09/27/22  OT Start Time: 0955  OT Stop Time: 1033  OT Total Time (min): 38 min    Billable Minutes:Therapeutic Exercise 38 minutes    OT/TITO: OT          9/27/2022

## 2022-09-27 NOTE — ASSESSMENT & PLAN NOTE
Questionable history of diastolic CHF. Documented in chart, but previous TTE in Care Everywhere 03/2022 demonstrated normal EF without noted diastolic dysfunction. Patient also not complaining of HF symptoms (STRATTON, orthopnea, new BLE swelling, anasarca).    -- Repeat TTE on 9/26/22- EF is 50%

## 2022-09-28 LAB
ANION GAP SERPL CALC-SCNC: 11 MMOL/L (ref 8–16)
BUN SERPL-MCNC: 18 MG/DL (ref 8–23)
CALCIUM SERPL-MCNC: 8.3 MG/DL (ref 8.7–10.5)
CHLORIDE SERPL-SCNC: 105 MMOL/L (ref 95–110)
CO2 SERPL-SCNC: 19 MMOL/L (ref 23–29)
CREAT SERPL-MCNC: 1.2 MG/DL (ref 0.5–1.4)
EST. GFR  (NO RACE VARIABLE): >60 ML/MIN/1.73 M^2
GLUCOSE SERPL-MCNC: 75 MG/DL (ref 70–110)
POCT GLUCOSE: 127 MG/DL (ref 70–110)
POCT GLUCOSE: 130 MG/DL (ref 70–110)
POCT GLUCOSE: 153 MG/DL (ref 70–110)
POTASSIUM SERPL-SCNC: 3.2 MMOL/L (ref 3.5–5.1)
SODIUM SERPL-SCNC: 135 MMOL/L (ref 136–145)

## 2022-09-28 PROCEDURE — 36415 COLL VENOUS BLD VENIPUNCTURE: CPT | Performed by: STUDENT IN AN ORGANIZED HEALTH CARE EDUCATION/TRAINING PROGRAM

## 2022-09-28 PROCEDURE — 80048 BASIC METABOLIC PNL TOTAL CA: CPT | Performed by: STUDENT IN AN ORGANIZED HEALTH CARE EDUCATION/TRAINING PROGRAM

## 2022-09-28 PROCEDURE — 63600175 PHARM REV CODE 636 W HCPCS: Performed by: INTERNAL MEDICINE

## 2022-09-28 PROCEDURE — 25000003 PHARM REV CODE 250: Performed by: STUDENT IN AN ORGANIZED HEALTH CARE EDUCATION/TRAINING PROGRAM

## 2022-09-28 PROCEDURE — 93010 EKG 12-LEAD: ICD-10-PCS | Mod: ,,, | Performed by: STUDENT IN AN ORGANIZED HEALTH CARE EDUCATION/TRAINING PROGRAM

## 2022-09-28 PROCEDURE — 99232 SBSQ HOSP IP/OBS MODERATE 35: CPT | Mod: GC,,, | Performed by: INTERNAL MEDICINE

## 2022-09-28 PROCEDURE — 93005 ELECTROCARDIOGRAM TRACING: CPT

## 2022-09-28 PROCEDURE — 93010 ELECTROCARDIOGRAM REPORT: CPT | Mod: ,,, | Performed by: STUDENT IN AN ORGANIZED HEALTH CARE EDUCATION/TRAINING PROGRAM

## 2022-09-28 PROCEDURE — 20600001 HC STEP DOWN PRIVATE ROOM

## 2022-09-28 PROCEDURE — 63600175 PHARM REV CODE 636 W HCPCS: Performed by: STUDENT IN AN ORGANIZED HEALTH CARE EDUCATION/TRAINING PROGRAM

## 2022-09-28 PROCEDURE — 99233 PR SUBSEQUENT HOSPITAL CARE,LEVL III: ICD-10-PCS | Mod: GC,,, | Performed by: INTERNAL MEDICINE

## 2022-09-28 PROCEDURE — 25000003 PHARM REV CODE 250

## 2022-09-28 PROCEDURE — 99233 SBSQ HOSP IP/OBS HIGH 50: CPT | Mod: GC,,, | Performed by: INTERNAL MEDICINE

## 2022-09-28 PROCEDURE — 25000003 PHARM REV CODE 250: Performed by: INTERNAL MEDICINE

## 2022-09-28 PROCEDURE — 99232 PR SUBSEQUENT HOSPITAL CARE,LEVL II: ICD-10-PCS | Mod: GC,,, | Performed by: INTERNAL MEDICINE

## 2022-09-28 RX ORDER — POTASSIUM CHLORIDE 20 MEQ/1
40 TABLET, EXTENDED RELEASE ORAL
Status: COMPLETED | OUTPATIENT
Start: 2022-09-28 | End: 2022-09-28

## 2022-09-28 RX ORDER — ALLOPURINOL 100 MG/1
100 TABLET ORAL DAILY
Qty: 90 TABLET | Refills: 0 | Status: SHIPPED | OUTPATIENT
Start: 2022-09-29 | End: 2022-09-30 | Stop reason: SDUPTHER

## 2022-09-28 RX ORDER — CEFPODOXIME PROXETIL 200 MG/1
200 TABLET, FILM COATED ORAL DAILY
Status: COMPLETED | OUTPATIENT
Start: 2022-09-28 | End: 2022-09-29

## 2022-09-28 RX ORDER — CEFPODOXIME PROXETIL 200 MG/1
200 TABLET, FILM COATED ORAL EVERY 12 HOURS
Status: DISCONTINUED | OUTPATIENT
Start: 2022-09-28 | End: 2022-09-28

## 2022-09-28 RX ADMIN — ENOXAPARIN SODIUM 40 MG: 100 INJECTION SUBCUTANEOUS at 05:09

## 2022-09-28 RX ADMIN — LIDOCAINE 1 PATCH: 50 PATCH CUTANEOUS at 06:09

## 2022-09-28 RX ADMIN — MUPIROCIN: 20 OINTMENT TOPICAL at 09:09

## 2022-09-28 RX ADMIN — POTASSIUM CHLORIDE 40 MEQ: 1500 TABLET, EXTENDED RELEASE ORAL at 01:09

## 2022-09-28 RX ADMIN — Medication 6 MG: at 09:09

## 2022-09-28 RX ADMIN — ESCITALOPRAM 10 MG: 5 TABLET, FILM COATED ORAL at 09:09

## 2022-09-28 RX ADMIN — ALLOPURINOL 100 MG: 100 TABLET ORAL at 09:09

## 2022-09-28 RX ADMIN — POTASSIUM CHLORIDE 40 MEQ: 1500 TABLET, EXTENDED RELEASE ORAL at 05:09

## 2022-09-28 RX ADMIN — ATORVASTATIN CALCIUM 40 MG: 40 TABLET, FILM COATED ORAL at 09:09

## 2022-09-28 RX ADMIN — FOLIC ACID 1 MG: 1 TABLET ORAL at 09:09

## 2022-09-28 RX ADMIN — POTASSIUM CHLORIDE 40 MEQ: 1500 TABLET, EXTENDED RELEASE ORAL at 12:09

## 2022-09-28 RX ADMIN — PIPERACILLIN SODIUM AND TAZOBACTAM SODIUM 4.5 G: 4; .5 INJECTION, POWDER, LYOPHILIZED, FOR SOLUTION INTRAVENOUS at 12:09

## 2022-09-28 RX ADMIN — CEFPODOXIME PROXETIL 200 MG: 200 TABLET, FILM COATED ORAL at 12:09

## 2022-09-28 NOTE — ASSESSMENT & PLAN NOTE
--9/23: Ct A/P:Cirrhotic morphology of the liver with sequelae of portal hypertension as evidence by splenomegaly, intra-abdominal ascites, and possible portal hypertensive enteropathy.  -- Liver US reveals liver lesions and cirrhosis. AFP WNL. Defer non-emergent MRI /CT to better characterize liver lesions.   -- Consult palliative for GOC.   --Patient denies hx of alcohol abuse   --LFT with out elevation  --Patient with ascites: IR preformed diagnostic paracentetics on 9/27 to r/o SBP: which was negative 130 WBC and 10% segs  -- will plan for ppx given low protein  -- No sxs of SBP. Very low suspicion  -- Referral to hepatology on d/c given new cirrhosis

## 2022-09-28 NOTE — ASSESSMENT & PLAN NOTE
Patient presenting with TRENT with admission sCr 1.6 (baseline sCr wnl).   DDx: Pre-renal azotemia s/o sepsis vs questionable PO intake    Serum creatinine: 1.2 mg/dL 09/28/22 0942  Estimated creatinine clearance: 48.2 mL/min    -- resolved  -- Trend sCr  -- Strict I&Os  -- Avoid nephrotoxic agents  -- Renally adjust medications

## 2022-09-28 NOTE — PLAN OF CARE
SW spoke with patient son in regard to SNF and he reports that he will have to speak with his sister before they make final decision on SNF placement. SW has provided patient son with SW contact information and will continue to follow up.         Ranjana Zeng LMSW  [facility]   [phone]

## 2022-09-28 NOTE — SUBJECTIVE & OBJECTIVE
Interval History: Pt reports feeling relieved after paracentesis yesterday. Spoke with patient at length today regarding HCPOA and GOC. Pt appointed his daughter Марина as HCPOA.     Medications:  Continuous Infusions:  Scheduled Meds:   allopurinoL  100 mg Oral Daily    atorvastatin  40 mg Oral Daily    cefpodoxime  200 mg Oral Daily    enoxaparin  40 mg Subcutaneous Daily    EScitalopram oxalate  10 mg Oral Daily    folic acid  1 mg Oral Daily    LIDOcaine  1 patch Transdermal Q24H    LIDOcaine HCL 10 mg/ml (1%)  20 mL Other Once    LIDOcaine HCL 10 mg/ml (1%)  20 mL Other Once    mupirocin   Nasal BID    potassium chloride  40 mEq Oral Q3H     PRN Meds:acetaminophen, dextrose 10%, dextrose 10%, glucagon (human recombinant), glucose, glucose, insulin aspart U-100, melatonin, sodium chloride 0.9%    Objective:     Vital Signs (Most Recent):  Temp: 98.3 °F (36.8 °C) (09/28/22 1135)  Pulse: 69 (09/28/22 1135)  Resp: 18 (09/28/22 1135)  BP: (!) 94/55 (09/28/22 1135)  SpO2: 100 % (09/28/22 1135)   Vital Signs (24h Range):  Temp:  [97.9 °F (36.6 °C)-98.6 °F (37 °C)] 98.3 °F (36.8 °C)  Pulse:  [69-80] 69  Resp:  [14-20] 18  SpO2:  [98 %-100 %] 100 %  BP: ()/(55-74) 94/55     Weight: 85.5 kg (188 lb 7.9 oz)  Body mass index is 27.05 kg/m².    Physical Exam  Vitals and nursing note reviewed.   Constitutional:       Appearance: He is ill-appearing.   HENT:      Head: Normocephalic and atraumatic.      Nose: Nose normal.      Mouth/Throat:      Mouth: Mucous membranes are moist.      Pharynx: No oropharyngeal exudate or posterior oropharyngeal erythema.   Eyes:      General: No scleral icterus.     Conjunctiva/sclera: Conjunctivae normal.      Pupils: Pupils are equal, round, and reactive to light.   Cardiovascular:      Rate and Rhythm: Normal rate and regular rhythm.      Pulses: Normal pulses.      Heart sounds: No murmur heard.  Pulmonary:      Effort: Pulmonary effort is normal. No respiratory distress.      Breath  sounds: Normal breath sounds.   Abdominal:      General: Abdomen is flat. There is distension.      Palpations: Abdomen is soft.      Tenderness: There is no abdominal tenderness.   Musculoskeletal:      Cervical back: No tenderness.      Right lower leg: Edema present.      Left lower leg: Edema present.   Skin:     Capillary Refill: Capillary refill takes less than 2 seconds.      Coloration: Skin is pale.      Findings: Bruising present.      Comments: Significant bruising present in his upper extremities    Neurological:      Mental Status: He is alert and oriented to person, place, and time. Mental status is at baseline.       Review of Symptoms      Symptom Assessment (ESAS 0-10 Scale)  Pain:  0  Dyspnea:  0  Anxiety:  0  Nausea:  0  Depression:  0  Anorexia:  0  Fatigue:  0  Insomnia:  0  Restlessness:  0  Agitation:  0           Advance Care Planning   Advance Directives:   Living Will: Yes        Copy on chart: No        Oral Declaration: Yes  LaPOST: No    Do Not Resuscitate Status: No    Medical Power of : Yes        Oral Declaration: Yes   Witnesses:  Dr. Carlee Scott and Dr. Omer Garcia     Decision Making:  Patient answered questions and Family answered questions       Significant Labs:   CBC:   Recent Labs   Lab 09/26/22  0307   WBC 8.97   HGB 11.0*   HCT 34.5*   MCV 95   PLT 75*     BMP:  Recent Labs   Lab 09/28/22  0942   GLU 75   *   K 3.2*      CO2 19*   BUN 18   CREATININE 1.2   CALCIUM 8.3*     LFT:  Lab Results   Component Value Date    AST 35 09/26/2022    ALKPHOS 106 09/26/2022    BILITOT 1.1 (H) 09/26/2022     Albumin:   Albumin   Date Value Ref Range Status   09/26/2022 2.2 (L) 3.5 - 5.2 g/dL Final     Protein:   Total Protein   Date Value Ref Range Status   09/26/2022 4.7 (L) 6.0 - 8.4 g/dL Final     Lactic acid:   Lab Results   Component Value Date    LACTATE 2.0 09/24/2022    LACTATE 3.2 (H) 09/23/2022       Significant Imaging: I have reviewed all pertinent  imaging results/findings within the past 24 hours.

## 2022-09-28 NOTE — ASSESSMENT & PLAN NOTE
"Assessment: Mr. Arellano is an 81 yo M with multiple comorbidities     1) Symptoms: 5 falls over the past 6 months associated with dizziness/lightheadedness, appetite loss for the past few years. Reports not being able to enjoy food as much due to a loss of taste- 125 lbs wt loss over the past year. Reports eating 1 meal every 2 days for the past 6 months. Reports nausea contributing to lack of appetite.   Lack of energy, lack of exercise, and increased anxiety. Reports increasing anxiety about his safety at home.   Increased bilateral lower extremity swelling for the past 2 years.  Appetite while in the hospital has improved slightly after paracentesis.       2) Code status: Full Code    3) Psychosocial: The patient lives in a 2 story house with his son (son stays upstairs). Pt states that he doesn't feel that him and his son do very well at home as he has to constantly worry about his son "bumping into walls" and himself falling. Reports that his son works as a  and has poor vision.    Has 3 children- Sons in [place] and [place], Daughter in [place], [place]. Sees his other children twice a year. Currently - wife of 63 years passed away approx 2.5 years ago (was on Peritoneal dialysis for years).    4) Medicolegal: Appointed his daughter, Марина, as HCPOA. ACP documents now on file.     5) Spiritual: Is spiritual.     6) Goals of care/discussion: At the current moment, the patient's goal is to get better from his condition and go back home. He is okay going to an [facility] SNF in order to get stronger. The patient's daughter is currently trying to arrange Home Health through private insurance once the patient gets discharged from SNF.     Experience with critical illness: Patient's wife was a kidney transplant recipient. He reports seeing his wife do hemodialysis and peritoneal dialysis for over 20 years.     Understanding of illness: Pt understands that he is getting worse. He stated that this was " all from his old age and recognized that his functional status has greatly deteriorated over the past year due to his lack of appetite, nausea, and frequent falls. Still would like to be Full Code.     Present for discussion: Dr. Reg Lorenzana    7) Disposition plan: Skilled Nursing Facility    8)Summary of recommendations and follow up plan:  -Most important goals at this time are curative/life-prolongation (regardless of treatment burdens), improvement in condition but with limits to invasive therapies, or comfort and QOL  -Code status: Full  -Palliative care to provide emotional support and assist with communication regarding disease expectations and trajectory, and with medical decision-making, at the appropriate time  -Most appropriate disposition: continue with the current LOC for now      Thank you for the opportunity to care for this patient and family.     Please call with questions.     Omer Garcia MD PGY-2  Palliative Medicine   [facility]

## 2022-09-28 NOTE — PROGRESS NOTES
[facility] - Int[facility] ([address] Medicine  Progress Note    Patient Name: Jacques Arellano  MRN: [id number]  Patient Class: IP- Inpatient   Admission Date: 9/23/2022  Length of Stay: 4 days  Attending Physician: Sona Kaur MD  Primary Care Provider: Matty Garsia MD        Subjective:     Principal Problem:Shock        HPI:  82 y.o. male with documented notation of CHF but TTE 03/2022 demonstrated preserved EF without diastolic dysfunction, HTN, AF on Eliquis, history of TIA, recently diagnosed UTI presents via EMS for multiple falls. Patient states he has been unsteady on his feet and lightheaded for several weeks, with multiple falls and resulting abrasions on bilateral knees, arms, lower legs. He denies fever, but has had a few episodes of chills. Patient denies LOC or head trauma, but has scraped his head against the wall while trying to get up. Denies changes in medication recently. Denies chest pain, shortness of breath, abdominal pain, constipation, dysuria, changes in urinary frequency, cough, fever, malaise. Overall, feels well but for his intermittent light-headedness. Denies new substance use. Lives at home with son, who was at bedside on initial evaluation by ED and stated patient was at baseline mentation.    On EMS arrival, patient was hypotensive in 70s / 40s, with a blood glucose of 54. S/P 1.5 L total in ED with recovery of SBP, very fluid responsive. Patient tachycardic with slight leukocytosis, lactate 3.2. Troponin 0.166 without chest pain. Given dextrose to recover BG. Admitted to Hospital Medicine for concerns of sepsis of unknown source.      Overview/Hospital Course:  Patient admitted to the MICU secondary to concerns for sepsis of unknown source. Patient required norepinephrine to keep Maps elevated. Patient started on zosyn and vancomycin for empiric coverage.Given his presentation of multiple falls, patient was scanned in the ER. No acute intracranial process was  found but patient did have an expansile calvarial lesion involving the right frontal bone. CT A/P reveals multiple findings including displaced right rib fx, dilated ascending aortic aneurysm, and cirrhotic liver with ascites. An attempt to perform a diagnostic paracentesis was tried on 9/24/22, however the procedure was not performed given close proximity of bowel to the surface. No deep enough pocket was noted to be safe for the procedure. Patient's blood culture then grew GPC in clusters. Patient continued on IV abx. A second attempt to  perform paracentesis on 9/25 however patient reported that he was eating his lunch when I presented in his room. Furthermore, when I presented again patient was receiving his RUQ US. Patient was eventually weaned off norepinephrine and stepped down to the floor. IR preformed diagnostic paracentesis on 9/27/22 and negative for SBP. Deescalate abx to cefpodoxime. QTC elevated and will consider ppx outpatient. Pending SNF decision by family.       Interval History: NAOE.  He denies any fevers, chills, SOB. S/p paracentesis on 9/27 which was negative. He remains afebrile and VSS.     Review of Systems   Constitutional:  Positive for activity change. Negative for appetite change, chills, diaphoresis, fatigue, fever and unexpected weight change.   HENT:  Negative for congestion, hearing loss, rhinorrhea and sore throat.    Eyes:  Negative for photophobia and visual disturbance.   Respiratory:  Negative for cough, shortness of breath and wheezing.    Cardiovascular:  Negative for chest pain, palpitations and leg swelling.   Gastrointestinal:  Negative for abdominal pain, blood in stool, constipation, diarrhea, nausea and vomiting.   Genitourinary:  Negative for difficulty urinating, dysuria and hematuria.   Musculoskeletal:  Negative for arthralgias and myalgias.   Skin:  Positive for wound. Negative for pallor and rash.   Allergic/Immunologic: Negative for immunocompromised state.    Neurological:  Negative for dizziness, weakness, light-headedness, numbness and headaches.   Hematological:  Does not bruise/bleed easily.   Psychiatric/Behavioral:  Negative for agitation and confusion.    Objective:     Vital Signs (Most Recent):  Temp: 98.3 °F (36.8 °C) (09/28/22 1135)  Pulse: 69 (09/28/22 1135)  Resp: 18 (09/28/22 1135)  BP: (!) 94/55 (09/28/22 1135)  SpO2: 100 % (09/28/22 1135)   Vital Signs (24h Range):  Temp:  [97.9 °F (36.6 °C)-98.4 °F (36.9 °C)] 98.3 °F (36.8 °C)  Pulse:  [69-74] 69  Resp:  [14-19] 18  SpO2:  [98 %-100 %] 100 %  BP: ()/(55-74) 94/55     Weight: 85.5 kg (188 lb 7.9 oz)  Body mass index is 27.05 kg/m².    Intake/Output Summary (Last 24 hours) at 9/28/2022 1558  Last data filed at 9/28/2022 0500  Gross per 24 hour   Intake 240 ml   Output --   Net 240 ml        Physical Exam  Vitals and nursing note reviewed.   Constitutional:       General: He is not in acute distress.  HENT:      Head: Normocephalic and atraumatic.      Comments: Bilateral temporal wasting.     Mouth/Throat:      Mouth: Mucous membranes are moist.   Eyes:      General: No scleral icterus.     Extraocular Movements: Extraocular movements intact.      Pupils: Pupils are equal, round, and reactive to light.   Cardiovascular:      Rate and Rhythm: Normal rate and regular rhythm.      Pulses: Normal pulses.      Heart sounds: Normal heart sounds. No murmur heard.  Pulmonary:      Effort: Pulmonary effort is normal.      Breath sounds: Normal breath sounds. No wheezing, rhonchi or rales.   Abdominal:      General: Bowel sounds are normal. There is no distension.      Palpations: Abdomen is soft.      Tenderness: There is no abdominal tenderness.   Musculoskeletal:         General: Signs of injury present. No tenderness.      Right lower leg: No edema.      Left lower leg: No edema.   Skin:     General: Skin is warm.      Capillary Refill: Capillary refill takes less than 2 seconds.      Findings: Bruising  present.   Neurological:      General: No focal deficit present.      Mental Status: He is alert and oriented to person, place, and time.   Psychiatric:         Mood and Affect: Mood normal.         Thought Content: Thought content normal.       Significant Labs: All pertinent labs within the past 24 hours have been reviewed.  Blood Culture: No results for input(s): LABBLOO in the last 48 hours.  CBC:   No results for input(s): WBC, HGB, HCT, PLT in the last 48 hours.    CMP:   Recent Labs   Lab 09/27/22  1015 09/28/22  0942   * 135*   K 4.2 3.2*    105   CO2 18* 19*   * 75   BUN 21 18   CREATININE 1.5* 1.2   CALCIUM 8.1* 8.3*   ANIONGAP 12 11         Significant Imaging: I have reviewed all pertinent imaging results/findings within the past 24 hours.  IR Paracentesis with Imaging  Narrative: EXAMINATION:  Ultrasound-guided paracentesis    Procedural Personnel    Attending physician(s): Beck Juan MD    Fellow physician(s): None    Resident physician(s): Minerva Guajardo MD    Advanced practice provider(s): None    Pre-procedure diagnosis: Ascites    Post-procedure diagnosis: Same    Indication: Ascites    Complications: No immediate complications.    CLINICAL HISTORY:  Ascites.    TECHNIQUE:  Ultrasound-guided paracentesis    COMPARISON:  Abdominal ultrasound 09/25/2022 and CT chest abdomen pelvis 09/24/2022.    FINDINGS:  Pre-procedure    Consent: Informed consent for the procedure was obtained and time-out was performed prior to the procedure.    Preparation: The site was prepared and draped using maximal sterile barrier technique including cutaneous antisepsis.    Anesthesia/sedation    Level of anesthesia/sedation: No sedation    Anesthesia/sedation administered by: Not applicable    Limited abdominal ultrasound    Limited abdominal ultrasound was performed.    .  A safe window for paracentesis was identified.    Paracentesis    Local anesthesia was administered. The peritoneal cavity was  accessed and fluid return confirmed position. Ascites was drained.    Paracentesis access technique: Real-time ultrasound guidance    Closure    The catheter was removed. A sterile bandage was applied.    Post-drainage ultrasound: Not performed    Additional Details    Additional description of procedure: None    Equipment details: None    Specimens removed: Abdominal fluid    Estimated blood loss (mL): Less than 10    Standardized report: SIR_Paracentesis_v2    Attestation    Signer name: Beck Juan MD    I attest that I was present for the entire procedure. I reviewed the stored images and agree with the report as written.  Impression: Ultrasound-guided paracentesis with drainage of 3.1 mL of serous fluid.    _______________________________________________________________    Electronically signed by resident: Minerva Guajardo  Date:    09/27/2022  Time:    17:21    Electronically signed by: Beck Juan MD  Date:    09/27/2022  Time:    18:42        Assessment/Plan:      * Shock  Recurrent Falls  Previous H/o UTI  Light-headedness  Lactic acidosis    81 yo M presenting to ED with multiple history of falls w/o LOC / head trauma, light-headedness. Arrived with slight leukocytosis 12.7, tachycardia, hypotension (MAP low 60s), lactate 3.2. CXR unremarkable, CT without acute pulmonary or abdominal findings to suggest infection. UA pending collection. Patient appears hypovolemic on arrival. Mentation intact and at baseline. S/P sepsis fluid bolus (combined). Previously seen outpatient with pan-sensitive E Coli UTI for which he was prescribed a ciprofloxacin. CT head negative for acute abnormality (though suspicious bone finding needs OP f/u), CTAP with myriad of findings without acute infectious nidus.    Etiology: Hypotension may be 2/2 infection (though unclear source as patient would have been adequately treated with OP abx for UTI) vs HRS vs hypovolemia d/t questionable PO intake?     -- Vanc / Zosyn empiric abx  now deescalate to cefpodoxime. SBP rule out negative  -- Trend fever curve, trend CBC  -- Maintain MAP > 65; IVF PRN, patient is very fluid responsive  -- UA / UCx pending  -- BCx showed contaminate of CN Staph   -- Investigate for further cause of falls including: orthostatic vitals, cardiac telemetry    Balance disorder  -- Discussed with daughter, (Марина [phone]), who lives in [place] and reports brother locally with own health issues and not always available, with patient living at home solo. Reports not just lightheadedness recently due to hypotension but actually balance issues and falling since January. Reports not eating well since that time and losing weight. Hypoalbuminemia noted. Also issues with medication adherence at home. Requests evaluating possible cause of balance issues /falling and how to mitigate. Consider hypotension vs deconditioning vs anemia contributing vs neuro vs other  -- Iron: 49, Transferrin 129, Ferritin 275, TIBC 191, Saturated Iron 26, folate 3.2 and B12 1167.   -- Initiate Boost  -- Anti-hypertensive regimen management  -- PT/OT and dispo to SNF. Appreciate CM /SW assist in resources to ensure safe discharge plan  -- referral to neuro      Palliative care encounter        Ascites  --9/23: Ct A/P:Cirrhotic morphology of the liver with sequelae of portal hypertension as evidence by splenomegaly, intra-abdominal ascites, and possible portal hypertensive enteropathy.  -- Liver US reveals liver lesions and cirrhosis. AFP WNL. Defer non-emergent MRI /CT to better characterize liver lesions.   -- Consult palliative for GOC.   --Patient denies hx of alcohol abuse   --LFT with out elevation  --Patient with ascites: IR preformed diagnostic paracentetics on 9/27 to r/o SBP: which was negative 130 WBC and 10% segs  -- will plan for ppx given low protein  -- No sxs of SBP. Very low suspicion  -- Referral to hepatology on d/c given new cirrhosis    Rib fracture  2x acute mildly  displaced right anterior ribs fractures seen on CT chest  Analgesia prn  Incentive spirometry    Hypokalemia  K 2.9 on admission with concomitant hypomagnesemia.     -- Replete potassium to achieve goal of >4  --CMP daily  -- Replete Mg    TRENT (acute kidney injury)  Patient presenting with TRENT with admission sCr 1.6 (baseline sCr wnl).   DDx: Pre-renal azotemia s/o sepsis vs questionable PO intake    Serum creatinine: 1.2 mg/dL 09/28/22 0942  Estimated creatinine clearance: 48.2 mL/min    -- resolved  -- Trend sCr  -- Strict I&Os  -- Avoid nephrotoxic agents  -- Renally adjust medications      Type 2 diabetes mellitus, without long-term current use of insulin  Last A1c 6.1 2 months ago.     -- Hold home medications while inpatient  -- POCT glucose ACHS   -- LDSSI to maintain -180    Mixed hyperlipidemia  -- Continue home statin      Hypertension    Home medications: triamterene-HCTZ, Lasix    -- Hold home medications s/o hypotension    Falls frequently  -- PT/OT consulted    Diastolic heart failure  Questionable history of diastolic CHF. Documented in chart, but previous TTE in Care Everywhere 03/2022 demonstrated normal EF without noted diastolic dysfunction. Patient also not complaining of HF symptoms (STRATTON, orthopnea, new BLE swelling, anasarca).    -- Repeat TTE on 9/26/22- EF is 50%    Permanent atrial fibrillation  Patient with documented history of permanent Afib s/p watchman, no longer on AC or rate controlling agents.  [id number]   HAS-BLED 4     -- Patient rate controlled, no indication for further agents at this time  -- No full AC required s/p Watchman; will hold AC ppx and APT s/o active bleeding from abrasion sites  -- Cardiac telemetry  -- Maintain K > 4, Mg > 2, Ca wnl; replete PRN  -- STAT cardiology consult if hemodynamic instability for DCCV evaluation      VTE Risk Mitigation (From admission, onward)         Ordered     enoxaparin injection 40 mg  Daily         09/26/22 1220     Reason  for No Pharmacological VTE Prophylaxis  Once        Question:  Reasons:  Answer:  Active Bleeding    09/24/22 0332     IP VTE HIGH RISK PATIENT  Once         09/24/22 0332     Place sequential compression device  Until discontinued         09/24/22 0332                Discharge Planning   ELMER: 9/29/2022     Code Status: Full Code   Is the patient medically ready for discharge?: Yes    Reason for patient still in hospital (select all that apply): Pending disposition  Discharge Plan A: Skilled Nursing Facility                  Veena Richards DO  Department of Hospital Medicine   [facility] - Intensive Care ([place] [address])

## 2022-09-28 NOTE — ASSESSMENT & PLAN NOTE
Recurrent Falls  Previous H/o UTI  Light-headedness  Lactic acidosis    83 yo M presenting to ED with multiple history of falls w/o LOC / head trauma, light-headedness. Arrived with slight leukocytosis 12.7, tachycardia, hypotension (MAP low 60s), lactate 3.2. CXR unremarkable, CT without acute pulmonary or abdominal findings to suggest infection. UA pending collection. Patient appears hypovolemic on arrival. Mentation intact and at baseline. S/P sepsis fluid bolus (combined). Previously seen outpatient with pan-sensitive E Coli UTI for which he was prescribed a ciprofloxacin. CT head negative for acute abnormality (though suspicious bone finding needs OP f/u), CTAP with myriad of findings without acute infectious nidus.    Etiology: Hypotension may be 2/2 infection (though unclear source as patient would have been adequately treated with OP abx for UTI) vs HRS vs hypovolemia d/t questionable PO intake?     -- Vanc / Zosyn empiric abx now deescalate to cefpodoxime. SBP rule out negative  -- Trend fever curve, trend CBC  -- Maintain MAP > 65; IVF PRN, patient is very fluid responsive  -- UA / UCx pending  -- BCx showed contaminate of CN Staph   -- Investigate for further cause of falls including: orthostatic vitals, cardiac telemetry

## 2022-09-28 NOTE — SUBJECTIVE & OBJECTIVE
Interval History: NAOE.  He denies any fevers, chills, SOB. S/p paracentesis on 9/27 which was negative. He remains afebrile and VSS.     Review of Systems   Constitutional:  Positive for activity change. Negative for appetite change, chills, diaphoresis, fatigue, fever and unexpected weight change.   HENT:  Negative for congestion, hearing loss, rhinorrhea and sore throat.    Eyes:  Negative for photophobia and visual disturbance.   Respiratory:  Negative for cough, shortness of breath and wheezing.    Cardiovascular:  Negative for chest pain, palpitations and leg swelling.   Gastrointestinal:  Negative for abdominal pain, blood in stool, constipation, diarrhea, nausea and vomiting.   Genitourinary:  Negative for difficulty urinating, dysuria and hematuria.   Musculoskeletal:  Negative for arthralgias and myalgias.   Skin:  Positive for wound. Negative for pallor and rash.   Allergic/Immunologic: Negative for immunocompromised state.   Neurological:  Negative for dizziness, weakness, light-headedness, numbness and headaches.   Hematological:  Does not bruise/bleed easily.   Psychiatric/Behavioral:  Negative for agitation and confusion.    Objective:     Vital Signs (Most Recent):  Temp: 98.3 °F (36.8 °C) (09/28/22 1135)  Pulse: 69 (09/28/22 1135)  Resp: 18 (09/28/22 1135)  BP: (!) 94/55 (09/28/22 1135)  SpO2: 100 % (09/28/22 1135)   Vital Signs (24h Range):  Temp:  [97.9 °F (36.6 °C)-98.4 °F (36.9 °C)] 98.3 °F (36.8 °C)  Pulse:  [69-74] 69  Resp:  [14-19] 18  SpO2:  [98 %-100 %] 100 %  BP: ()/(55-74) 94/55     Weight: 85.5 kg (188 lb 7.9 oz)  Body mass index is 27.05 kg/m².    Intake/Output Summary (Last 24 hours) at 9/28/2022 1558  Last data filed at 9/28/2022 0500  Gross per 24 hour   Intake 240 ml   Output --   Net 240 ml        Physical Exam  Vitals and nursing note reviewed.   Constitutional:       General: He is not in acute distress.  HENT:      Head: Normocephalic and atraumatic.      Comments:  Bilateral temporal wasting.     Mouth/Throat:      Mouth: Mucous membranes are moist.   Eyes:      General: No scleral icterus.     Extraocular Movements: Extraocular movements intact.      Pupils: Pupils are equal, round, and reactive to light.   Cardiovascular:      Rate and Rhythm: Normal rate and regular rhythm.      Pulses: Normal pulses.      Heart sounds: Normal heart sounds. No murmur heard.  Pulmonary:      Effort: Pulmonary effort is normal.      Breath sounds: Normal breath sounds. No wheezing, rhonchi or rales.   Abdominal:      General: Bowel sounds are normal. There is no distension.      Palpations: Abdomen is soft.      Tenderness: There is no abdominal tenderness.   Musculoskeletal:         General: Signs of injury present. No tenderness.      Right lower leg: No edema.      Left lower leg: No edema.   Skin:     General: Skin is warm.      Capillary Refill: Capillary refill takes less than 2 seconds.      Findings: Bruising present.   Neurological:      General: No focal deficit present.      Mental Status: He is alert and oriented to person, place, and time.   Psychiatric:         Mood and Affect: Mood normal.         Thought Content: Thought content normal.       Significant Labs: All pertinent labs within the past 24 hours have been reviewed.  Blood Culture: No results for input(s): LABBLOO in the last 48 hours.  CBC:   No results for input(s): WBC, HGB, HCT, PLT in the last 48 hours.    CMP:   Recent Labs   Lab 09/27/22  1015 09/28/22  0942   * 135*   K 4.2 3.2*    105   CO2 18* 19*   * 75   BUN 21 18   CREATININE 1.5* 1.2   CALCIUM 8.1* 8.3*   ANIONGAP 12 11         Significant Imaging: I have reviewed all pertinent imaging results/findings within the past 24 hours.  IR Paracentesis with Imaging  Narrative: EXAMINATION:  Ultrasound-guided paracentesis    Procedural Personnel    Attending physician(s): Beck Juan MD    Fellow physician(s): None    Resident physician(s):  Minerva Guajardo MD    Advanced practice provider(s): None    Pre-procedure diagnosis: Ascites    Post-procedure diagnosis: Same    Indication: Ascites    Complications: No immediate complications.    CLINICAL HISTORY:  Ascites.    TECHNIQUE:  Ultrasound-guided paracentesis    COMPARISON:  Abdominal ultrasound 09/25/2022 and CT chest abdomen pelvis 09/24/2022.    FINDINGS:  Pre-procedure    Consent: Informed consent for the procedure was obtained and time-out was performed prior to the procedure.    Preparation: The site was prepared and draped using maximal sterile barrier technique including cutaneous antisepsis.    Anesthesia/sedation    Level of anesthesia/sedation: No sedation    Anesthesia/sedation administered by: Not applicable    Limited abdominal ultrasound    Limited abdominal ultrasound was performed.    .  A safe window for paracentesis was identified.    Paracentesis    Local anesthesia was administered. The peritoneal cavity was accessed and fluid return confirmed position. Ascites was drained.    Paracentesis access technique: Real-time ultrasound guidance    Closure    The catheter was removed. A sterile bandage was applied.    Post-drainage ultrasound: Not performed    Additional Details    Additional description of procedure: None    Equipment details: None    Specimens removed: Abdominal fluid    Estimated blood loss (mL): Less than 10    Standardized report: SIR_Paracentesis_v2    Attestation    Signer name: Beck Juan MD    I attest that I was present for the entire procedure. I reviewed the stored images and agree with the report as written.  Impression: Ultrasound-guided paracentesis with drainage of 3.1 mL of serous fluid.    _______________________________________________________________    Electronically signed by resident: Minerva Guajardo  Date:    09/27/2022  Time:    17:21    Electronically signed by: Beck Juan MD  Date:    09/27/2022  Time:    18:42

## 2022-09-28 NOTE — ASSESSMENT & PLAN NOTE
Patient with documented history of permanent Afib s/p watchman, no longer on AC or rate controlling agents.  [id number] 5   HAS-BLED 4     -- Patient rate controlled, no indication for further agents at this time  -- No full AC required s/p Watchman; will hold AC ppx and APT s/o active bleeding from abrasion sites  -- Cardiac telemetry  -- Maintain K > 4, Mg > 2, Ca wnl; replete PRN  -- STAT cardiology consult if hemodynamic instability for DCCV evaluation   77 year old female complaining of abdominal pain and passing maroon colored stools. Patient states that she has had abdominal pain since ventral hernia repair on 3/2/18.

## 2022-09-28 NOTE — ASSESSMENT & PLAN NOTE
-- Discussed with daughter, (Марина [phone]), who lives in [place] and reports brother locally with own health issues and not always available, with patient living at home solo. Reports not just lightheadedness recently due to hypotension but actually balance issues and falling since January. Reports not eating well since that time and losing weight. Hypoalbuminemia noted. Also issues with medication adherence at home. Requests evaluating possible cause of balance issues /falling and how to mitigate. Consider hypotension vs deconditioning vs anemia contributing vs neuro vs other  -- Iron: 49, Transferrin 129, Ferritin 275, TIBC 191, Saturated Iron 26, folate 3.2 and B12 1167.   -- Initiate Boost  -- Anti-hypertensive regimen management  -- PT/OT and dispo to SNF. Appreciate CM /SW assist in resources to ensure safe discharge plan  -- referral to neuro

## 2022-09-29 PROBLEM — G45.9 TIA (TRANSIENT ISCHEMIC ATTACK): Status: ACTIVE | Noted: 2022-09-29

## 2022-09-29 PROBLEM — G45.9 TIA (TRANSIENT ISCHEMIC ATTACK): Status: RESOLVED | Noted: 2022-09-29 | Resolved: 2022-09-29

## 2022-09-29 LAB
ANION GAP SERPL CALC-SCNC: 10 MMOL/L (ref 8–16)
BACTERIA BLD CULT: NORMAL
BUN SERPL-MCNC: 17 MG/DL (ref 8–23)
CALCIUM SERPL-MCNC: 8.3 MG/DL (ref 8.7–10.5)
CHLORIDE SERPL-SCNC: 105 MMOL/L (ref 95–110)
CO2 SERPL-SCNC: 18 MMOL/L (ref 23–29)
CREAT SERPL-MCNC: 1.1 MG/DL (ref 0.5–1.4)
EST. GFR  (NO RACE VARIABLE): >60 ML/MIN/1.73 M^2
GLUCOSE SERPL-MCNC: 101 MG/DL (ref 70–110)
POCT GLUCOSE: 106 MG/DL (ref 70–110)
POCT GLUCOSE: 127 MG/DL (ref 70–110)
POCT GLUCOSE: 130 MG/DL (ref 70–110)
POCT GLUCOSE: 131 MG/DL (ref 70–110)
POTASSIUM SERPL-SCNC: 3.8 MMOL/L (ref 3.5–5.1)
SODIUM SERPL-SCNC: 133 MMOL/L (ref 136–145)
TB INDURATION 48 - 72 HR READ: 0 MM

## 2022-09-29 PROCEDURE — 99232 SBSQ HOSP IP/OBS MODERATE 35: CPT | Mod: GC,,, | Performed by: EMERGENCY MEDICINE

## 2022-09-29 PROCEDURE — 25000003 PHARM REV CODE 250

## 2022-09-29 PROCEDURE — 97530 THERAPEUTIC ACTIVITIES: CPT

## 2022-09-29 PROCEDURE — 94761 N-INVAS EAR/PLS OXIMETRY MLT: CPT

## 2022-09-29 PROCEDURE — 80048 BASIC METABOLIC PNL TOTAL CA: CPT | Performed by: STUDENT IN AN ORGANIZED HEALTH CARE EDUCATION/TRAINING PROGRAM

## 2022-09-29 PROCEDURE — 99232 PR SUBSEQUENT HOSPITAL CARE,LEVL II: ICD-10-PCS | Mod: GC,,, | Performed by: EMERGENCY MEDICINE

## 2022-09-29 PROCEDURE — 36415 COLL VENOUS BLD VENIPUNCTURE: CPT | Performed by: STUDENT IN AN ORGANIZED HEALTH CARE EDUCATION/TRAINING PROGRAM

## 2022-09-29 PROCEDURE — 63600175 PHARM REV CODE 636 W HCPCS: Performed by: STUDENT IN AN ORGANIZED HEALTH CARE EDUCATION/TRAINING PROGRAM

## 2022-09-29 PROCEDURE — 99233 PR SUBSEQUENT HOSPITAL CARE,LEVL III: ICD-10-PCS | Mod: GC,,, | Performed by: STUDENT IN AN ORGANIZED HEALTH CARE EDUCATION/TRAINING PROGRAM

## 2022-09-29 PROCEDURE — 25000003 PHARM REV CODE 250: Performed by: STUDENT IN AN ORGANIZED HEALTH CARE EDUCATION/TRAINING PROGRAM

## 2022-09-29 PROCEDURE — 97116 GAIT TRAINING THERAPY: CPT

## 2022-09-29 PROCEDURE — 99233 SBSQ HOSP IP/OBS HIGH 50: CPT | Mod: GC,,, | Performed by: STUDENT IN AN ORGANIZED HEALTH CARE EDUCATION/TRAINING PROGRAM

## 2022-09-29 PROCEDURE — 20600001 HC STEP DOWN PRIVATE ROOM

## 2022-09-29 PROCEDURE — 97535 SELF CARE MNGMENT TRAINING: CPT

## 2022-09-29 RX ORDER — NAPROXEN SODIUM 220 MG/1
81 TABLET, FILM COATED ORAL DAILY
Status: DISCONTINUED | OUTPATIENT
Start: 2022-09-30 | End: 2022-10-03 | Stop reason: HOSPADM

## 2022-09-29 RX ADMIN — MUPIROCIN: 20 OINTMENT TOPICAL at 09:09

## 2022-09-29 RX ADMIN — Medication 6 MG: at 08:09

## 2022-09-29 RX ADMIN — LIDOCAINE 1 PATCH: 50 PATCH CUTANEOUS at 06:09

## 2022-09-29 RX ADMIN — ALLOPURINOL 100 MG: 100 TABLET ORAL at 09:09

## 2022-09-29 RX ADMIN — FOLIC ACID 1 MG: 1 TABLET ORAL at 09:09

## 2022-09-29 RX ADMIN — MUPIROCIN: 20 OINTMENT TOPICAL at 08:09

## 2022-09-29 RX ADMIN — ESCITALOPRAM 10 MG: 5 TABLET, FILM COATED ORAL at 09:09

## 2022-09-29 RX ADMIN — ATORVASTATIN CALCIUM 40 MG: 40 TABLET, FILM COATED ORAL at 09:09

## 2022-09-29 RX ADMIN — ENOXAPARIN SODIUM 40 MG: 100 INJECTION SUBCUTANEOUS at 04:09

## 2022-09-29 RX ADMIN — CEFPODOXIME PROXETIL 200 MG: 200 TABLET, FILM COATED ORAL at 09:09

## 2022-09-29 NOTE — PT/OT/SLP PROGRESS
Physical Therapy Treatment    Patient Name:  Jacques Arellano   MRN:  [id number]    Recommendations:     Discharge Recommendations:  nursing facility, skilled   Discharge Equipment Recommendations:  (TBD)   Barriers to discharge: Inaccessible home and Decreased caregiver support    Assessment:     Jacques Arellano is a 82 y.o. male admitted with a medical diagnosis of Shock.  He presents with the following impairments/functional limitations:  weakness, impaired endurance, impaired self care skills, impaired functional mobility, gait instability, impaired balance, impaired cognition, decreased lower extremity function, decreased safety awareness Pt tolerated treatment well as he was able to gait train for 4 lateral steps to the left side with Min A, RW and Max A  verbal cueing for RW managment. Pt is very afraid of falling. Pt will benefit from skilled PT 3/wk in order to increase functional mobility. Pt when medically stable should discharge to a SNF.    Rehab Prognosis: Fair; patient would benefit from acute skilled PT services to address these deficits and reach maximum level of function.    Recent Surgery: * No surgery found *      Plan:     During this hospitalization, patient to be seen 3 x/week to address the identified rehab impairments via gait training, therapeutic activities, therapeutic exercises and progress toward the following goals:    Plan of Care Expires:  10/25/22    Subjective     Chief Complaint: fear of falling  Patient/Family Comments/goals: to not fall and to get home  Pain/Comfort:  Pain Rating 1: 0/10  Pain Rating Post-Intervention 1: 0/10      Objective:     Communicated with nurse prior to session.  Patient found supine with pulse ox (continuous), telemetry, blood pressure cuff (hep lock iv) upon PT entry to room.     General Precautions: Standard, fall   Orthopedic Precautions:N/A   Braces:    Respiratory Status: Room air     Functional Mobility:  Bed Mobility:     Supine to Sit: moderate  assistance. Pt needed assistance at legs.  Sit to Supine: moderate assistance. Pt needed assistance with legs.    Transfers:     Sit to Stand:  moderate assistance with rolling walker. Pt needed verbal cueing for hand placement with RW.     Gait: Pt gait trained for 4 left lateral steps with min A and RW. Pt needed max A verbal cueing for RW management.    Balance: Pt sat EOB for 20 minutes with min A to SBA while brushing teeth and other self care. Pt was working on dynamic reaching.      AM-PAC 6 CLICK MOBILITY  Turning over in bed (including adjusting bedclothes, sheets and blankets)?: 3  Sitting down on and standing up from a chair with arms (e.g., wheelchair, bedside commode, etc.): 2  Moving from lying on back to sitting on the side of the bed?: 2  Moving to and from a bed to a chair (including a wheelchair)?: 2  Need to walk in hospital room?: 2  Climbing 3-5 steps with a railing?: 1  Basic Mobility Total Score: 12       Therapeutic Activities and Exercises:   Pt was verbally educated on PT POC. Pt expressed verbal understanding.    Patient left supine with all lines intact, call button in reach, and nurse notified..    GOALS:   Multidisciplinary Problems       Physical Therapy Goals          Problem: Physical Therapy    Goal Priority Disciplines Outcome Goal Variances Interventions   Physical Therapy Goal     PT, PT/OT Ongoing, Progressing     Description: Goals to be met by: 10/9/2022     Patient will increase functional independence with mobility by performin. Supine to sit with Contact Guard Assistance  2. Sit to supine with Contact Guard Assistance  3. Sit to stand transfer with Minimal Assistance and RW  4. Bed to chair transfer with Minimal Assistance using Rolling Walker  5. Gait  x 100 feet with Minimal Assistance using Rolling Walker.   6. Ascend/descend 3 stair with no Handrails Minimal Assistance using No Assistive Device.                          Time Tracking:     PT Received On:  09/29/22  PT Start Time: 0829     PT Stop Time: 0902  PT Total Time (min): 33 min     Billable Minutes: Gait Training 10 minutes and Therapeutic Activity 23 minutes    Treatment Type: Treatment  PT/PTA: PT     PTA Visit Number: 0     09/29/2022

## 2022-09-29 NOTE — PROGRESS NOTES
"[facility] ([place] [id number])  Palliative Medicine  Progress Note    Patient Name: Jacques Arellano  MRN: [id number]  Admission Date: 9/23/2022  Hospital Length of Stay: 5 days  Code Status: Full Code   Attending Provider: Esteban Valdez DO  Consulting Provider: Omer Garcia MD  Primary Care Physician: Matty Garsia MD  Principal Problem:Shock    Patient information was obtained from patient and primary team.      Assessment/Plan:     Palliative care encounter  Assessment: Mr. Arellano is an 81 yo M with multiple comorbidities     1) Symptoms: 5 falls over the past 6 months associated with dizziness/lightheadedness, appetite loss for the past few years. Reports not being able to enjoy food as much due to a loss of taste- 125 lbs wt loss over the past year. Reports eating 1 meal every 2 days for the past 6 months. Reports nausea contributing to lack of appetite.   Lack of energy, lack of exercise, and increased anxiety. Reports increasing anxiety about his safety at home.   Increased bilateral lower extremity swelling for the past 2 years.  Appetite while in the hospital has improved slightly after paracentesis.       2) Code status: Full Code    3) Psychosocial: The patient lives in a 2 story house with his son (son stays upstairs). Pt states that he doesn't feel that him and his son do very well at home as he has to constantly worry about his son "bumping into walls" and himself falling. Reports that his son works as a  and has poor vision.    Has 3 children- Sons in [place] and [place], Daughter in [place], [place]. Sees his other children twice a year. Currently - wife of 63 years passed away approx 2.5 years ago (was on Peritoneal dialysis for years).    4) Medicolegal: Appointed his daughter, Марина, as HCPOA. ACP documents now on file. Will speak with patient tomorrow and attempt to have [place] documents signed    5) Spiritual: Is spiritual. Reports being Christian and " states that he would like to see the  while he's hospitalized    6) Goals of care/discussion: At the current moment, the patient's goal is to get better from his condition and go back home. He is okay going to an [facility] SNF in order to get stronger. The patient's daughter is currently trying to arrange Home Health through private insurance once the patient gets discharged from SNF.     Experience with critical illness: Patient's wife was a kidney transplant recipient. He reports seeing his wife do hemodialysis and peritoneal dialysis for over 20 years.     Understanding of illness: Pt understands that he is getting worse. He stated that this was all from his old age and recognized that his functional status has greatly deteriorated over the past year due to his lack of appetite, nausea, and frequent falls. Still would like to be Full Code.     Present for discussion: Dr. Reg Lorenzana    7) Disposition plan: Skilled Nursing Facility at [facility]    8)Summary of recommendations and follow up plan:  -Most important goals at this time are curative/life-prolongation (regardless of treatment burdens), improvement in condition but with limits to invasive therapies, or comfort and QOL  -Code status: Full  -Palliative care to provide emotional support and assist with communication regarding disease expectations and trajectory, and with medical decision-making, at the appropriate time  -Most appropriate disposition: continue with the current LOC for now. Consider starting rifaximin and lactulose to Tx his Liver cirrhosis. Will attempt to sign [place] forms with patient tomorrow.       Thank you for the opportunity to care for this patient and family.     Please call with questions.     Omer Garcia MD PGY-2  Palliative Medicine   [facility]          I will follow-up with patient. Please contact us if you have any additional questions.    Subjective:     Chief Complaint:   Chief  Complaint   Patient presents with    Altered Mental Status     Pt from home via EMS for AMS and multiple falls. Initial CBG with EMS 54, given amp D50, recovered to 138. Pt also hypotensive on EMS arrival 72/48. Numerous skin tears incurred from falls.       HPI:   Mr. Arellano is an 83 yo M with a Mhx of CHF (EF 50%), HTN, Atrial fibrillation s/p Watchman March 2022, and TIA who is admitted to [facility] with septic shock. Palliative care was consulted to assist with goals of care discussion. States he has been unsteady and feeling lightheaded for the past 6 months and has subsequently had 5 falls resulting abrasions on bilateral knees, arms, lower legs. His last fall occurred right before he was admitted to the hospital two days ago. He was admitted to MICU with concerns of sepsis and required vasopressor support and IV zosyn/vanc. Imaging revealed displaced right rib fractures, dilated ascending aortic aneurysm, and a liver cirrhosis with ascites. He denies any fever, but has had a few episodes of chills and was recently treated for a pan-sensitive e coli UTI with ciprofloxacin. Denies chest pain or dyspnea. Has intermittent palpitations. Denies abdominal pain, nausea, or vomiting but has had watery stools for the past month. Denies dysuria, hematuria, or frequency. Denies cough or rhinorrhea. Lives at home with son and ambulates with a walker. Pt reportedly at baseline mental status per son via HM note. However, patient did have multiple instances of visual hallucinations/delusions while conducting bedside interview. The patient reported seeing a hummingbird flying around and also commented on beer cans on the wall.          Hospital Course:  No notes on file    Interval History: Comfortable in bed this morning. Revisited goals of care planning with the patient. He reported wanting to speak with the chaplai    Medications:  Continuous Infusions:  Scheduled Meds:   allopurinoL  100 mg Oral Daily    atorvastatin  40  mg Oral Daily    enoxaparin  40 mg Subcutaneous Daily    EScitalopram oxalate  10 mg Oral Daily    folic acid  1 mg Oral Daily    LIDOcaine  1 patch Transdermal Q24H    LIDOcaine HCL 10 mg/ml (1%)  20 mL Other Once    LIDOcaine HCL 10 mg/ml (1%)  20 mL Other Once    mupirocin   Nasal BID     PRN Meds:acetaminophen, dextrose 10%, dextrose 10%, glucagon (human recombinant), glucose, glucose, insulin aspart U-100, melatonin, sodium chloride 0.9%    Objective:     Vital Signs (Most Recent):  Temp: 98.6 °F (37 °C) (09/29/22 0816)  Pulse: 71 (09/29/22 0816)  Resp: 10 (09/29/22 0816)  BP: 112/67 (09/29/22 0816)  SpO2: 100 % (09/29/22 1208) Vital Signs (24h Range):  Temp:  [98.2 °F (36.8 °C)-99.4 °F (37.4 °C)] 98.6 °F (37 °C)  Pulse:  [69-72] 71  Resp:  [7-19] 10  SpO2:  [100 %] 100 %  BP: ()/(57-76) 112/67     Weight: 85.5 kg (188 lb 7.9 oz)  Body mass index is 27.05 kg/m².    Physical Exam  Vitals and nursing note reviewed.   Constitutional:       Appearance: He is ill-appearing.   HENT:      Head: Normocephalic and atraumatic.      Nose: Nose normal.      Mouth/Throat:      Mouth: Mucous membranes are moist.      Pharynx: No oropharyngeal exudate or posterior oropharyngeal erythema.   Eyes:      General: No scleral icterus.     Conjunctiva/sclera: Conjunctivae normal.      Pupils: Pupils are equal, round, and reactive to light.   Cardiovascular:      Rate and Rhythm: Normal rate and regular rhythm.      Pulses: Normal pulses.      Heart sounds: No murmur heard.  Pulmonary:      Effort: Pulmonary effort is normal. No respiratory distress.      Breath sounds: Normal breath sounds.   Abdominal:      General: Abdomen is flat. There is distension.      Palpations: Abdomen is soft.      Tenderness: There is no abdominal tenderness.   Musculoskeletal:      Cervical back: No tenderness.      Right lower leg: Edema present.      Left lower leg: Edema present.   Skin:     Capillary Refill: Capillary refill takes less  than 2 seconds.      Coloration: Skin is pale.      Findings: Bruising present.      Comments: Significant bruising present in his upper extremities    Neurological:      Mental Status: He is alert and oriented to person, place, and time. Mental status is at baseline.       Review of Symptoms      Symptom Assessment (ESAS 0-10 Scale)  Pain:  0  Dyspnea:  0  Anxiety:  0  Nausea:  0  Depression:  0  Anorexia:  0  Fatigue:  0  Insomnia:  0  Restlessness:  0  Agitation:  0           Advance Care Planning   Advance Care Planning       Significant Labs: CBC: No results for input(s): WBC, HGB, HCT, PLT in the last 48 hours.  CMP:   Recent Labs   Lab 09/28/22  0942 09/29/22  0813   * 133*   K 3.2* 3.8    105   CO2 19* 18*   GLU 75 101   BUN 18 17   CREATININE 1.2 1.1   CALCIUM 8.3* 8.3*   ANIONGAP 11 10     CBC:   Recent Labs   Lab 09/26/22  0307   WBC 8.97   HGB 11.0*   HCT 34.5*   MCV 95   PLT 75*     BMP:  Recent Labs   Lab 09/29/22  0813      *   K 3.8      CO2 18*   BUN 17   CREATININE 1.1   CALCIUM 8.3*     LFT:  Lab Results   Component Value Date    AST 35 09/26/2022    ALKPHOS 106 09/26/2022    BILITOT 1.1 (H) 09/26/2022     Albumin:   Albumin   Date Value Ref Range Status   09/26/2022 2.2 (L) 3.5 - 5.2 g/dL Final     Protein:   Total Protein   Date Value Ref Range Status   09/26/2022 4.7 (L) 6.0 - 8.4 g/dL Final     Lactic acid:   Lab Results   Component Value Date    LACTATE 2.0 09/24/2022    LACTATE 3.2 (H) 09/23/2022       Significant Imaging: I have reviewed all pertinent imaging results/findings within the past 24 hours.      Omer Garcia MD  Palliative Medicine  [facility] - Intensive Care ([facility]

## 2022-09-29 NOTE — PLAN OF CARE
Patient is medically stable to dc today. SW has followed up with patient daughter and she would like patient to dc to [facility].  STEPHANIE spoke with Lou in admission reporting that she will follow up on bed availability and reach out to patient daughter to sign admission paperwork.     Updated 1:48pm  STEPHANIE spoke to Lou, admission coordinator with [facility] verifying that a bed will be available tomorrow 9/30/2022.        Ranjana Zeng LMSW  [facility]   [id number]

## 2022-09-29 NOTE — PROGRESS NOTES
[facility] - Intensive Care ([address] Medicine  Progress Note    Patient Name: Jacques Arellano  MRN: [id number]  Patient Class: IP- Inpatient   Admission Date: 9/23/2022  Length of Stay: 5 days  Attending Physician: Esteban Valdez DO  Primary Care Provider: Matty Garsia MD        Subjective:     Principal Problem:Shock        HPI:  82 y.o. male with documented notation of CHF but TTE 03/2022 demonstrated preserved EF without diastolic dysfunction, HTN, AF on Eliquis, history of TIA, recently diagnosed UTI presents via EMS for multiple falls. Patient states he has been unsteady on his feet and lightheaded for several weeks, with multiple falls and resulting abrasions on bilateral knees, arms, lower legs. He denies fever, but has had a few episodes of chills. Patient denies LOC or head trauma, but has scraped his head against the wall while trying to get up. Denies changes in medication recently. Denies chest pain, shortness of breath, abdominal pain, constipation, dysuria, changes in urinary frequency, cough, fever, malaise. Overall, feels well but for his intermittent light-headedness. Denies new substance use. Lives at home with son, who was at bedside on initial evaluation by ED and stated patient was at baseline mentation.    On EMS arrival, patient was hypotensive in 70s / 40s, with a blood glucose of 54. S/P 1.5 L total in ED with recovery of SBP, very fluid responsive. Patient tachycardic with slight leukocytosis, lactate 3.2. Troponin 0.166 without chest pain. Given dextrose to recover BG. Admitted to Hospital Medicine for concerns of sepsis of unknown source.      Overview/Hospital Course:  Patient admitted to the MICU secondary to concerns for sepsis of unknown source. Patient required norepinephrine to keep Maps elevated. Patient started on zosyn and vancomycin for empiric coverage.Given his presentation of multiple falls, patient was scanned in the ER. No acute intracranial process was  found but patient did have an expansile calvarial lesion involving the right frontal bone. CT A/P reveals multiple findings including displaced right rib fx, dilated ascending aortic aneurysm, and cirrhotic liver with ascites. An attempt to perform a diagnostic paracentesis was tried on 9/24/22, however the procedure was not performed given close proximity of bowel to the surface. No deep enough pocket was noted to be safe for the procedure. Patient's blood culture then grew GPC in clusters. Patient continued on IV abx. A second attempt to  perform paracentesis on 9/25 however patient reported that he was eating his lunch when I presented in his room. Furthermore, when I presented again patient was receiving his RUQ US. Patient was eventually weaned off norepinephrine and stepped down to the floor. IR preformed diagnostic paracentesis on 9/27/22 and negative for SBP. Deescalate abx to cefpodoxime. QTC elevated and will consider ppx outpatient. Pending SNF decision by family.       Interval History: NAOE. Pt denies any fever, chills, SOB, or chest pain. He remains afebrile and VSS. Pending SNF placement. Has some c/o tingling in tongue.     Review of Systems   Constitutional:  Positive for activity change. Negative for appetite change, chills, diaphoresis, fatigue, fever and unexpected weight change.   HENT:  Negative for congestion, hearing loss, rhinorrhea and sore throat.    Eyes:  Negative for photophobia and visual disturbance.   Respiratory:  Negative for cough, shortness of breath and wheezing.    Cardiovascular:  Negative for chest pain, palpitations and leg swelling.   Gastrointestinal:  Negative for abdominal pain, blood in stool, constipation, diarrhea, nausea and vomiting.   Genitourinary:  Negative for difficulty urinating, dysuria and hematuria.   Musculoskeletal:  Negative for arthralgias and myalgias.   Skin:  Positive for wound. Negative for pallor and rash.   Allergic/Immunologic: Negative for  immunocompromised state.   Neurological:  Negative for dizziness, weakness, light-headedness, numbness and headaches.   Hematological:  Does not bruise/bleed easily.   Psychiatric/Behavioral:  Negative for agitation and confusion.    Objective:     Vital Signs (Most Recent):  Temp: 98.6 °F (37 °C) (09/29/22 0816)  Pulse: 71 (09/29/22 0816)  Resp: 10 (09/29/22 0816)  BP: 112/67 (09/29/22 0816)  SpO2: 100 % (09/29/22 1208) Vital Signs (24h Range):  Temp:  [98.2 °F (36.8 °C)-99.4 °F (37.4 °C)] 98.6 °F (37 °C)  Pulse:  [69-72] 71  Resp:  [7-19] 10  SpO2:  [100 %] 100 %  BP: ()/(57-76) 112/67     Weight: 85.5 kg (188 lb 7.9 oz)  Body mass index is 27.05 kg/m².    Intake/Output Summary (Last 24 hours) at 9/29/2022 1422  Last data filed at 9/28/2022 2000  Gross per 24 hour   Intake --   Output 350 ml   Net -350 ml      Physical Exam  Vitals and nursing note reviewed.   Constitutional:       General: He is not in acute distress.  HENT:      Head: Normocephalic and atraumatic.      Comments: Bilateral temporal wasting.     Mouth/Throat:      Mouth: Mucous membranes are moist.   Eyes:      General: No scleral icterus.     Extraocular Movements: Extraocular movements intact.      Pupils: Pupils are equal, round, and reactive to light.   Cardiovascular:      Rate and Rhythm: Normal rate and regular rhythm.      Pulses: Normal pulses.      Heart sounds: Normal heart sounds. No murmur heard.  Pulmonary:      Effort: Pulmonary effort is normal.      Breath sounds: Normal breath sounds. No wheezing, rhonchi or rales.   Abdominal:      General: Bowel sounds are normal. There is no distension.      Palpations: Abdomen is soft.      Tenderness: There is no abdominal tenderness.   Musculoskeletal:         General: Signs of injury present. No tenderness.      Right lower leg: No edema.      Left lower leg: No edema.   Skin:     General: Skin is warm.      Capillary Refill: Capillary refill takes less than 2 seconds.      Findings:  Bruising present.   Neurological:      General: No focal deficit present.      Mental Status: He is alert and oriented to person, place, and time.   Psychiatric:         Mood and Affect: Mood normal.         Thought Content: Thought content normal.       Significant Labs: All pertinent labs within the past 24 hours have been reviewed.  BMP:   Recent Labs   Lab 09/29/22  0813      *   K 3.8      CO2 18*   BUN 17   CREATININE 1.1   CALCIUM 8.3*     CBC: No results for input(s): WBC, HGB, HCT, PLT in the last 48 hours.    Significant Imaging: I have reviewed all pertinent imaging results/findings within the past 24 hours.      Assessment/Plan:      * Shock  Recurrent Falls  Previous H/o UTI  Light-headedness  Lactic acidosis    81 yo M presenting to ED with multiple history of falls w/o LOC / head trauma, light-headedness. Arrived with slight leukocytosis 12.7, tachycardia, hypotension (MAP low 60s), lactate 3.2. CXR unremarkable, CT without acute pulmonary or abdominal findings to suggest infection. UA pending collection. Patient appears hypovolemic on arrival. Mentation intact and at baseline. S/P sepsis fluid bolus (combined). Previously seen outpatient with pan-sensitive E Coli UTI for which he was prescribed a ciprofloxacin. CT head negative for acute abnormality (though suspicious bone finding needs OP f/u), CTAP with myriad of findings without acute infectious nidus.    Etiology: Hypotension may be 2/2 infection (though unclear source as patient would have been adequately treated with OP abx for UTI) vs HRS vs hypovolemia d/t questionable PO intake?     -- Vanc / Zosyn empiric abx now deescalate to cefpodoxime. SBP rule out negative  -- Trend fever curve, trend CBC  -- Maintain MAP > 65; IVF PRN, patient is very fluid responsive  -- UA / UCx pending  -- BCx showed contaminate of CN Staph   -- Investigate for further cause of falls including: orthostatic vitals, cardiac telemetry    Paresthesia  of tongue  - C/o of tongue tingling   - Outpatient referral to ENT      Stroke  - Hx of TIA  - will start 81 mg of ASA outpatient      Balance disorder  -- Discussed with daughter, (Марина [phone]), who lives in [place] and reports brother locally with own health issues and not always available, with patient living at home solo. Reports not just lightheadedness recently due to hypotension but actually balance issues and falling since January. Reports not eating well since that time and losing weight. Hypoalbuminemia noted. Also issues with medication adherence at home. Requests evaluating possible cause of balance issues /falling and how to mitigate. Consider hypotension vs deconditioning vs anemia contributing vs neuro vs other  -- Iron: 49, Transferrin 129, Ferritin 275, TIBC 191, Saturated Iron 26, folate 3.2 and B12 1167.   -- Initiate Boost  -- Anti-hypertensive regimen management  -- PT/OT and dispo to SNF. Appreciate CM /SW assist in resources to ensure safe discharge plan  -- referral to neuro      Palliative care encounter        Ascites  --9/23: Ct A/P:Cirrhotic morphology of the liver with sequelae of portal hypertension as evidence by splenomegaly, intra-abdominal ascites, and possible portal hypertensive enteropathy.  -- Liver US reveals liver lesions and cirrhosis. AFP WNL. Defer non-emergent MRI /CT to better characterize liver lesions.   -- Consult palliative for GOC.   --Patient denies hx of alcohol abuse   --LFT with out elevation  --Patient with ascites: IR preformed diagnostic paracentetics on 9/27 to r/o SBP: which was negative 130 WBC and 10% segs  -- will plan for ppx given low protein  -- No sxs of SBP. Very low suspicion  -- Referral to hepatology on d/c given new cirrhosis    Rib fracture  2x acute mildly displaced right anterior ribs fractures seen on CT chest  Analgesia prn  Incentive spirometry    Hypokalemia  K 2.9 on admission with concomitant hypomagnesemia.     -- Replete  potassium to achieve goal of >4  --CMP daily  -- Replete Mg    TRENT (acute kidney injury)  Patient presenting with TRENT with admission sCr 1.6 (baseline sCr wnl).   DDx: Pre-renal azotemia s/o sepsis vs questionable PO intake    Serum creatinine: 1.2 mg/dL 09/28/22 0942  Estimated creatinine clearance: 48.2 mL/min    -- resolved  -- Trend sCr  -- Strict I&Os  -- Avoid nephrotoxic agents  -- Renally adjust medications      Type 2 diabetes mellitus, without long-term current use of insulin  Last A1c 6.1 2 months ago.     -- Hold home medications while inpatient  -- POCT glucose ACHS   -- LDSSI to maintain -180    Mixed hyperlipidemia  -- Continue home statin      Hypertension    Home medications: triamterene-HCTZ, Lasix    -- Hold home medications s/o hypotension    Falls frequently  -- PT/OT consulted    Diastolic heart failure  Questionable history of diastolic CHF. Documented in chart, but previous TTE in Care Everywhere 03/2022 demonstrated normal EF without noted diastolic dysfunction. Patient also not complaining of HF symptoms (STRATTON, orthopnea, new BLE swelling, anasarca).    -- Repeat TTE on 9/26/22- EF is 50%    Permanent atrial fibrillation  Patient with documented history of permanent Afib s/p watchman, no longer on AC or rate controlling agents.  [id number]-TDOp 5   HAS-BLED 4     -- Patient rate controlled, no indication for further agents at this time  -- No full AC required s/p Watchman; will hold AC ppx and APT s/o active bleeding from abrasion sites  -- Cardiac telemetry  -- Maintain K > 4, Mg > 2, Ca wnl; replete PRN  -- STAT cardiology consult if hemodynamic instability for DCCV evaluation      VTE Risk Mitigation (From admission, onward)         Ordered     enoxaparin injection 40 mg  Daily         09/26/22 1220     Reason for No Pharmacological VTE Prophylaxis  Once        Question:  Reasons:  Answer:  Active Bleeding    09/24/22 0332     IP VTE HIGH RISK PATIENT  Once         09/24/22 0332      Place sequential compression device  Until discontinued         09/24/22 0332                Discharge Planning   ELMER: 9/29/2022     Code Status: Full Code   Is the patient medically ready for discharge?: Yes    Reason for patient still in hospital (select all that apply): Patient trending condition  Discharge Plan A: Skilled Nursing Facility                  Brisa Lamar MD  Department of Hospital Medicine   [facility] - Intensive Care ([place])

## 2022-09-29 NOTE — PT/OT/SLP PROGRESS
Occupational Therapy   Treatment    Name: Jacques Arellano  MRN: [id number]  Admitting Diagnosis:  Shock       Recommendations:     Discharge Recommendations: nursing facility, skilled  Discharge Equipment Recommendations:   (TBD)  Barriers to discharge:  Other (Comment) (requires increased assist)    Assessment:     Jacques Arellano is a 82 y.o. male with a medical diagnosis of Shock.  He presents with deficits in cognition as well as endurance, mobility and self-care. Pt. Tolerated session well on this date and very pleased to sit up in chair. Pt. Required Mod A for sit to stand from EOB and mod A to transfer to bedside chair. Patient would benefit from continued OT services to maximize level of safety and independence with self-care tasks.   . Performance deficits affecting function are weakness, impaired endurance, impaired self care skills, impaired functional mobility, gait instability, impaired cognition, impaired balance, impaired cardiopulmonary response to activity.     Rehab Prognosis:  Good; patient would benefit from acute skilled OT services to address these deficits and reach maximum level of function.       Plan:     Patient to be seen 3 x/week to address the above listed problems via self-care/home management, therapeutic activities, therapeutic exercises  Plan of Care Expires: 10/25/22  Plan of Care Reviewed with: patient    Subjective     Pain/Comfort:  Pain Rating 1: 0/10  Pain Rating Post-Intervention 1: 0/10    Objective:     Communicated with: nurse prior to session.  Patient found supine with telemetry, pulse ox (continuous), blood pressure cuff upon OT entry to room.    General Precautions: Standard, fall   Orthopedic Precautions:N/A   Braces: N/A  Respiratory Status: Room air     Occupational Performance:     Bed Mobility:    Patient completed Rolling/Turning to Left with  maximal assistance  Patient completed Rolling/Turning to Right with maximal assistance  Patient completed  Scooting/Bridging with maximal assistance  Patient completed Supine to Sit with maximal assistance     Functional Mobility/Transfers:  Patient completed Sit <> Stand Transfer with moderate assistance  with  no assistive device   Patient completed Bed <> Chair Transfer using Stand Pivot technique with moderate assistance with no assistive device  Functional Mobility: not tested     Activities of Daily Living:Feeding:  supervision to eat luunch  Upper Body Dressing: maximal assistance to don gown like robe  Toileting: total assistance to clean and don fresh brief      [facility] 6 Click ADL: 15    Treatment & Education:  Pt. Sat up in chair x ~ 2 hours on this date  Pt. Required max A for SPT from bedside chair to bed. Pt. Required Max A for sit to supine and was drawsheet x 2 to HOB    Patient left supine with all lines intact and call button in reach    GOALS:   Multidisciplinary Problems       Occupational Therapy Goals          Problem: Occupational Therapy    Goal Priority Disciplines Outcome Interventions   Occupational Therapy Goal     OT, PT/OT Ongoing, Progressing    Description: Goals to be met by: 10/14/2022     Patient will increase functional independence with ADLs by performing:    UE Dressing with Minimal Assistance.  LE Dressing with Moderate Assistance.  Grooming while seated with Set-up Assistance.  Toileting from bedside commode with Moderate Assistance for hygiene and clothing management.   Toilet transfer to bedside commode with Moderate Assistance.                         Time Tracking:     OT Date of Treatment: 09/29/22  OT Start Time: 1256  OT Stop Time: 1319  OT Total Time (min): 23 min + 1448 to 1501 (13 minutes)    Billable Minutes:Self Care/Home Management 36 minutes total    OT/TITO: OT          9/29/2022

## 2022-09-29 NOTE — ASSESSMENT & PLAN NOTE
"Assessment: Mr. Arellano is an 83 yo M with multiple comorbidities     1) Symptoms: 5 falls over the past 6 months associated with dizziness/lightheadedness, appetite loss for the past few years. Reports not being able to enjoy food as much due to a loss of taste- 125 lbs wt loss over the past year. Reports eating 1 meal every 2 days for the past 6 months. Reports nausea contributing to lack of appetite.   Lack of energy, lack of exercise, and increased anxiety. Reports increasing anxiety about his safety at home.   Increased bilateral lower extremity swelling for the past 2 years.  Appetite while in the hospital has improved slightly after paracentesis.       2) Code status: Full Code    3) Psychosocial: The patient lives in a 2 story house with his son (son stays upstairs). Pt states that he doesn't feel that him and his son do very well at home as he has to constantly worry about his son "bumping into walls" and himself falling. Reports that his son works as a  and has poor vision.    Has 3 children- Sons in [place] and [place], Daughter in [place], [place]. Sees his other children twice a year. Currently - wife of 63 years passed away approx 2.5 years ago (was on Peritoneal dialysis for years).    4) Medicolegal: Appointed his daughter, Марина, as HCPOA. ACP documents now on file. Will speak with patient tomorrow and attempt to have [place] documents signed    5) Spiritual: Is spiritual. Reports being Sabianism and states that he would like to see the  while he's hospitalized    6) Goals of care/discussion: At the current moment, the patient's goal is to get better from his condition and go back home. He is okay going to an [facility] SNF in order to get stronger. The patient's daughter is currently trying to arrange Home Health through private insurance once the patient gets discharged from SNF.     Experience with critical illness: Patient's wife was a kidney transplant recipient. He " reports seeing his wife do hemodialysis and peritoneal dialysis for over 20 years.     Understanding of illness: Pt understands that he is getting worse. He stated that this was all from his old age and recognized that his functional status has greatly deteriorated over the past year due to his lack of appetite, nausea, and frequent falls. Still would like to be Full Code.     Present for discussion: Dr. Reg Lorenzana    7) Disposition plan: Skilled Nursing Facility at [facility]    8)Summary of recommendations and follow up plan:  -Most important goals at this time are curative/life-prolongation (regardless of treatment burdens), improvement in condition but with limits to invasive therapies, or comfort and QOL  -Code status: Full  -Palliative care to provide emotional support and assist with communication regarding disease expectations and trajectory, and with medical decision-making, at the appropriate time  -Most appropriate disposition: continue with the current LOC for now. Consider starting rifaximin and lactulose to Tx his Liver cirrhosis. Will attempt to sign [place] forms with patient tomorrow.       Thank you for the opportunity to care for this patient and family.     Please call with questions.     Omer Garcia MD PGY-2  Palliative Medicine   [facility]

## 2022-09-29 NOTE — SUBJECTIVE & OBJECTIVE
Interval History: NAOE. Pt denies any fever, chills, SOB, or chest pain. He remains afebrile and VSS. Pending SNF placement. Has some c/o tingling in tongue.     Review of Systems   Constitutional:  Positive for activity change. Negative for appetite change, chills, diaphoresis, fatigue, fever and unexpected weight change.   HENT:  Negative for congestion, hearing loss, rhinorrhea and sore throat.    Eyes:  Negative for photophobia and visual disturbance.   Respiratory:  Negative for cough, shortness of breath and wheezing.    Cardiovascular:  Negative for chest pain, palpitations and leg swelling.   Gastrointestinal:  Negative for abdominal pain, blood in stool, constipation, diarrhea, nausea and vomiting.   Genitourinary:  Negative for difficulty urinating, dysuria and hematuria.   Musculoskeletal:  Negative for arthralgias and myalgias.   Skin:  Positive for wound. Negative for pallor and rash.   Allergic/Immunologic: Negative for immunocompromised state.   Neurological:  Negative for dizziness, weakness, light-headedness, numbness and headaches.   Hematological:  Does not bruise/bleed easily.   Psychiatric/Behavioral:  Negative for agitation and confusion.    Objective:     Vital Signs (Most Recent):  Temp: 98.6 °F (37 °C) (09/29/22 0816)  Pulse: 71 (09/29/22 0816)  Resp: 10 (09/29/22 0816)  BP: 112/67 (09/29/22 0816)  SpO2: 100 % (09/29/22 1208) Vital Signs (24h Range):  Temp:  [98.2 °F (36.8 °C)-99.4 °F (37.4 °C)] 98.6 °F (37 °C)  Pulse:  [69-72] 71  Resp:  [7-19] 10  SpO2:  [100 %] 100 %  BP: ()/(57-76) 112/67     Weight: 85.5 kg (188 lb 7.9 oz)  Body mass index is 27.05 kg/m².    Intake/Output Summary (Last 24 hours) at 9/29/2022 1422  Last data filed at 9/28/2022 2000  Gross per 24 hour   Intake --   Output 350 ml   Net -350 ml      Physical Exam  Vitals and nursing note reviewed.   Constitutional:       General: He is not in acute distress.  HENT:      Head: Normocephalic and atraumatic.       Comments: Bilateral temporal wasting.     Mouth/Throat:      Mouth: Mucous membranes are moist.   Eyes:      General: No scleral icterus.     Extraocular Movements: Extraocular movements intact.      Pupils: Pupils are equal, round, and reactive to light.   Cardiovascular:      Rate and Rhythm: Normal rate and regular rhythm.      Pulses: Normal pulses.      Heart sounds: Normal heart sounds. No murmur heard.  Pulmonary:      Effort: Pulmonary effort is normal.      Breath sounds: Normal breath sounds. No wheezing, rhonchi or rales.   Abdominal:      General: Bowel sounds are normal. There is no distension.      Palpations: Abdomen is soft.      Tenderness: There is no abdominal tenderness.   Musculoskeletal:         General: Signs of injury present. No tenderness.      Right lower leg: No edema.      Left lower leg: No edema.   Skin:     General: Skin is warm.      Capillary Refill: Capillary refill takes less than 2 seconds.      Findings: Bruising present.   Neurological:      General: No focal deficit present.      Mental Status: He is alert and oriented to person, place, and time.   Psychiatric:         Mood and Affect: Mood normal.         Thought Content: Thought content normal.       Significant Labs: All pertinent labs within the past 24 hours have been reviewed.  BMP:   Recent Labs   Lab 09/29/22  0813      *   K 3.8      CO2 18*   BUN 17   CREATININE 1.1   CALCIUM 8.3*     CBC: No results for input(s): WBC, HGB, HCT, PLT in the last 48 hours.    Significant Imaging: I have reviewed all pertinent imaging results/findings within the past 24 hours.

## 2022-09-29 NOTE — SUBJECTIVE & OBJECTIVE
Interval History: Comfortable in bed this morning. Revisited goals of care planning with the patient. He reported wanting to speak with the chaplai    Medications:  Continuous Infusions:  Scheduled Meds:   allopurinoL  100 mg Oral Daily    atorvastatin  40 mg Oral Daily    enoxaparin  40 mg Subcutaneous Daily    EScitalopram oxalate  10 mg Oral Daily    folic acid  1 mg Oral Daily    LIDOcaine  1 patch Transdermal Q24H    LIDOcaine HCL 10 mg/ml (1%)  20 mL Other Once    LIDOcaine HCL 10 mg/ml (1%)  20 mL Other Once    mupirocin   Nasal BID     PRN Meds:acetaminophen, dextrose 10%, dextrose 10%, glucagon (human recombinant), glucose, glucose, insulin aspart U-100, melatonin, sodium chloride 0.9%    Objective:     Vital Signs (Most Recent):  Temp: 98.6 °F (37 °C) (09/29/22 0816)  Pulse: 71 (09/29/22 0816)  Resp: 10 (09/29/22 0816)  BP: 112/67 (09/29/22 0816)  SpO2: 100 % (09/29/22 1208) Vital Signs (24h Range):  Temp:  [98.2 °F (36.8 °C)-99.4 °F (37.4 °C)] 98.6 °F (37 °C)  Pulse:  [69-72] 71  Resp:  [7-19] 10  SpO2:  [100 %] 100 %  BP: ()/(57-76) 112/67     Weight: 85.5 kg (188 lb 7.9 oz)  Body mass index is 27.05 kg/m².    Physical Exam  Vitals and nursing note reviewed.   Constitutional:       Appearance: He is ill-appearing.   HENT:      Head: Normocephalic and atraumatic.      Nose: Nose normal.      Mouth/Throat:      Mouth: Mucous membranes are moist.      Pharynx: No oropharyngeal exudate or posterior oropharyngeal erythema.   Eyes:      General: No scleral icterus.     Conjunctiva/sclera: Conjunctivae normal.      Pupils: Pupils are equal, round, and reactive to light.   Cardiovascular:      Rate and Rhythm: Normal rate and regular rhythm.      Pulses: Normal pulses.      Heart sounds: No murmur heard.  Pulmonary:      Effort: Pulmonary effort is normal. No respiratory distress.      Breath sounds: Normal breath sounds.   Abdominal:      General: Abdomen is flat. There is distension.      Palpations:  Abdomen is soft.      Tenderness: There is no abdominal tenderness.   Musculoskeletal:      Cervical back: No tenderness.      Right lower leg: Edema present.      Left lower leg: Edema present.   Skin:     Capillary Refill: Capillary refill takes less than 2 seconds.      Coloration: Skin is pale.      Findings: Bruising present.      Comments: Significant bruising present in his upper extremities    Neurological:      Mental Status: He is alert and oriented to person, place, and time. Mental status is at baseline.       Review of Symptoms      Symptom Assessment (ESAS 0-10 Scale)  Pain:  0  Dyspnea:  0  Anxiety:  0  Nausea:  0  Depression:  0  Anorexia:  0  Fatigue:  0  Insomnia:  0  Restlessness:  0  Agitation:  0           Advance Care Planning   Advance Care Planning       Significant Labs: CBC: No results for input(s): WBC, HGB, HCT, PLT in the last 48 hours.  CMP:   Recent Labs   Lab 09/28/22  0942 09/29/22  0813   * 133*   K 3.2* 3.8    105   CO2 19* 18*   GLU 75 101   BUN 18 17   CREATININE 1.2 1.1   CALCIUM 8.3* 8.3*   ANIONGAP 11 10     CBC:   Recent Labs   Lab 09/26/22  0307   WBC 8.97   HGB 11.0*   HCT 34.5*   MCV 95   PLT 75*     BMP:  Recent Labs   Lab 09/29/22  0813      *   K 3.8      CO2 18*   BUN 17   CREATININE 1.1   CALCIUM 8.3*     LFT:  Lab Results   Component Value Date    AST 35 09/26/2022    ALKPHOS 106 09/26/2022    BILITOT 1.1 (H) 09/26/2022     Albumin:   Albumin   Date Value Ref Range Status   09/26/2022 2.2 (L) 3.5 - 5.2 g/dL Final     Protein:   Total Protein   Date Value Ref Range Status   09/26/2022 4.7 (L) 6.0 - 8.4 g/dL Final     Lactic acid:   Lab Results   Component Value Date    LACTATE 2.0 09/24/2022    LACTATE 3.2 (H) 09/23/2022       Significant Imaging: I have reviewed all pertinent imaging results/findings within the past 24 hours.

## 2022-09-30 LAB
ANION GAP SERPL CALC-SCNC: 10 MMOL/L (ref 8–16)
BACTERIA SPEC AEROBE CULT: NO GROWTH
BUN SERPL-MCNC: 16 MG/DL (ref 8–23)
CALCIUM SERPL-MCNC: 8.1 MG/DL (ref 8.7–10.5)
CHLORIDE SERPL-SCNC: 107 MMOL/L (ref 95–110)
CO2 SERPL-SCNC: 15 MMOL/L (ref 23–29)
CREAT SERPL-MCNC: 1 MG/DL (ref 0.5–1.4)
EST. GFR  (NO RACE VARIABLE): >60 ML/MIN/1.73 M^2
GLUCOSE SERPL-MCNC: 90 MG/DL (ref 70–110)
POCT GLUCOSE: 104 MG/DL (ref 70–110)
POCT GLUCOSE: 136 MG/DL (ref 70–110)
POCT GLUCOSE: 138 MG/DL (ref 70–110)
POCT GLUCOSE: 153 MG/DL (ref 70–110)
POTASSIUM SERPL-SCNC: 3.8 MMOL/L (ref 3.5–5.1)
SODIUM SERPL-SCNC: 132 MMOL/L (ref 136–145)

## 2022-09-30 PROCEDURE — 80048 BASIC METABOLIC PNL TOTAL CA: CPT | Performed by: STUDENT IN AN ORGANIZED HEALTH CARE EDUCATION/TRAINING PROGRAM

## 2022-09-30 PROCEDURE — 25000003 PHARM REV CODE 250

## 2022-09-30 PROCEDURE — 99233 PR SUBSEQUENT HOSPITAL CARE,LEVL III: ICD-10-PCS | Mod: GC,,, | Performed by: STUDENT IN AN ORGANIZED HEALTH CARE EDUCATION/TRAINING PROGRAM

## 2022-09-30 PROCEDURE — 63600175 PHARM REV CODE 636 W HCPCS: Performed by: STUDENT IN AN ORGANIZED HEALTH CARE EDUCATION/TRAINING PROGRAM

## 2022-09-30 PROCEDURE — 25000003 PHARM REV CODE 250: Performed by: STUDENT IN AN ORGANIZED HEALTH CARE EDUCATION/TRAINING PROGRAM

## 2022-09-30 PROCEDURE — 36415 COLL VENOUS BLD VENIPUNCTURE: CPT | Performed by: STUDENT IN AN ORGANIZED HEALTH CARE EDUCATION/TRAINING PROGRAM

## 2022-09-30 PROCEDURE — 20600001 HC STEP DOWN PRIVATE ROOM

## 2022-09-30 PROCEDURE — 99233 SBSQ HOSP IP/OBS HIGH 50: CPT | Mod: GC,,, | Performed by: STUDENT IN AN ORGANIZED HEALTH CARE EDUCATION/TRAINING PROGRAM

## 2022-09-30 RX ORDER — FOLIC ACID 1 MG/1
1 TABLET ORAL DAILY
Refills: 0
Start: 2022-09-30 | End: 2023-09-30

## 2022-09-30 RX ORDER — SULFAMETHOXAZOLE AND TRIMETHOPRIM 800; 160 MG/1; MG/1
1 TABLET ORAL 2 TIMES DAILY
Status: DISCONTINUED | OUTPATIENT
Start: 2022-09-30 | End: 2022-10-01

## 2022-09-30 RX ORDER — SULFAMETHOXAZOLE AND TRIMETHOPRIM 800; 160 MG/1; MG/1
1 TABLET ORAL 2 TIMES DAILY
Qty: 14 TABLET | Refills: 0 | Status: SHIPPED | OUTPATIENT
Start: 2022-09-30 | End: 2022-09-30 | Stop reason: HOSPADM

## 2022-09-30 RX ORDER — SODIUM CHLORIDE 0.9 % (FLUSH) 0.9 %
10 SYRINGE (ML) INJECTION
Status: DISCONTINUED | OUTPATIENT
Start: 2022-09-30 | End: 2022-10-03 | Stop reason: HOSPADM

## 2022-09-30 RX ORDER — LIDOCAINE 50 MG/G
1 PATCH TOPICAL DAILY
Refills: 0
Start: 2022-09-30

## 2022-09-30 RX ORDER — NAPROXEN SODIUM 220 MG/1
81 TABLET, FILM COATED ORAL DAILY
Status: DISCONTINUED | OUTPATIENT
Start: 2022-09-30 | End: 2022-09-30

## 2022-09-30 RX ORDER — ACETAMINOPHEN 325 MG/1
650 TABLET ORAL EVERY 6 HOURS PRN
Refills: 0
Start: 2022-09-30

## 2022-09-30 RX ORDER — TALC
6 POWDER (GRAM) TOPICAL NIGHTLY PRN
Status: DISCONTINUED | OUTPATIENT
Start: 2022-09-30 | End: 2022-09-30

## 2022-09-30 RX ORDER — ALLOPURINOL 100 MG/1
100 TABLET ORAL DAILY
Qty: 90 TABLET | Refills: 0
Start: 2022-09-30

## 2022-09-30 RX ORDER — SULFAMETHOXAZOLE AND TRIMETHOPRIM 800; 160 MG/1; MG/1
1 TABLET ORAL 2 TIMES DAILY
Status: DISCONTINUED | OUTPATIENT
Start: 2022-09-30 | End: 2022-09-30

## 2022-09-30 RX ORDER — NAPROXEN SODIUM 220 MG/1
81 TABLET, FILM COATED ORAL DAILY
Refills: 0
Start: 2022-09-30 | End: 2022-10-14

## 2022-09-30 RX ADMIN — Medication 6 MG: at 08:09

## 2022-09-30 RX ADMIN — ASPIRIN 81 MG: 81 TABLET, CHEWABLE ORAL at 08:09

## 2022-09-30 RX ADMIN — MUPIROCIN: 20 OINTMENT TOPICAL at 08:09

## 2022-09-30 RX ADMIN — ESCITALOPRAM 10 MG: 5 TABLET, FILM COATED ORAL at 08:09

## 2022-09-30 RX ADMIN — LIDOCAINE 1 PATCH: 50 PATCH CUTANEOUS at 09:09

## 2022-09-30 RX ADMIN — ACETAMINOPHEN 650 MG: 325 TABLET ORAL at 08:09

## 2022-09-30 RX ADMIN — FOLIC ACID 1 MG: 1 TABLET ORAL at 08:09

## 2022-09-30 RX ADMIN — ALLOPURINOL 100 MG: 100 TABLET ORAL at 08:09

## 2022-09-30 RX ADMIN — ATORVASTATIN CALCIUM 40 MG: 40 TABLET, FILM COATED ORAL at 08:09

## 2022-09-30 RX ADMIN — SULFAMETHOXAZOLE AND TRIMETHOPRIM 1 TABLET: 800; 160 TABLET ORAL at 04:09

## 2022-09-30 RX ADMIN — ENOXAPARIN SODIUM 40 MG: 100 INJECTION SUBCUTANEOUS at 04:09

## 2022-09-30 NOTE — PLAN OF CARE
STEPHANIE sent updated clinicals to [facility] [employer] Phone: [phone]  ;(Covid test required )via [employer] for review. STEPHANIE will continue to follow patient.    9:41 AM   STEPHANIE spoke with Vanessa in admission at  [facility] [phone] ( Lou out of the office today). Vanessa stated they are waiting for patient's daughter to complete/sign and return back paper work. STEPHANIE will follow.      1:36 PM   STEPHANIE contact [employer] daughter [phone] and stated she has signed and sent paper work along with insurance to Vanessa at [facility]. STEPHANIE contacted Vanessa in admission and placed on hold for ten minutes and told no one is available to talk call. STEPHANIE will call back.       1:47 PM   STEPHANIE contacted Vanessa back and told they're doing the verification currently because [employer] is his primary insurance instead of Medicare.and will call STEPHANIE back once it's been approval for Auth. STEPHANIE will continue to follow.    3:03 PM   [facility] [place] still awaits Auth from [employer]. STEPHANIE will follow.        Carola Naranjo LMSW  PRN - [facility]  [phone]

## 2022-09-30 NOTE — PLAN OF CARE
Patient has discharge orders with plans to be discharged.   Patient is stable on room air.   Problem: Adult Inpatient Plan of Care  Goal: Plan of Care Review  Outcome: Met  Goal: Patient-Specific Goal (Individualized)  Outcome: Met  Goal: Absence of Hospital-Acquired Illness or Injury  Outcome: Met  Goal: Optimal Comfort and Wellbeing  Outcome: Met  Goal: Readiness for Transition of Care  Outcome: Met     Problem: Diabetes Comorbidity  Goal: Blood Glucose Level Within Targeted Range  Outcome: Met     Problem: Fluid and Electrolyte Imbalance (Acute Kidney Injury/Impairment)  Goal: Fluid and Electrolyte Balance  Outcome: Met     Problem: Oral Intake Inadequate (Acute Kidney Injury/Impairment)  Goal: Optimal Nutrition Intake  Outcome: Met     Problem: Renal Function Impairment (Acute Kidney Injury/Impairment)  Goal: Effective Renal Function  Outcome: Met     Problem: Impaired Wound Healing  Goal: Optimal Wound Healing  Outcome: Met     Problem: Fall Injury Risk  Goal: Absence of Fall and Fall-Related Injury  Outcome: Met     Problem: Skin Injury Risk Increased  Goal: Skin Health and Integrity  Outcome: Met     Problem: Coping Ineffective  Goal: Effective Coping  Outcome: Met

## 2022-09-30 NOTE — PROGRESS NOTES
[facility] - Intensive Care ([address] Medicine  Progress Note    Patient Name: Jacques Arellano  MRN: [id number]  Patient Class: IP- Inpatient   Admission Date: 9/23/2022  Length of Stay: 6 days  Attending Physician: Esteban Valdez DO  Primary Care Provider: Matty Garsia MD        Subjective:     Principal Problem:Shock        HPI:  82 y.o. male with documented notation of CHF but TTE 03/2022 demonstrated preserved EF without diastolic dysfunction, HTN, AF on Eliquis, history of TIA, recently diagnosed UTI presents via EMS for multiple falls. Patient states he has been unsteady on his feet and lightheaded for several weeks, with multiple falls and resulting abrasions on bilateral knees, arms, lower legs. He denies fever, but has had a few episodes of chills. Patient denies LOC or head trauma, but has scraped his head against the wall while trying to get up. Denies changes in medication recently. Denies chest pain, shortness of breath, abdominal pain, constipation, dysuria, changes in urinary frequency, cough, fever, malaise. Overall, feels well but for his intermittent light-headedness. Denies new substance use. Lives at home with son, who was at bedside on initial evaluation by ED and stated patient was at baseline mentation.    On EMS arrival, patient was hypotensive in 70s / 40s, with a blood glucose of 54. S/P 1.5 L total in ED with recovery of SBP, very fluid responsive. Patient tachycardic with slight leukocytosis, lactate 3.2. Troponin 0.166 without chest pain. Given dextrose to recover BG. Admitted to Hospital Medicine for concerns of sepsis of unknown source.      Overview/Hospital Course:  Patient admitted to the MICU secondary to concerns for sepsis of unknown source. Patient required norepinephrine to keep Maps elevated. Patient started on zosyn and vancomycin for empiric coverage.Given his presentation of multiple falls, patient was scanned in the ER. No acute intracranial process was  found but patient did have an expansile calvarial lesion involving the right frontal bone. CT A/P reveals multiple findings including displaced right rib fx, dilated ascending aortic aneurysm, and cirrhotic liver with ascites. An attempt to perform a diagnostic paracentesis was tried on 9/24/22, however the procedure was not performed given close proximity of bowel to the surface. No deep enough pocket was noted to be safe for the procedure. Patient's blood culture then grew GPC in clusters. Patient continued on IV abx. A second attempt to  perform paracentesis on 9/25 however patient reported that he was eating his lunch when I presented in his room. Furthermore, when I presented again patient was receiving his RUQ US. Patient was eventually weaned off norepinephrine and stepped down to the floor. IR preformed diagnostic paracentesis on 9/27/22 and negative for SBP. Deescalate abx to cefpodoxime. QTC elevated and will consider ppx outpatient. Pending SNF decision by family.       Interval History:  NAOE. Pt denies any fever, chills, SOB, or chest pain. He remains afebrile and VSS. Pending SNF placement.     Review of Systems   Constitutional:  Positive for activity change. Negative for appetite change, chills, diaphoresis, fatigue, fever and unexpected weight change.   HENT:  Negative for congestion, hearing loss, rhinorrhea and sore throat.    Eyes:  Negative for photophobia and visual disturbance.   Respiratory:  Negative for cough, shortness of breath and wheezing.    Cardiovascular:  Negative for chest pain, palpitations and leg swelling.   Gastrointestinal:  Negative for abdominal pain, blood in stool, constipation, diarrhea, nausea and vomiting.   Genitourinary:  Negative for difficulty urinating, dysuria and hematuria.   Musculoskeletal:  Negative for arthralgias and myalgias.   Skin:  Positive for wound. Negative for pallor and rash.   Allergic/Immunologic: Negative for immunocompromised state.    Neurological:  Negative for dizziness, weakness, light-headedness, numbness and headaches.   Hematological:  Does not bruise/bleed easily.   Psychiatric/Behavioral:  Negative for agitation and confusion.    Objective:     Vital Signs (Most Recent):  Temp: 97.6 °F (36.4 °C) (09/30/22 0801)  Pulse: 69 (09/30/22 1200)  Resp: 13 (09/30/22 1200)  BP: (!) 99/57 (09/30/22 1200)  SpO2: 100 % (09/30/22 1200)   Vital Signs (24h Range):  Temp:  [97.4 °F (36.3 °C)-98.4 °F (36.9 °C)] 97.6 °F (36.4 °C)  Pulse:  [68-70] 69  Resp:  [13-22] 13  SpO2:  [100 %] 100 %  BP: ()/(57-62) 99/57     Weight: 85.5 kg (188 lb 7.9 oz)  Body mass index is 27.05 kg/m².  No intake or output data in the 24 hours ending 09/30/22 1444   Physical Exam  Vitals and nursing note reviewed.   Constitutional:       General: He is not in acute distress.  HENT:      Head: Normocephalic and atraumatic.      Comments: Bilateral temporal wasting.     Mouth/Throat:      Mouth: Mucous membranes are moist.   Eyes:      General: No scleral icterus.     Extraocular Movements: Extraocular movements intact.      Pupils: Pupils are equal, round, and reactive to light.   Cardiovascular:      Rate and Rhythm: Normal rate and regular rhythm.      Pulses: Normal pulses.      Heart sounds: Normal heart sounds. No murmur heard.  Pulmonary:      Effort: Pulmonary effort is normal.      Breath sounds: Normal breath sounds. No wheezing, rhonchi or rales.   Abdominal:      General: Bowel sounds are normal. There is no distension.      Palpations: Abdomen is soft.      Tenderness: There is no abdominal tenderness.   Musculoskeletal:         General: Signs of injury present. No tenderness.      Right lower leg: No edema.      Left lower leg: No edema.   Skin:     General: Skin is warm.      Capillary Refill: Capillary refill takes less than 2 seconds.      Findings: Bruising present.   Neurological:      General: No focal deficit present.      Mental Status: He is alert and  oriented to person, place, and time.   Psychiatric:         Mood and Affect: Mood normal.         Thought Content: Thought content normal.       Significant Labs: All pertinent labs within the past 24 hours have been reviewed.  BMP:   Recent Labs   Lab 09/30/22  0852   GLU 90   *   K 3.8      CO2 15*   BUN 16   CREATININE 1.0   CALCIUM 8.1*     CBC: No results for input(s): WBC, HGB, HCT, PLT in the last 48 hours.  CMP:   Recent Labs   Lab 09/29/22  0813 09/30/22  0852   * 132*   K 3.8 3.8    107   CO2 18* 15*    90   BUN 17 16   CREATININE 1.1 1.0   CALCIUM 8.3* 8.1*   ANIONGAP 10 10       Significant Imaging: I have reviewed all pertinent imaging results/findings within the past 24 hours.        Assessment/Plan:      * Shock  Recurrent Falls  Previous H/o UTI  Light-headedness  Lactic acidosis    81 yo M presenting to ED with multiple history of falls w/o LOC / head trauma, light-headedness. Arrived with slight leukocytosis 12.7, tachycardia, hypotension (MAP low 60s), lactate 3.2. CXR unremarkable, CT without acute pulmonary or abdominal findings to suggest infection. UA pending collection. Patient appears hypovolemic on arrival. Mentation intact and at baseline. S/P sepsis fluid bolus (combined). Previously seen outpatient with pan-sensitive E Coli UTI for which he was prescribed a ciprofloxacin. CT head negative for acute abnormality (though suspicious bone finding needs OP f/u), CTAP with myriad of findings without acute infectious nidus.    Etiology: Hypotension may be 2/2 infection (though unclear source as patient would have been adequately treated with OP abx for UTI) vs HRS vs hypovolemia d/t questionable PO intake?     -- Vanc / Zosyn empiric abx now deescalate to cefpodoxime. SBP rule out negative  -- Trend fever curve, trend CBC  -- Maintain MAP > 65; IVF PRN, patient is very fluid responsive  -- UA / UCx pending  -- BCx showed contaminate of CN Staph   -- Investigate for  further cause of falls including: orthostatic vitals, cardiac telemetry    Paresthesia of tongue  - C/o of tongue tingling   - Outpatient referral to ENT      Stroke  - Hx of TIA  - will start 81 mg of ASA outpatient      Balance disorder  -- Discussed with daughter, (Марина [phone]), who lives in [place] and reports brother locally with own health issues and not always available, with patient living at home solo. Reports not just lightheadedness recently due to hypotension but actually balance issues and falling since January. Reports not eating well since that time and losing weight. Hypoalbuminemia noted. Also issues with medication adherence at home. Requests evaluating possible cause of balance issues /falling and how to mitigate. Consider hypotension vs deconditioning vs anemia contributing vs neuro vs other  -- Iron: 49, Transferrin 129, Ferritin 275, TIBC 191, Saturated Iron 26, folate 3.2 and B12 1167.   -- Initiate Boost  -- Anti-hypertensive regimen management  -- PT/OT and dispo to SNF. Appreciate CM /SW assist in resources to ensure safe discharge plan  -- referral to neuro      Palliative care encounter        Ascites  --9/23: Ct A/P:Cirrhotic morphology of the liver with sequelae of portal hypertension as evidence by splenomegaly, intra-abdominal ascites, and possible portal hypertensive enteropathy.  -- Liver US reveals liver lesions and cirrhosis. AFP WNL. Defer non-emergent MRI /CT to better characterize liver lesions.   -- Consult palliative for GOC.   --Patient denies hx of alcohol abuse   --LFT with out elevation  --Patient with ascites: IR preformed diagnostic paracentetics on 9/27 to r/o SBP: which was negative 130 WBC and 10% segs  -- will plan for ppx given low protein  -- No sxs of SBP. Very low suspicion  -- Referral to hepatology on d/c given new cirrhosis    Rib fracture  2x acute mildly displaced right anterior ribs fractures seen on CT chest  Analgesia prn  Incentive  spirometry    Hypokalemia  K 2.9 on admission with concomitant hypomagnesemia.     -- Replete potassium to achieve goal of >4  --CMP daily  -- Replete Mg    TRENT (acute kidney injury)  Patient presenting with TRENT with admission sCr 1.6 (baseline sCr wnl).   DDx: Pre-renal azotemia s/o sepsis vs questionable PO intake    Serum creatinine: 1.2 mg/dL 09/28/22 0942  Estimated creatinine clearance: 48.2 mL/min    -- resolved  -- Trend sCr  -- Strict I&Os  -- Avoid nephrotoxic agents  -- Renally adjust medications      Type 2 diabetes mellitus, without long-term current use of insulin  Last A1c 6.1 2 months ago.     -- Hold home medications while inpatient  -- POCT glucose ACHS   -- LDSSI to maintain -180    Mixed hyperlipidemia  -- Continue home statin      Hypertension    Home medications: triamterene-HCTZ, Lasix    -- Hold home medications s/o hypotension    Falls frequently  -- PT/OT consulted    Diastolic heart failure  Questionable history of diastolic CHF. Documented in chart, but previous TTE in Care Everywhere 03/2022 demonstrated normal EF without noted diastolic dysfunction. Patient also not complaining of HF symptoms (STRATTON, orthopnea, new BLE swelling, anasarca).    -- Repeat TTE on 9/26/22- EF is 50%    Permanent atrial fibrillation  Patient with documented history of permanent Afib s/p watchman, no longer on AC or rate controlling agents.  [id number]   HAS-BLED 4     -- Patient rate controlled, no indication for further agents at this time  -- No full AC required s/p Watchman; will hold AC ppx and APT s/o active bleeding from abrasion sites  -- Cardiac telemetry  -- Maintain K > 4, Mg > 2, Ca wnl; replete PRN  -- STAT cardiology consult if hemodynamic instability for DCCV evaluation      VTE Risk Mitigation (From admission, onward)         Ordered     Place sequential compression device  Until discontinued         09/30/22 1350     IP VTE HIGH RISK PATIENT  Once         09/30/22 1348     enoxaparin  injection 40 mg  Daily         09/26/22 1220     Reason for No Pharmacological VTE Prophylaxis  Once        Question:  Reasons:  Answer:  Active Bleeding    09/24/22 0332     Place sequential compression device  Until discontinued         09/24/22 0332                Discharge Planning   ELMER: 9/30/2022     Code Status: Full Code   Is the patient medically ready for discharge?: Yes    Reason for patient still in hospital (select all that apply): Patient trending condition  Discharge Plan A: Skilled Nursing Facility                  Brisa Lamar MD  Department of Hospital Medicine   [facility] - Intensive Care ([place])

## 2022-09-30 NOTE — SUBJECTIVE & OBJECTIVE
Interval History:  NAOE. Pt denies any fever, chills, SOB, or chest pain. He remains afebrile and VSS. Pending SNF placement.     Review of Systems   Constitutional:  Positive for activity change. Negative for appetite change, chills, diaphoresis, fatigue, fever and unexpected weight change.   HENT:  Negative for congestion, hearing loss, rhinorrhea and sore throat.    Eyes:  Negative for photophobia and visual disturbance.   Respiratory:  Negative for cough, shortness of breath and wheezing.    Cardiovascular:  Negative for chest pain, palpitations and leg swelling.   Gastrointestinal:  Negative for abdominal pain, blood in stool, constipation, diarrhea, nausea and vomiting.   Genitourinary:  Negative for difficulty urinating, dysuria and hematuria.   Musculoskeletal:  Negative for arthralgias and myalgias.   Skin:  Positive for wound. Negative for pallor and rash.   Allergic/Immunologic: Negative for immunocompromised state.   Neurological:  Negative for dizziness, weakness, light-headedness, numbness and headaches.   Hematological:  Does not bruise/bleed easily.   Psychiatric/Behavioral:  Negative for agitation and confusion.    Objective:     Vital Signs (Most Recent):  Temp: 97.6 °F (36.4 °C) (09/30/22 0801)  Pulse: 69 (09/30/22 1200)  Resp: 13 (09/30/22 1200)  BP: (!) 99/57 (09/30/22 1200)  SpO2: 100 % (09/30/22 1200)   Vital Signs (24h Range):  Temp:  [97.4 °F (36.3 °C)-98.4 °F (36.9 °C)] 97.6 °F (36.4 °C)  Pulse:  [68-70] 69  Resp:  [13-22] 13  SpO2:  [100 %] 100 %  BP: ()/(57-62) 99/57     Weight: 85.5 kg (188 lb 7.9 oz)  Body mass index is 27.05 kg/m².  No intake or output data in the 24 hours ending 09/30/22 1444   Physical Exam  Vitals and nursing note reviewed.   Constitutional:       General: He is not in acute distress.  HENT:      Head: Normocephalic and atraumatic.      Comments: Bilateral temporal wasting.     Mouth/Throat:      Mouth: Mucous membranes are moist.   Eyes:      General: No  scleral icterus.     Extraocular Movements: Extraocular movements intact.      Pupils: Pupils are equal, round, and reactive to light.   Cardiovascular:      Rate and Rhythm: Normal rate and regular rhythm.      Pulses: Normal pulses.      Heart sounds: Normal heart sounds. No murmur heard.  Pulmonary:      Effort: Pulmonary effort is normal.      Breath sounds: Normal breath sounds. No wheezing, rhonchi or rales.   Abdominal:      General: Bowel sounds are normal. There is no distension.      Palpations: Abdomen is soft.      Tenderness: There is no abdominal tenderness.   Musculoskeletal:         General: Signs of injury present. No tenderness.      Right lower leg: No edema.      Left lower leg: No edema.   Skin:     General: Skin is warm.      Capillary Refill: Capillary refill takes less than 2 seconds.      Findings: Bruising present.   Neurological:      General: No focal deficit present.      Mental Status: He is alert and oriented to person, place, and time.   Psychiatric:         Mood and Affect: Mood normal.         Thought Content: Thought content normal.       Significant Labs: All pertinent labs within the past 24 hours have been reviewed.  BMP:   Recent Labs   Lab 09/30/22  0852   GLU 90   *   K 3.8      CO2 15*   BUN 16   CREATININE 1.0   CALCIUM 8.1*     CBC: No results for input(s): WBC, HGB, HCT, PLT in the last 48 hours.  CMP:   Recent Labs   Lab 09/29/22  0813 09/30/22  0852   * 132*   K 3.8 3.8    107   CO2 18* 15*    90   BUN 17 16   CREATININE 1.1 1.0   CALCIUM 8.3* 8.1*   ANIONGAP 10 10       Significant Imaging: I have reviewed all pertinent imaging results/findings within the past 24 hours.

## 2022-09-30 NOTE — PLAN OF CARE
NURSING HOME ORDERS    09/30/2022  [facility]  [facility] - INTENSIVE CARE ([place])  [address]  [facility] [address]  Dept: [phone]  Loc: [phone]     Admit to Nursing Home:      Diagnoses:  Active Hospital Problems    Diagnosis  POA    *Shock [R57.9]  Unknown    Stroke [I63.9]  Unknown     TIA      Paresthesia of tongue [R44.8]  Unknown    Palliative care encounter [Z51.5]  Not Applicable    Balance disorder [R26.89]  Unknown    Ascites [R18.8]  Yes    TRENT (acute kidney injury) [N17.9]  Unknown    Hypokalemia [E87.6]  Unknown    Rib fracture [S22.39XA]  Unknown    Hypertension [I10]  Yes     Chronic    Mixed hyperlipidemia [E78.2]  Yes     Chronic    Diastolic heart failure [I50.30]  Yes    Type 2 diabetes mellitus, without long-term current use of insulin [E11.9]  Unknown    Permanent atrial fibrillation [I48.21]  Yes     Chronic    Falls frequently [R29.6]  Not Applicable     Chronic      Resolved Hospital Problems    Diagnosis Date Resolved POA    TIA (transient ischemic attack) [G45.9] 09/29/2022 Unknown       Patient is homebound due to:  Shock    Allergies:Review of patient's allergies indicates:  No Known Allergies    Vitals:  Routine    Diet: cardiac diet    Activities:   Up in a chair each morning as tolerated and Activity as tolerated    Goals of Care Treatment Preferences:  Code Status: Full Code    Living Will: Yes              Labs: N/A    Nursing Precautions:  Fall    Consults:   PT to evaluate and treat- 5 times a week and OT to evaluate and treat- 5 times a week     Miscellaneous Care: CHF Care: Daily Weight with notification of MD/NP of 2lb or > increase in 24 hours    v/s and O2 sat every shift    Oxygen as needed for sats <90%    Report abnormal breath sounds to MD/NP    Edema checks q shift- notify MD/NP of increased edema    Task segmentation by nursing for daily care to decrease exertion    Schedule appointment with cardiologist, Dr. Bowen in 4  Weeks    CHF education to include diet ,medication, and CHF flags for MD notification                   Diabetes Care:  Report CBG < 60 or > 350 to physician.      Medications: Discontinue all previous medication orders, if any. See new list below.     Medication List        START taking these medications      allopurinoL 100 MG tablet  Commonly known as: ZYLOPRIM  Take 1 tablet (100 mg total) by mouth once daily.  Replaces: febuxostat 40 mg Tab     aspirin 81 MG Chew  Take 1 tablet (81 mg total) by mouth once daily.  Replaces: aspirin 81 MG EC tablet     folic acid 1 MG tablet  Commonly known as: FOLVITE  Take 1 tablet (1 mg total) by mouth once daily.     LIDOcaine 5 %  Commonly known as: LIDODERM  Place 1 patch onto the skin once daily. Remove & Discard patch within 12 hours or as directed by MD. Apply to right ribs.            CHANGE how you take these medications      acetaminophen 325 MG tablet  Commonly known as: TYLENOL  Take 2 tablets (650 mg total) by mouth every 6 (six) hours as needed for Pain (DO NOT EXCEED more than 2000 mg in 1 day).  What changed: reasons to take this            CONTINUE taking these medications      atorvastatin 40 MG tablet  Commonly known as: LIPITOR  Take 40 mg by mouth once daily.     EScitalopram oxalate 10 MG tablet  Commonly known as: LEXAPRO  Take 1 tablet (10 mg total) by mouth once daily.            STOP taking these medications      amoxicillin 500 MG capsule  Commonly known as: AMOXIL     aspirin 81 MG EC tablet  Commonly known as: ECOTRIN  Replaced by: aspirin 81 MG Chew     ciprofloxacin HCl 500 MG tablet  Commonly known as: CIPRO     clorazepate 7.5 MG Tab  Commonly known as: TRANXENE     febuxostat 40 mg Tab  Commonly known as: ULORIC  Replaced by: allopurinoL 100 MG tablet     furosemide 20 MG tablet  Commonly known as: LASIX     glipiZIDE 10 MG tablet  Commonly known as: GLUCOTROL     HYDROcodone-acetaminophen 5-325 mg per tablet  Commonly known as: NORCO      metFORMIN 1000 MG tablet  Commonly known as: GLUCOPHAGE     ondansetron 4 MG tablet  Commonly known as: ZOFRAN     potassium chloride 10 MEQ Cpsr  Commonly known as: MICRO-K     triamterene-hydrochlorothiazide 75-50 mg 75-50 mg per tablet  Commonly known as: MAXZIDE                Immunizations Administered as of 9/30/2022       Name Date Dose VIS Date Route Exp Date    COVID-19, MRNA, LN-S, PF (Pfizer) (Purple Cap) 2/22/2021 -- -- -- --    Lot: [id number]     External: Auto Reconciled From Outside Source     COVID-19, MRNA, LN-S, PF (Pfizer) (Purple Cap) 1/27/2021 -- -- -- --    Lot: [id number]     External: Auto Reconciled From Outside Source             This patient has had both covid vaccinations    Some patients may experience side effects after vaccination.  These may include fever, headache, muscle or joint aches.  Most symptoms resolve with 24-48 hours and do not require urgent medical evaluation unless they persist for more than 72 hours or symptoms are concerning for an unrelated medical condition.          _________________________________  Veena Richards DO  10/03/2022

## 2022-10-01 LAB
ANION GAP SERPL CALC-SCNC: 10 MMOL/L (ref 8–16)
BASOPHILS # BLD AUTO: 0.04 K/UL (ref 0–0.2)
BASOPHILS NFR BLD: 0.4 % (ref 0–1.9)
BUN SERPL-MCNC: 17 MG/DL (ref 8–23)
CALCIUM SERPL-MCNC: 8.3 MG/DL (ref 8.7–10.5)
CHLORIDE SERPL-SCNC: 103 MMOL/L (ref 95–110)
CO2 SERPL-SCNC: 20 MMOL/L (ref 23–29)
CREAT SERPL-MCNC: 0.9 MG/DL (ref 0.5–1.4)
DIFFERENTIAL METHOD: ABNORMAL
EOSINOPHIL # BLD AUTO: 0.1 K/UL (ref 0–0.5)
EOSINOPHIL NFR BLD: 0.7 % (ref 0–8)
ERYTHROCYTE [DISTWIDTH] IN BLOOD BY AUTOMATED COUNT: 16.8 % (ref 11.5–14.5)
EST. GFR  (NO RACE VARIABLE): >60 ML/MIN/1.73 M^2
GLUCOSE SERPL-MCNC: 105 MG/DL (ref 70–110)
HCT VFR BLD AUTO: 33.6 % (ref 40–54)
HGB BLD-MCNC: 11.2 G/DL (ref 14–18)
IMM GRANULOCYTES # BLD AUTO: 0.06 K/UL (ref 0–0.04)
IMM GRANULOCYTES NFR BLD AUTO: 0.5 % (ref 0–0.5)
LYMPHOCYTES # BLD AUTO: 0.9 K/UL (ref 1–4.8)
LYMPHOCYTES NFR BLD: 8.2 % (ref 18–48)
MCH RBC QN AUTO: 30.4 PG (ref 27–31)
MCHC RBC AUTO-ENTMCNC: 33.3 G/DL (ref 32–36)
MCV RBC AUTO: 91 FL (ref 82–98)
MONOCYTES # BLD AUTO: 0.7 K/UL (ref 0.3–1)
MONOCYTES NFR BLD: 6.7 % (ref 4–15)
NEUTROPHILS # BLD AUTO: 9.3 K/UL (ref 1.8–7.7)
NEUTROPHILS NFR BLD: 83.5 % (ref 38–73)
NRBC BLD-RTO: 0 /100 WBC
PLATELET # BLD AUTO: 98 K/UL (ref 150–450)
PMV BLD AUTO: 10.6 FL (ref 9.2–12.9)
POCT GLUCOSE: 105 MG/DL (ref 70–110)
POCT GLUCOSE: 131 MG/DL (ref 70–110)
POCT GLUCOSE: 166 MG/DL (ref 70–110)
POTASSIUM SERPL-SCNC: 3.4 MMOL/L (ref 3.5–5.1)
RBC # BLD AUTO: 3.68 M/UL (ref 4.6–6.2)
SODIUM SERPL-SCNC: 133 MMOL/L (ref 136–145)
WBC # BLD AUTO: 11.08 K/UL (ref 3.9–12.7)

## 2022-10-01 PROCEDURE — 25000003 PHARM REV CODE 250: Performed by: STUDENT IN AN ORGANIZED HEALTH CARE EDUCATION/TRAINING PROGRAM

## 2022-10-01 PROCEDURE — 25000003 PHARM REV CODE 250

## 2022-10-01 PROCEDURE — 94761 N-INVAS EAR/PLS OXIMETRY MLT: CPT

## 2022-10-01 PROCEDURE — 93005 ELECTROCARDIOGRAM TRACING: CPT

## 2022-10-01 PROCEDURE — 36415 COLL VENOUS BLD VENIPUNCTURE: CPT | Performed by: STUDENT IN AN ORGANIZED HEALTH CARE EDUCATION/TRAINING PROGRAM

## 2022-10-01 PROCEDURE — 80048 BASIC METABOLIC PNL TOTAL CA: CPT | Performed by: STUDENT IN AN ORGANIZED HEALTH CARE EDUCATION/TRAINING PROGRAM

## 2022-10-01 PROCEDURE — 93010 EKG 12-LEAD: ICD-10-PCS | Mod: ,,, | Performed by: INTERNAL MEDICINE

## 2022-10-01 PROCEDURE — 93010 ELECTROCARDIOGRAM REPORT: CPT | Mod: ,,, | Performed by: INTERNAL MEDICINE

## 2022-10-01 PROCEDURE — 99233 SBSQ HOSP IP/OBS HIGH 50: CPT | Mod: GC,,, | Performed by: STUDENT IN AN ORGANIZED HEALTH CARE EDUCATION/TRAINING PROGRAM

## 2022-10-01 PROCEDURE — 99233 PR SUBSEQUENT HOSPITAL CARE,LEVL III: ICD-10-PCS | Mod: GC,,, | Performed by: STUDENT IN AN ORGANIZED HEALTH CARE EDUCATION/TRAINING PROGRAM

## 2022-10-01 PROCEDURE — 85025 COMPLETE CBC W/AUTO DIFF WBC: CPT | Performed by: STUDENT IN AN ORGANIZED HEALTH CARE EDUCATION/TRAINING PROGRAM

## 2022-10-01 PROCEDURE — 20600001 HC STEP DOWN PRIVATE ROOM

## 2022-10-01 PROCEDURE — 63600175 PHARM REV CODE 636 W HCPCS: Performed by: STUDENT IN AN ORGANIZED HEALTH CARE EDUCATION/TRAINING PROGRAM

## 2022-10-01 RX ORDER — POTASSIUM CHLORIDE 20 MEQ/1
40 TABLET, EXTENDED RELEASE ORAL
Status: COMPLETED | OUTPATIENT
Start: 2022-10-01 | End: 2022-10-01

## 2022-10-01 RX ADMIN — ESCITALOPRAM 10 MG: 5 TABLET, FILM COATED ORAL at 08:10

## 2022-10-01 RX ADMIN — POTASSIUM CHLORIDE 40 MEQ: 1500 TABLET, EXTENDED RELEASE ORAL at 10:10

## 2022-10-01 RX ADMIN — MUPIROCIN: 20 OINTMENT TOPICAL at 08:10

## 2022-10-01 RX ADMIN — ASPIRIN 81 MG: 81 TABLET, CHEWABLE ORAL at 08:10

## 2022-10-01 RX ADMIN — LIDOCAINE 1 PATCH: 50 PATCH CUTANEOUS at 08:10

## 2022-10-01 RX ADMIN — ATORVASTATIN CALCIUM 40 MG: 40 TABLET, FILM COATED ORAL at 08:10

## 2022-10-01 RX ADMIN — FOLIC ACID 1 MG: 1 TABLET ORAL at 08:10

## 2022-10-01 RX ADMIN — ENOXAPARIN SODIUM 40 MG: 100 INJECTION SUBCUTANEOUS at 05:10

## 2022-10-01 RX ADMIN — ALLOPURINOL 100 MG: 100 TABLET ORAL at 08:10

## 2022-10-01 RX ADMIN — SULFAMETHOXAZOLE AND TRIMETHOPRIM 1 TABLET: 800; 160 TABLET ORAL at 08:10

## 2022-10-01 RX ADMIN — POTASSIUM CHLORIDE 40 MEQ: 1500 TABLET, EXTENDED RELEASE ORAL at 12:10

## 2022-10-01 NOTE — CHAPLAIN
: Rhys Lombardo    REASON FOR VISIT:      SUBJECTIVE:    Conducted assessment of how patient is coping with illness and loss of his wife.     OBJECTIVE:    Sensed he was focused on the on need for connection and acknowledgement of his life story and values.     ASSESSMENT:  Patient expressed he is coping by expressing gratefulness for his life and his wife and at same time I sense he still is processing through these difficulties and could use support.       PROVIDED & PLAN:    Patient was interested in  visiting again. Plan to suggest follow up again from  in order to continue to assess any distress and help him in his coping by being present.

## 2022-10-01 NOTE — SUBJECTIVE & OBJECTIVE
Interval History: Pt's tele and EKG overnight showed PVCs. Otherwise, was asymptomatic. He remains afebrile and VSS, with no acute c/o of fever, chills, N/V, SOB, and chest pain. Pending SNF placement    Review of Systems   Constitutional:  Positive for activity change. Negative for appetite change, chills, diaphoresis, fatigue, fever and unexpected weight change.   HENT:  Negative for congestion, hearing loss, rhinorrhea and sore throat.    Eyes:  Negative for photophobia and visual disturbance.   Respiratory:  Negative for cough, shortness of breath and wheezing.    Cardiovascular:  Negative for chest pain, palpitations and leg swelling.   Gastrointestinal:  Negative for abdominal pain, blood in stool, constipation, diarrhea, nausea and vomiting.   Genitourinary:  Negative for difficulty urinating, dysuria and hematuria.   Musculoskeletal:  Negative for arthralgias and myalgias.   Skin:  Positive for wound. Negative for pallor and rash.   Allergic/Immunologic: Negative for immunocompromised state.   Neurological:  Negative for dizziness, weakness, light-headedness, numbness and headaches.   Hematological:  Does not bruise/bleed easily.   Psychiatric/Behavioral:  Negative for agitation and confusion.    Objective:     Vital Signs (Most Recent):  Temp: 97.9 °F (36.6 °C) (10/01/22 0719)  Pulse: 69 (10/01/22 0821)  Resp: 12 (10/01/22 0719)  BP: 119/61 (10/01/22 0719)  SpO2: 100 % (10/01/22 0821) Vital Signs (24h Range):  Temp:  [97.9 °F (36.6 °C)-98.2 °F (36.8 °C)] 97.9 °F (36.6 °C)  Pulse:  [69-71] 69  Resp:  [12-20] 12  SpO2:  [97 %-100 %] 100 %  BP: ()/(50-61) 119/61     Weight: 85.5 kg (188 lb 7.9 oz)  Body mass index is 27.05 kg/m².  No intake or output data in the 24 hours ending 10/01/22 0910   Physical Exam  Vitals and nursing note reviewed.   Constitutional:       General: He is not in acute distress.  HENT:      Head: Normocephalic and atraumatic.      Comments: Bilateral temporal wasting.      Mouth/Throat:      Mouth: Mucous membranes are moist.   Eyes:      General: No scleral icterus.     Extraocular Movements: Extraocular movements intact.      Pupils: Pupils are equal, round, and reactive to light.   Cardiovascular:      Rate and Rhythm: Normal rate and regular rhythm.      Pulses: Normal pulses.      Heart sounds: Normal heart sounds. No murmur heard.  Pulmonary:      Effort: Pulmonary effort is normal.      Breath sounds: Normal breath sounds. No wheezing, rhonchi or rales.   Abdominal:      General: Bowel sounds are normal. There is no distension.      Palpations: Abdomen is soft.      Tenderness: There is no abdominal tenderness.   Musculoskeletal:         General: Signs of injury present. No tenderness.      Right lower leg: Edema present.      Left lower leg: Edema present.   Skin:     General: Skin is warm.      Capillary Refill: Capillary refill takes less than 2 seconds.      Findings: Bruising present.   Neurological:      General: No focal deficit present.      Mental Status: He is alert and oriented to person, place, and time.   Psychiatric:         Mood and Affect: Mood normal.         Thought Content: Thought content normal.       Significant Labs: All pertinent labs within the past 24 hours have been reviewed.  BMP:   Recent Labs   Lab 10/01/22  0555      *   K 3.4*      CO2 20*   BUN 17   CREATININE 0.9   CALCIUM 8.3*       Significant Imaging: I have reviewed all pertinent imaging results/findings within the past 24 hours.

## 2022-10-01 NOTE — PROGRESS NOTES
[facility] - Intensive Care ([address] Medicine  Progress Note    Patient Name: Jacques Arellano  MRN: [id number]  Patient Class: IP- Inpatient   Admission Date: 9/23/2022  Length of Stay: 7 days  Attending Physician: Susnane Trevino DO  Primary Care Provider: Matty Garsia MD        Subjective:     Principal Problem:Shock        HPI:  82 y.o. male with documented notation of CHF but TTE 03/2022 demonstrated preserved EF without diastolic dysfunction, HTN, AF on Eliquis, history of TIA, recently diagnosed UTI presents via EMS for multiple falls. Patient states he has been unsteady on his feet and lightheaded for several weeks, with multiple falls and resulting abrasions on bilateral knees, arms, lower legs. He denies fever, but has had a few episodes of chills. Patient denies LOC or head trauma, but has scraped his head against the wall while trying to get up. Denies changes in medication recently. Denies chest pain, shortness of breath, abdominal pain, constipation, dysuria, changes in urinary frequency, cough, fever, malaise. Overall, feels well but for his intermittent light-headedness. Denies new substance use. Lives at home with son, who was at bedside on initial evaluation by ED and stated patient was at baseline mentation.    On EMS arrival, patient was hypotensive in 70s / 40s, with a blood glucose of 54. S/P 1.5 L total in ED with recovery of SBP, very fluid responsive. Patient tachycardic with slight leukocytosis, lactate 3.2. Troponin 0.166 without chest pain. Given dextrose to recover BG. Admitted to Hospital Medicine for concerns of sepsis of unknown source.      Overview/Hospital Course:  Patient admitted to the MICU secondary to concerns for sepsis of unknown source. Patient required norepinephrine to keep Maps elevated. Patient started on zosyn and vancomycin for empiric coverage.Given his presentation of multiple falls, patient was scanned in the ER. No acute intracranial process was  found but patient did have an expansile calvarial lesion involving the right frontal bone. CT A/P reveals multiple findings including displaced right rib fx, dilated ascending aortic aneurysm, and cirrhotic liver with ascites. An attempt to perform a diagnostic paracentesis was tried on 9/24/22, however the procedure was not performed given close proximity of bowel to the surface. No deep enough pocket was noted to be safe for the procedure. Patient's blood culture then grew GPC in clusters. Patient continued on IV abx. A second attempt to  perform paracentesis on 9/25 however patient reported that he was eating his lunch when I presented in his room. Furthermore, when I presented again patient was receiving his RUQ US. Patient was eventually weaned off norepinephrine and stepped down to the floor. IR preformed diagnostic paracentesis on 9/27/22 and negative for SBP. Deescalate abx to cefpodoxime. QTC elevated and will consider ppx outpatient. Pending SNF decision by family.       Interval History: Pt's tele and EKG overnight showed PVCs. Otherwise, was asymptomatic. He remains afebrile and VSS, with no acute c/o of fever, chills, N/V, SOB, and chest pain. Pending SNF placement    Review of Systems   Constitutional:  Positive for activity change. Negative for appetite change, chills, diaphoresis, fatigue, fever and unexpected weight change.   HENT:  Negative for congestion, hearing loss, rhinorrhea and sore throat.    Eyes:  Negative for photophobia and visual disturbance.   Respiratory:  Negative for cough, shortness of breath and wheezing.    Cardiovascular:  Negative for chest pain, palpitations and leg swelling.   Gastrointestinal:  Negative for abdominal pain, blood in stool, constipation, diarrhea, nausea and vomiting.   Genitourinary:  Negative for difficulty urinating, dysuria and hematuria.   Musculoskeletal:  Negative for arthralgias and myalgias.   Skin:  Positive for wound. Negative for pallor and rash.    Allergic/Immunologic: Negative for immunocompromised state.   Neurological:  Negative for dizziness, weakness, light-headedness, numbness and headaches.   Hematological:  Does not bruise/bleed easily.   Psychiatric/Behavioral:  Negative for agitation and confusion.    Objective:     Vital Signs (Most Recent):  Temp: 97.9 °F (36.6 °C) (10/01/22 0719)  Pulse: 69 (10/01/22 0821)  Resp: 12 (10/01/22 0719)  BP: 119/61 (10/01/22 0719)  SpO2: 100 % (10/01/22 0821) Vital Signs (24h Range):  Temp:  [97.9 °F (36.6 °C)-98.2 °F (36.8 °C)] 97.9 °F (36.6 °C)  Pulse:  [69-71] 69  Resp:  [12-20] 12  SpO2:  [97 %-100 %] 100 %  BP: ()/(50-61) 119/61     Weight: 85.5 kg (188 lb 7.9 oz)  Body mass index is 27.05 kg/m².  No intake or output data in the 24 hours ending 10/01/22 0910   Physical Exam  Vitals and nursing note reviewed.   Constitutional:       General: He is not in acute distress.  HENT:      Head: Normocephalic and atraumatic.      Comments: Bilateral temporal wasting.     Mouth/Throat:      Mouth: Mucous membranes are moist.   Eyes:      General: No scleral icterus.     Extraocular Movements: Extraocular movements intact.      Pupils: Pupils are equal, round, and reactive to light.   Cardiovascular:      Rate and Rhythm: Normal rate and regular rhythm.      Pulses: Normal pulses.      Heart sounds: Normal heart sounds. No murmur heard.  Pulmonary:      Effort: Pulmonary effort is normal.      Breath sounds: Normal breath sounds. No wheezing, rhonchi or rales.   Abdominal:      General: Bowel sounds are normal. There is no distension.      Palpations: Abdomen is soft.      Tenderness: There is no abdominal tenderness.   Musculoskeletal:         General: Signs of injury present. No tenderness.      Right lower leg: Edema present.      Left lower leg: Edema present.   Skin:     General: Skin is warm.      Capillary Refill: Capillary refill takes less than 2 seconds.      Findings: Bruising present.   Neurological:       General: No focal deficit present.      Mental Status: He is alert and oriented to person, place, and time.   Psychiatric:         Mood and Affect: Mood normal.         Thought Content: Thought content normal.       Significant Labs: All pertinent labs within the past 24 hours have been reviewed.  BMP:   Recent Labs   Lab 10/01/22  0555      *   K 3.4*      CO2 20*   BUN 17   CREATININE 0.9   CALCIUM 8.3*       Significant Imaging: I have reviewed all pertinent imaging results/findings within the past 24 hours.      Assessment/Plan:      * Shock  Recurrent Falls  Previous H/o UTI  Light-headedness  Lactic acidosis    81 yo M presenting to ED with multiple history of falls w/o LOC / head trauma, light-headedness. Arrived with slight leukocytosis 12.7, tachycardia, hypotension (MAP low 60s), lactate 3.2. CXR unremarkable, CT without acute pulmonary or abdominal findings to suggest infection. UA pending collection. Patient appears hypovolemic on arrival. Mentation intact and at baseline. S/P sepsis fluid bolus (combined). Previously seen outpatient with pan-sensitive E Coli UTI for which he was prescribed a ciprofloxacin. CT head negative for acute abnormality (though suspicious bone finding needs OP f/u), CTAP with myriad of findings without acute infectious nidus.    Etiology: Hypotension may be 2/2 infection (though unclear source as patient would have been adequately treated with OP abx for UTI) vs HRS vs hypovolemia d/t questionable PO intake?     -- Vanc / Zosyn empiric abx now deescalate to cefpodoxime. SBP rule out negative  -- Trend fever curve, trend CBC  -- Maintain MAP > 65; IVF PRN, patient is very fluid responsive  -- UA / UCx pending  -- BCx showed contaminate of CN Staph   -- Investigate for further cause of falls including: orthostatic vitals, cardiac telemetry    Paresthesia of tongue  - C/o of tongue tingling   - Outpatient referral to ENT      Stroke  - Hx of TIA  - will start 81  mg of ASA outpatient      Balance disorder  -- Discussed with daughter, (Марина [phone]), who lives in [place] and reports brother locally with own health issues and not always available, with patient living at home solo. Reports not just lightheadedness recently due to hypotension but actually balance issues and falling since January. Reports not eating well since that time and losing weight. Hypoalbuminemia noted. Also issues with medication adherence at home. Requests evaluating possible cause of balance issues /falling and how to mitigate. Consider hypotension vs deconditioning vs anemia contributing vs neuro vs other  -- Iron: 49, Transferrin 129, Ferritin 275, TIBC 191, Saturated Iron 26, folate 3.2 and B12 1167.   -- Initiate Boost  -- Anti-hypertensive regimen management  -- PT/OT and dispo to SNF. Appreciate CM /SW assist in resources to ensure safe discharge plan  -- referral to neuro      Palliative care encounter        Ascites  --9/23: Ct A/P:Cirrhotic morphology of the liver with sequelae of portal hypertension as evidence by splenomegaly, intra-abdominal ascites, and possible portal hypertensive enteropathy.  -- Liver US reveals liver lesions and cirrhosis. AFP WNL. Defer non-emergent MRI /CT to better characterize liver lesions.   -- Consult palliative for GOC.   --Patient denies hx of alcohol abuse   --LFT with out elevation  --Patient with ascites: IR preformed diagnostic paracentetics on 9/27 to r/o SBP: which was negative 130 WBC and 10% segs  -- will plan for ppx given low protein  -- No sxs of SBP. Very low suspicion  -- Referral to hepatology on d/c given new cirrhosis    Rib fracture  2x acute mildly displaced right anterior ribs fractures seen on CT chest  Analgesia prn  Incentive spirometry    Hypokalemia  K 2.9 on admission with concomitant hypomagnesemia.     -- Replete potassium to achieve goal of >4  --CMP daily  -- Replete Mg    TRENT (acute kidney injury)  Patient presenting  with TRENT with admission sCr 1.6 (baseline sCr wnl).   DDx: Pre-renal azotemia s/o sepsis vs questionable PO intake    Serum creatinine: 1.2 mg/dL 09/28/22 0942  Estimated creatinine clearance: 48.2 mL/min    -- resolved  -- Trend sCr  -- Strict I&Os  -- Avoid nephrotoxic agents  -- Renally adjust medications      Type 2 diabetes mellitus, without long-term current use of insulin  Last A1c 6.1 2 months ago.     -- Hold home medications while inpatient  -- POCT glucose ACHS   -- LDSSI to maintain -180    Mixed hyperlipidemia  -- Continue home statin      Hypertension    Home medications: triamterene-HCTZ, Lasix    -- Hold home medications s/o hypotension    Falls frequently  -- PT/OT consulted    Diastolic heart failure  Questionable history of diastolic CHF. Documented in chart, but previous TTE in Care Everywhere 03/2022 demonstrated normal EF without noted diastolic dysfunction. Patient also not complaining of HF symptoms (STRATTON, orthopnea, new BLE swelling, anasarca).    -- Repeat TTE on 9/26/22- EF is 50%    Permanent atrial fibrillation  Patient with documented history of permanent Afib s/p watchman, no longer on AC or rate controlling agents.  [id number]   HAS-BLED 4     -- Patient rate controlled, no indication for further agents at this time  -- No full AC required s/p Watchman; will hold AC ppx and APT s/o active bleeding from abrasion sites  -- Cardiac telemetry  -- Maintain K > 4, Mg > 2, Ca wnl; replete PRN  -- STAT cardiology consult if hemodynamic instability for DCCV evaluation      VTE Risk Mitigation (From admission, onward)         Ordered     Place sequential compression device  Until discontinued         09/30/22 1350     IP VTE HIGH RISK PATIENT  Once         09/30/22 1348     enoxaparin injection 40 mg  Daily         09/26/22 1220     Reason for No Pharmacological VTE Prophylaxis  Once        Question:  Reasons:  Answer:  Active Bleeding    09/24/22 0332     Place sequential compression  device  Until discontinued         09/24/22 0332                Discharge Planning   ELMER: 10/3/2022     Code Status: Full Code   Is the patient medically ready for discharge?: Yes    Reason for patient still in hospital (select all that apply): Patient trending condition  Discharge Plan A: Skilled Nursing Facility                  Brisa Lamar MD  Department of Hospital Medicine   [facility] - Intensive Care ([place])

## 2022-10-02 LAB
POCT GLUCOSE: 116 MG/DL (ref 70–110)
POCT GLUCOSE: 138 MG/DL (ref 70–110)
POCT GLUCOSE: 143 MG/DL (ref 70–110)
POCT GLUCOSE: 156 MG/DL (ref 70–110)

## 2022-10-02 PROCEDURE — 63600175 PHARM REV CODE 636 W HCPCS: Performed by: STUDENT IN AN ORGANIZED HEALTH CARE EDUCATION/TRAINING PROGRAM

## 2022-10-02 PROCEDURE — 25000003 PHARM REV CODE 250

## 2022-10-02 PROCEDURE — 99232 SBSQ HOSP IP/OBS MODERATE 35: CPT | Mod: GC,,, | Performed by: STUDENT IN AN ORGANIZED HEALTH CARE EDUCATION/TRAINING PROGRAM

## 2022-10-02 PROCEDURE — 99232 PR SUBSEQUENT HOSPITAL CARE,LEVL II: ICD-10-PCS | Mod: GC,,, | Performed by: STUDENT IN AN ORGANIZED HEALTH CARE EDUCATION/TRAINING PROGRAM

## 2022-10-02 PROCEDURE — 94761 N-INVAS EAR/PLS OXIMETRY MLT: CPT

## 2022-10-02 PROCEDURE — 20600001 HC STEP DOWN PRIVATE ROOM

## 2022-10-02 PROCEDURE — 25000003 PHARM REV CODE 250: Performed by: STUDENT IN AN ORGANIZED HEALTH CARE EDUCATION/TRAINING PROGRAM

## 2022-10-02 RX ADMIN — FOLIC ACID 1 MG: 1 TABLET ORAL at 08:10

## 2022-10-02 RX ADMIN — ENOXAPARIN SODIUM 40 MG: 100 INJECTION SUBCUTANEOUS at 04:10

## 2022-10-02 RX ADMIN — ASPIRIN 81 MG: 81 TABLET, CHEWABLE ORAL at 08:10

## 2022-10-02 RX ADMIN — LIDOCAINE 1 PATCH: 50 PATCH CUTANEOUS at 08:10

## 2022-10-02 RX ADMIN — ATORVASTATIN CALCIUM 40 MG: 40 TABLET, FILM COATED ORAL at 08:10

## 2022-10-02 RX ADMIN — ESCITALOPRAM 10 MG: 5 TABLET, FILM COATED ORAL at 08:10

## 2022-10-02 RX ADMIN — ACETAMINOPHEN 650 MG: 325 TABLET ORAL at 08:10

## 2022-10-02 RX ADMIN — Medication 6 MG: at 08:10

## 2022-10-02 RX ADMIN — ALLOPURINOL 100 MG: 100 TABLET ORAL at 08:10

## 2022-10-02 NOTE — PROGRESS NOTES
[facility] - [facility] ([address] Medicine  Progress Note    Patient Name: Jacques Arellano  MRN: [id number]  Patient Class: IP- Inpatient   Admission Date: 9/23/2022  Length of Stay: 8 days  Attending Physician: Susanne Trevino DO  Primary Care Provider: Matty Garsia MD        Subjective:     Principal Problem:Shock        HPI:  82 y.o. male with documented notation of CHF but TTE 03/2022 demonstrated preserved EF without diastolic dysfunction, HTN, AF on Eliquis, history of TIA, recently diagnosed UTI presents via EMS for multiple falls. Patient states he has been unsteady on his feet and lightheaded for several weeks, with multiple falls and resulting abrasions on bilateral knees, arms, lower legs. He denies fever, but has had a few episodes of chills. Patient denies LOC or head trauma, but has scraped his head against the wall while trying to get up. Denies changes in medication recently. Denies chest pain, shortness of breath, abdominal pain, constipation, dysuria, changes in urinary frequency, cough, fever, malaise. Overall, feels well but for his intermittent light-headedness. Denies new substance use. Lives at home with son, who was at bedside on initial evaluation by ED and stated patient was at baseline mentation.    On EMS arrival, patient was hypotensive in 70s / 40s, with a blood glucose of 54. S/P 1.5 L total in ED with recovery of SBP, very fluid responsive. Patient tachycardic with slight leukocytosis, lactate 3.2. Troponin 0.166 without chest pain. Given dextrose to recover BG. Admitted to Hospital Medicine for concerns of sepsis of unknown source.      Overview/Hospital Course:  Patient admitted to the MICU secondary to concerns for sepsis of unknown source. Patient required norepinephrine to keep Maps elevated. Patient started on zosyn and vancomycin for empiric coverage.Given his presentation of multiple falls, patient was scanned in the ER. No acute intracranial process was  found but patient did have an expansile calvarial lesion involving the right frontal bone. CT A/P reveals multiple findings including displaced right rib fx, dilated ascending aortic aneurysm, and cirrhotic liver with ascites. An attempt to perform a diagnostic paracentesis was tried on 9/24/22, however the procedure was not performed given close proximity of bowel to the surface. No deep enough pocket was noted to be safe for the procedure. Patient's blood culture then grew GPC in clusters. Patient continued on IV abx. A second attempt to  perform paracentesis on 9/25 however patient reported that he was eating his lunch when I presented in his room. Furthermore, when I presented again patient was receiving his RUQ US. Patient was eventually weaned off norepinephrine and stepped down to the floor. IR preformed diagnostic paracentesis on 9/27/22 and negative for SBP. Deescalate abx to cefpodoxime. QTC elevated and will consider ppx outpatient. Pending SNF decision by family.       Interval History: NAOE. Pt lying comfortably in bed with no acute complaints. He remains afebrile and VSS. Pending SNF placement    Review of Systems   Constitutional:  Positive for activity change. Negative for appetite change, chills, diaphoresis, fatigue, fever and unexpected weight change.   HENT:  Negative for congestion, hearing loss, rhinorrhea and sore throat.    Eyes:  Negative for photophobia and visual disturbance.   Respiratory:  Negative for cough, shortness of breath and wheezing.    Cardiovascular:  Negative for chest pain, palpitations and leg swelling.   Gastrointestinal:  Negative for abdominal pain, blood in stool, constipation, diarrhea, nausea and vomiting.   Genitourinary:  Negative for difficulty urinating, dysuria and hematuria.   Musculoskeletal:  Negative for arthralgias and myalgias.   Skin:  Positive for wound. Negative for pallor and rash.   Allergic/Immunologic: Negative for immunocompromised state.    Neurological:  Negative for dizziness, weakness, light-headedness, numbness and headaches.   Hematological:  Does not bruise/bleed easily.   Psychiatric/Behavioral:  Negative for agitation and confusion.    Objective:     Vital Signs (Most Recent):  Temp: 98.1 °F (36.7 °C) (10/02/22 0743)  Pulse: 70 (10/02/22 1100)  Resp: (!) 21 (10/02/22 1100)  BP: 115/62 (10/02/22 0743)  SpO2: 100 % (10/02/22 1100)   Vital Signs (24h Range):  Temp:  [97.4 °F (36.3 °C)-98.6 °F (37 °C)] 98.1 °F (36.7 °C)  Pulse:  [69-73] 70  Resp:  [17-22] 21  SpO2:  [100 %] 100 %  BP: (106-115)/(62-64) 115/62     Weight: 85.5 kg (188 lb 7.9 oz)  Body mass index is 27.05 kg/m².  No intake or output data in the 24 hours ending 10/02/22 1228   Physical Exam  Vitals and nursing note reviewed.   Constitutional:       General: He is not in acute distress.  HENT:      Head: Normocephalic and atraumatic.      Comments: Bilateral temporal wasting.     Mouth/Throat:      Mouth: Mucous membranes are moist.   Eyes:      General: No scleral icterus.     Extraocular Movements: Extraocular movements intact.      Pupils: Pupils are equal, round, and reactive to light.   Cardiovascular:      Rate and Rhythm: Normal rate and regular rhythm.      Pulses: Normal pulses.      Heart sounds: Normal heart sounds. No murmur heard.  Pulmonary:      Effort: Pulmonary effort is normal.      Breath sounds: Normal breath sounds. No wheezing, rhonchi or rales.   Abdominal:      General: Bowel sounds are normal. There is no distension.      Palpations: Abdomen is soft.      Tenderness: There is no abdominal tenderness.   Musculoskeletal:         General: Signs of injury present. No tenderness.      Right lower leg: Edema present.      Left lower leg: Edema present.   Skin:     General: Skin is warm.      Capillary Refill: Capillary refill takes less than 2 seconds.      Findings: Bruising present.   Neurological:      General: No focal deficit present.      Mental Status: He is  alert and oriented to person, place, and time.   Psychiatric:         Mood and Affect: Mood normal.         Thought Content: Thought content normal.       Significant Labs: All pertinent labs within the past 24 hours have been reviewed.    Significant Imaging: I have reviewed all pertinent imaging results/findings within the past 24 hours.      Assessment/Plan:      * Shock  Recurrent Falls  Previous H/o UTI  Light-headedness  Lactic acidosis    81 yo M presenting to ED with multiple history of falls w/o LOC / head trauma, light-headedness. Arrived with slight leukocytosis 12.7, tachycardia, hypotension (MAP low 60s), lactate 3.2. CXR unremarkable, CT without acute pulmonary or abdominal findings to suggest infection. UA pending collection. Patient appears hypovolemic on arrival. Mentation intact and at baseline. S/P sepsis fluid bolus (combined). Previously seen outpatient with pan-sensitive E Coli UTI for which he was prescribed a ciprofloxacin. CT head negative for acute abnormality (though suspicious bone finding needs OP f/u), CTAP with myriad of findings without acute infectious nidus.    Etiology: Hypotension may be 2/2 infection (though unclear source as patient would have been adequately treated with OP abx for UTI) vs HRS vs hypovolemia d/t questionable PO intake?     -- Vanc / Zosyn empiric abx now deescalate to cefpodoxime. SBP rule out negative  -- Trend fever curve, trend CBC  -- Maintain MAP > 65; IVF PRN, patient is very fluid responsive  -- UA / UCx pending  -- BCx showed contaminate of CN Staph   -- Investigate for further cause of falls including: orthostatic vitals, cardiac telemetry    Paresthesia of tongue  - C/o of tongue tingling   - Outpatient referral to ENT      Stroke  - Hx of TIA  - will start 81 mg of ASA outpatient      Balance disorder  -- Discussed with daughter, (Марина [phone]), who lives in [place] and reports brother locally with own health issues and not always  available, with patient living at home solo. Reports not just lightheadedness recently due to hypotension but actually balance issues and falling since January. Reports not eating well since that time and losing weight. Hypoalbuminemia noted. Also issues with medication adherence at home. Requests evaluating possible cause of balance issues /falling and how to mitigate. Consider hypotension vs deconditioning vs anemia contributing vs neuro vs other  -- Iron: 49, Transferrin 129, Ferritin 275, TIBC 191, Saturated Iron 26, folate 3.2 and B12 1167.   -- Initiate Boost  -- Anti-hypertensive regimen management  -- PT/OT and dispo to SNF. Appreciate CM /SW assist in resources to ensure safe discharge plan  -- referral to neuro      Palliative care encounter        Ascites  --9/23: Ct A/P:Cirrhotic morphology of the liver with sequelae of portal hypertension as evidence by splenomegaly, intra-abdominal ascites, and possible portal hypertensive enteropathy.  -- Liver US reveals liver lesions and cirrhosis. AFP WNL. Defer non-emergent MRI /CT to better characterize liver lesions.   -- Consult palliative for GOC.   --Patient denies hx of alcohol abuse   --LFT with out elevation  --Patient with ascites: IR preformed diagnostic paracentetics on 9/27 to r/o SBP: which was negative 130 WBC and 10% segs  -- will plan for ppx given low protein  -- No sxs of SBP. Very low suspicion  -- Referral to hepatology on d/c given new cirrhosis    Rib fracture  2x acute mildly displaced right anterior ribs fractures seen on CT chest  Analgesia prn  Incentive spirometry    Hypokalemia  K 2.9 on admission with concomitant hypomagnesemia.     -- Replete potassium to achieve goal of >4  --CMP daily  -- Replete Mg    TRENT (acute kidney injury)  Patient presenting with TRENT with admission sCr 1.6 (baseline sCr wnl).   DDx: Pre-renal azotemia s/o sepsis vs questionable PO intake    Serum creatinine: 1.2 mg/dL 09/28/22 0942  Estimated creatinine  clearance: 48.2 mL/min    -- resolved  -- Trend sCr  -- Strict I&Os  -- Avoid nephrotoxic agents  -- Renally adjust medications      Type 2 diabetes mellitus, without long-term current use of insulin  Last A1c 6.1 2 months ago.     -- Hold home medications while inpatient  -- POCT glucose ACHS   -- LDSSI to maintain -180    Mixed hyperlipidemia  -- Continue home statin      Hypertension    Home medications: triamterene-HCTZ, Lasix    -- Hold home medications s/o hypotension    Falls frequently  -- PT/OT consulted    Diastolic heart failure  Questionable history of diastolic CHF. Documented in chart, but previous TTE in Care Everywhere 03/2022 demonstrated normal EF without noted diastolic dysfunction. Patient also not complaining of HF symptoms (STRATTON, orthopnea, new BLE swelling, anasarca).    -- Repeat TTE on 9/26/22- EF is 50%    Permanent atrial fibrillation  Patient with documented history of permanent Afib s/p watchman, no longer on AC or rate controlling agents.  [id number] 5   HAS-BLED 4     -- Patient rate controlled, no indication for further agents at this time  -- No full AC required s/p Watchman; will hold AC ppx and APT s/o active bleeding from abrasion sites  -- Cardiac telemetry  -- Maintain K > 4, Mg > 2, Ca wnl; replete PRN  -- STAT cardiology consult if hemodynamic instability for DCCV evaluation      VTE Risk Mitigation (From admission, onward)         Ordered     Place sequential compression device  Until discontinued         09/30/22 1350     IP VTE HIGH RISK PATIENT  Once         09/30/22 1348     enoxaparin injection 40 mg  Daily         09/26/22 1220     Reason for No Pharmacological VTE Prophylaxis  Once        Question:  Reasons:  Answer:  Active Bleeding    09/24/22 0332     Place sequential compression device  Until discontinued         09/24/22 0332                Discharge Planning   ELMER: 10/3/2022     Code Status: Full Code   Is the patient medically ready for discharge?: Yes     Reason for patient still in hospital (select all that apply): Pending disposition  Discharge Plan A: Skilled Nursing Facility                  Brisa Lamar MD  Department of Hospital Medicine   [facility] - Intensive Care ([place])

## 2022-10-02 NOTE — PLAN OF CARE
Patient is stable on room air, monitored by bedside telemetry. Patient is expected to be discharged to a skilled facility on 10/03/22. Patient has multiple bruises and skin tears due to fall pre-admit.   Problem: Adult Inpatient Plan of Care  Goal: Absence of Hospital-Acquired Illness or Injury  Outcome: Ongoing, Progressing  Intervention: Prevent Infection  Flowsheets (Taken 10/2/2022 1131)  Infection Prevention:   hand hygiene promoted   single patient room provided  Goal: Optimal Comfort and Wellbeing  Outcome: Ongoing, Progressing  Intervention: Provide Person-Centered Care  Flowsheets (Taken 10/2/2022 1131)  Trust Relationship/Rapport:   care explained   questions answered

## 2022-10-02 NOTE — SUBJECTIVE & OBJECTIVE
Interval History: NAOE. Pt lying comfortably in bed with no acute complaints. He remains afebrile and VSS. Pending SNF placement    Review of Systems   Constitutional:  Positive for activity change. Negative for appetite change, chills, diaphoresis, fatigue, fever and unexpected weight change.   HENT:  Negative for congestion, hearing loss, rhinorrhea and sore throat.    Eyes:  Negative for photophobia and visual disturbance.   Respiratory:  Negative for cough, shortness of breath and wheezing.    Cardiovascular:  Negative for chest pain, palpitations and leg swelling.   Gastrointestinal:  Negative for abdominal pain, blood in stool, constipation, diarrhea, nausea and vomiting.   Genitourinary:  Negative for difficulty urinating, dysuria and hematuria.   Musculoskeletal:  Negative for arthralgias and myalgias.   Skin:  Positive for wound. Negative for pallor and rash.   Allergic/Immunologic: Negative for immunocompromised state.   Neurological:  Negative for dizziness, weakness, light-headedness, numbness and headaches.   Hematological:  Does not bruise/bleed easily.   Psychiatric/Behavioral:  Negative for agitation and confusion.    Objective:     Vital Signs (Most Recent):  Temp: 98.1 °F (36.7 °C) (10/02/22 0743)  Pulse: 70 (10/02/22 1100)  Resp: (!) 21 (10/02/22 1100)  BP: 115/62 (10/02/22 0743)  SpO2: 100 % (10/02/22 1100)   Vital Signs (24h Range):  Temp:  [97.4 °F (36.3 °C)-98.6 °F (37 °C)] 98.1 °F (36.7 °C)  Pulse:  [69-73] 70  Resp:  [17-22] 21  SpO2:  [100 %] 100 %  BP: (106-115)/(62-64) 115/62     Weight: 85.5 kg (188 lb 7.9 oz)  Body mass index is 27.05 kg/m².  No intake or output data in the 24 hours ending 10/02/22 1228   Physical Exam  Vitals and nursing note reviewed.   Constitutional:       General: He is not in acute distress.  HENT:      Head: Normocephalic and atraumatic.      Comments: Bilateral temporal wasting.     Mouth/Throat:      Mouth: Mucous membranes are moist.   Eyes:      General: No  scleral icterus.     Extraocular Movements: Extraocular movements intact.      Pupils: Pupils are equal, round, and reactive to light.   Cardiovascular:      Rate and Rhythm: Normal rate and regular rhythm.      Pulses: Normal pulses.      Heart sounds: Normal heart sounds. No murmur heard.  Pulmonary:      Effort: Pulmonary effort is normal.      Breath sounds: Normal breath sounds. No wheezing, rhonchi or rales.   Abdominal:      General: Bowel sounds are normal. There is no distension.      Palpations: Abdomen is soft.      Tenderness: There is no abdominal tenderness.   Musculoskeletal:         General: Signs of injury present. No tenderness.      Right lower leg: Edema present.      Left lower leg: Edema present.   Skin:     General: Skin is warm.      Capillary Refill: Capillary refill takes less than 2 seconds.      Findings: Bruising present.   Neurological:      General: No focal deficit present.      Mental Status: He is alert and oriented to person, place, and time.   Psychiatric:         Mood and Affect: Mood normal.         Thought Content: Thought content normal.       Significant Labs: All pertinent labs within the past 24 hours have been reviewed.    Significant Imaging: I have reviewed all pertinent imaging results/findings within the past 24 hours.

## 2022-10-03 VITALS
TEMPERATURE: 99 F | RESPIRATION RATE: 13 BRPM | WEIGHT: 188.5 LBS | DIASTOLIC BLOOD PRESSURE: 62 MMHG | SYSTOLIC BLOOD PRESSURE: 106 MMHG | OXYGEN SATURATION: 100 % | HEIGHT: 70 IN | BODY MASS INDEX: 26.99 KG/M2 | HEART RATE: 71 BPM

## 2022-10-03 PROBLEM — N17.9 AKI (ACUTE KIDNEY INJURY): Status: RESOLVED | Noted: 2022-09-24 | Resolved: 2022-10-03

## 2022-10-03 LAB
POCT GLUCOSE: 102 MG/DL (ref 70–110)
POCT GLUCOSE: 145 MG/DL (ref 70–110)
POCT GLUCOSE: 152 MG/DL (ref 70–110)

## 2022-10-03 PROCEDURE — 25000003 PHARM REV CODE 250: Performed by: STUDENT IN AN ORGANIZED HEALTH CARE EDUCATION/TRAINING PROGRAM

## 2022-10-03 PROCEDURE — 63600175 PHARM REV CODE 636 W HCPCS: Performed by: STUDENT IN AN ORGANIZED HEALTH CARE EDUCATION/TRAINING PROGRAM

## 2022-10-03 PROCEDURE — 94761 N-INVAS EAR/PLS OXIMETRY MLT: CPT

## 2022-10-03 PROCEDURE — 97535 SELF CARE MNGMENT TRAINING: CPT | Mod: CO

## 2022-10-03 PROCEDURE — 25000003 PHARM REV CODE 250

## 2022-10-03 PROCEDURE — 99239 HOSP IP/OBS DSCHRG MGMT >30: CPT | Mod: GC,,, | Performed by: STUDENT IN AN ORGANIZED HEALTH CARE EDUCATION/TRAINING PROGRAM

## 2022-10-03 PROCEDURE — 97530 THERAPEUTIC ACTIVITIES: CPT | Mod: CO

## 2022-10-03 PROCEDURE — 99239 PR HOSPITAL DISCHARGE DAY,>30 MIN: ICD-10-PCS | Mod: GC,,, | Performed by: STUDENT IN AN ORGANIZED HEALTH CARE EDUCATION/TRAINING PROGRAM

## 2022-10-03 RX ADMIN — LIDOCAINE 1 PATCH: 50 PATCH CUTANEOUS at 07:10

## 2022-10-03 RX ADMIN — ESCITALOPRAM 10 MG: 5 TABLET, FILM COATED ORAL at 09:10

## 2022-10-03 RX ADMIN — ATORVASTATIN CALCIUM 40 MG: 40 TABLET, FILM COATED ORAL at 09:10

## 2022-10-03 RX ADMIN — ASPIRIN 81 MG: 81 TABLET, CHEWABLE ORAL at 09:10

## 2022-10-03 RX ADMIN — FOLIC ACID 1 MG: 1 TABLET ORAL at 09:10

## 2022-10-03 RX ADMIN — ENOXAPARIN SODIUM 40 MG: 100 INJECTION SUBCUTANEOUS at 05:10

## 2022-10-03 RX ADMIN — ALLOPURINOL 100 MG: 100 TABLET ORAL at 09:10

## 2022-10-03 NOTE — PROGRESS NOTES
[facility] - Intensive Care ([address] Medicine  Progress Note    Patient Name: Jacques Arellano  MRN: [id number]  Patient Class: IP- Inpatient   Admission Date: 9/23/2022  Length of Stay: 9 days  Attending Physician: Susanne Trevino DO  Primary Care Provider: Matty Garsia MD        Subjective:     Principal Problem:Shock        HPI:  82 y.o. male with documented notation of CHF but TTE 03/2022 demonstrated preserved EF without diastolic dysfunction, HTN, AF on Eliquis, history of TIA, recently diagnosed UTI presents via EMS for multiple falls. Patient states he has been unsteady on his feet and lightheaded for several weeks, with multiple falls and resulting abrasions on bilateral knees, arms, lower legs. He denies fever, but has had a few episodes of chills. Patient denies LOC or head trauma, but has scraped his head against the wall while trying to get up. Denies changes in medication recently. Denies chest pain, shortness of breath, abdominal pain, constipation, dysuria, changes in urinary frequency, cough, fever, malaise. Overall, feels well but for his intermittent light-headedness. Denies new substance use. Lives at home with son, who was at bedside on initial evaluation by ED and stated patient was at baseline mentation.    On EMS arrival, patient was hypotensive in 70s / 40s, with a blood glucose of 54. S/P 1.5 L total in ED with recovery of SBP, very fluid responsive. Patient tachycardic with slight leukocytosis, lactate 3.2. Troponin 0.166 without chest pain. Given dextrose to recover BG. Admitted to Hospital Medicine for concerns of sepsis of unknown source.      Overview/Hospital Course:  Patient admitted to the MICU secondary to concerns for sepsis of unknown source. Patient required norepinephrine to keep Maps elevated. Patient started on zosyn and vancomycin for empiric coverage.Given his presentation of multiple falls, patient was scanned in the ER. No acute intracranial process was  found but patient did have an expansile calvarial lesion involving the right frontal bone. CT A/P reveals multiple findings including displaced right rib fx, dilated ascending aortic aneurysm, and cirrhotic liver with ascites. An attempt to perform a diagnostic paracentesis was tried on 9/24/22, however the procedure was not performed given close proximity of bowel to the surface. No deep enough pocket was noted to be safe for the procedure. Patient's blood culture then grew GPC in clusters. Patient continued on IV abx. A second attempt to  perform paracentesis on 9/25 however patient reported that he was eating his lunch when I presented in his room. Furthermore, when I presented again patient was receiving his RUQ US. Patient was eventually weaned off norepinephrine and stepped down to the floor. IR preformed diagnostic paracentesis on 9/27/22 and negative for SBP. Deescalate abx to cefpodoxime. QTC elevated and will consider ppx outpatient. Pending SNF decision by family.       Interval History: NAOE. Pt lying comfortably in bed with no acute complaints. He remains afebrile and VSS. Pending SNF placement    Review of Systems   Constitutional:  Positive for activity change. Negative for appetite change, chills, diaphoresis, fatigue, fever and unexpected weight change.   HENT:  Negative for congestion, hearing loss, rhinorrhea and sore throat.    Eyes:  Negative for photophobia and visual disturbance.   Respiratory:  Negative for cough, shortness of breath and wheezing.    Cardiovascular:  Negative for chest pain, palpitations and leg swelling.   Gastrointestinal:  Negative for abdominal pain, blood in stool, constipation, diarrhea, nausea and vomiting.   Genitourinary:  Negative for difficulty urinating, dysuria and hematuria.   Musculoskeletal:  Negative for arthralgias and myalgias.   Skin:  Positive for wound. Negative for pallor and rash.   Allergic/Immunologic: Negative for immunocompromised state.    Neurological:  Negative for dizziness, weakness, light-headedness, numbness and headaches.   Hematological:  Does not bruise/bleed easily.   Psychiatric/Behavioral:  Negative for agitation and confusion.    Objective:     Vital Signs (Most Recent):  Temp: 97.9 °F (36.6 °C) (10/03/22 1214)  Pulse: 69 (10/03/22 1214)  Resp: 16 (10/03/22 1214)  BP: (!) 89/51 (10/03/22 1214)  SpO2: 100 % (10/03/22 1214)   Vital Signs (24h Range):  Temp:  [97.8 °F (36.6 °C)-98.4 °F (36.9 °C)] 97.9 °F (36.6 °C)  Pulse:  [69-75] 69  Resp:  [14-27] 16  SpO2:  [100 %] 100 %  BP: ()/(51-61) 89/51     Weight: 85.5 kg (188 lb 7.9 oz)  Body mass index is 27.05 kg/m².    Intake/Output Summary (Last 24 hours) at 10/3/2022 1319  Last data filed at 10/3/2022 0032  Gross per 24 hour   Intake 222 ml   Output --   Net 222 ml        Physical Exam  Vitals and nursing note reviewed.   Constitutional:       General: He is not in acute distress.  HENT:      Head: Normocephalic and atraumatic.      Comments: Bilateral temporal wasting.     Mouth/Throat:      Mouth: Mucous membranes are moist.   Eyes:      General: No scleral icterus.     Extraocular Movements: Extraocular movements intact.      Pupils: Pupils are equal, round, and reactive to light.   Cardiovascular:      Rate and Rhythm: Normal rate and regular rhythm.      Pulses: Normal pulses.      Heart sounds: Normal heart sounds. No murmur heard.  Pulmonary:      Effort: Pulmonary effort is normal.      Breath sounds: Normal breath sounds. No wheezing, rhonchi or rales.   Abdominal:      General: Bowel sounds are normal. There is no distension.      Palpations: Abdomen is soft.      Tenderness: There is no abdominal tenderness.   Musculoskeletal:         General: Signs of injury present. No tenderness.      Right lower leg: Edema present.      Left lower leg: Edema present.   Skin:     General: Skin is warm.      Capillary Refill: Capillary refill takes less than 2 seconds.      Findings:  Bruising present.   Neurological:      General: No focal deficit present.      Mental Status: He is alert and oriented to person, place, and time.   Psychiatric:         Mood and Affect: Mood normal.         Thought Content: Thought content normal.       Significant Labs: All pertinent labs within the past 24 hours have been reviewed.    Significant Imaging: I have reviewed all pertinent imaging results/findings within the past 24 hours.      Assessment/Plan:      * Shock  Recurrent Falls  Previous H/o UTI  Light-headedness  Lactic acidosis    81 yo M presenting to ED with multiple history of falls w/o LOC / head trauma, light-headedness. Arrived with slight leukocytosis 12.7, tachycardia, hypotension (MAP low 60s), lactate 3.2. CXR unremarkable, CT without acute pulmonary or abdominal findings to suggest infection. UA pending collection. Patient appears hypovolemic on arrival. Mentation intact and at baseline. S/P sepsis fluid bolus (combined). Previously seen outpatient with pan-sensitive E Coli UTI for which he was prescribed a ciprofloxacin. CT head negative for acute abnormality (though suspicious bone finding needs OP f/u), CTAP with myriad of findings without acute infectious nidus.    Etiology: Hypotension may be 2/2 infection (though unclear source as patient would have been adequately treated with OP abx for UTI) vs HRS vs hypovolemia d/t questionable PO intake?     -- Resolved  -- Trend fever curve, trend CBC  -- Maintain MAP > 65; IVF PRN, patient is very fluid responsive  -- UA / UCx pending  -- BCx showed contaminate of CN Staph   -- Investigate for further cause of falls including: orthostatic vitals, cardiac telemetry (both negative for etiology)     Paresthesia of tongue  - C/o of tongue tingling   - Outpatient referral to ENT      Stroke  - Hx of TIA  - will start 81 mg of ASA outpatient      Balance disorder  -- Discussed with daughter, (Марина [phone]), who lives in [place] and reports  brother locally with own health issues and not always available, with patient living at home solo. Reports not just lightheadedness recently due to hypotension but actually balance issues and falling since January. Reports not eating well since that time and losing weight. Hypoalbuminemia noted. Also issues with medication adherence at home. Requests evaluating possible cause of balance issues /falling and how to mitigate. Consider hypotension vs deconditioning vs anemia contributing vs neuro vs other  -- Iron: 49, Transferrin 129, Ferritin 275, TIBC 191, Saturated Iron 26, folate 3.2 and B12 1167.   -- Initiate Boost  -- Anti-hypertensive regimen management  -- PT/OT and dispo to SNF. Appreciate CM /SW assist in resources to ensure safe discharge plan  -- referral to neuro      Palliative care encounter  Outpatient palliative care      Ascites  --9/23: Ct A/P:Cirrhotic morphology of the liver with sequelae of portal hypertension as evidence by splenomegaly, intra-abdominal ascites, and possible portal hypertensive enteropathy.  -- Liver US reveals liver lesions and cirrhosis. AFP WNL. Defer non-emergent MRI /CT to better characterize liver lesions.   --  palliative for GOC.   --Patient denies hx of alcohol abuse   --LFT with out elevation  --Patient with ascites: IR preformed diagnostic paracentetics on 9/27 to r/o SBP: which was negative 130 WBC and 10% segs  -- will not complete ppx  -- No sxs of SBP. Very low suspicion  -- Referral to hepatology on d/c given new cirrhosis    Rib fracture  2x acute mildly displaced right anterior ribs fractures seen on CT chest  Analgesia prn  Incentive spirometry    Hypokalemia  K 2.9 on admission with concomitant hypomagnesemia.     -- Replete potassium to achieve goal of >4  --CMP daily  -- Replete Mg    Type 2 diabetes mellitus, without long-term current use of insulin  Last A1c 6.1 2 months ago.     -- Hold home medications while inpatient  -- POCT glucose ACHS   -- LDSSI to  maintain -180    Mixed hyperlipidemia  -- Continue home statin      Hypertension    Home medications: triamterene-HCTZ, Lasix    -- Hold home medications s/o hypotension    Falls frequently  -- PT/OT consulted    Diastolic heart failure  Questionable history of diastolic CHF. Documented in chart, but previous TTE in Care Everywhere 03/2022 demonstrated normal EF without noted diastolic dysfunction. Patient also not complaining of HF symptoms (STRATTON, orthopnea, new BLE swelling, anasarca).    -- Repeat TTE on 9/26/22- EF is 50%    Permanent atrial fibrillation  Patient with documented history of permanent Afib s/p watchman, no longer on AC or rate controlling agents.  [id number] 5   HAS-BLED 4     -- Patient rate controlled, no indication for further agents at this time  -- No full AC required s/p Watchman; will hold AC ppx and APT s/o active bleeding from abrasion sites  -- Cardiac telemetry  -- Maintain K > 4, Mg > 2, Ca wnl; replete PRN  -- STAT cardiology consult if hemodynamic instability for DCCV evaluation      VTE Risk Mitigation (From admission, onward)         Ordered     Place sequential compression device  Until discontinued         09/30/22 1350     IP VTE HIGH RISK PATIENT  Once         09/30/22 1348     enoxaparin injection 40 mg  Daily         09/26/22 1220     Reason for No Pharmacological VTE Prophylaxis  Once        Question:  Reasons:  Answer:  Active Bleeding    09/24/22 0332     Place sequential compression device  Until discontinued         09/24/22 0332                Discharge Planning   ELMER: 10/3/2022     Code Status: Full Code   Is the patient medically ready for discharge?: Yes    Reason for patient still in hospital (select all that apply): Pending disposition  Discharge Plan A: Skilled Nursing Facility                  Veena Richards DO  Department of Hospital Medicine   [facility] - Intensive Care ([place])

## 2022-10-03 NOTE — PT/OT/SLP PROGRESS
"Occupational Therapy   Treatment    Name: Jacques Arellano  MRN: [id number]  Admitting Diagnosis:  Shock       Recommendations:     Discharge Recommendations: nursing facility, skilled  Discharge Equipment Recommendations:   (TBD)  Barriers to discharge:   (requires increased assist)    Assessment:     Jacques Arellano is a 82 y.o. male with a medical diagnosis of Shock.  He presents with the following performance deficits affecting function are weakness, impaired endurance, impaired self care skills, impaired functional mobility, gait instability, impaired balance, decreased lower extremity function, decreased upper extremity function, decreased coordination, pain, decreased safety awareness, impaired cardiopulmonary response to activity. Patient tolerated well today. Patient would benefit from continued OT services to address deficits and progress towards goals. Continue OT POC.    Rehab Prognosis:  Good; patient would benefit from acute skilled OT services to address these deficits and reach maximum level of function.       Plan:     Patient to be seen 3 x/week to address the above listed problems via self-care/home management, therapeutic activities, therapeutic exercises  Plan of Care Expires: 10/25/22  Plan of Care Reviewed with: patient    Subjective   "I have diarrhea."     Pain/Comfort:  Pain Rating 1: 0/10  Pain Rating Post-Intervention 1: 0/10    Objective:     Communicated with: RN and OTR prior to session.  Patient found HOB elevated with telemetry, pulse ox (continuous) upon OT entry to room.  A client care conference was completed by the OTR and the MEJÍA prior to treatment by the MEJÍA to discuss the patient's POC and current status.    General Precautions: Standard, fall   Orthopedic Precautions:N/A   Braces: N/A  Respiratory Status: Room air     Occupational Performance:     Bed Mobility:    Patient completed Supine to Sit with moderate assistance and HOB slightly elevated with trunk support and min LE " management      Functional Mobility/Transfers:  Patient completed Sit <> Stand Transfer with minimum assistance  with  rolling walker  X2 trials from EOB   Patient completed Bed > Chair Transfer using Step Transfer technique with moderate assistance with rolling walker  Functional Mobility: few steps using RW with Mod(A) (5 steps laterally)    Activities of Daily Living:  Lower Body Dressing: total assistance for donning B socks  Toileting: total assistance for perirectal hygiene and brief management; patient received soiled; increased time for patient request to void seated EOB using portable urinal  Feeding: SBA/S    [facility] 6 Click ADL: 15    Treatment & Education:  ADL re-training as indicated above  Functional transfer/mobility training as indicated above  Patient meal re-heated upon request  Education on OT POC, goals, current progress, and maintaining skin integrity  Addressed all patient questions/concerns within TITO scope of practice    Patient left up in chair with all lines intact, call button in reach, and RN notified    GOALS:   Multidisciplinary Problems       Occupational Therapy Goals          Problem: Occupational Therapy    Goal Priority Disciplines Outcome Interventions   Occupational Therapy Goal     OT, PT/OT Ongoing, Progressing    Description: Goals to be met by: 10/14/2022     Patient will increase functional independence with ADLs by performing:    UE Dressing with Minimal Assistance.  LE Dressing with Moderate Assistance.  Grooming while seated with Set-up Assistance.  Toileting from bedside commode with Moderate Assistance for hygiene and clothing management.   Toilet transfer to bedside commode with Moderate Assistance.                         Time Tracking:     OT Date of Treatment: 10/03/22  OT Start Time: 1420  OT Stop Time: 1508  OT Total Time (min): 48 min    Billable Minutes:Self Care/Home Management 26  Therapeutic Activity 22    OT/TITO: TITO     TITO Visit Number: 1    10/3/2022

## 2022-10-03 NOTE — PLAN OF CARE
Problem: Occupational Therapy  Goal: Occupational Therapy Goal  Description: Goals to be met by: 10/14/2022     Patient will increase functional independence with ADLs by performing:    UE Dressing with Minimal Assistance.  LE Dressing with Moderate Assistance.  Grooming while seated with Set-up Assistance.  Toileting from bedside commode with Moderate Assistance for hygiene and clothing management.   Toilet transfer to bedside commode with Moderate Assistance.    Outcome: Ongoing, Progressing

## 2022-10-03 NOTE — ASSESSMENT & PLAN NOTE
--9/23: Ct A/P:Cirrhotic morphology of the liver with sequelae of portal hypertension as evidence by splenomegaly, intra-abdominal ascites, and possible portal hypertensive enteropathy.  -- Liver US reveals liver lesions and cirrhosis. AFP WNL. Defer non-emergent MRI /CT to better characterize liver lesions.   --  palliative for GOC.   --Patient denies hx of alcohol abuse   --LFT with out elevation  --Patient with ascites: IR preformed diagnostic paracentetics on 9/27 to r/o SBP: which was negative 130 WBC and 10% segs  -- will not complete ppx  -- No sxs of SBP. Very low suspicion  -- Referral to hepatology on d/c given new cirrhosis

## 2022-10-03 NOTE — DISCHARGE SUMMARY
[facility] - Intensive Care ([place] [address])  [facility] Medicine  Discharge Summary      Patient Name: Jacques Arellano  MRN: [id number]  Patient Class: IP- Inpatient  Admission Date: 9/23/2022  Hospital Length of Stay: 9 days  Discharge Date and Time:  10/03/2022 3:26 PM  Attending Physician: Susanne Trevino DO   Discharging Provider: Veena Richards DO  Primary Care Provider: Matty Garsia MD  [facility] Medicine Team: [facility] MED 5 Veena Richards DO    HPI:   82 y.o. male with documented notation of CHF but TTE 03/2022 demonstrated preserved EF without diastolic dysfunction, HTN, AF on Eliquis, history of TIA, recently diagnosed UTI presents via EMS for multiple falls. Patient states he has been unsteady on his feet and lightheaded for several weeks, with multiple falls and resulting abrasions on bilateral knees, arms, lower legs. He denies fever, but has had a few episodes of chills. Patient denies LOC or head trauma, but has scraped his head against the wall while trying to get up. Denies changes in medication recently. Denies chest pain, shortness of breath, abdominal pain, constipation, dysuria, changes in urinary frequency, cough, fever, malaise. Overall, feels well but for his intermittent light-headedness. Denies new substance use. Lives at home with son, who was at bedside on initial evaluation by ED and stated patient was at baseline mentation.    On EMS arrival, patient was hypotensive in 70s / 40s, with a blood glucose of 54. S/P 1.5 L total in ED with recovery of SBP, very fluid responsive. Patient tachycardic with slight leukocytosis, lactate 3.2. Troponin 0.166 without chest pain. Given dextrose to recover BG. Admitted to Hospital Medicine for concerns of sepsis of unknown source.      * No surgery found *      Hospital Course:   Patient admitted to the MICU secondary to concerns for sepsis of unknown source. Patient required norepinephrine to keep Maps elevated. Patient started on zosyn and vancomycin  for empiric coverage.Given his presentation of multiple falls, patient was scanned in the ER. No acute intracranial process was found but patient did have an expansile calvarial lesion involving the right frontal bone. CT A/P reveals multiple findings including displaced right rib fx, dilated ascending aortic aneurysm, and cirrhotic liver with ascites. An attempt to perform a diagnostic paracentesis was tried on 9/24/22, however the procedure was not performed given close proximity of bowel to the surface. No deep enough pocket was noted to be safe for the procedure. Patient's blood culture then grew GPC in clusters. Patient continued on IV abx. A second attempt to  perform paracentesis on 9/25 however patient reported that he was eating his lunch when I presented in his room. Furthermore, when I presented again patient was receiving his RUQ US. Patient was eventually weaned off norepinephrine and stepped down to the floor. IR preformed diagnostic paracentesis on 9/27/22 and negative for SBP. Deescalate abx to cefpodoxime. QTC elevated and will consider ppx outpatient. Pending SNF decision by family.        Goals of Care Treatment Preferences:  Code Status: Full Code    Living Will: Yes              Consults:   Consults (From admission, onward)        Status Ordering Provider     Inpatient consult to Spiritual Care  Once        Provider:  (Not yet assigned)    Completed SATHYA KEANE     IP consult to case management  Once        Provider:  (Not yet assigned)    Acknowledged DIONIICO SANTIAGO     Inpatient consult to Palliative Care  Once        Provider:  (Not yet assigned)    Completed SHELIA TONG     Inpatient consult to Registered Dietitian/Nutritionist  Once        Provider:  (Not yet assigned)    Completed PAULINA COX     Inpatient consult to Critical Care Medicine  Once        Provider:  (Not yet assigned)    Completed CHRIS MOREJON-ON          No new Assessment & Plan notes have been filed  under this hospital service since the last note was generated.  Service: Hospital Medicine    Final Active Diagnoses:    Diagnosis Date Noted POA    PRINCIPAL PROBLEM:  Shock [R57.9] 09/24/2022 Unknown    Stroke [I63.9]  Unknown    Paresthesia of tongue [R44.8]  Unknown    Palliative care encounter [Z51.5] 09/26/2022 Not Applicable    Balance disorder [R26.89] 09/26/2022 Unknown    Ascites [R18.8] 09/25/2022 Yes    Hypokalemia [E87.6] 09/24/2022 Unknown    Rib fracture [S22.39XA] 09/24/2022 Unknown    Hypertension [I10] 07/19/2022 Yes     Chronic    Mixed hyperlipidemia [E78.2] 03/24/2022 Yes     Chronic    Diastolic heart failure [I50.30] 03/24/2022 Yes    Type 2 diabetes mellitus, without long-term current use of insulin [E11.9] 03/24/2022 Unknown    Permanent atrial fibrillation [I48.21] 02/15/2022 Yes     Chronic    Falls frequently [R29.6] 02/15/2022 Not Applicable     Chronic      Problems Resolved During this Admission:    Diagnosis Date Noted Date Resolved POA    TIA (transient ischemic attack) [G45.9] 09/29/2022 09/29/2022 Yes    TRENT (acute kidney injury) [N17.9] 09/24/2022 10/03/2022 Unknown       Discharged Condition: good    Disposition: Skilled Nursing Facility    Follow Up:    Patient Instructions:      Ambulatory referral/consult to Neurology   Standing Status: Future   Referral Priority: Routine Referral Type: Consultation   Referral Reason: Specialty Services Required   Requested Specialty: Neurology   Number of Visits Requested: 1     Ambulatory referral/consult to CLINIC Palliative Care   Standing Status: Future   Referral Priority: Routine Referral Type: Consultation   Requested Specialty: Palliative Medicine   Number of Visits Requested: 1     Ambulatory referral/consult to Internal Medicine   Standing Status: Future   Referral Priority: Routine Referral Type: Consultation   Referral Reason: Specialty Services Required   Requested Specialty: Internal Medicine   Number of Visits  Requested: 1     Ambulatory referral/consult to Hepatology   Standing Status: Future   Referral Priority: Routine Referral Type: Consultation   Referral Reason: Specialty Services Required   Requested Specialty: Hepatology   Number of Visits Requested: 1       Significant Diagnostic Studies: Labs:   CMP No results for input(s): NA, K, CL, CO2, GLU, BUN, CREATININE, CALCIUM, PROT, ALBUMIN, BILITOT, ALKPHOS, AST, ALT, ANIONGAP, ESTGFRAFRICA, EGFRNONAA in the last 48 hours., CBC No results for input(s): WBC, HGB, HCT, PLT in the last 48 hours., INR   Lab Results   Component Value Date    INR 1.5 (H) 09/23/2022    and All labs within the past 24 hours have been reviewed    Pending Diagnostic Studies:     Procedure Component Value Units Date/Time    Brain natriuretic peptide [[id number]] Collected: 09/23/22 2220    Order Status: Sent Lab Status: In process Updated: 09/23/22 2220    Specimen: Blood          Medications:  Reconciled Home Medications:      Medication List      START taking these medications    allopurinoL 100 MG tablet  Commonly known as: ZYLOPRIM  Take 1 tablet (100 mg total) by mouth once daily.  Replaces: febuxostat 40 mg Tab     aspirin 81 MG Chew  Take 1 tablet (81 mg total) by mouth once daily.  Replaces: aspirin 81 MG EC tablet     folic acid 1 MG tablet  Commonly known as: FOLVITE  Take 1 tablet (1 mg total) by mouth once daily.     LIDOcaine 5 %  Commonly known as: LIDODERM  Place 1 patch onto the skin once daily. Remove & Discard patch within 12 hours or as directed by MD. Apply to right ribs.        CHANGE how you take these medications    acetaminophen 325 MG tablet  Commonly known as: TYLENOL  Take 2 tablets (650 mg total) by mouth every 6 (six) hours as needed for Pain (DO NOT EXCEED more than 2000 mg in 1 day).  What changed: reasons to take this        CONTINUE taking these medications    atorvastatin 40 MG tablet  Commonly known as: LIPITOR  Take 40 mg by mouth once daily.     EScitalopram  oxalate 10 MG tablet  Commonly known as: LEXAPRO  Take 1 tablet (10 mg total) by mouth once daily.        STOP taking these medications    amoxicillin 500 MG capsule  Commonly known as: AMOXIL     aspirin 81 MG EC tablet  Commonly known as: ECOTRIN  Replaced by: aspirin 81 MG Chew     ciprofloxacin HCl 500 MG tablet  Commonly known as: CIPRO     clorazepate 7.5 MG Tab  Commonly known as: TRANXENE     febuxostat 40 mg Tab  Commonly known as: ULORIC  Replaced by: allopurinoL 100 MG tablet     furosemide 20 MG tablet  Commonly known as: LASIX     glipiZIDE 10 MG tablet  Commonly known as: GLUCOTROL     HYDROcodone-acetaminophen 5-325 mg per tablet  Commonly known as: NORCO     metFORMIN 1000 MG tablet  Commonly known as: GLUCOPHAGE     ondansetron 4 MG tablet  Commonly known as: ZOFRAN     potassium chloride 10 MEQ Cpsr  Commonly known as: MICRO-K     triamterene-hydrochlorothiazide 75-50 mg 75-50 mg per tablet  Commonly known as: MAXZIDE            Indwelling Lines/Drains at time of discharge:   Lines/Drains/Airways     None                 Time spent on the discharge of patient: 45 minutes         Veena Richards DO  Department of Hospital Medicine  [facility] - Intensive Care ([place]-)

## 2022-10-03 NOTE — ASSESSMENT & PLAN NOTE
Patient presenting with TRENT with admission sCr 1.6 (baseline sCr wnl).   DDx: Pre-renal azotemia s/o sepsis vs questionable PO intake    Serum creatinine: 0.9 mg/dL 10/01/22 0555  Estimated creatinine clearance: 64.2 mL/min    -- resolved  -- Trend sCr  -- Strict I&Os  -- Avoid nephrotoxic agents  -- Renally adjust medications

## 2022-10-03 NOTE — ASSESSMENT & PLAN NOTE
Patient with documented history of permanent Afib s/p watchman, no longer on AC or rate controlling agents.  [id number] 5   HAS-BLED 4     -- Patient rate controlled, no indication for further agents at this time  -- No full AC required s/p Watchman; will hold AC ppx and APT s/o active bleeding from abrasion sites  -- Cardiac telemetry  -- Maintain K > 4, Mg > 2, Ca wnl; replete PRN  -- STAT cardiology consult if hemodynamic instability for DCCV evaluation

## 2022-10-03 NOTE — ASSESSMENT & PLAN NOTE
-- Discussed with daughter, (Марина [phone]), who lives in [place] and reports brother locally with own health issues and not always available, with patient living at home solo. Reports not just lightheadedness recently due to hypotension but actually balance issues and falling since January. Reports not eating well since that time and losing weight. Hypoalbuminemia noted. Also issues with medication adherence at home. Requests evaluating possible cause of balance issues /falling and how to mitigate. Consider hypotension vs deconditioning vs anemia contributing vs neuro vs other  -- Iron: 49, Transferrin 129, Ferritin 275, TIBC 191, Saturated Iron 26, folate 3.2 and B12 1167.   -- Initiate Boost  -- Anti-hypertensive regimen management  -- PT/OT and dispo to SNF. Appreciate CM /SW assist in resources to ensure safe discharge plan  -- referral to neuro

## 2022-10-03 NOTE — ASSESSMENT & PLAN NOTE
Recurrent Falls  Previous H/o UTI  Light-headedness  Lactic acidosis    81 yo M presenting to ED with multiple history of falls w/o LOC / head trauma, light-headedness. Arrived with slight leukocytosis 12.7, tachycardia, hypotension (MAP low 60s), lactate 3.2. CXR unremarkable, CT without acute pulmonary or abdominal findings to suggest infection. UA pending collection. Patient appears hypovolemic on arrival. Mentation intact and at baseline. S/P sepsis fluid bolus (combined). Previously seen outpatient with pan-sensitive E Coli UTI for which he was prescribed a ciprofloxacin. CT head negative for acute abnormality (though suspicious bone finding needs OP f/u), CTAP with myriad of findings without acute infectious nidus.    Etiology: Hypotension may be 2/2 infection (though unclear source as patient would have been adequately treated with OP abx for UTI) vs HRS vs hypovolemia d/t questionable PO intake?     -- Resolved  -- Trend fever curve, trend CBC  -- Maintain MAP > 65; IVF PRN, patient is very fluid responsive  -- UA / UCx pending  -- BCx showed contaminate of CN Staph   -- Investigate for further cause of falls including: orthostatic vitals, cardiac telemetry (both negative for etiology)

## 2022-10-03 NOTE — PLAN OF CARE
[facility] - Intensive Care ([facility])  Discharge Final Note    Primary Care Provider: Matty Garsia MD    Expected Discharge Date: 10/3/2022    Final Discharge Note (most recent)       Final Note - 10/03/22 1603          Final Note    Assessment Type Final Discharge Note (P)      Anticipated Discharge Disposition Skilled Nursing Facility (P)      Hospital Resources/Appts/Education Provided Provided patient/caregiver with written discharge plan information;Appointments scheduled and added to AVS (P)                      Important Message from Medicare  Important Message from Medicare regarding Discharge Appeal Rights: Given to patient/caregiver, Explained to patient/caregiver, Signed/date by patient/caregiver, Other (comments) (verbal: spoke with Марина)     Date IMM was signed: 09/30/22  Time IMM was signed: 1433    Patient discharging today to [facility]. SW has arranged transport for 5:00pm.  Nurse call report to [phone].       Ranjana Zeng LMSW  [facility]   [id number]

## 2022-10-03 NOTE — SUBJECTIVE & OBJECTIVE
Interval History: NAOE. Pt lying comfortably in bed with no acute complaints. He remains afebrile and VSS. Pending SNF placement    Review of Systems   Constitutional:  Positive for activity change. Negative for appetite change, chills, diaphoresis, fatigue, fever and unexpected weight change.   HENT:  Negative for congestion, hearing loss, rhinorrhea and sore throat.    Eyes:  Negative for photophobia and visual disturbance.   Respiratory:  Negative for cough, shortness of breath and wheezing.    Cardiovascular:  Negative for chest pain, palpitations and leg swelling.   Gastrointestinal:  Negative for abdominal pain, blood in stool, constipation, diarrhea, nausea and vomiting.   Genitourinary:  Negative for difficulty urinating, dysuria and hematuria.   Musculoskeletal:  Negative for arthralgias and myalgias.   Skin:  Positive for wound. Negative for pallor and rash.   Allergic/Immunologic: Negative for immunocompromised state.   Neurological:  Negative for dizziness, weakness, light-headedness, numbness and headaches.   Hematological:  Does not bruise/bleed easily.   Psychiatric/Behavioral:  Negative for agitation and confusion.    Objective:     Vital Signs (Most Recent):  Temp: 97.9 °F (36.6 °C) (10/03/22 1214)  Pulse: 69 (10/03/22 1214)  Resp: 16 (10/03/22 1214)  BP: (!) 89/51 (10/03/22 1214)  SpO2: 100 % (10/03/22 1214)   Vital Signs (24h Range):  Temp:  [97.8 °F (36.6 °C)-98.4 °F (36.9 °C)] 97.9 °F (36.6 °C)  Pulse:  [69-75] 69  Resp:  [14-27] 16  SpO2:  [100 %] 100 %  BP: ()/(51-61) 89/51     Weight: 85.5 kg (188 lb 7.9 oz)  Body mass index is 27.05 kg/m².    Intake/Output Summary (Last 24 hours) at 10/3/2022 1319  Last data filed at 10/3/2022 0032  Gross per 24 hour   Intake 222 ml   Output --   Net 222 ml        Physical Exam  Vitals and nursing note reviewed.   Constitutional:       General: He is not in acute distress.  HENT:      Head: Normocephalic and atraumatic.      Comments: Bilateral  temporal wasting.     Mouth/Throat:      Mouth: Mucous membranes are moist.   Eyes:      General: No scleral icterus.     Extraocular Movements: Extraocular movements intact.      Pupils: Pupils are equal, round, and reactive to light.   Cardiovascular:      Rate and Rhythm: Normal rate and regular rhythm.      Pulses: Normal pulses.      Heart sounds: Normal heart sounds. No murmur heard.  Pulmonary:      Effort: Pulmonary effort is normal.      Breath sounds: Normal breath sounds. No wheezing, rhonchi or rales.   Abdominal:      General: Bowel sounds are normal. There is no distension.      Palpations: Abdomen is soft.      Tenderness: There is no abdominal tenderness.   Musculoskeletal:         General: Signs of injury present. No tenderness.      Right lower leg: Edema present.      Left lower leg: Edema present.   Skin:     General: Skin is warm.      Capillary Refill: Capillary refill takes less than 2 seconds.      Findings: Bruising present.   Neurological:      General: No focal deficit present.      Mental Status: He is alert and oriented to person, place, and time.   Psychiatric:         Mood and Affect: Mood normal.         Thought Content: Thought content normal.       Significant Labs: All pertinent labs within the past 24 hours have been reviewed.    Significant Imaging: I have reviewed all pertinent imaging results/findings within the past 24 hours.

## 2022-10-04 LAB — BACTERIA SPEC ANAEROBE CULT: NORMAL

## 2022-10-14 ENCOUNTER — TELEPHONE (OUTPATIENT)
Dept: HEPATOLOGY | Facility: CLINIC | Age: 82
End: 2022-10-14
Payer: MEDICARE

## 2022-10-14 ENCOUNTER — HOSPITAL ENCOUNTER (INPATIENT)
Facility: HOSPITAL | Age: 82
LOS: 13 days | Discharge: HOSPICE | DRG: 372 | End: 2022-10-27
Attending: EMERGENCY MEDICINE | Admitting: HOSPITALIST
Payer: MEDICARE

## 2022-10-14 DIAGNOSIS — K92.1 GASTROINTESTINAL HEMORRHAGE WITH MELENA: Primary | ICD-10-CM

## 2022-10-14 DIAGNOSIS — I50.32 CHRONIC DIASTOLIC HEART FAILURE: ICD-10-CM

## 2022-10-14 DIAGNOSIS — D72.829 LEUKOCYTOSIS, UNSPECIFIED TYPE: ICD-10-CM

## 2022-10-14 DIAGNOSIS — Z95.818 PRESENCE OF WATCHMAN LEFT ATRIAL APPENDAGE CLOSURE DEVICE: Chronic | ICD-10-CM

## 2022-10-14 DIAGNOSIS — I48.21 PERMANENT ATRIAL FIBRILLATION: Chronic | ICD-10-CM

## 2022-10-14 DIAGNOSIS — E78.2 MIXED HYPERLIPIDEMIA: Chronic | ICD-10-CM

## 2022-10-14 DIAGNOSIS — R79.1 ELEVATED INR: ICD-10-CM

## 2022-10-14 DIAGNOSIS — Z95.0 PRESENCE OF CARDIAC PACEMAKER: ICD-10-CM

## 2022-10-14 DIAGNOSIS — M1A.00X0 IDIOPATHIC CHRONIC GOUT WITHOUT TOPHUS, UNSPECIFIED SITE: Chronic | ICD-10-CM

## 2022-10-14 DIAGNOSIS — E11.9 TYPE 2 DIABETES MELLITUS WITHOUT COMPLICATION, WITHOUT LONG-TERM CURRENT USE OF INSULIN: ICD-10-CM

## 2022-10-14 DIAGNOSIS — Z79.01 CHRONIC ANTICOAGULATION: ICD-10-CM

## 2022-10-14 DIAGNOSIS — R41.82 ALTERED MENTAL STATUS, UNSPECIFIED ALTERED MENTAL STATUS TYPE: ICD-10-CM

## 2022-10-14 DIAGNOSIS — I95.9 HYPOTENSION: ICD-10-CM

## 2022-10-14 DIAGNOSIS — A04.72 C. DIFFICILE COLITIS: ICD-10-CM

## 2022-10-14 DIAGNOSIS — R07.9 CHEST PAIN: ICD-10-CM

## 2022-10-14 DIAGNOSIS — R94.31 PROLONGED Q-T INTERVAL ON ECG: ICD-10-CM

## 2022-10-14 DIAGNOSIS — I10 PRIMARY HYPERTENSION: ICD-10-CM

## 2022-10-14 DIAGNOSIS — K74.69 OTHER CIRRHOSIS OF LIVER: ICD-10-CM

## 2022-10-14 DIAGNOSIS — L30.8 DERMATITIS ASSOCIATED WITH MOISTURE: ICD-10-CM

## 2022-10-14 DIAGNOSIS — R79.89 ELEVATED LACTIC ACID LEVEL: ICD-10-CM

## 2022-10-14 DIAGNOSIS — K70.31 ALCOHOLIC CIRRHOSIS OF LIVER WITH ASCITES: ICD-10-CM

## 2022-10-14 DIAGNOSIS — K92.2 GASTROINTESTINAL HEMORRHAGE, UNSPECIFIED GASTROINTESTINAL HEMORRHAGE TYPE: ICD-10-CM

## 2022-10-14 DIAGNOSIS — R00.1 BRADYCARDIA: ICD-10-CM

## 2022-10-14 DIAGNOSIS — I50.9 CHF (CONGESTIVE HEART FAILURE): ICD-10-CM

## 2022-10-14 DIAGNOSIS — E83.42 HYPOMAGNESEMIA: ICD-10-CM

## 2022-10-14 DIAGNOSIS — K52.9 COLITIS: ICD-10-CM

## 2022-10-14 PROBLEM — R19.7 DIARRHEA: Status: ACTIVE | Noted: 2022-10-14

## 2022-10-14 LAB
ABO + RH BLD: NORMAL
ALBUMIN SERPL BCP-MCNC: 2.6 G/DL (ref 3.5–5.2)
ALLENS TEST: ABNORMAL
ALP SERPL-CCNC: 167 U/L (ref 55–135)
ALT SERPL W/O P-5'-P-CCNC: 17 U/L (ref 10–44)
AMMONIA PLAS-SCNC: 44 UMOL/L (ref 10–50)
ANION GAP SERPL CALC-SCNC: 12 MMOL/L (ref 8–16)
APTT BLDCRRT: 31.8 SEC (ref 21–32)
AST SERPL-CCNC: 29 U/L (ref 10–40)
B-OH-BUTYR BLD STRIP-SCNC: 1.7 MMOL/L (ref 0–0.5)
BACTERIA #/AREA URNS AUTO: ABNORMAL /HPF
BASOPHILS # BLD AUTO: 0.08 K/UL (ref 0–0.2)
BASOPHILS NFR BLD: 0.3 % (ref 0–1.9)
BILIRUB SERPL-MCNC: 1.7 MG/DL (ref 0.1–1)
BILIRUB UR QL STRIP: NEGATIVE
BLD GP AB SCN CELLS X3 SERPL QL: NORMAL
BNP SERPL-MCNC: 544 PG/ML (ref 0–99)
BUN SERPL-MCNC: 25 MG/DL (ref 8–23)
CALCIUM SERPL-MCNC: 8.5 MG/DL (ref 8.7–10.5)
CHLORIDE SERPL-SCNC: 108 MMOL/L (ref 95–110)
CK SERPL-CCNC: 177 U/L (ref 20–200)
CLARITY UR REFRACT.AUTO: CLEAR
CO2 SERPL-SCNC: 17 MMOL/L (ref 23–29)
COLOR UR AUTO: YELLOW
CREAT SERPL-MCNC: 1.1 MG/DL (ref 0.5–1.4)
DELSYS: ABNORMAL
DIFFERENTIAL METHOD: ABNORMAL
EOSINOPHIL # BLD AUTO: 0 K/UL (ref 0–0.5)
EOSINOPHIL NFR BLD: 0.1 % (ref 0–8)
ERYTHROCYTE [DISTWIDTH] IN BLOOD BY AUTOMATED COUNT: 17.1 % (ref 11.5–14.5)
EST. GFR  (NO RACE VARIABLE): >60 ML/MIN/1.73 M^2
GLUCOSE SERPL-MCNC: 83 MG/DL (ref 70–110)
GLUCOSE UR QL STRIP: NEGATIVE
HCO3 UR-SCNC: 18.3 MMOL/L (ref 24–28)
HCT VFR BLD AUTO: 32.8 % (ref 40–54)
HGB BLD-MCNC: 10.4 G/DL (ref 14–18)
HGB UR QL STRIP: NEGATIVE
HYALINE CASTS UR QL AUTO: 2 /LPF
IMM GRANULOCYTES # BLD AUTO: 0.46 K/UL (ref 0–0.04)
IMM GRANULOCYTES NFR BLD AUTO: 1.9 % (ref 0–0.5)
INR PPP: 1.4 (ref 0.8–1.2)
KETONES UR QL STRIP: NEGATIVE
LACTATE SERPL-SCNC: 2.4 MMOL/L (ref 0.5–2.2)
LEUKOCYTE ESTERASE UR QL STRIP: ABNORMAL
LIPASE SERPL-CCNC: 8 U/L (ref 4–60)
LYMPHOCYTES # BLD AUTO: 1 K/UL (ref 1–4.8)
LYMPHOCYTES NFR BLD: 4 % (ref 18–48)
MAGNESIUM SERPL-MCNC: 1.4 MG/DL (ref 1.6–2.6)
MCH RBC QN AUTO: 30.5 PG (ref 27–31)
MCHC RBC AUTO-ENTMCNC: 31.7 G/DL (ref 32–36)
MCV RBC AUTO: 96 FL (ref 82–98)
MICROSCOPIC COMMENT: ABNORMAL
MODE: ABNORMAL
MONOCYTES # BLD AUTO: 1.1 K/UL (ref 0.3–1)
MONOCYTES NFR BLD: 4.7 % (ref 4–15)
NEUTROPHILS # BLD AUTO: 21.6 K/UL (ref 1.8–7.7)
NEUTROPHILS NFR BLD: 89 % (ref 38–73)
NITRITE UR QL STRIP: NEGATIVE
NRBC BLD-RTO: 0 /100 WBC
PCO2 BLDA: 32.6 MMHG (ref 35–45)
PH SMN: 7.36 [PH] (ref 7.35–7.45)
PH UR STRIP: 6 [PH] (ref 5–8)
PHOSPHATE SERPL-MCNC: 2.8 MG/DL (ref 2.7–4.5)
PLATELET # BLD AUTO: 195 K/UL (ref 150–450)
PLATELET BLD QL SMEAR: ABNORMAL
PMV BLD AUTO: 10.4 FL (ref 9.2–12.9)
PO2 BLDA: 18 MMHG (ref 40–60)
POC BE: -7 MMOL/L
POC SATURATED O2: 26 % (ref 95–100)
POC TCO2: 19 MMOL/L (ref 24–29)
POTASSIUM SERPL-SCNC: 3.9 MMOL/L (ref 3.5–5.1)
PROCALCITONIN SERPL IA-MCNC: 0.1 NG/ML
PROT SERPL-MCNC: 5.3 G/DL (ref 6–8.4)
PROT UR QL STRIP: NEGATIVE
PROTHROMBIN TIME: 14.7 SEC (ref 9–12.5)
RBC # BLD AUTO: 3.41 M/UL (ref 4.6–6.2)
RBC #/AREA URNS AUTO: 1 /HPF (ref 0–4)
SAMPLE: ABNORMAL
SITE: ABNORMAL
SODIUM SERPL-SCNC: 137 MMOL/L (ref 136–145)
SP GR UR STRIP: >1.03 (ref 1–1.03)
SQUAMOUS #/AREA URNS AUTO: 0 /HPF
TROPONIN I SERPL DL<=0.01 NG/ML-MCNC: 0.08 NG/ML (ref 0–0.03)
URN SPEC COLLECT METH UR: ABNORMAL
WBC # BLD AUTO: 24.21 K/UL (ref 3.9–12.7)
WBC #/AREA URNS AUTO: 8 /HPF (ref 0–5)

## 2022-10-14 PROCEDURE — 84145 PROCALCITONIN (PCT): CPT | Performed by: PHYSICIAN ASSISTANT

## 2022-10-14 PROCEDURE — 99291 CRITICAL CARE FIRST HOUR: CPT | Mod: ,,, | Performed by: PHYSICIAN ASSISTANT

## 2022-10-14 PROCEDURE — 83880 ASSAY OF NATRIURETIC PEPTIDE: CPT | Performed by: PHYSICIAN ASSISTANT

## 2022-10-14 PROCEDURE — 83605 ASSAY OF LACTIC ACID: CPT | Performed by: PHYSICIAN ASSISTANT

## 2022-10-14 PROCEDURE — 63600175 PHARM REV CODE 636 W HCPCS: Performed by: STUDENT IN AN ORGANIZED HEALTH CARE EDUCATION/TRAINING PROGRAM

## 2022-10-14 PROCEDURE — 93010 ELECTROCARDIOGRAM REPORT: CPT | Mod: ,,, | Performed by: INTERNAL MEDICINE

## 2022-10-14 PROCEDURE — 63600175 PHARM REV CODE 636 W HCPCS: Performed by: PHYSICIAN ASSISTANT

## 2022-10-14 PROCEDURE — 94761 N-INVAS EAR/PLS OXIMETRY MLT: CPT

## 2022-10-14 PROCEDURE — 80053 COMPREHEN METABOLIC PANEL: CPT | Performed by: PHYSICIAN ASSISTANT

## 2022-10-14 PROCEDURE — C9113 INJ PANTOPRAZOLE SODIUM, VIA: HCPCS | Performed by: PHYSICIAN ASSISTANT

## 2022-10-14 PROCEDURE — 83735 ASSAY OF MAGNESIUM: CPT | Performed by: PHYSICIAN ASSISTANT

## 2022-10-14 PROCEDURE — 27000207 HC ISOLATION

## 2022-10-14 PROCEDURE — 12000002 HC ACUTE/MED SURGE SEMI-PRIVATE ROOM

## 2022-10-14 PROCEDURE — 25000003 PHARM REV CODE 250: Performed by: STUDENT IN AN ORGANIZED HEALTH CARE EDUCATION/TRAINING PROGRAM

## 2022-10-14 PROCEDURE — 83690 ASSAY OF LIPASE: CPT | Performed by: PHYSICIAN ASSISTANT

## 2022-10-14 PROCEDURE — 85025 COMPLETE CBC W/AUTO DIFF WBC: CPT | Performed by: PHYSICIAN ASSISTANT

## 2022-10-14 PROCEDURE — 82140 ASSAY OF AMMONIA: CPT | Performed by: PHYSICIAN ASSISTANT

## 2022-10-14 PROCEDURE — 99900035 HC TECH TIME PER 15 MIN (STAT)

## 2022-10-14 PROCEDURE — 84484 ASSAY OF TROPONIN QUANT: CPT | Performed by: PHYSICIAN ASSISTANT

## 2022-10-14 PROCEDURE — 85610 PROTHROMBIN TIME: CPT | Performed by: PHYSICIAN ASSISTANT

## 2022-10-14 PROCEDURE — 99285 EMERGENCY DEPT VISIT HI MDM: CPT | Mod: 25

## 2022-10-14 PROCEDURE — 20600001 HC STEP DOWN PRIVATE ROOM

## 2022-10-14 PROCEDURE — 96374 THER/PROPH/DIAG INJ IV PUSH: CPT

## 2022-10-14 PROCEDURE — 81001 URINALYSIS AUTO W/SCOPE: CPT | Performed by: PHYSICIAN ASSISTANT

## 2022-10-14 PROCEDURE — 82010 KETONE BODYS QUAN: CPT | Performed by: PHYSICIAN ASSISTANT

## 2022-10-14 PROCEDURE — 93010 EKG 12-LEAD: ICD-10-PCS | Mod: ,,, | Performed by: INTERNAL MEDICINE

## 2022-10-14 PROCEDURE — 82550 ASSAY OF CK (CPK): CPT | Performed by: PHYSICIAN ASSISTANT

## 2022-10-14 PROCEDURE — 93005 ELECTROCARDIOGRAM TRACING: CPT

## 2022-10-14 PROCEDURE — 87040 BLOOD CULTURE FOR BACTERIA: CPT | Mod: 59 | Performed by: PHYSICIAN ASSISTANT

## 2022-10-14 PROCEDURE — 84100 ASSAY OF PHOSPHORUS: CPT | Performed by: PHYSICIAN ASSISTANT

## 2022-10-14 PROCEDURE — 99223 PR INITIAL HOSPITAL CARE,LEVL III: ICD-10-PCS | Mod: AI,GC,, | Performed by: HOSPITALIST

## 2022-10-14 PROCEDURE — 25500020 PHARM REV CODE 255: Performed by: EMERGENCY MEDICINE

## 2022-10-14 PROCEDURE — 82803 BLOOD GASES ANY COMBINATION: CPT

## 2022-10-14 PROCEDURE — 99223 1ST HOSP IP/OBS HIGH 75: CPT | Mod: AI,GC,, | Performed by: HOSPITALIST

## 2022-10-14 PROCEDURE — 86901 BLOOD TYPING SEROLOGIC RH(D): CPT | Performed by: PHYSICIAN ASSISTANT

## 2022-10-14 PROCEDURE — 85730 THROMBOPLASTIN TIME PARTIAL: CPT | Performed by: PHYSICIAN ASSISTANT

## 2022-10-14 PROCEDURE — 99291 PR CRITICAL CARE, E/M 30-74 MINUTES: ICD-10-PCS | Mod: ,,, | Performed by: PHYSICIAN ASSISTANT

## 2022-10-14 RX ORDER — SODIUM CHLORIDE 0.9 % (FLUSH) 0.9 %
10 SYRINGE (ML) INJECTION EVERY 12 HOURS PRN
Status: DISCONTINUED | OUTPATIENT
Start: 2022-10-14 | End: 2022-10-27

## 2022-10-14 RX ORDER — MAGNESIUM SULFATE HEPTAHYDRATE 40 MG/ML
2 INJECTION, SOLUTION INTRAVENOUS ONCE
Status: COMPLETED | OUTPATIENT
Start: 2022-10-14 | End: 2022-10-15

## 2022-10-14 RX ORDER — PANTOPRAZOLE SODIUM 40 MG/10ML
80 INJECTION, POWDER, LYOPHILIZED, FOR SOLUTION INTRAVENOUS
Status: COMPLETED | OUTPATIENT
Start: 2022-10-14 | End: 2022-10-14

## 2022-10-14 RX ORDER — FOLIC ACID 1 MG/1
1 TABLET ORAL DAILY
Status: DISCONTINUED | OUTPATIENT
Start: 2022-10-15 | End: 2022-10-27

## 2022-10-14 RX ORDER — ATORVASTATIN CALCIUM 40 MG/1
40 TABLET, FILM COATED ORAL DAILY
Status: DISCONTINUED | OUTPATIENT
Start: 2022-10-15 | End: 2022-10-27

## 2022-10-14 RX ORDER — PANTOPRAZOLE SODIUM 40 MG/10ML
40 INJECTION, POWDER, LYOPHILIZED, FOR SOLUTION INTRAVENOUS 2 TIMES DAILY
Status: DISCONTINUED | OUTPATIENT
Start: 2022-10-15 | End: 2022-10-19

## 2022-10-14 RX ORDER — IBUPROFEN 200 MG
24 TABLET ORAL
Status: DISCONTINUED | OUTPATIENT
Start: 2022-10-14 | End: 2022-10-27

## 2022-10-14 RX ORDER — ALLOPURINOL 100 MG/1
100 TABLET ORAL DAILY
Status: DISCONTINUED | OUTPATIENT
Start: 2022-10-15 | End: 2022-10-18

## 2022-10-14 RX ORDER — NALOXONE HCL 0.4 MG/ML
0.02 VIAL (ML) INJECTION
Status: DISCONTINUED | OUTPATIENT
Start: 2022-10-14 | End: 2022-10-27

## 2022-10-14 RX ORDER — SODIUM CHLORIDE 0.9 % (FLUSH) 0.9 %
10 SYRINGE (ML) INJECTION
Status: DISCONTINUED | OUTPATIENT
Start: 2022-10-14 | End: 2022-10-27

## 2022-10-14 RX ORDER — ESCITALOPRAM OXALATE 10 MG/1
10 TABLET ORAL DAILY
Status: DISCONTINUED | OUTPATIENT
Start: 2022-10-15 | End: 2022-10-27

## 2022-10-14 RX ORDER — GLUCAGON 1 MG
1 KIT INJECTION
Status: DISCONTINUED | OUTPATIENT
Start: 2022-10-14 | End: 2022-10-27

## 2022-10-14 RX ORDER — IBUPROFEN 200 MG
16 TABLET ORAL
Status: DISCONTINUED | OUTPATIENT
Start: 2022-10-14 | End: 2022-10-27

## 2022-10-14 RX ADMIN — MAGNESIUM SULFATE 2 G: 2 INJECTION INTRAVENOUS at 10:10

## 2022-10-14 RX ADMIN — PIPERACILLIN SODIUM AND TAZOBACTAM SODIUM 4.5 G: 4; .5 INJECTION, POWDER, LYOPHILIZED, FOR SOLUTION INTRAVENOUS at 10:10

## 2022-10-14 RX ADMIN — PANTOPRAZOLE SODIUM 80 MG: 40 INJECTION, POWDER, FOR SOLUTION INTRAVENOUS at 04:10

## 2022-10-14 RX ADMIN — SODIUM CHLORIDE 500 ML: 0.9 INJECTION, SOLUTION INTRAVENOUS at 10:10

## 2022-10-14 RX ADMIN — IOHEXOL 100 ML: 350 INJECTION, SOLUTION INTRAVENOUS at 03:10

## 2022-10-14 NOTE — ASSESSMENT & PLAN NOTE
Last A1c 6.1 3 months ago. Hypoglycemic on admission.      Plan:  - No home medications  Noted   - POCT glucose ACHS   - Low threshold to start LDSSI

## 2022-10-14 NOTE — ASSESSMENT & PLAN NOTE
9/23: Ct A/P:Cirrhotic morphology of the liver with sequelae of portal hypertension as evidence by splenomegaly, intra-abdominal ascites, and possible portal hypertensive enteropathy. Pt denies hx of alcohol abuse.  R preformed diagnostic paracentetics on 9/27 to r/o SBP: which was negative 130 WBC and 10%     Plan:  - daily CMP

## 2022-10-14 NOTE — ASSESSMENT & PLAN NOTE
Patient with documented history of permanent Afib s/p watchman, no longer on AC or rate controlling agents.  [id number] 5   HAS-BLED 4      Plan:  - Patient rate controlled, no indication for further agents at this time  - No full AC required s/p Watchman; will hold AC ppx and APT s/o active bleeding from abrasion sites  - Cardiac telemetry  - Maintain K > 4, Mg > 2, Ca wnl; replete PRN  - STAT cardiology consult if hemodynamic instability for DCCV evaluation

## 2022-10-14 NOTE — ASSESSMENT & PLAN NOTE
Pt endorses melena. CT scan with no evidence of contrast extravasation to suggest active GI bleed at this time. Stable 3 cm enhancing lesion at the hepatic dome with evidence of liver cirrhosis, circumferential wall thickening of the rectum and sigmoid colon concern for colitis, and pancreatic hypodensities measuring up to 2.0 cm. Considering liver disease, suspicion for ruptured esophogeal varcies. Recent EGD at [facility] reviewed, will discuss with GI    Plan:  - CBC q12, trend H&H  - GI consulted  - PPI 40mg BID  - 500cc of fluid  - Empirically starting Zosyn for Intraabdominal coverage  - NPO

## 2022-10-14 NOTE — ASSESSMENT & PLAN NOTE
9/24 ANNE: The left ventricle is normal in size with low normal systolic function. The estimated ejection fraction is 50%.There is abnormal septal wall motion consistent with right ventricular pacing. Mild right ventricular enlargement with mildly reduced right ventricular systolic function. BNP:544    Plan:  - Low threshold for Lasix as CXR/lung sounds clear while saturating well on RA.

## 2022-10-14 NOTE — ED PROVIDER NOTES
"Encounter Date: 10/14/2022       History     Chief Complaint   Patient presents with    GI Problem     Pt pale. Arrived from [facility] for GI bleed x1 day. Pt severely lethargic, speaking in short fragmented sentences d/t "pain everywhere."     The history is obtained from records, as patient is currently awake but not speaking/responding and unaccompanied. The patient is an 82 year old male who has a documented past medical history of HTN, HLD, HFpEF, AF S/P Watchman device 3/2022, pacemaker, DM II, stroke, Brain lesion, pacemaker, rib fractures, falls, pressure sores, and thoracic aortic aneurysm. He was recently admitted to ICU on 9/23/22 on pressors and antibiotic for suspected septic shock. Hospital course was complicated by new findings of cirrhosis with ascites, ascending aortic aneurysm (4.2 cm), and expansile Calvarial lesion. He is s/p diagnostic paracentesis done by IR on 9/27. He was discharged on 10/3/22. He had an ER visit at [facility] on 10/12/22 for abdominal pain and coffee ground emesis. He reportedly was not taking Eliquis, was taking daily ASA and Ibuprofen. He had an EGD that reported gastritis esophagitis. He was treated with Protonix and ultimately discharged. He was sent back to the ER here today from NH for an emergent evaluation due to copious black bowel movements and acute altered mental status x 1 day, described as acute lethargy, decreased interaction, not following commands, and only making one word statements. History limited.       Review of patient's allergies indicates:  No Known Allergies  Past Medical History:   Diagnosis Date    TRENT (acute kidney injury) 09/24/2022    Anticoagulant long-term use     Aortic aneurysm     Ascites of liver     Atrial fibrillation     Brain lesion     Diabetes mellitus     Diastolic heart failure     Frequent falls     GI bleeding     Hyperlipidemia     Hypertension     Liver disease     Renal disorder     eswl    Sacral " decubitus ulcer     Sleep apnea     20 yrs ago    Stroke     TIA    UTI (urinary tract infection)     Vertigo      Past Surgical History:   Procedure Laterality Date    CARDIAC SURGERY  03/30/2022    watchmen    INSERTION OF PACEMAKER      KIDNEY STONE SURGERY      OPEN REDUCTION AND INTERNAL FIXATION (ORIF) OF FRACTURE OF DISTAL RADIUS Right 5/17/2022    Procedure: ORIF, FRACTURE, RADIUS, DISTAL;  Surgeon: Claude S. Williams IV, MD;  Location: [facility];  Service: Orthopedics;  Laterality: Right;    TONSILLECTOMY       Family History   Problem Relation Age of Onset    Fainting Neg Hx     Heart attack Neg Hx     Heart disease Neg Hx     Heart failure Neg Hx     Hypertension Neg Hx     Atrial Septal Defect Neg Hx      Social History     Tobacco Use    Smoking status: Never    Smokeless tobacco: Never   Substance Use Topics    Alcohol use: Not Currently     Comment: SOCIAL    Drug use: Never     Review of Systems   Unable to perform ROS: Mental status change   Constitutional:  Positive for activity change.   Gastrointestinal:  Positive for blood in stool.   Psychiatric/Behavioral:  Positive for confusion.      Physical Exam     Initial Vitals [10/14/22 1306]   BP Pulse Resp Temp SpO2   101/65 70 19 97.7 °F (36.5 °C) 97 %      MAP       --         Physical Exam    Nursing note and vitals reviewed.  Constitutional: He appears well-developed and well-nourished. He is not diaphoretic.   Unaccompanied. Ill appearing. Pale. Not diaphoretic.    HENT:   Head: Atraumatic.   Dry oral mucosa    Eyes:   Pupils equal round and reactive to light. Unable to check EOM. There is scant green drainage. Sclera white.    Cardiovascular:  Normal rate.           Pulmonary/Chest: No respiratory distress.   No cough. No tachypnea. No hypoxia. No increased work of breathing noted.    Abdominal: There is no abdominal tenderness.   Ascites present - moderate - does not grimace or groan during deep abdominal palpations.  There is  no rebound and no guarding.   Genitourinary:    Genitourinary Comments: No  abnormality noted on visual inspection. Depends/diaper with moderate to large amount of soft black tarry stool consistent with stas/gross melena - heme positive confirmed on guiac card.      Musculoskeletal:         General: Edema present. No tenderness.      Comments: 2+ pretibial edema bilaterally      Neurological:   Lethargic. Eyes open. Seems to track with eyes. No facial droop. Non-verbal. Not following commands or answering questions. Exam limited.    Skin:   Numerous bruises in various stages of heeling. Acute appearing skin avulsion to right proximal forearm. Chronic decubitus sores to sacrum and back. No jaundice. No rash.        ED Course   Procedures  Labs Reviewed   CBC W/ AUTO DIFFERENTIAL - Abnormal; Notable for the following components:       Result Value    WBC 24.21 (*)     RBC 3.41 (*)     Hemoglobin 10.4 (*)     Hematocrit 32.8 (*)     MCHC 31.7 (*)     RDW 17.1 (*)     Immature Granulocytes 1.9 (*)     Gran # (ANC) 21.6 (*)     Immature Grans (Abs) 0.46 (*)     Mono # 1.1 (*)     Gran % 89.0 (*)     Lymph % 4.0 (*)     All other components within normal limits   COMPREHENSIVE METABOLIC PANEL - Abnormal; Notable for the following components:    CO2 17 (*)     BUN 25 (*)     Calcium 8.5 (*)     Total Protein 5.3 (*)     Albumin 2.6 (*)     Total Bilirubin 1.7 (*)     Alkaline Phosphatase 167 (*)     All other components within normal limits   PROTIME-INR - Abnormal; Notable for the following components:    Prothrombin Time 14.7 (*)     INR 1.4 (*)     All other components within normal limits   LACTIC ACID, PLASMA - Abnormal; Notable for the following components:    Lactate (Lactic Acid) 2.4 (*)     All other components within normal limits   TROPONIN I - Abnormal; Notable for the following components:    Troponin I 0.079 (*)     All other components within normal limits   B-TYPE NATRIURETIC PEPTIDE - Abnormal; Notable  for the following components:     (*)     All other components within normal limits   BETA - HYDROXYBUTYRATE, SERUM - Abnormal; Notable for the following components:    Beta-Hydroxybutyrate 1.7 (*)     All other components within normal limits   MAGNESIUM - Abnormal; Notable for the following components:    Magnesium 1.4 (*)     All other components within normal limits   ISTAT PROCEDURE - Abnormal; Notable for the following components:    POC PCO2 32.6 (*)     POC PO2 18 (*)     POC HCO3 18.3 (*)     POC SATURATED O2 26 (*)     POC TCO2 19 (*)     All other components within normal limits   CULTURE, BLOOD   CULTURE, BLOOD   APTT   CK   PHOSPHORUS   PROCALCITONIN   AMMONIA   LIPASE   URINALYSIS, REFLEX TO URINE CULTURE   TYPE & SCREEN     Results for orders placed or performed during the hospital encounter of 10/14/22   CBC auto differential   Result Value Ref Range    WBC 24.21 (H) 3.90 - 12.70 K/uL    RBC 3.41 (L) 4.60 - 6.20 M/uL    Hemoglobin 10.4 (L) 14.0 - 18.0 g/dL    Hematocrit 32.8 (L) 40.0 - 54.0 %    MCV 96 82 - 98 fL    MCH 30.5 27.0 - 31.0 pg    MCHC 31.7 (L) 32.0 - 36.0 g/dL    RDW 17.1 (H) 11.5 - 14.5 %    Platelets 195 150 - 450 K/uL    MPV 10.4 9.2 - 12.9 fL    Immature Granulocytes 1.9 (H) 0.0 - 0.5 %    Gran # (ANC) 21.6 (H) 1.8 - 7.7 K/uL    Immature Grans (Abs) 0.46 (H) 0.00 - 0.04 K/uL    Lymph # 1.0 1.0 - 4.8 K/uL    Mono # 1.1 (H) 0.3 - 1.0 K/uL    Eos # 0.0 0.0 - 0.5 K/uL    Baso # 0.08 0.00 - 0.20 K/uL    nRBC 0 0 /100 WBC    Gran % 89.0 (H) 38.0 - 73.0 %    Lymph % 4.0 (L) 18.0 - 48.0 %    Mono % 4.7 4.0 - 15.0 %    Eosinophil % 0.1 0.0 - 8.0 %    Basophil % 0.3 0.0 - 1.9 %    Platelet Estimate Appears normal     Differential Method Automated    Comprehensive metabolic panel   Result Value Ref Range    Sodium 137 136 - 145 mmol/L    Potassium 3.9 3.5 - 5.1 mmol/L    Chloride 108 95 - 110 mmol/L    CO2 17 (L) 23 - 29 mmol/L    Glucose 83 70 - 110 mg/dL    BUN 25 (H) 8 - 23 mg/dL     Creatinine 1.1 0.5 - 1.4 mg/dL    Calcium 8.5 (L) 8.7 - 10.5 mg/dL    Total Protein 5.3 (L) 6.0 - 8.4 g/dL    Albumin 2.6 (L) 3.5 - 5.2 g/dL    Total Bilirubin 1.7 (H) 0.1 - 1.0 mg/dL    Alkaline Phosphatase 167 (H) 55 - 135 U/L    AST 29 10 - 40 U/L    ALT 17 10 - 44 U/L    Anion Gap 12 8 - 16 mmol/L    eGFR >60.0 >60 mL/min/1.73 m^2   Protime-INR   Result Value Ref Range    Prothrombin Time 14.7 (H) 9.0 - 12.5 sec    INR 1.4 (H) 0.8 - 1.2   APTT   Result Value Ref Range    aPTT 31.8 21.0 - 32.0 sec   Lactic acid, plasma   Result Value Ref Range    Lactate (Lactic Acid) 2.4 (H) 0.5 - 2.2 mmol/L   Troponin I   Result Value Ref Range    Troponin I 0.079 (H) 0.000 - 0.026 ng/mL   Brain natriuretic peptide   Result Value Ref Range     (H) 0 - 99 pg/mL   Beta - Hydroxybutyrate, Serum   Result Value Ref Range    Beta-Hydroxybutyrate 1.7 (H) 0.0 - 0.5 mmol/L   CPK   Result Value Ref Range     20 - 200 U/L   Magnesium   Result Value Ref Range    Magnesium 1.4 (L) 1.6 - 2.6 mg/dL   Phosphorus   Result Value Ref Range    Phosphorus 2.8 2.7 - 4.5 mg/dL   Procalcitonin   Result Value Ref Range    Procalcitonin 0.10 <0.25 ng/mL   Ammonia   Result Value Ref Range    Ammonia 44 10 - 50 umol/L   Lipase   Result Value Ref Range    Lipase 8 4 - 60 U/L   Type & Screen   Result Value Ref Range    Group & Rh O POS     Indirect Alyssa NEG    ISTAT PROCEDURE   Result Value Ref Range    POC PH 7.357 7.35 - 7.45    POC PCO2 32.6 (L) 35 - 45 mmHg    POC PO2 18 (L) 40 - 60 mmHg    POC HCO3 18.3 (L) 24 - 28 mmol/L    POC BE -7 -2 to 2 mmol/L    POC SATURATED O2 26 (L) 95 - 100 %    POC TCO2 19 (L) 24 - 29 mmol/L    Sample VENOUS     Site Other     Allens Test N/A     DelSys Room Air     Mode SPONT        EKG Readings: (Independently Interpreted)   Initial Reading: No STEMI. Rhythm: Atrial Fibrillation. Heart Rate: 84. Ectopy: Bigeminy. ST Segments: Non-Specific ST Segment Depression. Other Findings: Prolonged QT Interval.  Clinical Impression: with PVCs   ER attending physician reviewed and signed      Imaging Results              CT Head Without Contrast (Final result)  Result time 10/14/22 15:50:32      Final result by Joel Estrella DO (10/14/22 15:50:32)                   Impression:      No significant change from prior.  Specifically without evidence for acute intracranial hemorrhage or sulcal effacement to suggest large territory recent infarction    Expansile peripherally sclerotic lesion right frontal calvarium extending to the orbit unchanged as detailed above.      Electronically signed by: Joel Estrella DO  Date:    10/14/2022  Time:    15:50               Narrative:    EXAMINATION:  CT HEAD WITHOUT CONTRAST    CLINICAL HISTORY:  Mental status change, unknown cause;    TECHNIQUE:  Multiple sequential 5 mm axial images of the head without contrast.  Coronal and sagittal reformatted imaging from the axial acquisition.    COMPARISON:  09/24/2022    FINDINGS:  Generalized cerebral volume loss similar to prior with compensatory enlargement ventricles sulci and cisterns without hydrocephalus.  There is patchy and confluent decreased attenuation supratentorial white matter which is nonspecific and suggestive for chronic ischemic change.  Stable small focal hypodensity in the right thalamus compatible with remote lacunar-type infarct.    No evidence for acute intracranial hemorrhage or definite new abnormal parenchymal attenuation.  No midline shift or mass effect.  Continued heterogeneous expansile predominantly lucent and peripheral sclerotic lesion within the right frontal calvarium extending to the superior and lateral orbital wall.  There is component of stable questioned erosion of the outer table along the lateral margin of the lesion.  Configuration with heterogeneous expansile sclerotic margins concerning for possible fibro-osseous lesion with fibrous dysplasia to be considered.  Clinical correlation advised.                                         CTA Acute GI [place], Abdomen and Pelvis (Final result)  Result time 10/14/22 16:44:28      Final result by Roberto Lyon MD (10/14/22 16:44:28)                   Impression:      No evidence of contrast extravasation to suggest active GI bleed at this time.    Stable 3 cm enhancing lesion at the hepatic dome which does appear to demonstrate some washout on delayed images, suggesting possible HCC.  Once again, MRI of the liver may be considered for further evaluation.    Circumferential wall thickening of the rectum and sigmoid colon with presacral edema raising concern for colitis.    Bilateral pleural effusions, left larger than right.    Enhancing hypodensity within the left renal midpole, potentially representing complex cyst versus neoplasm.  Recommend nonemergent retroperitoneal ultrasound for further evaluation.    Intraluminal bladder air, recommend correlation for recent instrumentation.    Pancreatic hypodensities measuring up to 2.0 cm.  Differential includes side branch IPMNs, pseudocyst, or other cystic neoplasm.  Further evaluation with nonemergent MRCP when clinically appropriate.    Cirrhotic liver morphology.  Moderate volume abdominal ascites.    Additional stable findings as above.    This report was flagged in Epic as abnormal.    Electronically signed by resident: Edmar Chase  Date:    10/14/2022  Time:    15:58    Electronically signed by: Roberto Lyon MD  Date:    10/14/2022  Time:    16:44               Narrative:    EXAMINATION:  CTA ACUTE GI BLEED, ABDOMEN AND PELVIS    CLINICAL HISTORY:  Acute gi bleeding;    TECHNIQUE:  Noncontrast images were obtained from the lung bases through the pelvis. Following noncontrast images, 100 cc of  Omni IV contrast was administered, and multi-detector helical CT technique, axial CT angiogram images of the abdomen were obtained from the lung bases through the pelvis.Delayed phase images of the abdomen and pelvis also done. 2D  post-processing coronal and sagittal reconstructions of the abdominal aorta and visceral arteries performed.    COMPARISON:  CT 09/24/2022    FINDINGS:  Bilateral pleural effusions, left greater than right.  Partial compressive atelectasis of the lower lobes.  Peripheral reticulations within the lung bases, nonspecific could represent early fibrotic change.  Heart is normal size.  ICD leads terminate within the heart.  Multivessel coronary artery calcific atherosclerosis.    Cirrhotic liver morphology.  There is still a suggestion of a 3 cm enhancing lesion at the hepatic dome (series 4, image 77).  There does appear to be some washout on delayed images, suggesting possible HCC.  Once again, MRI of the liver may be considered for further evaluation.  Gallbladder is distended with hyperdense fluid, potentially biliary sludge or vicarious excretion of contrast from recent prior exam..  There is no intra-or extrahepatic biliary ductal dilatation.    The stomach, spleen, and adrenal glands are unremarkable.  2.0 cm hypodensity pancreatic body.  1.5 cm hypodensity within uncinate process.  Findings nonspecific and could represent side branch IPMNs, cyst, or other cystic neoplasm    Kidneys are atrophic.  1.1 cm left pole renal stone.  Enhancing hypodensity within the left midpole measuring 2.4 cm, nonspecific could represent complex cyst versus neoplasm.  There is additional punctate hypodensities throughout both kidneys too small to characterize.  The ureters appear normal in course and caliber without evidence of ureteral dilatation. There is contrast within the bladder likely from recent prior contrasted exam.  There is intraluminal bladder air, nonspecific and could relate to recent instrumentation.    Bowel is diffusely decompressed.  No evidence of arterial blushing or venous pooling to suggest active GI bleed.  No evidence of obstruction.  There is circumferential wall thickening of the rectum and sigmoid colon  potentially representing early colitis.    Moderate volume abdominal ascites.  Presacral edema.  No evidence of organized fluid collections.  No intraperitoneal free air.  There is no evidence of lymph node enlargement in the abdomen or pelvis.    Grade 1 retrolisthesis of T12 on L1 and grade 1 anterolisthesis of L3 on L4.  Transitional lumbosacral vertebral body with lumbarization of S1.  T10 vertebral body demonstrates a butterfly deformity.  No evidence of acute fractures.    Diffuse anasarca.  Fluid within the bilateral inguinal canals.    VASCULAR:    There is no evidence of aortic dissection or stenosis.    Moderate aortoiliac atherosclerotic calcifications.    Iliac arteries are patent without aneurysmal dilation.    Celiac, SMA, HORTENCIA, and renal arteries are patent.                                       X-Ray Chest AP Portable (Final result)  Result time 10/14/22 15:02:06      Final result by Arnel Payne Jr., MD (10/14/22 15:02:06)                   Impression:      See above      Electronically signed by: Arnel Payne MD  Date:    10/14/2022  Time:    15:02               Narrative:    EXAMINATION:  XR CHEST AP PORTABLE    CLINICAL HISTORY:  Hypotension, unspecified    TECHNIQUE:  Single frontal view of the chest was performed.    COMPARISON:  September 23, 2022    FINDINGS:  Cardiac pacemaker and wires as well as multiple monitoring leads noted.  Heart size pulmonary vessels are similar.  Accentuation of the interstitial markings may be exaggerated by radiographic technique.  Images somewhat lordotic in projection.  No confluent consolidation or pneumothorax.                                       Medications   sodium chloride 0.9% flush 10 mL (has no administration in time range)   allopurinoL tablet 100 mg (has no administration in time range)   atorvastatin tablet 40 mg (has no administration in time range)   EScitalopram oxalate tablet 10 mg (has no administration in time range)   folic acid tablet 1  mg (has no administration in time range)   sodium chloride 0.9% flush 10 mL (has no administration in time range)   naloxone 0.4 mg/mL injection 0.02 mg (has no administration in time range)   glucose chewable tablet 16 g (has no administration in time range)   glucose chewable tablet 24 g (has no administration in time range)   glucagon (human recombinant) injection 1 mg (has no administration in time range)   dextrose 10% bolus 125 mL (has no administration in time range)   dextrose 10% bolus 250 mL (has no administration in time range)   pantoprazole injection 40 mg (has no administration in time range)   pantoprazole injection 80 mg (80 mg Intravenous Given 10/14/22 1623)   iohexoL (OMNIPAQUE 350) injection 75 mL (100 mLs Intravenous Given 10/14/22 1537)     Medical Decision Making:   History:   Old Medical Records: I decided to obtain old medical records.  Old Records Summarized: records from another hospital and records from previous admission(s).  Initial Assessment:   83 yo male, extensive past medical history, recently new diagnosis of cirrhosis and ascites, long term anticoagulant use, recently seen in outside ER for c/o abdominal pain and coffee ground emesis, s/p EGD on 10/12 reportedly showing gastritis/esophagitis, treated with protonix, sent here today from NH for an emergent evaluation due to black/bloody stools and acute AMS/lethargy    Differential Diagnosis:   Upper GI bleeding, lower GI bleeding, Anemia, shock, infection, CVA, decompensated liver failure, coagulopathy, brain mass, encephalopathy, medication, etc    Clinical Tests:   Lab Tests: Ordered and Reviewed  Radiological Study: Ordered and Reviewed  Medical Tests: Ordered and Reviewed  ED Management:  Vital signs reviewed - not tachycardic, soft BP noted    Records reviewed - recent EGD described gastritis and esophagitis - no mention of varices noted in this patient with cirrhosis and GI bleeding   Pt awake, but lethargic and non-verbal.  Large amount of black tarry stool.    I discussed the case in detail with the ER attending physician, Dr Pantoja, who also evaluated the patient at bedside and also spoke with patient's family on the phone - updated on patient status -  DNR/DNI with exceptions confirmed - patient's son is local and will come to the ER now   Update: ER attending physician Dr Pantoja reports that on his 2nd encounter with pt, after pt's son arrived, patient now is alert, speaking and having lucid conversation.   Elevated WBC count at 24k  H & H improved in comparison to previous.   Work up and exam notable for CHF exacerbation   CT head completed - no acute findings reported   CTA abdomen pelvis completed - cirrhosis, ascites, and colitis reported   Uncertain etiology of AMS, non-verbal, lethargy - pt improved during ED stay and currently at baseline per son   No further bloody BM's while in ED - BP stable at this time  Hospital medicine agrees to admit for further evaluation and management       Additional MDM:     EKG: I have independently interpreted EKG(s) - see notes.   GI Bleeding: Stool - Hemoccult: Positive. Location of Bleeding: unknown.   Severity: The patient is actively bleeding and will require critical care. Consultants: GI Medicine and Critical Care.     X-Rays: I have independently interpreted X-Ray(s) - see notes.                      Clinical Impression:   Final diagnoses:  [I95.9] Hypotension  [R41.82] Altered mental status, unspecified altered mental status type  [K92.1] Gastrointestinal hemorrhage with melena (Primary)  [D72.829] Leukocytosis, unspecified type  [R79.89] Elevated lactic acid level  [R94.31] Prolonged Q-T interval on ECG  [R79.1] Elevated INR  [K70.31] Alcoholic cirrhosis of liver with ascites  [E83.42] Hypomagnesemia        ED Disposition Condition    Observation Stable                Rohit Rogers PA-C  10/14/22 [id number]

## 2022-10-14 NOTE — TELEPHONE ENCOUNTER
Can you call him to schedule hospital f/u appt soon with any MD? He needs a soon appt given decompensated liver disease and liver lesions    Thanks !

## 2022-10-14 NOTE — Clinical Note
Diagnosis: Gastrointestinal hemorrhage with melena [[id number]]   Future Attending Provider: BRITNI RAMIREZ [[id number]]   Admitting Provider:: BRITNI RAMIREZ [[id number]]   Special Needs:: No Special Needs [1]

## 2022-10-14 NOTE — ED TRIAGE NOTES
From [facility] via EMS with C/O altered mental status. Non verbal at this time. Found to be soiled with what appears to be stas blood.

## 2022-10-15 LAB
ALBUMIN SERPL BCP-MCNC: 2.2 G/DL (ref 3.5–5.2)
ALP SERPL-CCNC: 140 U/L (ref 55–135)
ALT SERPL W/O P-5'-P-CCNC: 16 U/L (ref 10–44)
ANION GAP SERPL CALC-SCNC: 9 MMOL/L (ref 8–16)
ANISOCYTOSIS BLD QL SMEAR: SLIGHT
AST SERPL-CCNC: 26 U/L (ref 10–40)
BASOPHILS # BLD AUTO: 0.04 K/UL (ref 0–0.2)
BASOPHILS # BLD AUTO: 0.05 K/UL (ref 0–0.2)
BASOPHILS NFR BLD: 0.2 % (ref 0–1.9)
BASOPHILS NFR BLD: 0.2 % (ref 0–1.9)
BILIRUB SERPL-MCNC: 1.5 MG/DL (ref 0.1–1)
BUN SERPL-MCNC: 26 MG/DL (ref 8–23)
BURR CELLS BLD QL SMEAR: ABNORMAL
C DIFF GDH STL QL: POSITIVE
C DIFF TOX A+B STL QL IA: POSITIVE
CALCIUM SERPL-MCNC: 8.4 MG/DL (ref 8.7–10.5)
CHLORIDE SERPL-SCNC: 110 MMOL/L (ref 95–110)
CO2 SERPL-SCNC: 18 MMOL/L (ref 23–29)
CREAT SERPL-MCNC: 1.1 MG/DL (ref 0.5–1.4)
DIFFERENTIAL METHOD: ABNORMAL
DIFFERENTIAL METHOD: ABNORMAL
EOSINOPHIL # BLD AUTO: 0.1 K/UL (ref 0–0.5)
EOSINOPHIL # BLD AUTO: 0.1 K/UL (ref 0–0.5)
EOSINOPHIL NFR BLD: 0.4 % (ref 0–8)
EOSINOPHIL NFR BLD: 0.5 % (ref 0–8)
ERYTHROCYTE [DISTWIDTH] IN BLOOD BY AUTOMATED COUNT: 16.8 % (ref 11.5–14.5)
ERYTHROCYTE [DISTWIDTH] IN BLOOD BY AUTOMATED COUNT: 17.1 % (ref 11.5–14.5)
EST. GFR  (NO RACE VARIABLE): >60 ML/MIN/1.73 M^2
GLUCOSE SERPL-MCNC: 88 MG/DL (ref 70–110)
HCT VFR BLD AUTO: 28 % (ref 40–54)
HCT VFR BLD AUTO: 28.4 % (ref 40–54)
HGB BLD-MCNC: 9.2 G/DL (ref 14–18)
HGB BLD-MCNC: 9.4 G/DL (ref 14–18)
HYPOCHROMIA BLD QL SMEAR: ABNORMAL
IMM GRANULOCYTES # BLD AUTO: 0.15 K/UL (ref 0–0.04)
IMM GRANULOCYTES # BLD AUTO: 0.46 K/UL (ref 0–0.04)
IMM GRANULOCYTES NFR BLD AUTO: 0.8 % (ref 0–0.5)
IMM GRANULOCYTES NFR BLD AUTO: 1.7 % (ref 0–0.5)
LYMPHOCYTES # BLD AUTO: 0.8 K/UL (ref 1–4.8)
LYMPHOCYTES # BLD AUTO: 0.9 K/UL (ref 1–4.8)
LYMPHOCYTES NFR BLD: 2.9 % (ref 18–48)
LYMPHOCYTES NFR BLD: 4.6 % (ref 18–48)
MAGNESIUM SERPL-MCNC: 1.7 MG/DL (ref 1.6–2.6)
MCH RBC QN AUTO: 30.5 PG (ref 27–31)
MCH RBC QN AUTO: 30.8 PG (ref 27–31)
MCHC RBC AUTO-ENTMCNC: 32.9 G/DL (ref 32–36)
MCHC RBC AUTO-ENTMCNC: 33.1 G/DL (ref 32–36)
MCV RBC AUTO: 93 FL (ref 82–98)
MCV RBC AUTO: 93 FL (ref 82–98)
MONOCYTES # BLD AUTO: 1.1 K/UL (ref 0.3–1)
MONOCYTES # BLD AUTO: 1.4 K/UL (ref 0.3–1)
MONOCYTES NFR BLD: 5.1 % (ref 4–15)
MONOCYTES NFR BLD: 5.5 % (ref 4–15)
NEUTROPHILS # BLD AUTO: 17.7 K/UL (ref 1.8–7.7)
NEUTROPHILS # BLD AUTO: 23.6 K/UL (ref 1.8–7.7)
NEUTROPHILS NFR BLD: 88.5 % (ref 38–73)
NEUTROPHILS NFR BLD: 89.6 % (ref 38–73)
NRBC BLD-RTO: 0 /100 WBC
NRBC BLD-RTO: 0 /100 WBC
OVALOCYTES BLD QL SMEAR: ABNORMAL
PHOSPHATE SERPL-MCNC: 3.3 MG/DL (ref 2.7–4.5)
PLATELET # BLD AUTO: 160 K/UL (ref 150–450)
PLATELET # BLD AUTO: 182 K/UL (ref 150–450)
PLATELET BLD QL SMEAR: ABNORMAL
PMV BLD AUTO: 10.4 FL (ref 9.2–12.9)
PMV BLD AUTO: 10.4 FL (ref 9.2–12.9)
POCT GLUCOSE: 86 MG/DL (ref 70–110)
POIKILOCYTOSIS BLD QL SMEAR: SLIGHT
POLYCHROMASIA BLD QL SMEAR: ABNORMAL
POTASSIUM SERPL-SCNC: 3.8 MMOL/L (ref 3.5–5.1)
PROT SERPL-MCNC: 4.5 G/DL (ref 6–8.4)
RBC # BLD AUTO: 3.02 M/UL (ref 4.6–6.2)
RBC # BLD AUTO: 3.05 M/UL (ref 4.6–6.2)
SCHISTOCYTES BLD QL SMEAR: ABNORMAL
SODIUM SERPL-SCNC: 137 MMOL/L (ref 136–145)
WBC # BLD AUTO: 19.99 K/UL (ref 3.9–12.7)
WBC # BLD AUTO: 26.32 K/UL (ref 3.9–12.7)
WBC #/AREA STL HPF: NORMAL /[HPF]

## 2022-10-15 PROCEDURE — 99223 PR INITIAL HOSPITAL CARE,LEVL III: ICD-10-PCS | Mod: ,,, | Performed by: INTERNAL MEDICINE

## 2022-10-15 PROCEDURE — 83735 ASSAY OF MAGNESIUM: CPT | Performed by: STUDENT IN AN ORGANIZED HEALTH CARE EDUCATION/TRAINING PROGRAM

## 2022-10-15 PROCEDURE — 20600001 HC STEP DOWN PRIVATE ROOM

## 2022-10-15 PROCEDURE — 87045 FECES CULTURE AEROBIC BACT: CPT | Performed by: STUDENT IN AN ORGANIZED HEALTH CARE EDUCATION/TRAINING PROGRAM

## 2022-10-15 PROCEDURE — 63600175 PHARM REV CODE 636 W HCPCS: Performed by: STUDENT IN AN ORGANIZED HEALTH CARE EDUCATION/TRAINING PROGRAM

## 2022-10-15 PROCEDURE — 36415 COLL VENOUS BLD VENIPUNCTURE: CPT | Performed by: STUDENT IN AN ORGANIZED HEALTH CARE EDUCATION/TRAINING PROGRAM

## 2022-10-15 PROCEDURE — 85025 COMPLETE CBC W/AUTO DIFF WBC: CPT | Performed by: STUDENT IN AN ORGANIZED HEALTH CARE EDUCATION/TRAINING PROGRAM

## 2022-10-15 PROCEDURE — C9113 INJ PANTOPRAZOLE SODIUM, VIA: HCPCS | Performed by: STUDENT IN AN ORGANIZED HEALTH CARE EDUCATION/TRAINING PROGRAM

## 2022-10-15 PROCEDURE — 51798 US URINE CAPACITY MEASURE: CPT

## 2022-10-15 PROCEDURE — 63600175 PHARM REV CODE 636 W HCPCS: Mod: JA | Performed by: STUDENT IN AN ORGANIZED HEALTH CARE EDUCATION/TRAINING PROGRAM

## 2022-10-15 PROCEDURE — 89055 LEUKOCYTE ASSESSMENT FECAL: CPT | Performed by: STUDENT IN AN ORGANIZED HEALTH CARE EDUCATION/TRAINING PROGRAM

## 2022-10-15 PROCEDURE — 87427 SHIGA-LIKE TOXIN AG IA: CPT | Performed by: STUDENT IN AN ORGANIZED HEALTH CARE EDUCATION/TRAINING PROGRAM

## 2022-10-15 PROCEDURE — 25000003 PHARM REV CODE 250: Performed by: STUDENT IN AN ORGANIZED HEALTH CARE EDUCATION/TRAINING PROGRAM

## 2022-10-15 PROCEDURE — 87449 NOS EACH ORGANISM AG IA: CPT | Performed by: STUDENT IN AN ORGANIZED HEALTH CARE EDUCATION/TRAINING PROGRAM

## 2022-10-15 PROCEDURE — 84100 ASSAY OF PHOSPHORUS: CPT | Performed by: STUDENT IN AN ORGANIZED HEALTH CARE EDUCATION/TRAINING PROGRAM

## 2022-10-15 PROCEDURE — 80053 COMPREHEN METABOLIC PANEL: CPT | Performed by: STUDENT IN AN ORGANIZED HEALTH CARE EDUCATION/TRAINING PROGRAM

## 2022-10-15 PROCEDURE — 99233 SBSQ HOSP IP/OBS HIGH 50: CPT | Mod: GC,,, | Performed by: HOSPITALIST

## 2022-10-15 PROCEDURE — 99223 1ST HOSP IP/OBS HIGH 75: CPT | Mod: ,,, | Performed by: INTERNAL MEDICINE

## 2022-10-15 PROCEDURE — 99233 PR SUBSEQUENT HOSPITAL CARE,LEVL III: ICD-10-PCS | Mod: GC,,, | Performed by: HOSPITALIST

## 2022-10-15 PROCEDURE — 87046 STOOL CULTR AEROBIC BACT EA: CPT | Performed by: STUDENT IN AN ORGANIZED HEALTH CARE EDUCATION/TRAINING PROGRAM

## 2022-10-15 PROCEDURE — 27000207 HC ISOLATION

## 2022-10-15 RX ADMIN — PIPERACILLIN SODIUM AND TAZOBACTAM SODIUM 4.5 G: 4; .5 INJECTION, POWDER, LYOPHILIZED, FOR SOLUTION INTRAVENOUS at 09:10

## 2022-10-15 RX ADMIN — PANTOPRAZOLE SODIUM 40 MG: 40 INJECTION, POWDER, FOR SOLUTION INTRAVENOUS at 08:10

## 2022-10-15 RX ADMIN — VANCOMYCIN HYDROCHLORIDE 125 MG: KIT at 11:10

## 2022-10-15 RX ADMIN — VANCOMYCIN HYDROCHLORIDE 125 MG: KIT at 08:10

## 2022-10-15 RX ADMIN — OCTREOTIDE ACETATE 50 MCG/HR: 500 INJECTION, SOLUTION INTRAVENOUS; SUBCUTANEOUS at 01:10

## 2022-10-15 RX ADMIN — OCTREOTIDE ACETATE 50 MCG/HR: 500 INJECTION, SOLUTION INTRAVENOUS; SUBCUTANEOUS at 11:10

## 2022-10-15 RX ADMIN — PIPERACILLIN SODIUM AND TAZOBACTAM SODIUM 4.5 G: 4; .5 INJECTION, POWDER, LYOPHILIZED, FOR SOLUTION INTRAVENOUS at 02:10

## 2022-10-15 RX ADMIN — ATORVASTATIN CALCIUM 40 MG: 40 TABLET, FILM COATED ORAL at 09:10

## 2022-10-15 RX ADMIN — ESCITALOPRAM OXALATE 10 MG: 10 TABLET ORAL at 09:10

## 2022-10-15 RX ADMIN — SODIUM CHLORIDE 500 ML: 0.9 INJECTION, SOLUTION INTRAVENOUS at 05:10

## 2022-10-15 RX ADMIN — PANTOPRAZOLE SODIUM 40 MG: 40 INJECTION, POWDER, FOR SOLUTION INTRAVENOUS at 05:10

## 2022-10-15 RX ADMIN — ALLOPURINOL 100 MG: 100 TABLET ORAL at 09:10

## 2022-10-15 RX ADMIN — FOLIC ACID 1 MG: 1 TABLET ORAL at 09:10

## 2022-10-15 NOTE — HOSPITAL COURSE
CTA A/P was performed urgently, without evidence of contrast extravasation though showing aforementioned liver lesion and rectosigmoid colitis. Stool cultures returned C diff +ve and he was placed on PO vancomycin therapy. He underwent paracentesis and analysis of ascitic fluid was not indicative of SBP. Cultures and cytology are pending. He has been on IV PPI and octreotide gtt (since discontinued) with initial plans to scope, however, with stabilization of blood counts and CDI, this is no longer indicated. Hypotensive requiring fluid boluses and albumin with slight recovery of BP. Interval development of UTI, now starting cefepime. Now more encephalopathic. Discussed with POA who decided hospice. Discharged to hospice facility.

## 2022-10-15 NOTE — NURSING
Pt BP is 93/48-MAP 68, HR at 71 and pt just had the 2nd BM today with small amount liquid dark red blood stool. Notified IM3 about pt BP. STAT CBC ordered, 500cc bolus NS ordered.  informed nurse to notify IM3 team if MAP goes below 65

## 2022-10-15 NOTE — ASSESSMENT & PLAN NOTE
Pt endorses melena. CT scan with no evidence of contrast extravasation to suggest active GI bleed at this time. Stable 3 cm enhancing lesion at the hepatic dome with evidence of liver cirrhosis, circumferential wall thickening of the rectum and sigmoid colon concern for colitis, and pancreatic hypodensities measuring up to 2.0 cm. Considering liver disease, suspicion for ruptured esophogeal varcies. Recent EGD at [facility] reviewed, will discuss with GI    Plan:  - CBC q12, trend H&H  - GI planning for endoscopy & sigmoidoscopy on Monday  - PPI 40mg BID  - 500cc of fluid  - Empirically starting Zosyn for Intraabdominal coverage  - NPO at midnight on Sunday for procedure Monday

## 2022-10-15 NOTE — H&P
"[facility] - Emergency Dept  [facility] Medicine  History & Physical    Patient Name: Jacques Arellano  MRN: [id number]  Patient Class: OP- Observation  Admission Date: 10/14/2022  Attending Physician: Nemesio Reynolds MD   Primary Care Provider: Matty Garsia MD         Patient information was obtained from patient and ER records.     Subjective:     Principal Problem:<principal problem not specified>    Chief Complaint:   Chief Complaint   Patient presents with    GI Problem     Pt pale. Arrived from [facility] for GI bleed x1 day. Pt severely lethargic, speaking in short fragmented sentences d/t "pain everywhere."        HPI: 81 yo M with PMhx of CHF, AFib, Watchman, pacer, expansile brain lesion, pacemaker presents to from [facility] for diarrhea, copious black stool and acute altered mental status. Pt reports he has 3-5 profuse watery BM for the last few days noting a little bit of blood. Pt was recently admitted to [facility] ED on 10/12 for abdominal pain and coffee ground emesis, EGD revealed grade D esophagitis and gastritis with no overt GI bleeding and was subsequently discharged with Protonix. Pt was recently admitted to [facility] ICU on 9/23 for sepsis thought to be secondary to E coli. UTI vs cirrhosis and was treated with antibiotic and pressors . Inpatient workup revealed new dx of cirrhosis with ascites no spb, aortic aneurysm, and expansile Calvarial lesion, pt was DC on 10/13 back to [facility].      Today pt H & H stable. CTA abd shows colitis, cirrhosis/ascites, and new potential HCC. BNP elevated at 544 however XCR and PE unconcerning for CHF exacerbation. Mentation improved while in ED - CT head stable.       Past Medical History:   Diagnosis Date    TRENT (acute kidney injury) 09/24/2022    Anticoagulant long-term use     Aortic aneurysm     Ascites of liver     Atrial fibrillation     Brain lesion     Diabetes mellitus     Diastolic heart failure     Frequent falls     " GI bleeding     Hyperlipidemia     Hypertension     Liver disease     Renal disorder     eswl    Sacral decubitus ulcer     Sleep apnea     20 yrs ago    Stroke     TIA    UTI (urinary tract infection)     Vertigo        Past Surgical History:   Procedure Laterality Date    CARDIAC SURGERY  03/30/2022    watchmen    INSERTION OF PACEMAKER      KIDNEY STONE SURGERY      OPEN REDUCTION AND INTERNAL FIXATION (ORIF) OF FRACTURE OF DISTAL RADIUS Right 5/17/2022    Procedure: ORIF, FRACTURE, RADIUS, DISTAL;  Surgeon: Claude S. Williams IV, MD;  Location: [facility];  Service: Orthopedics;  Laterality: Right;    TONSILLECTOMY         Review of patient's allergies indicates:  No Known Allergies    No current facility-administered medications on file prior to encounter.     Current Outpatient Medications on File Prior to Encounter   Medication Sig    acetaminophen (TYLENOL) 325 MG tablet Take 2 tablets (650 mg total) by mouth every 6 (six) hours as needed for Pain (DO NOT EXCEED more than 2000 mg in 1 day).    allopurinoL (ZYLOPRIM) 100 MG tablet Take 1 tablet (100 mg total) by mouth once daily.    atorvastatin (LIPITOR) 40 MG tablet Take 40 mg by mouth once daily.    folic acid (FOLVITE) 1 MG tablet Take 1 tablet (1 mg total) by mouth once daily.    furosemide (LASIX) 20 MG tablet TAKE 1 TABLET(20 MG) BY MOUTH TWICE DAILY    EScitalopram oxalate (LEXAPRO) 10 MG tablet Take 1 tablet (10 mg total) by mouth once daily.    LIDOcaine (LIDODERM) 5 % Place 1 patch onto the skin once daily. Remove & Discard patch within 12 hours or as directed by MD. Apply to right ribs.    [DISCONTINUED] aspirin 81 MG Chew Take 1 tablet (81 mg total) by mouth once daily.     Family History    None       Tobacco Use    Smoking status: Never    Smokeless tobacco: Never   Substance and Sexual Activity    Alcohol use: Not Currently     Comment: SOCIAL    Drug use: Never    Sexual activity: Not Currently     Review of Systems    Constitutional:  Positive for activity change, chills and fatigue. Negative for fever.   HENT:  Negative for congestion, rhinorrhea and trouble swallowing.    Eyes:  Negative for discharge and visual disturbance.   Respiratory:  Negative for apnea, cough and shortness of breath.    Cardiovascular:  Positive for leg swelling. Negative for chest pain.   Gastrointestinal:  Positive for abdominal distention, blood in stool and diarrhea. Negative for abdominal pain and nausea.   Genitourinary:  Negative for difficulty urinating and dysuria.   Musculoskeletal:  Negative for arthralgias and joint swelling.   Skin:  Positive for pallor. Negative for color change.   Neurological:  Positive for weakness. Negative for dizziness and headaches.   Psychiatric/Behavioral:  Negative for agitation, behavioral problems and confusion.    Objective:     Vital Signs (Most Recent):  Temp: 98.4 °F (36.9 °C) (10/1939)  Pulse: 83 (10/1939)  Resp: 18 (10/1939)  BP: (!) 94/55 (10/1939)  SpO2: 99 % (10/1939)   Vital Signs (24h Range):  Temp:  [97.7 °F (36.5 °C)-98.4 °F (36.9 °C)] 98.4 °F (36.9 °C)  Pulse:  [70-83] 83  Resp:  [16-19] 18  SpO2:  [97 %-100 %] 99 %  BP: ()/(55-65) 94/55     Weight: 85.3 kg (188 lb)  Body mass index is 26.98 kg/m².    Physical Exam  Constitutional:       Appearance: Normal appearance.   HENT:      Head: Normocephalic and atraumatic.      Mouth/Throat:      Mouth: Mucous membranes are dry.      Pharynx: Oropharynx is clear.   Cardiovascular:      Rate and Rhythm: Normal rate and regular rhythm.   Pulmonary:      Effort: Pulmonary effort is normal.      Breath sounds: Normal breath sounds.   Abdominal:      General: There is distension.      Palpations: Abdomen is soft.   Musculoskeletal:      Cervical back: Normal range of motion and neck supple.      Right lower leg: Edema present.      Left lower leg: Edema present.   Skin:     General: Skin is dry.      Coloration: Skin is  jaundiced.   Neurological:      Mental Status: He is alert and oriented to person, place, and time. Mental status is at baseline.   Psychiatric:         Mood and Affect: Mood normal.         Thought Content: Thought content normal.           Significant Labs: All pertinent labs within the past 24 hours have been reviewed.  Recent Lab Results         10/14/22  1635   10/14/22  1440        Procalcitonin   0.10  Comment: A concentration < 0.25 ng/mL represents a low risk of bacterial   infection.  Procalcitonin may not be accurate among patients with localized   infection, recent trauma or major surgery, immunosuppressed state,   invasive fungal infection, renal dysfunction. Decisions regarding   initiation or continuation of antibiotic therapy should not be based   solely on procalcitonin levels.         Albumin   2.6       Alkaline Phosphatase   167       Allens Test N/A         ALT   17       Ammonia   44       Anion Gap   12       aPTT   31.8  Comment: aPTT therapeutic range = 39-69 seconds       AST   29       Baso #   0.08       Basophil %   0.3       Beta-Hydroxybutyrate   1.7       BILIRUBIN TOTAL   1.7  Comment: For infants and newborns, interpretation of results should be based  on gestational age, weight and in agreement with clinical  observations.    Premature Infant recommended reference ranges:  Up to 24 hours.............<8.0 mg/dL  Up to 48 hours............<12.0 mg/dL  3-5 days..................<15.0 mg/dL  6-29 days.................<15.0 mg/dL         BNP   544  Comment: Values of less than 100 pg/ml are consistent with non-CHF populations.       Site Other         BUN   25       Calcium   8.5       Chloride   108       CO2   17       CPK   177       Creatinine   1.1       DelSys Room Air         Differential Method   Automated       eGFR   >60.0       Eos #   0.0       Eosinophil %   0.1       Glucose   83       Gran # (ANC)   21.6       Gran %   89.0       Group & Rh   O POS       Hematocrit    32.8       Hemoglobin   10.4       Immature Grans (Abs)   0.46  Comment: Mild elevation in immature granulocytes is non specific and   can be seen in a variety of conditions including stress response,   acute inflammation, trauma and pregnancy. Correlation with other   laboratory and clinical findings is essential.         Immature Granulocytes   1.9       INDIRECT NATASHA   NEG       INR   1.4  Comment: Coumadin Therapy:  2.0 - 3.0 for INR for all indicators except mechanical heart valves  and antiphospholipid syndromes which should use 2.5 - 3.5.         Lactate, Mohamud   2.4  Comment: Falsely low lactic acid results can be found in samples   containing >=13.0 mg/dL total bilirubin and/or >=3.5 mg/dL   direct bilirubin.         Lipase   8       Lymph #   1.0       Lymph %   4.0       Magnesium   1.4       MCH   30.5       MCHC   31.7       MCV   96       Mode SPONT         Mono #   1.1       Mono %   4.7       MPV   10.4       nRBC   0       Phosphorus   2.8       Platelet Estimate   Appears normal       Platelets   195       POC BE -7         POC HCO3 18.3         POC PCO2 32.6         POC PH 7.357         POC PO2 18         POC SATURATED O2 26         POC TCO2 19         Potassium   3.9       PROTEIN TOTAL   5.3       Protime   14.7       RBC   3.41       RDW   17.1       Sample VENOUS         Sodium   137       Troponin I   0.079  Comment: The reference interval for Troponin I represents the 99th percentile   cutoff   for our facility and is consistent with 3rd generation assay   performance.         WBC   24.21               Significant Imaging: I have reviewed all pertinent imaging results/findings within the past 24 hours.    Assessment/Plan:     Diarrhea  Pt endorses profuse watery diarrhea for the past few days. Concerning for possible C diff with recent aggressive antibiotic regimen     Plan:  - Stool studies pending  - C diff pending  - Starting zosyn empirically   - 500 cc of NS     GI bleed  Pt endorses  melena. CT scan with no evidence of contrast extravasation to suggest active GI bleed at this time. Stable 3 cm enhancing lesion at the hepatic dome with evidence of liver cirrhosis, circumferential wall thickening of the rectum and sigmoid colon concern for colitis, and pancreatic hypodensities measuring up to 2.0 cm. Considering liver disease, suspicion for ruptured esophogeal varcies. Recent EGD at [facility] reviewed, will discuss with GI    Plan:  - CBC q12, trend H&H  - GI consulted  - PPI 40mg BID  - 500cc of fluid  - Empirically starting Zosyn for Intraabdominal coverage  - NPO    Ascites  9/23: Ct A/P:Cirrhotic morphology of the liver with sequelae of portal hypertension as evidence by splenomegaly, intra-abdominal ascites, and possible portal hypertensive enteropathy. Pt denies hx of alcohol abuse.  R preformed diagnostic paracentetics on 9/27 to r/o SBP: which was negative 130 WBC and 10%     Plan:  - daily CMP     Chronic gout  Plan:  Continue home allopurinol.       Type 2 diabetes mellitus, without long-term current use of insulin  Last A1c 6.1 3 months ago. Hypoglycemic on admission.      Plan:  - No home medications  Noted   - POCT glucose ACHS   - Low threshold to start LDSSI     Mixed hyperlipidemia  Plan:  - Continue home statin       Hypertension  Home medications: triamterene-HCTZ, Lasix     Plan:  - Hold home medications s/o hypotension    Diastolic heart failure  9/24 ANNE: The left ventricle is normal in size with low normal systolic function. The estimated ejection fraction is 50%.There is abnormal septal wall motion consistent with right ventricular pacing. Mild right ventricular enlargement with mildly reduced right ventricular systolic function. BNP:544    Plan:  - Low threshold for Lasix as CXR/lung sounds clear while saturating well on RA.         Permanent atrial fibrillation  Patient with documented history of permanent Afib s/p watchman, no longer on AC or rate controlling  agents.  [id number]   HAS-BLED 4      Plan:  - Patient rate controlled, no indication for further agents at this time  - No full AC required s/p Watchman; will hold AC ppx and APT s/o active bleeding from abrasion sites  - Cardiac telemetry  - Maintain K > 4, Mg > 2, Ca wnl; replete PRN  - STAT cardiology consult if hemodynamic instability for DCCV evaluation      VTE Risk Mitigation (From admission, onward)         Ordered     IP VTE HIGH RISK PATIENT  Once         10/14/22 1826     Place sequential compression device  Until discontinued         10/14/22 1826                   Starr Hodges DO  Department of Hospital Medicine   [facility] - Emergency Dept

## 2022-10-15 NOTE — PROGRESS NOTES
[facility] Medicine  Progress Note    Patient Name: Jacques Arellano  MRN: [id number]  Patient Class: IP- Inpatient   Admission Date: 10/14/2022  Length of Stay: 1 days  Attending Physician: Nemesio Reynolds MD  Primary Care Provider: Matty Garsia MD        Subjective:     Principal Problem:<principal problem not specified>        HPI:  83 yo M with PMhx of CHF, AFib, Watchman, pacer, expansile brain lesion, pacemaker presents to from [facility] for diarrhea, copious black stool and acute altered mental status. Pt reports he has 3-5 profuse watery BM for the last few days noting a little bit of blood. Pt was recently admitted to [facility] ED on 10/12 for abdominal pain and coffee ground emesis, EGD revealed grade D esophagitis and gastritis with no overt GI bleeding and was subsequently discharged with Protonix. Pt was recently admitted to [facility] ICU on 9/23 for sepsis thought to be secondary to E coli. UTI vs cirrhosis and was treated with antibiotic and pressors . Inpatient workup revealed new dx of cirrhosis with ascites no spb, aortic aneurysm, and expansile Calvarial lesion, pt was DC on 10/13 back to [facility].      Today pt H & H stable. CTA abd shows colitis, cirrhosis/ascites, and new potential HCC. BNP elevated at 544 however XCR and PE unconcerning for CHF exacerbation. Mentation improved while in ED - CT head stable.       Overview/Hospital Course:  No notes on file    Interval History: Small bloody stools overnight with bright red blood per nurse. Pt has not urinated overnight, Bladder scan with only 180cc. HDS. WBC down trending. Stool studies pending. GI planning to scope on Monday.H&H slowly down trending but stable.     Review of Systems   Constitutional:  Positive for activity change, chills and fatigue.   HENT:  Negative for congestion, rhinorrhea and voice change.    Eyes:  Negative for discharge and visual disturbance.   Respiratory:  Negative for apnea and  chest tightness.    Cardiovascular:  Negative for chest pain and leg swelling.   Gastrointestinal:  Positive for abdominal distention, blood in stool, diarrhea and nausea.   Genitourinary:  Positive for difficulty urinating. Negative for dysuria.   Musculoskeletal:  Negative for arthralgias and joint swelling.   Skin:  Positive for pallor.   Neurological:  Positive for weakness. Negative for headaches.   Psychiatric/Behavioral:  Negative for behavioral problems and hallucinations.    Objective:     Vital Signs (Most Recent):  Temp: 98 °F (36.7 °C) (10/15/22 0722)  Pulse: 69 (10/15/22 0722)  Resp: 18 (10/15/22 0722)  BP: (!) 107/52 (10/15/22 0722)  SpO2: 98 % (10/15/22 0722)   Vital Signs (24h Range):  Temp:  [97.4 °F (36.3 °C)-98.4 °F (36.9 °C)] 98 °F (36.7 °C)  Pulse:  [69-83] 69  Resp:  [16-19] 18  SpO2:  [97 %-100 %] 98 %  BP: ()/(52-65) 107/52     Weight: 83.5 kg (184 lb 1.4 oz)  Body mass index is 26.41 kg/m².    Intake/Output Summary (Last 24 hours) at 10/15/2022 0734  Last data filed at 10/14/2022 2227  Gross per 24 hour   Intake --   Output 350 ml   Net -350 ml      Physical Exam  Constitutional:       Appearance: Normal appearance.   HENT:      Head: Normocephalic and atraumatic.      Mouth/Throat:      Mouth: Mucous membranes are dry.      Pharynx: Oropharynx is clear.   Eyes:      Extraocular Movements: Extraocular movements intact.      Pupils: Pupils are equal, round, and reactive to light.   Cardiovascular:      Rate and Rhythm: Normal rate and regular rhythm.   Pulmonary:      Effort: Pulmonary effort is normal.      Breath sounds: Normal breath sounds.   Abdominal:      General: There is distension.      Palpations: Abdomen is soft.   Musculoskeletal:      Cervical back: Normal range of motion.      Right lower leg: Edema present.      Left lower leg: Edema present.   Skin:     General: Skin is dry.      Coloration: Skin is pale.   Neurological:      General: No focal deficit present.       Mental Status: He is alert and oriented to person, place, and time.       Significant Labs: All pertinent labs within the past 24 hours have been reviewed.  Recent Lab Results  (Last 5 results in the past 24 hours)        10/15/22  0555   10/14/22  2226   10/14/22  1635   10/14/22  1515   10/14/22  1440        Procalcitonin         0.10  Comment: A concentration < 0.25 ng/mL represents a low risk of bacterial   infection.  Procalcitonin may not be accurate among patients with localized   infection, recent trauma or major surgery, immunosuppressed state,   invasive fungal infection, renal dysfunction. Decisions regarding   initiation or continuation of antibiotic therapy should not be based   solely on procalcitonin levels.         Albumin 2.2         2.6       Alkaline Phosphatase 140         167       Allens Test     N/A           ALT 16         17       Ammonia         44       Anion Gap 9         12       Appearance, UA   Clear             aPTT         31.8  Comment: aPTT therapeutic range = 39-69 seconds       AST 26         29       Bacteria, UA   Rare             Baso # 0.04         0.08       Basophil % 0.2         0.3       Beta-Hydroxybutyrate         1.7       Bilirubin (UA)   Negative             BILIRUBIN TOTAL 1.5  Comment: For infants and newborns, interpretation of results should be based  on gestational age, weight and in agreement with clinical  observations.    Premature Infant recommended reference ranges:  Up to 24 hours.............<8.0 mg/dL  Up to 48 hours............<12.0 mg/dL  3-5 days..................<15.0 mg/dL  6-29 days.................<15.0 mg/dL           1.7  Comment: For infants and newborns, interpretation of results should be based  on gestational age, weight and in agreement with clinical  observations.    Premature Infant recommended reference ranges:  Up to 24 hours.............<8.0 mg/dL  Up to 48 hours............<12.0 mg/dL  3-5 days..................<15.0 mg/dL  6-29  days.................<15.0 mg/dL         Blood Culture, Routine       No Growth to date  [P]         BNP         544  Comment: Values of less than 100 pg/ml are consistent with non-CHF populations.       Site     Other           BUN 26         25       Calcium 8.4         8.5       Chloride 110         108       CO2 18         17       Color, UA   Yellow             CPK         177       Creatinine 1.1         1.1       Dels     Room Air           Differential Method Automated         Automated       eGFR >60.0         >60.0       Eos # 0.1         0.0       Eosinophil % 0.4         0.1       Glucose 88         83       Glucose, UA   Negative             Gran # (ANC) 17.7         21.6       Gran % 88.5         89.0       Group & Rh         O POS       Hematocrit 28.0         32.8       Hemoglobin 9.2         10.4       Hyaline Casts, UA   2             Immature Grans (Abs) 0.15  Comment: Mild elevation in immature granulocytes is non specific and   can be seen in a variety of conditions including stress response,   acute inflammation, trauma and pregnancy. Correlation with other   laboratory and clinical findings is essential.           0.46  Comment: Mild elevation in immature granulocytes is non specific and   can be seen in a variety of conditions including stress response,   acute inflammation, trauma and pregnancy. Correlation with other   laboratory and clinical findings is essential.         Immature Granulocytes 0.8         1.9       INDIRECT NATASHA         NEG       INR         1.4  Comment: Coumadin Therapy:  2.0 - 3.0 for INR for all indicators except mechanical heart valves  and antiphospholipid syndromes which should use 2.5 - 3.5.         Ketones, UA   Negative             Lactate, Mohamud         2.4  Comment: Falsely low lactic acid results can be found in samples   containing >=13.0 mg/dL total bilirubin and/or >=3.5 mg/dL   direct bilirubin.         Leukocytes, UA   Trace             Lipase          8       Lymph # 0.9         1.0       Lymph % 4.6         4.0       Magnesium 1.7         1.4       MCH 30.5         30.5       MCHC 32.9         31.7       MCV 93         96       Microscopic Comment   SEE COMMENT  Comment: Other formed elements not mentioned in the report are not   present in the microscopic examination.                Mode     SPONT           Mono # 1.1         1.1       Mono % 5.5         4.7       MPV 10.4         10.4       NITRITE UA   Negative             nRBC 0         0       Occult Blood UA   Negative             pH, UA   6.0             Phosphorus 3.3         2.8       Platelet Estimate         Appears normal       Platelets 160         195       POC BE     -7           POC HCO3     18.3           POC PCO2     32.6           POC PH     7.357           POC PO2     18           POC SATURATED O2     26           POC TCO2     19           Potassium 3.8         3.9       PROTEIN TOTAL 4.5         5.3       Protein, UA   Negative  Comment: Recommend a 24 hour urine protein or a urine   protein/creatinine ratio if globulin induced proteinuria is  clinically suspected.               Protime         14.7       RBC 3.02         3.41       RBC, UA   1             RDW 16.8         17.1       Sample     VENOUS           Sodium 137         137       Specific Gravity, UA   >1.030             Specimen UA   Urine, Clean Catch             Squam Epithel, UA   0             Troponin I         0.079  Comment: The reference interval for Troponin I represents the 99th percentile   cutoff   for our facility and is consistent with 3rd generation assay   performance.         WBC, UA   8             WBC 19.99         24.21                               [P] - Preliminary Result               Significant Imaging: I have reviewed all pertinent imaging results/findings within the past 24 hours.      Assessment/Plan:      Diarrhea  Pt endorses profuse watery diarrhea for the past few days. Concerning for possible C  diff with recent aggressive antibiotic regimen     Plan:  - Stool studies pending  - C diff pending  - Starting zosyn empirically   - 500 cc of NS     GI bleed  Pt endorses melena. CT scan with no evidence of contrast extravasation to suggest active GI bleed at this time. Stable 3 cm enhancing lesion at the hepatic dome with evidence of liver cirrhosis, circumferential wall thickening of the rectum and sigmoid colon concern for colitis, and pancreatic hypodensities measuring up to 2.0 cm. Considering liver disease, suspicion for ruptured esophogeal varcies. Recent EGD at [facility] reviewed, will discuss with GI    Plan:  - CBC q12, trend H&H  - GI planning for endoscopy & sigmoidoscopy on Monday  - PPI 40mg BID  - 500cc of fluid  - Empirically starting Zosyn for Intraabdominal coverage  - NPO at midnight on Sunday for procedure Monday     Ascites  9/23: Ct A/P:Cirrhotic morphology of the liver with sequelae of portal hypertension as evidence by splenomegaly, intra-abdominal ascites, and possible portal hypertensive enteropathy. Pt denies hx of alcohol abuse.  R preformed diagnostic paracentetics on 9/27 to r/o SBP: which was negative 130 WBC and 10%     Plan:  - daily CMP     Chronic gout  Plan:  Continue home allopurinol.       Type 2 diabetes mellitus, without long-term current use of insulin  Last A1c 6.1 3 months ago. Hypoglycemic on admission.      Plan:  - No home medications  Noted   - POCT glucose ACHS   - Low threshold to start LDSSI     Mixed hyperlipidemia  Plan:  - Continue home statin       Hypertension  Home medications: triamterene-HCTZ, Lasix     Plan:  - Hold home medications s/o hypotension    Diastolic heart failure  9/24 ANNE: The left ventricle is normal in size with low normal systolic function. The estimated ejection fraction is 50%.There is abnormal septal wall motion consistent with right ventricular pacing. Mild right ventricular enlargement with mildly reduced right ventricular systolic  function. BNP:544    Plan:  - Low threshold for Lasix as CXR/lung sounds clear while saturating well on RA.         Permanent atrial fibrillation  Patient with documented history of permanent Afib s/p watchman, no longer on AC or rate controlling agents.  [id number] 5   HAS-BLED 4      Plan:  - Patient rate controlled, no indication for further agents at this time  - No full AC required s/p Watchman; will hold AC ppx and APT s/o active bleeding from abrasion sites  - Cardiac telemetry  - Maintain K > 4, Mg > 2, Ca wnl; replete PRN  - STAT cardiology consult if hemodynamic instability for DCCV evaluation      VTE Risk Mitigation (From admission, onward)         Ordered     IP VTE HIGH RISK PATIENT  Once         10/14/22 1826     Place sequential compression device  Until discontinued         10/14/22 1826                Discharge Planning   ELMER: 10/15/2022     Code Status: DNR   Is the patient medically ready for discharge?:     Reason for patient still in hospital (select all that apply): Patient unstable                     Starr Hodges DO  Department of Hospital Medicine   [facility]EL[facility]

## 2022-10-15 NOTE — CONSULTS
[facility]  Gastroenterology  Consult Note    Patient Name: Jacques Arellano  MRN: [id number]  Admission Date: 10/14/2022  Hospital Length of Stay: 1 days  Code Status: DNR   Attending Provider:  Dr. Mcgregor  Consulting Provider: Praful Mohan MD  Primary Care Physician: Matty Garsia MD  Principal Problem:<principal problem not specified>    Inpatient consult to Gastroenterology  Consult performed by: Praful Mohan MD  Consult ordered by: Rohit Rogers PA-C      Subjective:     HPI: Jacques Arellano is a 82 y.o. male with history of CHF,  newly diagnosed decompensated cirrhosis, Afib s/p Watchman device implantation, pacer, expansile brain lesion, pacemaker presents to from [facility] for diarrhea, melena and altered mental status. The patient was altered when seen this morning and did not volunteer much of a history.   I called the patient's son who is unaware of any history of liver disease or NSAID use. In the ER here, his H&H was stable (Hemoglobin 11.2, which is his baseline). Labs were also significant for platelets 160 ( much improved from his previous admission),  INR 1.4, total bilirubin 1.5, BUN 26 and creatinine 1.1.    On 10/12 the patient had an admission to [facility] for abdominal pain and coffee-ground emesis.  An EGD performed then revealed grade D esophagitis and gastritis.  During that admission, it was determined that he had new onset cirrhosis on imaging.  Paracentesis was performed which was negative for SBP. He was discharged on p.o. Plavix b.i.d., along with Lasix and spironolactone.    Endoscopic hx:  EGD (10/12/22):  1.) Grade D esophagitis in the distal esophagus and GE junction  2.) mild gastritis diffuse biopsies for H pylori taken  3.) 1st portion of duodenum new sweep to clean base ulcers noted without stigmata of recent bleeding  4.) No blood visualized         Past Medical History:   Diagnosis Date    TRENT (acute kidney injury)  09/24/2022    Anticoagulant long-term use     Aortic aneurysm     Ascites of liver     Atrial fibrillation     Brain lesion     Diabetes mellitus     Diastolic heart failure     Frequent falls     GI bleeding     Hyperlipidemia     Hypertension     Liver disease     Renal disorder     eswl    Sacral decubitus ulcer     Sleep apnea     20 yrs ago    Stroke     TIA    UTI (urinary tract infection)     Vertigo        Past Surgical History:   Procedure Laterality Date    CARDIAC SURGERY  03/30/2022    watchmen    INSERTION OF PACEMAKER      KIDNEY STONE SURGERY      OPEN REDUCTION AND INTERNAL FIXATION (ORIF) OF FRACTURE OF DISTAL RADIUS Right 5/17/2022    Procedure: ORIF, FRACTURE, RADIUS, DISTAL;  Surgeon: Claude S. Williams IV, MD;  Location: [facility];  Service: Orthopedics;  Laterality: Right;    TONSILLECTOMY         Family History   Problem Relation Age of Onset    Fainting Neg Hx     Heart attack Neg Hx     Heart disease Neg Hx     Heart failure Neg Hx     Hypertension Neg Hx     Atrial Septal Defect Neg Hx        Social History     Socioeconomic History    Marital status:    Tobacco Use    Smoking status: Never    Smokeless tobacco: Never   Substance and Sexual Activity    Alcohol use: Not Currently     Comment: SOCIAL    Drug use: Never    Sexual activity: Not Currently     Social Determinants of Health     Financial Resource Strain: Low Risk     Difficulty of Paying Living Expenses: Not very hard   Food Insecurity: No Food Insecurity    Worried About Running Out of Food in the Last Year: Never true    Ran Out of Food in the Last Year: Never true   Transportation Needs: No Transportation Needs    Lack of Transportation (Medical): No    Lack of Transportation (Non-Medical): No   Physical Activity: Unknown    Days of Exercise per Week: 0 days   Stress: Stress Concern Present    Feeling of Stress : Very much   Social Connections: Unknown    Frequency of Social Gatherings with Friends and Family: Never     Active Member of Clubs or Organizations: Yes    Attends Club or Organization Meetings: Never    Marital Status:    Housing Stability: Low Risk     Unable to Pay for Housing in the Last Year: No    Number of Places Lived in the Last Year: 1    Unstable Housing in the Last Year: No       No current facility-administered medications on file prior to encounter.     Current Outpatient Medications on File Prior to Encounter   Medication Sig Dispense Refill    acetaminophen (TYLENOL) 325 MG tablet Take 2 tablets (650 mg total) by mouth every 6 (six) hours as needed for Pain (DO NOT EXCEED more than 2000 mg in 1 day).  0    allopurinoL (ZYLOPRIM) 100 MG tablet Take 1 tablet (100 mg total) by mouth once daily. 90 tablet 0    atorvastatin (LIPITOR) 40 MG tablet Take 40 mg by mouth once daily.      folic acid (FOLVITE) 1 MG tablet Take 1 tablet (1 mg total) by mouth once daily.  0    furosemide (LASIX) 20 MG tablet TAKE 1 TABLET(20 MG) BY MOUTH TWICE DAILY 60 tablet 2    EScitalopram oxalate (LEXAPRO) 10 MG tablet Take 1 tablet (10 mg total) by mouth once daily. 30 tablet 5    LIDOcaine (LIDODERM) 5 % Place 1 patch onto the skin once daily. Remove & Discard patch within 12 hours or as directed by MD. Apply to right ribs.  0       Review of patient's allergies indicates:  No Known Allergies    Review of Systems   Constitutional:  Positive for activity change, chills and fatigue. Negative for fever.   HENT:  Negative for congestion, rhinorrhea and trouble swallowing.    Eyes:  Negative for discharge and visual disturbance.   Respiratory:  Negative for apnea, cough and shortness of breath.    Cardiovascular:  Positive for leg swelling. Negative for chest pain.   Gastrointestinal:  Positive for abdominal distention, blood in stool and diarrhea. Negative for abdominal pain and nausea.   Genitourinary:  Negative for difficulty urinating and dysuria.   Musculoskeletal:  Negative for arthralgias and joint swelling.   Skin:   Positive for pallor. Negative for color change.   Neurological:  Positive for weakness. Negative for dizziness and headaches.   Psychiatric/Behavioral:  Negative for agitation, behavioral problems and confusion.      Objective:     Vitals:    10/15/22 1403   BP:    Pulse: 69   Resp:    Temp:        Physical Exam  Constitutional:       Appearance: Normal appearance.   HENT:      Head: Normocephalic and atraumatic.      Mouth/Throat:      Mouth: Mucous membranes are dry.      Pharynx: Oropharynx is clear.   Cardiovascular:      Rate and Rhythm: Normal rate and regular rhythm.   Pulmonary:      Effort: Pulmonary effort is normal.      Breath sounds: Normal breath sounds.   Abdominal:      General: There is distension.      Palpations: Abdomen is soft.      Rectal:   No external hemorrhoids. Normal sphincter tone.  No masses felt.  Red blood seen on finger.  Musculoskeletal:      Cervical back: Normal range of motion and neck supple.      Right lower leg: Edema present.      Left lower leg: Edema present.   Skin:     General: Skin is dry.      Coloration: Skin is jaundiced.   Neurological:      Mental Status: He is alert and oriented to person, place, and time. Mental status is at baseline.   Psychiatric:         Mood and Affect: Mood normal.         Thought Content: Thought content normal.       Significant Labs:  Recent Labs   Lab 10/14/22  1440 10/15/22  0555   HGB 10.4* 9.2*       Lab Results   Component Value Date    WBC 19.99 (H) 10/15/2022    HGB 9.2 (L) 10/15/2022    HCT 28.0 (L) 10/15/2022    MCV 93 10/15/2022     10/15/2022       Lab Results   Component Value Date     10/15/2022    K 3.8 10/15/2022     10/15/2022    CO2 18 (L) 10/15/2022    BUN 26 (H) 10/15/2022    CREATININE 1.1 10/15/2022    CALCIUM 8.4 (L) 10/15/2022    ANIONGAP 9 10/15/2022    ESTGFRAFRICA >60.0 07/19/2022    EGFRNONAA >60.0 07/19/2022       Lab Results   Component Value Date    ALT 16 10/15/2022    AST 26 10/15/2022     ALKPHOS 140 (H) 10/15/2022    BILITOT 1.5 (H) 10/15/2022       Lab Results   Component Value Date    INR 1.4 (H) 10/14/2022    INR 1.5 (H) 09/23/2022       Significant Imaging:  Reviewed pertinent radiology findings.       Assessment/Plan:     Jacques Arellano is a 82 y.o. male with history of CHF,  newly diagnosed decompensated cirrhosis, Afib s/p Watchman device implantation, pacer, expansile brain lesion, pacemaker presents to from [facility] for diarrhea, melena and altered mental status. On 10/12, he was admitted to OSH for coffee-ground emesis. EGD at that time showed LA grade D esophagitis and some clean based ulcers in the duodenum.Imaging on this admission concerning for colitis. Patient has ongoing rectal bleeding (appeared more red on today's exam).    Problem List:  GI bleeding in a patient with new onset decompensated cirrhosis, initial presentation with melena and now has BRBPR on exam  Colitis on imaging  Hx of LA grade D esophagitis & some clean based ulcers in the duodenum      - CTA abdomen and pelvis (10/14/22): No evidence of contrast extravasation to suggest active GI bleed. Stable 3 cm enhancing lesion at the hepatic dome which does appear to demonstrate some washout on delayed images, suggesting possible HCC. Circumferential wall thickening of the rectum and sigmoid colon with presacral edema raising concern for colitis. Pancreatic hypodensities measuring up to 2.0 cm.  Differential includes side branch IPMNs, pseudocyst, or other cystic neoplasm.     MELD-Na score: 13 at 10/15/2022  5:55 AM  MELD score: 13 at 10/15/2022  5:55 AM  Calculated from:  Serum Creatinine: 1.1 mg/dL at 10/15/2022  5:55 AM  Serum Sodium: 137 mmol/L at 10/15/2022  5:55 AM  Total Bilirubin: 1.5 mg/dL at 10/15/2022  5:55 AM  INR(ratio): 1.4 at 10/14/2022  2:40 PM  Age: 82 years     Recommendations:  - Plan for EGD and flexible sigmoidoscopy on Monday AM  - Clear liquid diet now and NPO at midnight before  procedure  - Please administer 2 Fleet enemas morning of procedure  - Maintain IV access with 2 large bore Ivs  - Intravascular resuscitation/support with IVFs   - Bolus IV pantoprazole 80mg followed by 40mg BID  - Start IV octreotide drip at 50 mcg/hr after a bolus of 50 mcg.  - Please notify GI team if there is significant change in patient's clinical status   - Monitor Hgb q8hrs  - Transfuse to keep Hgb >7, plts >50  - Appreciate recommendations from hepatology regarding his decompensated liver cirrhosis and enhancing liver lesion  - Hold anticoagulants if safe to do so per primary team  - We will follow stool cx, stool C diff PCR    Thank you for involving us in the care of Jacques Arellano. Please call with any additional questions, concerns or changes in the patient's clinical status. We will continue to follow. Dr. Mcgregor and I spent nearly 15 minutes in the room face-to-face with the patient & his son ( his daughter spoke to us via telephone).  Explained the nature of his disease, further steps and answered all their questions.     Praful Mohan MD  Gastroenterology Fellow PGY IV  [facility]

## 2022-10-15 NOTE — SUBJECTIVE & OBJECTIVE
Past Medical History:   Diagnosis Date    TRENT (acute kidney injury) 09/24/2022    Anticoagulant long-term use     Aortic aneurysm     Ascites of liver     Atrial fibrillation     Brain lesion     Diabetes mellitus     Diastolic heart failure     Frequent falls     GI bleeding     Hyperlipidemia     Hypertension     Liver disease     Renal disorder     eswl    Sacral decubitus ulcer     Sleep apnea     20 yrs ago    Stroke     TIA    UTI (urinary tract infection)     Vertigo        Past Surgical History:   Procedure Laterality Date    CARDIAC SURGERY  03/30/2022    [facility]men    INSERTION OF PACEMAKER      KIDNEY STONE SURGERY      OPEN REDUCTION AND INTERNAL FIXATION (ORIF) OF FRACTURE OF DISTAL RADIUS Right 5/17/2022    Procedure: ORIF, FRACTURE, RADIUS, DISTAL;  Surgeon: Claude S. Williams IV, MD;  Location: [facility];  Service: Orthopedics;  Laterality: Right;    TONSILLECTOMY         Review of patient's allergies indicates:  No Known Allergies    No current facility-administered medications on file prior to encounter.     Current Outpatient Medications on File Prior to Encounter   Medication Sig    acetaminophen (TYLENOL) 325 MG tablet Take 2 tablets (650 mg total) by mouth every 6 (six) hours as needed for Pain (DO NOT EXCEED more than 2000 mg in 1 day).    allopurinoL (ZYLOPRIM) 100 MG tablet Take 1 tablet (100 mg total) by mouth once daily.    atorvastatin (LIPITOR) 40 MG tablet Take 40 mg by mouth once daily.    folic acid (FOLVITE) 1 MG tablet Take 1 tablet (1 mg total) by mouth once daily.    furosemide (LASIX) 20 MG tablet TAKE 1 TABLET(20 MG) BY MOUTH TWICE DAILY    EScitalopram oxalate (LEXAPRO) 10 MG tablet Take 1 tablet (10 mg total) by mouth once daily.    LIDOcaine (LIDODERM) 5 % Place 1 patch onto the skin once daily. Remove & Discard patch within 12 hours or as directed by MD. Apply to right ribs.    [DISCONTINUED] aspirin 81 MG Chew Take 1 tablet (81 mg total) by mouth once daily.     Family History     None       Tobacco Use    Smoking status: Never    Smokeless tobacco: Never   Substance and Sexual Activity    Alcohol use: Not Currently     Comment: SOCIAL    Drug use: Never    Sexual activity: Not Currently     Review of Systems   Constitutional:  Positive for activity change, chills and fatigue. Negative for fever.   HENT:  Negative for congestion, rhinorrhea and trouble swallowing.    Eyes:  Negative for discharge and visual disturbance.   Respiratory:  Negative for apnea, cough and shortness of breath.    Cardiovascular:  Positive for leg swelling. Negative for chest pain.   Gastrointestinal:  Positive for abdominal distention, blood in stool and diarrhea. Negative for abdominal pain and nausea.   Genitourinary:  Negative for difficulty urinating and dysuria.   Musculoskeletal:  Negative for arthralgias and joint swelling.   Skin:  Positive for pallor. Negative for color change.   Neurological:  Positive for weakness. Negative for dizziness and headaches.   Psychiatric/Behavioral:  Negative for agitation, behavioral problems and confusion.    Objective:     Vital Signs (Most Recent):  Temp: 98.4 °F (36.9 °C) (10/1939)  Pulse: 83 (10/1939)  Resp: 18 (10/1939)  BP: (!) 94/55 (10/1939)  SpO2: 99 % (10/1939)   Vital Signs (24h Range):  Temp:  [97.7 °F (36.5 °C)-98.4 °F (36.9 °C)] 98.4 °F (36.9 °C)  Pulse:  [70-83] 83  Resp:  [16-19] 18  SpO2:  [97 %-100 %] 99 %  BP: ()/(55-65) 94/55     Weight: 85.3 kg (188 lb)  Body mass index is 26.98 kg/m².    Physical Exam  Constitutional:       Appearance: Normal appearance.   HENT:      Head: Normocephalic and atraumatic.      Mouth/Throat:      Mouth: Mucous membranes are dry.      Pharynx: Oropharynx is clear.   Cardiovascular:      Rate and Rhythm: Normal rate and regular rhythm.   Pulmonary:      Effort: Pulmonary effort is normal.      Breath sounds: Normal breath sounds.   Abdominal:      General: There is distension.       Palpations: Abdomen is soft.   Musculoskeletal:      Cervical back: Normal range of motion and neck supple.      Right lower leg: Edema present.      Left lower leg: Edema present.   Skin:     General: Skin is dry.      Coloration: Skin is jaundiced.   Neurological:      Mental Status: He is alert and oriented to person, place, and time. Mental status is at baseline.   Psychiatric:         Mood and Affect: Mood normal.         Thought Content: Thought content normal.           Significant Labs: All pertinent labs within the past 24 hours have been reviewed.  Recent Lab Results         10/14/22  1635   10/14/22  1440        Procalcitonin   0.10  Comment: A concentration < 0.25 ng/mL represents a low risk of bacterial   infection.  Procalcitonin may not be accurate among patients with localized   infection, recent trauma or major surgery, immunosuppressed state,   invasive fungal infection, renal dysfunction. Decisions regarding   initiation or continuation of antibiotic therapy should not be based   solely on procalcitonin levels.         Albumin   2.6       Alkaline Phosphatase   167       Allens Test N/A         ALT   17       Ammonia   44       Anion Gap   12       aPTT   31.8  Comment: aPTT therapeutic range = 39-69 seconds       AST   29       Baso #   0.08       Basophil %   0.3       Beta-Hydroxybutyrate   1.7       BILIRUBIN TOTAL   1.7  Comment: For infants and newborns, interpretation of results should be based  on gestational age, weight and in agreement with clinical  observations.    Premature Infant recommended reference ranges:  Up to 24 hours.............<8.0 mg/dL  Up to 48 hours............<12.0 mg/dL  3-5 days..................<15.0 mg/dL  6-29 days.................<15.0 mg/dL         BNP   544  Comment: Values of less than 100 pg/ml are consistent with non-CHF populations.       Site Other         BUN   25       Calcium   8.5       Chloride   108       CO2   17       CPK   177       Creatinine    1.1       [facility] Room Air         Differential Method   Automated       eGFR   >60.0       Eos #   0.0       Eosinophil %   0.1       Glucose   83       Gran # (ANC)   21.6       Gran %   89.0       Group & Rh   O POS       Hematocrit   32.8       Hemoglobin   10.4       Immature Grans (Abs)   0.46  Comment: Mild elevation in immature granulocytes is non specific and   can be seen in a variety of conditions including stress response,   acute inflammation, trauma and pregnancy. Correlation with other   laboratory and clinical findings is essential.         Immature Granulocytes   1.9       INDIRECT NATASHA   NEG       INR   1.4  Comment: Coumadin Therapy:  2.0 - 3.0 for INR for all indicators except mechanical heart valves  and antiphospholipid syndromes which should use 2.5 - 3.5.         Lactate, Mohamud   2.4  Comment: Falsely low lactic acid results can be found in samples   containing >=13.0 mg/dL total bilirubin and/or >=3.5 mg/dL   direct bilirubin.         Lipase   8       Lymph #   1.0       Lymph %   4.0       Magnesium   1.4       MCH   30.5       MCHC   31.7       MCV   96       Mode SPONT         Mono #   1.1       Mono %   4.7       MPV   10.4       nRBC   0       Phosphorus   2.8       Platelet Estimate   Appears normal       Platelets   195       POC BE -7         POC HCO3 18.3         POC PCO2 32.6         POC PH 7.357         POC PO2 18         POC SATURATED O2 26         POC TCO2 19         Potassium   3.9       PROTEIN TOTAL   5.3       Protime   14.7       RBC   3.41       RDW   17.1       Sample VENOUS         Sodium   137       Troponin I   0.079  Comment: The reference interval for Troponin I represents the 99th percentile   cutoff   for our facility and is consistent with 3rd generation assay   performance.         WBC   24.21               Significant Imaging: I have reviewed all pertinent imaging results/findings within the past 24 hours.

## 2022-10-15 NOTE — NURSING
Did not urinate this shift. Bladder scan showed 180ml. Patodia informed and wants to hold off on straight cath now. TM

## 2022-10-15 NOTE — PLAN OF CARE
Plan of care reviewed with pt and son:  -Pt has been sleeping a lot between care, responded well to voice, in and out of confusion, was oriented x3 this morning, only oriented x1 this afternoon  -On RA, VS stable with BP went low this afternoon,  500cc bolus NS given, MD want MAP to stay above 65. On tele, in paced rhythm with HR 70s-80s   -Octreotide drip started.   -Voided once per urinal with 350cc tea color urine, notified night shift nurse about bladder scan pt if he unable to void tonight   -Has 2 BMs with small amount of liquid dark red blood coming out from rectum- stool sample sent  -On full liquid diet now, aspiration precaution, pt only drinks small amount of water and 1 cold drink, did not want to eat anything   -Multiple old bruising and skin tare and abrasion all over his body -present on admission-refer to flowsheet. Still waiting for Wound care to come to assess. Pt skin is very fragile on his arms, small amount of serous drainage leaking from his arms   -Turned a few time during shift. Pressure point with Mepolex on. Perineum is red, groin is red, barrier cream applied. Waffle mattress applied  -SCD on   -Bed alarm on  -Call light in reach, [facility]

## 2022-10-15 NOTE — SUBJECTIVE & OBJECTIVE
Interval History: 3 loose bowel movements overnight. Continues to be confused with tachypnea this AM.   Will continue ABX coverage and workup for possible other sources of ongoing sepsis.   Also considering worsening pulmonary edema.   All questions answered, family updated.     Review of Systems   Unable to perform ROS: Mental status change   Objective:     Vital Signs (Most Recent):  Temp: 96.8 °F (36 °C) (10/15/22 1229)  Pulse: 69 (10/15/22 1549)  Resp: 18 (10/15/22 1229)  BP: (!) 123/53 (10/15/22 1229)  SpO2: 97 % (10/15/22 1229)   Vital Signs (24h Range):  Temp:  [96.8 °F (36 °C)-98.4 °F (36.9 °C)] 96.8 °F (36 °C)  Pulse:  [69-83] 69  Resp:  [16-18] 18  SpO2:  [97 %-100 %] 97 %  BP: ()/(52-63) 123/53     Weight: 83.5 kg (184 lb 1.4 oz)  Body mass index is 26.41 kg/m².    Intake/Output Summary (Last 24 hours) at 10/15/2022 1602  Last data filed at 10/15/2022 1332  Gross per 24 hour   Intake 177.86 ml   Output 700 ml   Net -522.14 ml      Physical Exam  Constitutional:       Appearance: He is ill-appearing.   HENT:      Head:      Comments: Subcutaneous lesion hard to palpation located on right frontal skull.   Eyes:      General: No scleral icterus.     Conjunctiva/sclera: Conjunctivae normal.   Cardiovascular:      Rate and Rhythm: Normal rate and regular rhythm.      Pulses: Normal pulses.      Heart sounds: Normal heart sounds.   Pulmonary:      Effort: Pulmonary effort is normal. No respiratory distress.      Breath sounds: Normal breath sounds.   Chest:      Comments: Pacemaker in place in right upper chest wall.   Abdominal:      General: Bowel sounds are normal. There is distension.      Palpations: Abdomen is soft.      Tenderness: There is no abdominal tenderness.   Musculoskeletal:      Right lower leg: Edema present.      Left lower leg: Edema present.   Skin:     General: Skin is warm and dry.      Findings: No bruising or rash.   Neurological:      GCS: GCS eye subscore is 4. GCS verbal  subscore is 3. GCS motor subscore is 5.       Significant Labs: All pertinent labs within the past 24 hours have been reviewed.  A1C:   Recent Labs   Lab 07/19/22  1149   HGBA1C 6.1*     ABGs:   Recent Labs   Lab 10/14/22  1635 10/16/22  0825   PH 7.357 7.401   PCO2 32.6* 25.5*   HCO3 18.3* 15.8*   POCSATURATED 26* 41*   BE -7 -9   PO2 18* 23*     Bilirubin:   Recent Labs   Lab 09/25/22  0310 09/26/22  0307 10/14/22  1440 10/15/22  0555 10/16/22  0610   BILITOT 1.3* 1.1* 1.7* 1.5* 1.5*     Blood Culture:   Recent Labs   Lab 10/14/22  1439 10/14/22  1515   LABBLOO No Growth to date  No Growth to date No Growth to date  No Growth to date     BMP:   Recent Labs   Lab 10/16/22  0610   *      K 3.8   *   CO2 15*   BUN 25*   CREATININE 1.2   CALCIUM 8.2*   MG 1.7     CBC:   Recent Labs   Lab 10/15/22  0555 10/15/22  1758 10/16/22  0610   WBC 19.99* 26.32* 15.53*   HGB 9.2* 9.4* 8.9*   HCT 28.0* 28.4* 26.8*    182 151     CMP:   Recent Labs   Lab 10/14/22  1440 10/15/22  0555 10/16/22  0610    137 137   K 3.9 3.8 3.8    110 111*   CO2 17* 18* 15*   GLU 83 88 134*   BUN 25* 26* 25*   CREATININE 1.1 1.1 1.2   CALCIUM 8.5* 8.4* 8.2*   PROT 5.3* 4.5* 4.3*   ALBUMIN 2.6* 2.2* 2.0*   BILITOT 1.7* 1.5* 1.5*   ALKPHOS 167* 140* 128   AST 29 26 20   ALT 17 16 14   ANIONGAP 12 9 11     Cardiac Markers:   Recent Labs   Lab 10/14/22  1440   *     Coagulation:   Recent Labs   Lab 10/14/22  1440   INR 1.4*   APTT 31.8     Lactic Acid:   Recent Labs   Lab 10/14/22  1440 10/16/22  0822   LACTATE 2.4* 2.9*     Lipase:   Recent Labs   Lab 10/14/22  1440   LIPASE 8     Lipid Panel: No results for input(s): CHOL, HDL, LDLCALC, TRIG, CHOLHDL in the last 48 hours.  Magnesium:   Recent Labs   Lab 10/14/22  1440 10/15/22  0555 10/16/22  0610   MG 1.4* 1.7 1.7     Pathology Results  (Last 10 years)      None          POCT Glucose:   Recent Labs   Lab 10/15/22  1348   POCTGLUCOSE 86     Prealbumin: No  results for input(s): PREALBUMIN in the last 48 hours.  Respiratory Culture: No results for input(s): GSRESP, RESPIRATORYC in the last 48 hours.  Troponin:   Recent Labs   Lab 10/14/22  1440   TROPONINI 0.079*     TSH:   Recent Labs   Lab 07/19/22  1149   TSH 0.555     Urine Culture: No results for input(s): LABURIN in the last 48 hours.  Urine Studies:   Recent Labs   Lab 10/14/22  2226   COLORU Yellow   APPEARANCEUA Clear   PHUR 6.0   SPECGRAV >1.030*   PROTEINUA Negative   GLUCUA Negative   KETONESU Negative   BILIRUBINUA Negative   OCCULTUA Negative   NITRITE Negative   LEUKOCYTESUR Trace*   RBCUA 1   WBCUA 8*   BACTERIA Rare   SQUAMEPITHEL 0   HYALINECASTS 2*       Significant Imaging: I have reviewed all pertinent imaging results/findings within the past 24 hours.

## 2022-10-15 NOTE — CONSULTS
RD consulted for skin tears. RD skin tears do not meet needs for increased protein/energy needs at this time. Please re-consult RD if any other nutritional concerns arise before next RD f/u.      Thanks!
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
## 2022-10-15 NOTE — HPI
83 yo M with PMhx of CHF, AFib, Watchman, pacer, expansile brain lesion, pacemaker presents to from [facility] for diarrhea, copious black stool and acute altered mental status. Pt reports he has 3-5 profuse watery BM for the last few days noting a little bit of blood. Pt was recently admitted to [facility] ED on 10/12 for abdominal pain and coffee ground emesis, EGD revealed grade D esophagitis and gastritis with no overt GI bleeding and was subsequently discharged with Protonix. Pt was recently admitted to [facility] ICU on 9/23 for sepsis thought to be secondary to E coli. UTI vs cirrhosis and was treated with antibiotic and pressors . Inpatient workup revealed new dx of cirrhosis with ascites no spb, aortic aneurysm, and expansile Calvarial lesion, pt was DC on 10/13 back to [facility].      Today pt H & H stable. CTA abd shows colitis, cirrhosis/ascites, and new potential HCC. BNP elevated at 544 however XCR and PE unconcerning for CHF exacerbation. Mentation improved while in ED - CT head stable.

## 2022-10-16 PROBLEM — K74.69 OTHER CIRRHOSIS OF LIVER: Status: ACTIVE | Noted: 2022-09-25

## 2022-10-16 LAB
ALBUMIN SERPL BCP-MCNC: 2 G/DL (ref 3.5–5.2)
ALLENS TEST: ABNORMAL
ALP SERPL-CCNC: 128 U/L (ref 55–135)
ALT SERPL W/O P-5'-P-CCNC: 14 U/L (ref 10–44)
ANION GAP SERPL CALC-SCNC: 11 MMOL/L (ref 8–16)
AST SERPL-CCNC: 20 U/L (ref 10–40)
BASOPHILS # BLD AUTO: 0.03 K/UL (ref 0–0.2)
BASOPHILS NFR BLD: 0.2 % (ref 0–1.9)
BILIRUB SERPL-MCNC: 1.5 MG/DL (ref 0.1–1)
BUN SERPL-MCNC: 25 MG/DL (ref 8–23)
CALCIUM SERPL-MCNC: 8.2 MG/DL (ref 8.7–10.5)
CHLORIDE SERPL-SCNC: 111 MMOL/L (ref 95–110)
CO2 SERPL-SCNC: 15 MMOL/L (ref 23–29)
CREAT SERPL-MCNC: 1.2 MG/DL (ref 0.5–1.4)
DELSYS: ABNORMAL
DIFFERENTIAL METHOD: ABNORMAL
EOSINOPHIL # BLD AUTO: 0 K/UL (ref 0–0.5)
EOSINOPHIL NFR BLD: 0.2 % (ref 0–8)
ERYTHROCYTE [DISTWIDTH] IN BLOOD BY AUTOMATED COUNT: 16.6 % (ref 11.5–14.5)
EST. GFR  (NO RACE VARIABLE): >60 ML/MIN/1.73 M^2
GLUCOSE SERPL-MCNC: 134 MG/DL (ref 70–110)
HCO3 UR-SCNC: 15.8 MMOL/L (ref 24–28)
HCT VFR BLD AUTO: 26.8 % (ref 40–54)
HGB BLD-MCNC: 8.9 G/DL (ref 14–18)
IMM GRANULOCYTES # BLD AUTO: 0.13 K/UL (ref 0–0.04)
IMM GRANULOCYTES NFR BLD AUTO: 0.8 % (ref 0–0.5)
LACTATE SERPL-SCNC: 2.9 MMOL/L (ref 0.5–2.2)
LYMPHOCYTES # BLD AUTO: 0.7 K/UL (ref 1–4.8)
LYMPHOCYTES NFR BLD: 4.3 % (ref 18–48)
MAGNESIUM SERPL-MCNC: 1.7 MG/DL (ref 1.6–2.6)
MCH RBC QN AUTO: 30.6 PG (ref 27–31)
MCHC RBC AUTO-ENTMCNC: 33.2 G/DL (ref 32–36)
MCV RBC AUTO: 92 FL (ref 82–98)
MONOCYTES # BLD AUTO: 0.6 K/UL (ref 0.3–1)
MONOCYTES NFR BLD: 3.7 % (ref 4–15)
NEUTROPHILS # BLD AUTO: 14.1 K/UL (ref 1.8–7.7)
NEUTROPHILS NFR BLD: 90.8 % (ref 38–73)
NRBC BLD-RTO: 0 /100 WBC
PCO2 BLDA: 25.5 MMHG (ref 35–45)
PH SMN: 7.4 [PH] (ref 7.35–7.45)
PHOSPHATE SERPL-MCNC: 3.7 MG/DL (ref 2.7–4.5)
PLATELET # BLD AUTO: 151 K/UL (ref 150–450)
PMV BLD AUTO: 10.5 FL (ref 9.2–12.9)
PO2 BLDA: 23 MMHG (ref 40–60)
POC BE: -9 MMOL/L
POC SATURATED O2: 41 % (ref 95–100)
POC TCO2: 17 MMOL/L (ref 24–29)
POCT GLUCOSE: 192 MG/DL (ref 70–110)
POTASSIUM SERPL-SCNC: 3.8 MMOL/L (ref 3.5–5.1)
PROT SERPL-MCNC: 4.3 G/DL (ref 6–8.4)
RBC # BLD AUTO: 2.91 M/UL (ref 4.6–6.2)
SAMPLE: ABNORMAL
SITE: ABNORMAL
SODIUM SERPL-SCNC: 137 MMOL/L (ref 136–145)
WBC # BLD AUTO: 15.53 K/UL (ref 3.9–12.7)

## 2022-10-16 PROCEDURE — 99223 1ST HOSP IP/OBS HIGH 75: CPT | Mod: GC,,, | Performed by: INTERNAL MEDICINE

## 2022-10-16 PROCEDURE — 20600001 HC STEP DOWN PRIVATE ROOM

## 2022-10-16 PROCEDURE — 85025 COMPLETE CBC W/AUTO DIFF WBC: CPT | Performed by: STUDENT IN AN ORGANIZED HEALTH CARE EDUCATION/TRAINING PROGRAM

## 2022-10-16 PROCEDURE — 99900035 HC TECH TIME PER 15 MIN (STAT)

## 2022-10-16 PROCEDURE — 63600175 PHARM REV CODE 636 W HCPCS: Performed by: STUDENT IN AN ORGANIZED HEALTH CARE EDUCATION/TRAINING PROGRAM

## 2022-10-16 PROCEDURE — 25000003 PHARM REV CODE 250: Performed by: STUDENT IN AN ORGANIZED HEALTH CARE EDUCATION/TRAINING PROGRAM

## 2022-10-16 PROCEDURE — 36415 COLL VENOUS BLD VENIPUNCTURE: CPT | Performed by: STUDENT IN AN ORGANIZED HEALTH CARE EDUCATION/TRAINING PROGRAM

## 2022-10-16 PROCEDURE — 51798 US URINE CAPACITY MEASURE: CPT

## 2022-10-16 PROCEDURE — 99232 SBSQ HOSP IP/OBS MODERATE 35: CPT | Mod: GC,,, | Performed by: HOSPITALIST

## 2022-10-16 PROCEDURE — 80053 COMPREHEN METABOLIC PANEL: CPT | Performed by: STUDENT IN AN ORGANIZED HEALTH CARE EDUCATION/TRAINING PROGRAM

## 2022-10-16 PROCEDURE — C9113 INJ PANTOPRAZOLE SODIUM, VIA: HCPCS | Performed by: STUDENT IN AN ORGANIZED HEALTH CARE EDUCATION/TRAINING PROGRAM

## 2022-10-16 PROCEDURE — 99232 PR SUBSEQUENT HOSPITAL CARE,LEVL II: ICD-10-PCS | Mod: GC,,, | Performed by: HOSPITALIST

## 2022-10-16 PROCEDURE — 99223 PR INITIAL HOSPITAL CARE,LEVL III: ICD-10-PCS | Mod: GC,,, | Performed by: INTERNAL MEDICINE

## 2022-10-16 PROCEDURE — 83605 ASSAY OF LACTIC ACID: CPT | Performed by: STUDENT IN AN ORGANIZED HEALTH CARE EDUCATION/TRAINING PROGRAM

## 2022-10-16 PROCEDURE — 84100 ASSAY OF PHOSPHORUS: CPT | Performed by: STUDENT IN AN ORGANIZED HEALTH CARE EDUCATION/TRAINING PROGRAM

## 2022-10-16 PROCEDURE — 83735 ASSAY OF MAGNESIUM: CPT | Performed by: STUDENT IN AN ORGANIZED HEALTH CARE EDUCATION/TRAINING PROGRAM

## 2022-10-16 PROCEDURE — 92610 EVALUATE SWALLOWING FUNCTION: CPT

## 2022-10-16 PROCEDURE — 27000207 HC ISOLATION

## 2022-10-16 PROCEDURE — 63600175 PHARM REV CODE 636 W HCPCS: Mod: JA | Performed by: STUDENT IN AN ORGANIZED HEALTH CARE EDUCATION/TRAINING PROGRAM

## 2022-10-16 RX ORDER — FUROSEMIDE 10 MG/ML
20 INJECTION INTRAMUSCULAR; INTRAVENOUS ONCE
Status: DISCONTINUED | OUTPATIENT
Start: 2022-10-16 | End: 2022-10-16

## 2022-10-16 RX ORDER — ACETAMINOPHEN 650 MG/20.3ML
650 LIQUID ORAL EVERY 6 HOURS PRN
Status: DISCONTINUED | OUTPATIENT
Start: 2022-10-17 | End: 2022-10-27

## 2022-10-16 RX ADMIN — VANCOMYCIN HYDROCHLORIDE 125 MG: KIT at 12:10

## 2022-10-16 RX ADMIN — PANTOPRAZOLE SODIUM 40 MG: 40 INJECTION, POWDER, FOR SOLUTION INTRAVENOUS at 09:10

## 2022-10-16 RX ADMIN — VANCOMYCIN HYDROCHLORIDE 125 MG: KIT at 05:10

## 2022-10-16 RX ADMIN — CEFTRIAXONE 2 G: 2 INJECTION, POWDER, FOR SOLUTION INTRAMUSCULAR; INTRAVENOUS at 01:10

## 2022-10-16 RX ADMIN — ESCITALOPRAM OXALATE 10 MG: 10 TABLET ORAL at 09:10

## 2022-10-16 RX ADMIN — ALLOPURINOL 100 MG: 100 TABLET ORAL at 09:10

## 2022-10-16 RX ADMIN — ATORVASTATIN CALCIUM 40 MG: 40 TABLET, FILM COATED ORAL at 09:10

## 2022-10-16 RX ADMIN — OCTREOTIDE ACETATE 50 MCG/HR: 500 INJECTION, SOLUTION INTRAVENOUS; SUBCUTANEOUS at 10:10

## 2022-10-16 RX ADMIN — FOLIC ACID 1 MG: 1 TABLET ORAL at 09:10

## 2022-10-16 NOTE — HPI
Mr. Jacques Arellano is an 82 year old male for whom hepatology is consulted for management of decompensated cirrhosis with newly discovered liver lesion. He has a PMH significant for atrial fibrillation (status post placement of pacemaker in 2019 and Watchman device in 03/2022), diverticulosis, HFpEF, HTN, and TIA (04/2022). History obtained mostly from chart review due to patient being encephalopathic.     Patient was hospitalized here from 09/23 to 10/03 for management of shock suspected secondary to UTI (based on UA, however urine culture was negative) that required initial admission to MICU for vasopressor support. During that admission, he reported preceding recurrent falls and underwent CT head showing expansile calvarial lesion involving the right frontal bone with plan to follow-up with neurosurgery outpatient. In addition, CT C/A/P on admission showed evidence of cirrhotic liver morphology with sequela of portal hypertension with splenomegaly and ascites; no prior abdominal imaging available for comparison. Diagnostic paracentesis performed was negative for SBP and showed a SAAG of 1.7 with total fluid protein of <1. Patient was discharged to [facility] with outpatient follow-up with hepatology and neurosurgery.     On 10/12 the patient had an admission to [facility] for abdominal pain and coffee-ground emesis.  An EGD performed then revealed grade D esophagitis and gastritis.    Hospital Course: Patient presented to [facility] from [facility] on 10/14/2022 with encephalopathy associated with bloody diarrhea. On arrival, vital signs normal on room air. Labs notable for leukocytosis (24), anemia (Hgb 10.4), normal platelets, elevated INR (1.4), normal sodium, normal renal function, and hyperbilirubinemia (1.7). Stool studies positive for C.diff. CT head showed prior expansile peripherally sclerotic lesion right frontal calvarium extending to the orbit. CTA A/P was negative for GI bleeding, but showed a 3 cm  enhancing lesion of the hepatic dome that was concerning for HCC with cirrhotic liver morphology and moderate volume of ascites with wall thickening of the rectum and sigmoid colon. GI consulted with recommendations to undergo EGD/sigmoidoscopy. Hepatology consulted for assistance.

## 2022-10-16 NOTE — ASSESSMENT & PLAN NOTE
9/23: Ct A/P:Cirrhotic morphology of the liver with sequelae of portal hypertension as evidence by splenomegaly, intra-abdominal ascites, and possible portal hypertensive enteropathy. Pt denies hx of alcohol abuse.  R preformed diagnostic paracentetics on 9/27 to r/o SBP: which was negative 130 WBC and 10%     Plan:   - daily CMP   - hepatology following; appreciate recs

## 2022-10-16 NOTE — CONSULTS
[facility]  Hepatology  Consult Note    Patient Name: Jacques Arellano  MRN: [id number]  Admission Date: 10/14/2022  Hospital Length of Stay: 2 days  Attending Provider: Nemesio Reynolds MD   Primary Care Physician: Matty Garsia MD  Principal Problem:<principal problem not specified>    Inpatient consult to Hepatology  Consult performed by: Surjit Pink MD  Consult ordered by: Praful Mohan MD        Subjective:     Transplant status: No    HPI:  Mr. Jacques Arellano is an 82 year old male for whom hepatology is consulted for management of decompensated cirrhosis with newly discovered liver lesion. He has a PMH significant for atrial fibrillation (status post placement of pacemaker in 2019 and Watchman device in 03/2022), diverticulosis, HFpEF, HTN, and TIA (04/2022). History obtained mostly from chart review due to patient being encephalopathic.     Patient was hospitalized here from 09/23 to 10/03 for management of shock suspected secondary to UTI (based on UA, however urine culture was negative) that required initial admission to MICU for vasopressor support. During that admission, he reported preceding recurrent falls and underwent CT head showing expansile calvarial lesion involving the right frontal bone with plan to follow-up with neurosurgery outpatient. In addition, CT C/A/P on admission showed evidence of cirrhotic liver morphology with sequela of portal hypertension with splenomegaly and ascites; no prior abdominal imaging available for comparison. Diagnostic paracentesis performed was negative for SBP and showed a SAAG of 1.7 with total fluid protein of <1. Patient was discharged to SNF with outpatient follow-up with hepatology and neurosurgery.     On 10/12 the patient had an admission to [facility] for abdominal pain and coffee-ground emesis.  An EGD performed then revealed grade D esophagitis and gastritis.    Hospital Course: Patient presented to [facility] from SNF on  10/14/2022 with encephalopathy associated with bloody diarrhea. On arrival, vital signs normal on room air. Labs notable for leukocytosis (24), anemia (Hgb 10.4), normal platelets, elevated INR (1.4), normal sodium, normal renal function, and hyperbilirubinemia (1.7). Stool studies positive for C.diff. CT head showed prior expansile peripherally sclerotic lesion right frontal calvarium extending to the orbit. CTA A/P was negative for GI bleeding, but showed a 3 cm enhancing lesion of the hepatic dome that was concerning for HCC with cirrhotic liver morphology and moderate volume of ascites with wall thickening of the rectum and sigmoid colon. GI consulted with recommendations to undergo EGD/sigmoidoscopy. Hepatology consulted for assistance.       Review of Systems   Unable to perform ROS: Mental status change     Past Medical History:   Diagnosis Date    TRENT (acute kidney injury) 09/24/2022    Anticoagulant long-term use     Aortic aneurysm     Ascites of liver     Atrial fibrillation     Brain lesion     Diabetes mellitus     Diastolic heart failure     Frequent falls     GI bleeding     Hyperlipidemia     Hypertension     Liver disease     Renal disorder     eswl    Sacral decubitus ulcer     Sleep apnea     20 yrs ago    Stroke     TIA    UTI (urinary tract infection)     Vertigo        Past Surgical History:   Procedure Laterality Date    CARDIAC SURGERY  03/30/2022    watchmen    INSERTION OF PACEMAKER      KIDNEY STONE SURGERY      OPEN REDUCTION AND INTERNAL FIXATION (ORIF) OF FRACTURE OF DISTAL RADIUS Right 5/17/2022    Procedure: ORIF, FRACTURE, RADIUS, DISTAL;  Surgeon: Claude S. Williams IV, MD;  Location: [facility];  Service: Orthopedics;  Laterality: Right;    TONSILLECTOMY         Review of patient's allergies indicates:  No Known Allergies      Tobacco Use    Smoking status: Never    Smokeless tobacco: Never   Substance and Sexual Activity    Alcohol use: Not Currently      Comment: SOCIAL    Drug use: Never    Sexual activity: Not Currently       Medications Prior to Admission   Medication Sig Dispense Refill Last Dose    acetaminophen (TYLENOL) 325 MG tablet Take 2 tablets (650 mg total) by mouth every 6 (six) hours as needed for Pain (DO NOT EXCEED more than 2000 mg in 1 day).  0 Past Week    allopurinoL (ZYLOPRIM) 100 MG tablet Take 1 tablet (100 mg total) by mouth once daily. 90 tablet 0 10/13/2022    atorvastatin (LIPITOR) 40 MG tablet Take 40 mg by mouth once daily.   10/13/2022    folic acid (FOLVITE) 1 MG tablet Take 1 tablet (1 mg total) by mouth once daily.  0 10/13/2022    furosemide (LASIX) 20 MG tablet TAKE 1 TABLET(20 MG) BY MOUTH TWICE DAILY 60 tablet 2 10/13/2022    EScitalopram oxalate (LEXAPRO) 10 MG tablet Take 1 tablet (10 mg total) by mouth once daily. 30 tablet 5 Unknown    LIDOcaine (LIDODERM) 5 % Place 1 patch onto the skin once daily. Remove & Discard patch within 12 hours or as directed by MD. Apply to right ribs.  0 Unknown       Objective:     Vital Signs (Most Recent):  Temp: 98 °F (36.7 °C) (10/16/22 1223)  Pulse: 72 (10/16/22 1223)  Resp: 19 (10/16/22 1223)  BP: (!) 101/50 (10/16/22 1223)  SpO2: 98 % (10/16/22 1223)   Vital Signs (24h Range):  Temp:  [97.5 °F (36.4 °C)-98.9 °F (37.2 °C)] 98 °F (36.7 °C)  Pulse:  [63-76] 72  Resp:  [16-19] 19  SpO2:  [96 %-98 %] 98 %  BP: ()/(48-59) 101/50     Weight: 83.5 kg (184 lb 1.4 oz) (10/15/22 0028)  Body mass index is 26.41 kg/m².    Physical Exam  Constitutional:       Appearance: He is ill-appearing.   HENT:      Head:      Comments: Subcutaneous lesion hard to palpation located on right frontal skull.   Eyes:      General: No scleral icterus.     Conjunctiva/sclera: Conjunctivae normal.   Cardiovascular:      Rate and Rhythm: Normal rate and regular rhythm.      Pulses: Normal pulses.      Heart sounds: Normal heart sounds.   Pulmonary:      Effort: Pulmonary effort is normal. No respiratory  distress.      Breath sounds: Normal breath sounds.   Chest:      Comments: Pacemaker in place in right upper chest wall.   Abdominal:      General: Bowel sounds are normal. There is distension.      Palpations: Abdomen is soft.      Tenderness: There is no abdominal tenderness.   Musculoskeletal:      Right lower leg: Edema present.      Left lower leg: Edema present.   Skin:     General: Skin is warm and dry.      Findings: No bruising or rash.   Neurological:      GCS: GCS eye subscore is 4. GCS verbal subscore is 3. GCS motor subscore is 5.       MELD-Na score: 13 at 10/16/2022  6:10 AM  MELD score: 13 at 10/16/2022  6:10 AM  Calculated from:  Serum Creatinine: 1.2 mg/dL at 10/16/2022  6:10 AM  Serum Sodium: 137 mmol/L at 10/16/2022  6:10 AM  Total Bilirubin: 1.5 mg/dL at 10/16/2022  6:10 AM  INR(ratio): 1.4 at 10/14/2022  2:40 PM  Age: 82 years    Significant Labs:  Labs within the past month have been reviewed.    Significant Imaging:  CT: Reviewed    Assessment/Plan:     Other cirrhosis of liver  This is an 82 year old male with a PMH significant for atrial fibrillation (status post placement of pacemaker in 2019 and Watchman device in 03/2022), diverticulosis, HFpEF, HTN, TIA (04/2022), and newly diagnosed cirrhosis and calvarial lesion involving the right frontal bone in 09/2022 during admission for septic shock from UTI (CT C/A/P showing evidence of cirrhotic liver morphology with sequela of portal hypertension with splenomegaly and ascites; diagnostic paracentesis showing a SAAG of 1.7 with total fluid protein of <1) who presented from [facility] on 10/14/2022 with decompensated cirrhosis in the setting of encephalopathy associated with GI bleeding (hematochezia) and C.diff. Presentation here notable for CTA A/P incidentally showing  3 cm enhancing lesion of the hepatic dome concerning for HCC with cirrhotic liver morphology. Hepatology consulted for further work-up of cirrhosis and hepatic lesion.      Recommendations:    -Etiology of underlying cirrhosis remains unclear with inability to obtain history from patient in the setting of encephalopathy  -Obtain serological evaluation for further work-up of liver disease (ordered).  -Obtain diagnostic paracentesis to rule out SBP.   -Okay to hold off on Lactulose with diarrhea associated with C.diff, but recommend adding Rifaximin BID.   -Follow-up GI recommendations.   -CMP and INR daily.   -Will discuss imaging findings from 09/2022 and this admission at IR conference this week. It would be helpful to obtain a MRI A/P for further evaluation of liver lesion, however this may not be possible with pacemaker in place. His cranial lesion could be a metastatic lesion of HCC and biopsy should be considered once encephalopathy improves. Regardless, based on age and frailty, patient would not be a candidate for transplant or systemic therapy options. Recommend evaluation by palliative care team.       Thank you for your consult. I will follow-up with patient. Please contact us if you have any additional questions.    Surjit Pink MD  Hepatology  [facility]

## 2022-10-16 NOTE — ASSESSMENT & PLAN NOTE
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 Presentation here notable for CTA A/P incidentally showing  3 cm enhancing lesion of the hepatic dome concerning for HCC with cirrhotic liver morphology. Hepatology consulted for further work-up of cirrhosis and hepatic lesion.     Recommendations:    -Etiology of underlying cirrhosis remains unclear with inability to obtain history from patient in the setting of encephalopathy  -Obtain serological evaluation for further work-up of liver disease (ordered).  -Obtain diagnostic paracentesis to rule out SBP.   -Okay to hold off on Lactulose with diarrhea associated with C.diff, but recommend adding Rifaximin BID.   -Follow-up GI recommendations.   -CMP and INR daily.   -Will discuss imaging findings from 09/2022 and this admission at IR conference this week. It would be helpful to obtain a MRI A/P for further evaluation of liver lesion, however this may not be possible with pacemaker in place. His cranial lesion could be a metastatic lesion of HCC and biopsy should be considered once encephalopathy improves. Regardless, based on age and frailty, patient would not be a candidate for transplant or systemic therapy options. Recommend evaluation by palliative care team.

## 2022-10-16 NOTE — SUBJECTIVE & OBJECTIVE
Review of Systems   Unable to perform ROS: Mental status change     Past Medical History:   Diagnosis Date    TRENT (acute kidney injury) 09/24/2022    Anticoagulant long-term use     Aortic aneurysm     Ascites of liver     Atrial fibrillation     Brain lesion     Diabetes mellitus     Diastolic heart failure     Frequent falls     GI bleeding     Hyperlipidemia     Hypertension     Liver disease     Renal disorder     eswl    Sacral decubitus ulcer     Sleep apnea     20 yrs ago    Stroke     TIA    UTI (urinary tract infection)     Vertigo        Past Surgical History:   Procedure Laterality Date    CARDIAC SURGERY  03/30/2022    watchmen    INSERTION OF PACEMAKER      KIDNEY STONE SURGERY      OPEN REDUCTION AND INTERNAL FIXATION (ORIF) OF FRACTURE OF DISTAL RADIUS Right 5/17/2022    Procedure: ORIF, FRACTURE, RADIUS, DISTAL;  Surgeon: Claude S. Williams IV, MD;  Location: [facility];  Service: Orthopedics;  Laterality: Right;    TONSILLECTOMY         Review of patient's allergies indicates:  No Known Allergies      Tobacco Use    Smoking status: Never    Smokeless tobacco: Never   Substance and Sexual Activity    Alcohol use: Not Currently     Comment: SOCIAL    Drug use: Never    Sexual activity: Not Currently       Medications Prior to Admission   Medication Sig Dispense Refill Last Dose    acetaminophen (TYLENOL) 325 MG tablet Take 2 tablets (650 mg total) by mouth every 6 (six) hours as needed for Pain (DO NOT EXCEED more than 2000 mg in 1 day).  0 Past Week    allopurinoL (ZYLOPRIM) 100 MG tablet Take 1 tablet (100 mg total) by mouth once daily. 90 tablet 0 10/13/2022    atorvastatin (LIPITOR) 40 MG tablet Take 40 mg by mouth once daily.   10/13/2022    folic acid (FOLVITE) 1 MG tablet Take 1 tablet (1 mg total) by mouth once daily.  0 10/13/2022    furosemide (LASIX) 20 MG tablet TAKE 1 TABLET(20 MG) BY MOUTH TWICE DAILY 60 tablet 2 10/13/2022    EScitalopram oxalate (LEXAPRO) 10 MG tablet Take 1 tablet (10 mg  total) by mouth once daily. 30 tablet 5 Unknown    LIDOcaine (LIDODERM) 5 % Place 1 patch onto the skin once daily. Remove & Discard patch within 12 hours or as directed by MD. Apply to right ribs.  0 Unknown       Objective:     Vital Signs (Most Recent):  Temp: 98 °F (36.7 °C) (10/16/22 1223)  Pulse: 72 (10/16/22 1223)  Resp: 19 (10/16/22 1223)  BP: (!) 101/50 (10/16/22 1223)  SpO2: 98 % (10/16/22 1223)   Vital Signs (24h Range):  Temp:  [97.5 °F (36.4 °C)-98.9 °F (37.2 °C)] 98 °F (36.7 °C)  Pulse:  [63-76] 72  Resp:  [16-19] 19  SpO2:  [96 %-98 %] 98 %  BP: ()/(48-59) 101/50     Weight: 83.5 kg (184 lb 1.4 oz) (10/15/22 0028)  Body mass index is 26.41 kg/m².    Physical Exam  Constitutional:       Appearance: He is ill-appearing.   HENT:      Head:      Comments: Subcutaneous lesion hard to palpation located on right frontal skull.   Eyes:      General: No scleral icterus.     Conjunctiva/sclera: Conjunctivae normal.   Cardiovascular:      Rate and Rhythm: Normal rate and regular rhythm.      Pulses: Normal pulses.      Heart sounds: Normal heart sounds.   Pulmonary:      Effort: Pulmonary effort is normal. No respiratory distress.      Breath sounds: Normal breath sounds.   Chest:      Comments: Pacemaker in place in right upper chest wall.   Abdominal:      General: Bowel sounds are normal. There is distension.      Palpations: Abdomen is soft.      Tenderness: There is no abdominal tenderness.   Musculoskeletal:      Right lower leg: Edema present.      Left lower leg: Edema present.   Skin:     General: Skin is warm and dry.      Findings: No bruising or rash.   Neurological:      GCS: GCS eye subscore is 4. GCS verbal subscore is 3. GCS motor subscore is 5.       MELD-Na score: 13 at 10/16/2022  6:10 AM  MELD score: 13 at 10/16/2022  6:10 AM  Calculated from:  Serum Creatinine: 1.2 mg/dL at 10/16/2022  6:10 AM  Serum Sodium: 137 mmol/L at 10/16/2022  6:10 AM  Total Bilirubin: 1.5 mg/dL at 10/16/2022   6:10 AM  INR(ratio): 1.4 at 10/14/2022  2:40 PM  Age: 82 years    Significant Labs:  Labs within the past month have been reviewed.    Significant Imaging:  CT: Reviewed

## 2022-10-16 NOTE — ASSESSMENT & PLAN NOTE
9/24 ANNE: The left ventricle is normal in size with low normal systolic function. The estimated ejection fraction is 50%.There is abnormal septal wall motion consistent with right ventricular pacing. Mild right ventricular enlargement with mildly reduced right ventricular systolic function. BNP:544    Plan:  - Low threshold for Lasix as CXR/lung sounds clear while saturating well on RA.   - 20 mg today in setting of tachypnea and pulm congestion

## 2022-10-16 NOTE — PLAN OF CARE
Plan of care reviewed with pt and son:  -At the beginning of day shift, pt was very restless, grab on the side rails and said he did not want to fall. And pt was having tachypnea at the time. Then pt was reoriented multiple time, became calmer when son came to visit, no more tachypnea. The rest of the shift, pt sleeping a lot, responded to voice, more confused compared to yesterday. At shift change this evening, pt totally awake, pulled off the coband around his IV, pulled off his gown and blanket  -On RA, VS stable with BP with MAP stayed above 65. On tele, in paced rhythm with HR 70s-80s   -Octreotide drip discontinued  -Bladder scan done at 1230 showed 608cc residual, then pt voided once per urinal with 450cc tea color urine-IM3 made awared. Notified night shift about monitor UO overnight.   -Has 2 BMs with small amount of liquid dark red blood   -On full liquid diet, aspiration precaution, pt only had some apple sauce and small sips of water. Refused to eat. Had to make sure pt is awake and alert enough when he swallows. Discussed with the doctor on-call about changing Vancomycin PO to another form since pt was coughing when he took the 6PM dose  -Multiple old bruising and skin tare and abrasion all over his body -present on admission-refer to flowsheet. Still waiting for Wound care to come to assess. Pt skin is very fragile on his arms, moderate amount of serous drainage leaking from his arms. Left arm is more swelling compared to right arm-IM3 made awared   -Turned a few time during shift. Pressure point with Mepolex on. Perineum is red, groin is red, barrier cream applied. Waffle mattress applied  -SCD on   -Bed alarm on. [employer]VRS camera in place.   -Call light in reach, [facility]

## 2022-10-16 NOTE — PROGRESS NOTES
[facility]  Progress Note    Patient Name: Jacques Arellano  MRN: [id number]  Patient Class: IP- Inpatient   Admission Date: 10/14/2022  Length of Stay: 2 days  Attending Physician: Chana Andersen*  Primary Care Provider: Matty Garsia MD        Subjective:     Principal Problem:<principal problem not specified>        HPI:  81 yo M with PMhx of CHF, AFib, Watchman, pacer, expansile brain lesion, pacemaker presents to from [facility] for diarrhea, copious black stool and acute altered mental status. Pt reports he has 3-5 profuse watery BM for the last few days noting a little bit of blood. Pt was recently admitted to [facility] ED on 10/12 for abdominal pain and coffee ground emesis, EGD revealed grade D esophagitis and gastritis with no overt GI bleeding and was subsequently discharged with Protonix. Pt was recently admitted to [facility] ICU on 9/23 for sepsis thought to be secondary to E coli. UTI vs cirrhosis and was treated with antibiotic and pressors . Inpatient workup revealed new dx of cirrhosis with ascites no spb, aortic aneurysm, and expansile Calvarial lesion, pt was DC on 10/13 back to [facility].      Today pt H & H stable. CTA abd shows colitis, cirrhosis/ascites, and new potential HCC. BNP elevated at 544 however XCR and PE unconcerning for CHF exacerbation. Mentation improved while in ED - CT head stable.       Overview/Hospital Course:  CTA abd shows colitis, cirrhosis/ascites, and new potential HCC. BNP elevated at 544 however XCR and PE unconcerning for CHF exacerbation. Mentation improved while in ED - CT head stable. 10/15: Small bloody stools overnight with bright red blood per nurse. Pt has not urinated overnight, Bladder scan with only 180cc. HDS. WBC down trending. Stool studies pending. GI planning to scope on 10/17. H&H slowly down trending but stable. Hepatology following with recommendation for paracentesis and SBP rule out.          Interval History: 3 loose bowel movements overnight. Continues to be confused with tachypnea this AM.   Will continue ABX coverage and workup for possible other sources of ongoing sepsis.   Also considering worsening pulmonary edema.   All questions answered, family updated.     Review of Systems   Unable to perform ROS: Mental status change   Objective:     Vital Signs (Most Recent):  Temp: 96.8 °F (36 °C) (10/15/22 1229)  Pulse: 69 (10/15/22 1549)  Resp: 18 (10/15/22 1229)  BP: (!) 123/53 (10/15/22 1229)  SpO2: 97 % (10/15/22 1229)   Vital Signs (24h Range):  Temp:  [96.8 °F (36 °C)-98.4 °F (36.9 °C)] 96.8 °F (36 °C)  Pulse:  [69-83] 69  Resp:  [16-18] 18  SpO2:  [97 %-100 %] 97 %  BP: ()/(52-63) 123/53     Weight: 83.5 kg (184 lb 1.4 oz)  Body mass index is 26.41 kg/m².    Intake/Output Summary (Last 24 hours) at 10/15/2022 1602  Last data filed at 10/15/2022 1332  Gross per 24 hour   Intake 177.86 ml   Output 700 ml   Net -522.14 ml      Physical Exam  Constitutional:       Appearance: He is ill-appearing.   HENT:      Head:      Comments: Subcutaneous lesion hard to palpation located on right frontal skull.   Eyes:      General: No scleral icterus.     Conjunctiva/sclera: Conjunctivae normal.   Cardiovascular:      Rate and Rhythm: Normal rate and regular rhythm.      Pulses: Normal pulses.      Heart sounds: Normal heart sounds.   Pulmonary:      Effort: Pulmonary effort is normal. No respiratory distress.      Breath sounds: Normal breath sounds.   Chest:      Comments: Pacemaker in place in right upper chest wall.   Abdominal:      General: Bowel sounds are normal. There is distension.      Palpations: Abdomen is soft.      Tenderness: There is no abdominal tenderness.   Musculoskeletal:      Right lower leg: Edema present.      Left lower leg: Edema present.   Skin:     General: Skin is warm and dry.      Findings: No bruising or rash.   Neurological:      GCS: GCS eye subscore is 4. GCS  verbal subscore is 3. GCS motor subscore is 5.       Significant Labs: All pertinent labs within the past 24 hours have been reviewed.  A1C:   Recent Labs   Lab 07/19/22  1149   HGBA1C 6.1*     ABGs:   Recent Labs   Lab 10/14/22  1635 10/16/22  0825   PH 7.357 7.401   PCO2 32.6* 25.5*   HCO3 18.3* 15.8*   POCSATURATED 26* 41*   BE -7 -9   PO2 18* 23*     Bilirubin:   Recent Labs   Lab 09/25/22  0310 09/26/22  0307 10/14/22  1440 10/15/22  0555 10/16/22  0610   BILITOT 1.3* 1.1* 1.7* 1.5* 1.5*     Blood Culture:   Recent Labs   Lab 10/14/22  1439 10/14/22  1515   LABBLOO No Growth to date  No Growth to date No Growth to date  No Growth to date     BMP:   Recent Labs   Lab 10/16/22  0610   *      K 3.8   *   CO2 15*   BUN 25*   CREATININE 1.2   CALCIUM 8.2*   MG 1.7     CBC:   Recent Labs   Lab 10/15/22  0555 10/15/22  1758 10/16/22  0610   WBC 19.99* 26.32* 15.53*   HGB 9.2* 9.4* 8.9*   HCT 28.0* 28.4* 26.8*    182 151     CMP:   Recent Labs   Lab 10/14/22  1440 10/15/22  0555 10/16/22  0610    137 137   K 3.9 3.8 3.8    110 111*   CO2 17* 18* 15*   GLU 83 88 134*   BUN 25* 26* 25*   CREATININE 1.1 1.1 1.2   CALCIUM 8.5* 8.4* 8.2*   PROT 5.3* 4.5* 4.3*   ALBUMIN 2.6* 2.2* 2.0*   BILITOT 1.7* 1.5* 1.5*   ALKPHOS 167* 140* 128   AST 29 26 20   ALT 17 16 14   ANIONGAP 12 9 11     Cardiac Markers:   Recent Labs   Lab 10/14/22  1440   *     Coagulation:   Recent Labs   Lab 10/14/22  1440   INR 1.4*   APTT 31.8     Lactic Acid:   Recent Labs   Lab 10/14/22  1440 10/16/22  0822   LACTATE 2.4* 2.9*     Lipase:   Recent Labs   Lab 10/14/22  1440   LIPASE 8     Lipid Panel: No results for input(s): CHOL, HDL, LDLCALC, TRIG, CHOLHDL in the last 48 hours.  Magnesium:   Recent Labs   Lab 10/14/22  1440 10/15/22  0555 10/16/22  0610   MG 1.4* 1.7 1.7     Pathology Results  (Last 10 years)      None          POCT Glucose:   Recent Labs   Lab 10/15/22  1348   POCTGLUCOSE 86      Prealbumin: No results for input(s): PREALBUMIN in the last 48 hours.  Respiratory Culture: No results for input(s): GSRESP, RESPIRATORYC in the last 48 hours.  Troponin:   Recent Labs   Lab 10/14/22  1440   TROPONINI 0.079*     TSH:   Recent Labs   Lab 07/19/22  1149   TSH 0.555     Urine Culture: No results for input(s): LABURIN in the last 48 hours.  Urine Studies:   Recent Labs   Lab 10/14/22  2226   COLORU Yellow   APPEARANCEUA Clear   PHUR 6.0   SPECGRAV >1.030*   PROTEINUA Negative   GLUCUA Negative   KETONESU Negative   BILIRUBINUA Negative   OCCULTUA Negative   NITRITE Negative   LEUKOCYTESUR Trace*   RBCUA 1   WBCUA 8*   BACTERIA Rare   SQUAMEPITHEL 0   HYALINECASTS 2*       Significant Imaging: I have reviewed all pertinent imaging results/findings within the past 24 hours.      Assessment/Plan:      Diarrhea  Pt endorses profuse watery diarrhea for the past few days. Concerning for possible C diff with recent aggressive antibiotic regimen     Plan:  - Stool studies pending  - C diff positive; starting Po Vancomycin   - Ceftriaxone for sbp coverage   - Will add flagyl if worsening     GI bleed  Pt endorses melena. CT scan with no evidence of contrast extravasation to suggest active GI bleed at this time. Stable 3 cm enhancing lesion at the hepatic dome with evidence of liver cirrhosis, circumferential wall thickening of the rectum and sigmoid colon concern for colitis, and pancreatic hypodensities measuring up to 2.0 cm. Considering liver disease, suspicion for ruptured esophogeal varcies. Recent EGD at [facility] reviewed, will discuss with GI    Plan:  - CBC q12, trend H&H  - GI planning for endoscopy & sigmoidoscopy on Monday  - PPI 40mg BID   - CTX for sbp ppx   - NPO at midnight on Sunday for procedure Monday     Other cirrhosis of liver  9/23: Ct A/P:Cirrhotic morphology of the liver with sequelae of portal hypertension as evidence by splenomegaly, intra-abdominal ascites, and possible portal  hypertensive enteropathy. Pt denies hx of alcohol abuse.  R preformed diagnostic paracentetics on 9/27 to r/o SBP: which was negative 130 WBC and 10%     Plan:   - daily CMP   - hepatology following; appreciate recs       Chronic gout  Plan:  Continue home allopurinol.        Type 2 diabetes mellitus, without long-term current use of insulin  Last A1c 6.1 3 months ago. Hypoglycemic on admission.      Plan:  - No home medications  Noted    - POCT glucose ACHS   - Low threshold to start LDSSI     Mixed hyperlipidemia  Plan:  - Continue home statin        Hypertension  Home medications: triamterene-HCTZ, Lasix     Plan:  - Hold home medications s/o hypotension     Diastolic heart failure  9/24 ANNE: The left ventricle is normal in size with low normal systolic function. The estimated ejection fraction is 50%.There is abnormal septal wall motion consistent with right ventricular pacing. Mild right ventricular enlargement with mildly reduced right ventricular systolic function. BNP:544    Plan:  - Low threshold for Lasix as CXR/lung sounds clear while saturating well on RA.   - 20 mg today in setting of tachypnea and pulm congestion         Permanent atrial fibrillation  Patient with documented history of permanent Afib s/p watchman, no longer on AC or rate controlling agents.  [id number] 5   HAS-BLED 4      Plan:  - Patient rate controlled, no indication for further agents at this time  - No full AC required s/p Watchman; will hold AC ppx and APT s/o active bleeding from abrasion sites  - Cardiac telemetry  - Maintain K > 4, Mg > 2, Ca wnl; replete PRN   - STAT cardiology consult if hemodynamic instability for DCCV evaluation      VTE Risk Mitigation (From admission, onward)         Ordered     IP VTE HIGH RISK PATIENT  Once         10/14/22 1826     Place sequential compression device  Until discontinued         10/14/22 1826                Discharge Planning   ELMER: 10/17/2022     Code Status: DNR   Is the patient  medically ready for discharge?: No    Reason for patient still in hospital (select all that apply): Patient trending condition                     Serg Boone MD  Department of Hospital Medicine   [facility]

## 2022-10-16 NOTE — ASSESSMENT & PLAN NOTE
Patient with documented history of permanent Afib s/p watchman, no longer on AC or rate controlling agents.  [id number] 5   HAS-BLED 4      Plan:  - Patient rate controlled, no indication for further agents at this time  - No full AC required s/p Watchman; will hold AC ppx and APT s/o active bleeding from abrasion sites  - Cardiac telemetry  - Maintain K > 4, Mg > 2, Ca wnl; replete PRN   - STAT cardiology consult if hemodynamic instability for DCCV evaluation

## 2022-10-16 NOTE — PT/OT/SLP EVAL
"Speech Language Pathology Evaluation  Bedside Swallow    Patient Name:  Jacques Arellano   MRN:  [id number]  Admitting Diagnosis: <principal problem not specified>    Recommendations:                 General Recommendations:   ongoing swallowing assessment/monitoring diet tolerance  Diet recommendations:   , Full liquids, Thin   Aspiration Precautions: 1 bite/sip at a time, Alternating bites/sips, Assistance with meals, Eliminate distractions, Feed only when awake/alert, Frequent oral care, HOB to 90 degrees, Meds crushed in puree, Monitor for s/s of aspiration, Small bites/sips, and Strict aspiration precautions   General Precautions: Standard, aspiration, fall  Communication strategies:  go to room if call light pushed    History:     Past Medical History:   Diagnosis Date    TRENT (acute kidney injury) 09/24/2022    Anticoagulant long-term use     Aortic aneurysm     Ascites of liver     Atrial fibrillation     Brain lesion     Diabetes mellitus     Diastolic heart failure     Frequent falls     GI bleeding     Hyperlipidemia     Hypertension     Liver disease     Renal disorder     eswl    Sacral decubitus ulcer     Sleep apnea     20 yrs ago    Stroke     TIA    UTI (urinary tract infection)     Vertigo        Past Surgical History:   Procedure Laterality Date    CARDIAC SURGERY  03/30/2022    watchmen    INSERTION OF PACEMAKER      KIDNEY STONE SURGERY      OPEN REDUCTION AND INTERNAL FIXATION (ORIF) OF FRACTURE OF DISTAL RADIUS Right 5/17/2022    Procedure: ORIF, FRACTURE, RADIUS, DISTAL;  Surgeon: Claude S. Williams IV, MD;  Location: [facility];  Service: Orthopedics;  Laterality: Right;    TONSILLECTOMY       Principal Problem:<principal problem not specified>     Chief Complaint:        Chief Complaint   Patient presents with    GI Problem       Pt pale. Arrived from [facility] for GI bleed x1 day. Pt severely lethargic, speaking in short fragmented sentences d/t "pain everywhere."         HPI: 83 yo M with " "PMhx of CHF, AFib, Watchman, pacer, expansile brain lesion, pacemaker presents to from [facility] for diarrhea, copious black stool and acute altered mental status. Pt reports he has 3-5 profuse watery BM for the last few days noting a little bit of blood. Pt was recently admitted to [facility] ED on 10/12 for abdominal pain and coffee ground emesis, EGD revealed grade D esophagitis and gastritis with no overt GI bleeding and was subsequently discharged with Protonix. Pt was recently admitted to [facility] ICU on 9/23 for sepsis thought to be secondary to E coli. UTI vs cirrhosis and was treated with antibiotic and pressors . Inpatient workup revealed new dx of cirrhosis with ascites no spb, aortic aneurysm, and expansile Calvarial lesion, pt was DC on 10/13 back to [facility].       Today pt H & H stable. CTA abd shows colitis, cirrhosis/ascites, and new potential HCC. BNP elevated at 544 however XCR and PE unconcerning for CHF exacerbation. Mentation improved while in ED - CT head stable.          Prior Intubation HX: none during this admission    Modified Barium Swallow: none on file    Chest X-Rays: 10/16/22: Findings most consistent with congestive failure.     Prior diet: currently on full liquid diet; NPO at midnight for procedure    Subjective     "I taste the applesauce." Pt stated after receiving bites of applesauce.      Pain/Comfort:  Pain Rating 1:  (no complaints)    Respiratory Status: Room air    Objective:     Oral Musculature Evaluation  Oral Musculature: unable to assess due to poor participation/comprehension  Dentition: upper and lower dentures  Secretion Management: adequate  Volitional Cough: adequate  Volitional Swallow: elicited  Voice Prior to PO Intake: dry, clear    Bedside Swallow Eval:   Consistencies Assessed:  Thin liquids 1/2 tsp x 1, full tsp x 1, cup sip x 1, straw sips x 2  Puree 1/2 tsp x 1, full tsp x 1   Solids deferred 2/2 confusion      Oral Phase: "   WFL    Pharyngeal Phase:   no overt clinical signs/symptoms of aspiration  no overt clinical signs/symptoms of pharyngeal dysphagia    Compensatory Strategies  None    Treatment: Pt exhibiting confusion and inconsistently following commands and attending.  Pt tolerated thin liquid and pureed trials, but solids were deferred 2/2 level of confusion. Pt appears safe to continue full liquid diet/thin liquids at this time. SLP services will continue to assess to determine if safe for further diet advancement. Strict aspiration precautions should be followed.     Assessment:     Jacques Arellano is a 82 y.o. male with an SLP diagnosis of Dysphagia/risk of aspiration 2/2 altered mental status.     Goals:   Multidisciplinary Problems       SLP Goals          Problem: SLP    Goal Priority Disciplines Outcome   SLP Goal     SLP    Description: Speech Language Pathology Goals  Goals expected to be met by 10/23:  1.  Pt will tolerate least restrictive diet without s/s of aspiration.                        Plan:     Patient to be seen:  4 x/week   Plan of Care expires:  11/16/22  Plan of Care reviewed with:  patient   SLP Follow-Up:  Yes       Discharge recommendations:   (tbd)     Time Tracking:     SLP Treatment Date:   10/16/22  Speech Start Time:  1103  Speech Stop Time:  1114     Speech Total Time (min):  11 min    Billable Minutes: Eval Swallow and Oral Function 11    10/16/2022

## 2022-10-16 NOTE — ASSESSMENT & PLAN NOTE
Pt endorses profuse watery diarrhea for the past few days. Concerning for possible C diff with recent aggressive antibiotic regimen     Plan:  - Stool studies pending  - C diff positive; starting Po Vancomycin   - Ceftriaxone for sbp coverage   - Will add flagyl if worsening

## 2022-10-16 NOTE — ASSESSMENT & PLAN NOTE
Pt endorses melena. CT scan with no evidence of contrast extravasation to suggest active GI bleed at this time. Stable 3 cm enhancing lesion at the hepatic dome with evidence of liver cirrhosis, circumferential wall thickening of the rectum and sigmoid colon concern for colitis, and pancreatic hypodensities measuring up to 2.0 cm. Considering liver disease, suspicion for ruptured esophogeal varcies. Recent EGD at [facility] reviewed, will discuss with GI    Plan:  - CBC q12, trend H&H  - GI planning for endoscopy & sigmoidoscopy on Monday  - PPI 40mg BID   - CTX for sbp ppx   - NPO at midnight on Sunday for procedure Monday

## 2022-10-17 ENCOUNTER — TELEPHONE (OUTPATIENT)
Dept: INTERVENTIONAL RADIOLOGY/VASCULAR | Facility: HOSPITAL | Age: 82
End: 2022-10-17
Payer: MEDICARE

## 2022-10-17 LAB
A1AT SERPL-MCNC: 142 MG/DL (ref 100–190)
AFP SERPL-MCNC: <2 NG/ML (ref 0–8.4)
ALBUMIN FLD-MCNC: 0.5 G/DL
ALBUMIN SERPL BCP-MCNC: 2.1 G/DL (ref 3.5–5.2)
ALP SERPL-CCNC: 148 U/L (ref 55–135)
ALT SERPL W/O P-5'-P-CCNC: 15 U/L (ref 10–44)
AMMONIA PLAS-SCNC: 25 UMOL/L (ref 10–50)
ANA SER QL IF: NORMAL
ANION GAP SERPL CALC-SCNC: 13 MMOL/L (ref 8–16)
APPEARANCE FLD: NORMAL
ASCENDING AORTA: 3.9 CM
AST SERPL-CCNC: 19 U/L (ref 10–40)
AV INDEX (PROSTH): 0.57
AV MEAN GRADIENT: 4 MMHG
AV PEAK GRADIENT: 7 MMHG
AV VALVE AREA: 2.35 CM2
AV VELOCITY RATIO: 0.57
BASOPHILS # BLD AUTO: 0.04 K/UL (ref 0–0.2)
BASOPHILS NFR BLD: 0.2 % (ref 0–1.9)
BILIRUB SERPL-MCNC: 1.3 MG/DL (ref 0.1–1)
BODY FLD TYPE: NORMAL
BODY FLUID SOURCE, LDH: NORMAL
BSA FOR ECHO PROCEDURE: 2.03 M2
BUN SERPL-MCNC: 29 MG/DL (ref 8–23)
CALCIUM SERPL-MCNC: 8.5 MG/DL (ref 8.7–10.5)
CANCER AG19-9 SERPL-ACNC: 23.5 U/ML (ref 0–40)
CEA SERPL-MCNC: 2.6 NG/ML (ref 0–5)
CERULOPLASMIN SERPL-MCNC: 25 MG/DL (ref 15–45)
CHLORIDE SERPL-SCNC: 110 MMOL/L (ref 95–110)
CO2 SERPL-SCNC: 14 MMOL/L (ref 23–29)
COLOR FLD: YELLOW
CREAT SERPL-MCNC: 1.5 MG/DL (ref 0.5–1.4)
CV ECHO LV RWT: 0.49 CM
DIFFERENTIAL METHOD: ABNORMAL
DOP CALC AO PEAK VEL: 1.33 M/S
DOP CALC AO VTI: 28.22 CM
DOP CALC LVOT AREA: 4.2 CM2
DOP CALC LVOT DIAMETER: 2.3 CM
DOP CALC LVOT PEAK VEL: 0.76 M/S
DOP CALC LVOT STROKE VOLUME: 66.28 CM3
DOP CALCLVOT PEAK VEL VTI: 15.96 CM
E COLI SXT1 STL QL IA: NEGATIVE
E COLI SXT2 STL QL IA: NEGATIVE
E WAVE DECELERATION TIME: 224.3 MSEC
E/A RATIO: 1.85
E/E' RATIO: 10.63 M/S
ECHO LV POSTERIOR WALL: 1.03 CM (ref 0.6–1.1)
EJECTION FRACTION: 65 %
EOSINOPHIL # BLD AUTO: 0 K/UL (ref 0–0.5)
EOSINOPHIL NFR BLD: 0.1 % (ref 0–8)
ERYTHROCYTE [DISTWIDTH] IN BLOOD BY AUTOMATED COUNT: 17 % (ref 11.5–14.5)
EST. GFR  (NO RACE VARIABLE): 45.9 ML/MIN/1.73 M^2
FERRITIN SERPL-MCNC: 343 NG/ML (ref 20–300)
FRACTIONAL SHORTENING: 28 % (ref 28–44)
GLUCOSE SERPL-MCNC: 146 MG/DL (ref 70–110)
GRAM STN SPEC: NORMAL
GRAM STN SPEC: NORMAL
HBV SURFACE AB SER-ACNC: <3 MIU/ML
HBV SURFACE AB SER-ACNC: NORMAL M[IU]/ML
HBV SURFACE AG SERPL QL IA: NORMAL
HCT VFR BLD AUTO: 28.6 % (ref 40–54)
HCV AB SERPL QL IA: NORMAL
HGB BLD-MCNC: 9.1 G/DL (ref 14–18)
IGG SERPL-MCNC: 815 MG/DL (ref 650–1600)
IMM GRANULOCYTES # BLD AUTO: 0.28 K/UL (ref 0–0.04)
IMM GRANULOCYTES NFR BLD AUTO: 1.7 % (ref 0–0.5)
INTERVENTRICULAR SEPTUM: 1.08 CM (ref 0.6–1.1)
IRON SERPL-MCNC: 29 UG/DL (ref 45–160)
LA MAJOR: 6 CM
LA MINOR: 6.05 CM
LA WIDTH: 4.02 CM
LDH FLD L TO P-CCNC: 63 U/L
LEFT ATRIUM SIZE: 4.3 CM
LEFT ATRIUM VOLUME INDEX MOD: 50.8 ML/M2
LEFT ATRIUM VOLUME INDEX: 44 ML/M2
LEFT ATRIUM VOLUME MOD: 102.17 CM3
LEFT ATRIUM VOLUME: 88.52 CM3
LEFT INTERNAL DIMENSION IN SYSTOLE: 3.01 CM (ref 2.1–4)
LEFT VENTRICLE DIASTOLIC VOLUME INDEX: 39.14 ML/M2
LEFT VENTRICLE DIASTOLIC VOLUME: 78.67 ML
LEFT VENTRICLE MASS INDEX: 74 G/M2
LEFT VENTRICLE SYSTOLIC VOLUME INDEX: 17.6 ML/M2
LEFT VENTRICLE SYSTOLIC VOLUME: 35.37 ML
LEFT VENTRICULAR INTERNAL DIMENSION IN DIASTOLE: 4.2 CM (ref 3.5–6)
LEFT VENTRICULAR MASS: 147.99 G
LV LATERAL E/E' RATIO: 9.44 M/S
LV SEPTAL E/E' RATIO: 12.14 M/S
LYMPHOCYTES # BLD AUTO: 0.9 K/UL (ref 1–4.8)
LYMPHOCYTES NFR BLD: 5.7 % (ref 18–48)
LYMPHOCYTES NFR FLD MANUAL: 37 %
MAGNESIUM SERPL-MCNC: 1.7 MG/DL (ref 1.6–2.6)
MCH RBC QN AUTO: 31 PG (ref 27–31)
MCHC RBC AUTO-ENTMCNC: 31.8 G/DL (ref 32–36)
MCV RBC AUTO: 97 FL (ref 82–98)
MESOTHL CELL NFR FLD MANUAL: 3 %
MITOCHONDRIA AB TITR SER IF: NORMAL {TITER}
MONOCYTES # BLD AUTO: 1 K/UL (ref 0.3–1)
MONOCYTES NFR BLD: 6 % (ref 4–15)
MONOS+MACROS NFR FLD MANUAL: 8 %
MV PEAK A VEL: 0.46 M/S
MV PEAK E VEL: 0.85 M/S
MV STENOSIS PRESSURE HALF TIME: 65.05 MS
MV VALVE AREA P 1/2 METHOD: 3.38 CM2
NEUTROPHILS # BLD AUTO: 14.1 K/UL (ref 1.8–7.7)
NEUTROPHILS NFR BLD: 86.3 % (ref 38–73)
NEUTROPHILS NFR FLD MANUAL: 52 %
NRBC BLD-RTO: 0 /100 WBC
PHOSPHATE SERPL-MCNC: 3.2 MG/DL (ref 2.7–4.5)
PISA TR MAX VEL: 2.77 M/S
PLATELET # BLD AUTO: 169 K/UL (ref 150–450)
PMV BLD AUTO: 10.8 FL (ref 9.2–12.9)
POTASSIUM SERPL-SCNC: 3.9 MMOL/L (ref 3.5–5.1)
PROT FLD-MCNC: <1 G/DL
PROT SERPL-MCNC: 4.6 G/DL (ref 6–8.4)
RA MAJOR: 5.7 CM
RA WIDTH: 3.69 CM
RBC # BLD AUTO: 2.94 M/UL (ref 4.6–6.2)
RIGHT VENTRICULAR END-DIASTOLIC DIMENSION: 4.45 CM
SATURATED IRON: 21 % (ref 20–50)
SINUS: 3.94 CM
SMOOTH MUSCLE AB TITR SER IF: NORMAL {TITER}
SODIUM SERPL-SCNC: 137 MMOL/L (ref 136–145)
SPECIMEN SOURCE: NORMAL
SPECIMEN SOURCE: NORMAL
STJ: 3.04 CM
TDI LATERAL: 0.09 M/S
TDI SEPTAL: 0.07 M/S
TDI: 0.08 M/S
TOTAL IRON BINDING CAPACITY: 136 UG/DL (ref 250–450)
TR MAX PG: 31 MMHG
TRANSFERRIN SERPL-MCNC: 92 MG/DL (ref 200–375)
TRICUSPID ANNULAR PLANE SYSTOLIC EXCURSION: 1.23 CM
WBC # BLD AUTO: 16.39 K/UL (ref 3.9–12.7)
WBC # FLD: 199 /CU MM

## 2022-10-17 PROCEDURE — 84157 ASSAY OF PROTEIN OTHER: CPT | Performed by: HOSPITALIST

## 2022-10-17 PROCEDURE — 80321 ALCOHOLS BIOMARKERS 1OR 2: CPT | Performed by: STUDENT IN AN ORGANIZED HEALTH CARE EDUCATION/TRAINING PROGRAM

## 2022-10-17 PROCEDURE — 86038 ANTINUCLEAR ANTIBODIES: CPT | Performed by: STUDENT IN AN ORGANIZED HEALTH CARE EDUCATION/TRAINING PROGRAM

## 2022-10-17 PROCEDURE — 87340 HEPATITIS B SURFACE AG IA: CPT | Performed by: STUDENT IN AN ORGANIZED HEALTH CARE EDUCATION/TRAINING PROGRAM

## 2022-10-17 PROCEDURE — 88112 PR  CYTOPATH, CELL ENHANCE TECH: ICD-10-PCS | Mod: 26,,, | Performed by: PATHOLOGY

## 2022-10-17 PROCEDURE — 82728 ASSAY OF FERRITIN: CPT | Performed by: STUDENT IN AN ORGANIZED HEALTH CARE EDUCATION/TRAINING PROGRAM

## 2022-10-17 PROCEDURE — 89051 BODY FLUID CELL COUNT: CPT | Performed by: HOSPITALIST

## 2022-10-17 PROCEDURE — 86301 IMMUNOASSAY TUMOR CA 19-9: CPT | Performed by: STUDENT IN AN ORGANIZED HEALTH CARE EDUCATION/TRAINING PROGRAM

## 2022-10-17 PROCEDURE — 88112 CYTOPATH CELL ENHANCE TECH: CPT | Mod: 26,,, | Performed by: PATHOLOGY

## 2022-10-17 PROCEDURE — 86803 HEPATITIS C AB TEST: CPT | Performed by: STUDENT IN AN ORGANIZED HEALTH CARE EDUCATION/TRAINING PROGRAM

## 2022-10-17 PROCEDURE — 80053 COMPREHEN METABOLIC PANEL: CPT | Performed by: STUDENT IN AN ORGANIZED HEALTH CARE EDUCATION/TRAINING PROGRAM

## 2022-10-17 PROCEDURE — 87070 CULTURE OTHR SPECIMN AEROBIC: CPT | Performed by: HOSPITALIST

## 2022-10-17 PROCEDURE — 87075 CULTR BACTERIA EXCEPT BLOOD: CPT | Performed by: HOSPITALIST

## 2022-10-17 PROCEDURE — 99232 SBSQ HOSP IP/OBS MODERATE 35: CPT | Mod: GC,,, | Performed by: HOSPITALIST

## 2022-10-17 PROCEDURE — 25000003 PHARM REV CODE 250

## 2022-10-17 PROCEDURE — 82390 ASSAY OF CERULOPLASMIN: CPT | Performed by: STUDENT IN AN ORGANIZED HEALTH CARE EDUCATION/TRAINING PROGRAM

## 2022-10-17 PROCEDURE — 82105 ALPHA-FETOPROTEIN SERUM: CPT | Performed by: STUDENT IN AN ORGANIZED HEALTH CARE EDUCATION/TRAINING PROGRAM

## 2022-10-17 PROCEDURE — 87205 SMEAR GRAM STAIN: CPT | Performed by: HOSPITALIST

## 2022-10-17 PROCEDURE — 82042 OTHER SOURCE ALBUMIN QUAN EA: CPT | Performed by: HOSPITALIST

## 2022-10-17 PROCEDURE — 82378 CARCINOEMBRYONIC ANTIGEN: CPT | Performed by: STUDENT IN AN ORGANIZED HEALTH CARE EDUCATION/TRAINING PROGRAM

## 2022-10-17 PROCEDURE — 88305 TISSUE EXAM BY PATHOLOGIST: CPT | Mod: 26,,, | Performed by: PATHOLOGY

## 2022-10-17 PROCEDURE — 88112 CYTOPATH CELL ENHANCE TECH: CPT | Performed by: PATHOLOGY

## 2022-10-17 PROCEDURE — 86235 NUCLEAR ANTIGEN ANTIBODY: CPT | Performed by: STUDENT IN AN ORGANIZED HEALTH CARE EDUCATION/TRAINING PROGRAM

## 2022-10-17 PROCEDURE — 84100 ASSAY OF PHOSPHORUS: CPT | Performed by: STUDENT IN AN ORGANIZED HEALTH CARE EDUCATION/TRAINING PROGRAM

## 2022-10-17 PROCEDURE — C9113 INJ PANTOPRAZOLE SODIUM, VIA: HCPCS | Performed by: STUDENT IN AN ORGANIZED HEALTH CARE EDUCATION/TRAINING PROGRAM

## 2022-10-17 PROCEDURE — 99232 PR SUBSEQUENT HOSPITAL CARE,LEVL II: ICD-10-PCS | Mod: GC,,, | Performed by: HOSPITALIST

## 2022-10-17 PROCEDURE — 83615 LACTATE (LD) (LDH) ENZYME: CPT | Performed by: HOSPITALIST

## 2022-10-17 PROCEDURE — 25000003 PHARM REV CODE 250: Performed by: STUDENT IN AN ORGANIZED HEALTH CARE EDUCATION/TRAINING PROGRAM

## 2022-10-17 PROCEDURE — 83735 ASSAY OF MAGNESIUM: CPT | Performed by: STUDENT IN AN ORGANIZED HEALTH CARE EDUCATION/TRAINING PROGRAM

## 2022-10-17 PROCEDURE — 86256 FLUORESCENT ANTIBODY TITER: CPT | Mod: 91 | Performed by: STUDENT IN AN ORGANIZED HEALTH CARE EDUCATION/TRAINING PROGRAM

## 2022-10-17 PROCEDURE — 82140 ASSAY OF AMMONIA: CPT

## 2022-10-17 PROCEDURE — 88305 TISSUE EXAM BY PATHOLOGIST: ICD-10-PCS | Mod: 26,,, | Performed by: PATHOLOGY

## 2022-10-17 PROCEDURE — 63600175 PHARM REV CODE 636 W HCPCS: Performed by: STUDENT IN AN ORGANIZED HEALTH CARE EDUCATION/TRAINING PROGRAM

## 2022-10-17 PROCEDURE — 82784 ASSAY IGA/IGD/IGG/IGM EACH: CPT | Performed by: STUDENT IN AN ORGANIZED HEALTH CARE EDUCATION/TRAINING PROGRAM

## 2022-10-17 PROCEDURE — 85025 COMPLETE CBC W/AUTO DIFF WBC: CPT | Performed by: STUDENT IN AN ORGANIZED HEALTH CARE EDUCATION/TRAINING PROGRAM

## 2022-10-17 PROCEDURE — 20600001 HC STEP DOWN PRIVATE ROOM

## 2022-10-17 PROCEDURE — 82103 ALPHA-1-ANTITRYPSIN TOTAL: CPT | Performed by: STUDENT IN AN ORGANIZED HEALTH CARE EDUCATION/TRAINING PROGRAM

## 2022-10-17 PROCEDURE — 86706 HEP B SURFACE ANTIBODY: CPT | Performed by: STUDENT IN AN ORGANIZED HEALTH CARE EDUCATION/TRAINING PROGRAM

## 2022-10-17 PROCEDURE — 88305 TISSUE EXAM BY PATHOLOGIST: CPT | Performed by: PATHOLOGY

## 2022-10-17 PROCEDURE — 25000003 PHARM REV CODE 250: Performed by: HOSPITALIST

## 2022-10-17 PROCEDURE — 27000207 HC ISOLATION

## 2022-10-17 PROCEDURE — 36415 COLL VENOUS BLD VENIPUNCTURE: CPT

## 2022-10-17 PROCEDURE — 84466 ASSAY OF TRANSFERRIN: CPT | Performed by: STUDENT IN AN ORGANIZED HEALTH CARE EDUCATION/TRAINING PROGRAM

## 2022-10-17 PROCEDURE — 51798 US URINE CAPACITY MEASURE: CPT

## 2022-10-17 RX ADMIN — VANCOMYCIN HYDROCHLORIDE 125 MG: KIT at 05:10

## 2022-10-17 RX ADMIN — ACETAMINOPHEN 650 MG: 160 SOLUTION ORAL at 12:10

## 2022-10-17 RX ADMIN — ESCITALOPRAM OXALATE 10 MG: 10 TABLET ORAL at 12:10

## 2022-10-17 RX ADMIN — VANCOMYCIN HYDROCHLORIDE 125 MG: KIT at 12:10

## 2022-10-17 RX ADMIN — VANCOMYCIN HYDROCHLORIDE 125 MG: KIT at 11:10

## 2022-10-17 RX ADMIN — PANTOPRAZOLE SODIUM 40 MG: 40 INJECTION, POWDER, FOR SOLUTION INTRAVENOUS at 09:10

## 2022-10-17 RX ADMIN — CEFTRIAXONE 2 G: 2 INJECTION, POWDER, FOR SOLUTION INTRAMUSCULAR; INTRAVENOUS at 02:10

## 2022-10-17 RX ADMIN — RIFAXIMIN 550 MG: 550 TABLET ORAL at 09:10

## 2022-10-17 RX ADMIN — ATORVASTATIN CALCIUM 40 MG: 40 TABLET, FILM COATED ORAL at 09:10

## 2022-10-17 RX ADMIN — Medication 1 ENEMA: at 04:10

## 2022-10-17 RX ADMIN — FOLIC ACID 1 MG: 1 TABLET ORAL at 12:10

## 2022-10-17 RX ADMIN — ALLOPURINOL 100 MG: 100 TABLET ORAL at 12:10

## 2022-10-17 NOTE — PROGRESS NOTES
[facility]  Wound Care    Patient Name:  Jacques Arellano   MRN:  [id number]  Date: 10/17/2022  Diagnosis: <principal problem not specified>    History:     Past Medical History:   Diagnosis Date    TRENT (acute kidney injury) 09/24/2022    Anticoagulant long-term use     Aortic aneurysm     Ascites of liver     Atrial fibrillation     Brain lesion     Diabetes mellitus     Diastolic heart failure     Frequent falls     GI bleeding     Hyperlipidemia     Hypertension     Liver disease     Renal disorder     eswl    Sacral decubitus ulcer     Sleep apnea     20 yrs ago    Stroke     TIA    UTI (urinary tract infection)     Vertigo        Social History     Socioeconomic History    Marital status:    Tobacco Use    Smoking status: Never    Smokeless tobacco: Never   Substance and Sexual Activity    Alcohol use: Not Currently     Comment: SOCIAL    Drug use: Never    Sexual activity: Not Currently     Social Determinants of Health     Financial Resource Strain: Unknown    Difficulty of Paying Living Expenses: Patient refused   Food Insecurity: Unknown    Worried About Running Out of Food in the Last Year: Patient refused    Ran Out of Food in the Last Year: Patient refused   Transportation Needs: Unknown    Lack of Transportation (Medical): Patient refused    Lack of Transportation (Non-Medical): Patient refused   Physical Activity: Unknown    Days of Exercise per Week: Patient refused   Stress: Stress Concern Present    Feeling of Stress : Very much   Social Connections: Unknown    Frequency of Communication with Friends and Family: Patient refused    Frequency of Social Gatherings with Friends and Family: Patient refused    Active Member of Clubs or Organizations: Patient refused    Attends Club or Organization Meetings: Patient refused    Marital Status: Patient refused   Housing Stability: Low Risk     Unable to Pay for Housing in the Last Year: No    Number of Places Lived in the Last Year: 1    Unstable  Housing in the Last Year: No       Precautions:     Allergies as of 10/14/2022    (No Known Allergies)       WO Assessment Details/Treatment      10/17/22 1546   WOCN Assessment   WOCN Total Time (mins) 45   Visit Date 10/17/22   Visit Time 1500   Consult Type New   WOCN Speciality Wound   Intervention assessed;changed;chart review;coordination of care;orders   Teaching on-going        Altered Skin Integrity 10/14/22 0030 Right lateral;upper Back   Date First Assessed/Time First Assessed: 10/14/22 0030   Altered Skin Integrity Present on Admission: yes  Side: Right  Orientation: lateral;upper  Location: Back   Dressing Foam        Altered Skin Integrity 10/14/22 0030 Right anterior;proximal;upper Arm   Date First Assessed/Time First Assessed: 10/14/22 0030   Altered Skin Integrity Present on Admission: yes  Side: Right  Orientation: anterior;proximal;upper  Location: Arm   Wound Image    Description of Altered Skin Integrity Partial thickness tissue loss. Shallow open ulcer with a red or pink wound bed, without slough. Intact or Open/Ruptured Serum-filled blister.   Dressing Appearance Moist drainage   Drainage Amount Moderate   Drainage Characteristics/Odor Serosanguineous   Appearance Pink;Ecchymotic   Dressing Changed;Non-adherent;Absorptive Pad   Dressing Change Due 10/18/22        Altered Skin Integrity 10/14/22 0030 Left anterior;upper Arm   Date First Assessed/Time First Assessed: 10/14/22 0030   Altered Skin Integrity Present on Admission: yes  Side: Left  Orientation: anterior;upper  Location: Arm   Wound Image    Description of Altered Skin Integrity Partial thickness tissue loss. Shallow open ulcer with a red or pink wound bed, without slough. Intact or Open/Ruptured Serum-filled blister.   Dressing Appearance Moist drainage   Drainage Amount Small   Drainage Characteristics/Odor Serosanguineous   Appearance Pink;Blistered;Ecchymotic   Wound Edges Irregular   Dressing Changed;Non-adherent;Absorptive Pad    Dressing Change Due 10/18/22        Altered Skin Integrity 10/14/22 0030  medial Perineum Moisture associated dermatitis   Date First Assessed/Time First Assessed: 10/14/22 0030   Altered Skin Integrity Present on Admission: yes  Side: (c)   Orientation: medial  Location: Perineum  Is this injury device related?: No  Primary Wound Type: Moisture associated dermatitis   Wound Image    Drainage Amount None   Appearance Red   Wound Edges Irregular   Care Cleansed with:;Sterile normal saline   Dressing Other (comment)  (Triad applied)        Altered Skin Integrity 10/14/22 0030 Left upper Back Skin Tear Partial thickness tissue loss. Shallow open ulcer with a red or pink wound bed, without slough. Intact or Open/Ruptured Serum-filled blister.   Date First Assessed/Time First Assessed: 10/14/22 0030   Altered Skin Integrity Present on Admission: yes  Side: Left  Orientation: upper  Location: Back  Is this injury device related?: No  Primary Wound Type: Skin Tear  Description of Altered Skin I...   Dressing Foam        Altered Skin Integrity 10/14/22 0030 Right anterior Knee Abrasion(s) Intact skin with non-blanchable redness of localized area   Date First Assessed/Time First Assessed: 10/14/22 0030   Altered Skin Integrity Present on Admission: yes  Side: Right  Orientation: anterior  Location: Knee  Is this injury device related?: No  Primary Wound Type: Abrasion(s)  Description of Altered ...   Wound Image    Description of Altered Skin Integrity Partial thickness tissue loss. Shallow open ulcer with a red or pink wound bed, without slough. Intact or Open/Ruptured Serum-filled blister.   Dressing Appearance Dry;Intact   Drainage Amount Scant   Drainage Characteristics/Odor Serous   Appearance Pink   Wound Length (cm) 1 cm   Wound Width (cm) 2 cm   Wound Depth (cm) 0.1 cm   Wound Volume (cm^3) 0.2 cm^3   Wound Surface Area (cm^2) 2 cm^2   Care Cleansed with:;Sterile normal saline   Dressing Foam   Wound consult  performed for multiple areas.  Fragile skin and skin tears noted to bilateral arms, right knee, and back.  Moisture associated skin dermatitis to perineum and coccyx.  Already on waffle overlay with orders to turn every 2 hours.  Having frequent loose bowel movements.    Recommendations made to primary team for Triad to perineum/ coccyx BID and prn soiling.  Bilateral arm skin tears--apply xeroform, cover with ultra-absorbent chux pad and tape.  Arms weeping heavily.   All other wounds--cover with foam dressings.   Orders placed.  Will follow.    10/17/2022

## 2022-10-17 NOTE — PLAN OF CARE
Patient arrived to [facility] for paracentesis. AAOx1, no distress noted, respirations even and unlabored, will continue to monitor. Allergies reviewed. Waiting for eval and consent for paracentesis procedure.

## 2022-10-17 NOTE — PLAN OF CARE
Toni DANIELLE  Initial Discharge Assessment       Primary Care Provider: Matty Garsia MD    Admission Diagnosis: Hypomagnesemia [E83.42]  CHF (congestive heart failure) [I50.9]  Prolonged Q-T interval on ECG [R94.31]  Elevated INR [R79.1]  Alcoholic cirrhosis of liver with ascites [K70.31]  Chest pain [R07.9]  Hypotension [I95.9]  Elevated lactic acid level [R79.89]  Gastrointestinal hemorrhage with melena [K92.1]  Altered mental status, unspecified altered mental status type [R41.82]  Leukocytosis, unspecified type [D72.829]    Admission Date: 10/14/2022  Expected Discharge Date: 10/20/2022    Discharge Barriers Identified: None    Payor: MEDICARE / Plan: MEDICARE PART A & B / Product Type: Government /     Extended Emergency Contact Information  Primary Emergency Contact: Марина Sorto  Mobile Phone: [phone]  Relation: Daughter  Secondary Emergency Contact: Reji Arellano  Mobile Phone: [phone]  Relation: Son  Preferred language: English   needed? No    Discharge Plan A: Return to nursing home         [id number] - [place] - [address]  AT [facility] [address]  [address] [place] [address]  Phone: [phone] Fax: [phone]      Initial Assessment (most recent)       Adult Discharge Assessment - 10/17/22 1146          Discharge Assessment    Assessment Type Discharge Planning Brief Assessment     Confirmed/corrected address, phone number and insurance Yes     Confirmed Demographics Correct on Facesheet     Source of Information family     If unable to respond/provide information was family/caregiver contacted? Yes     Contact Name/Number Марина( daughter) [phone]     When was your last doctors appointment? 10/10/22     Does patient/caregiver understand observation status Yes     Communicated ELMER with patient/caregiver Yes     Reason For Admission Kelley     Lives With facility resident     Facility Arrived From: [facility] [phone]     Do you expect to return to your current living situation? Yes     Do you have help at home or someone to help you manage your care at home? Yes     Who are your caregiver(s) and their phone number(s)? [facility] [phone]     Prior to hospitilization cognitive status: Unable to Assess     Current cognitive status: Unable to Assess     Walking or Climbing Stairs Difficulty ambulation difficulty, requires equipment     Mobility Management Walker     Dressing/Bathing Difficulty none     Equipment Currently Used at Home walker, rolling     Readmission within 30 days? Yes     Patient currently being followed by outpatient case management? No     Do you currently have service(s) that help you manage your care at home? Yes     How Many hours does patient receive services 24     Name and Contact number of agency [facility] [phone]     Do you take prescription medications? Yes     Do you have prescription coverage? Yes     Coverage Medicare     Do you have any problems affording any of your prescribed medications? No     Is the patient taking medications as prescribed? yes     Who is going to help you get home at discharge? [facility] [phone]     How do you get to doctors appointments? agency     Are you on dialysis? No     Do you take coumadin? No     Discharge Plan A Return to nursing home     DME Needed Upon Discharge  other (see comments)   Unknown at this time    Discharge Plan discussed with: Adult children     Discharge Barriers Identified None        Relationship/Environment    Name(s) of Who Lives With Patient [facility] [phone]                          Spoke to pt daughter. Pt lives at [facility] [phone]. Post hospital stay [facility] will be pt support person and pt. has transportation at d/c with hospital. There have been no  hospitalizations within the last 30 days per pt daughter. Verified pt PCP and preferred pharmacy. Pt daughter stated not on Coumadin and is not receiving dialysis. All questions answered regarding case management/ discharge planning , pt daughter verbalized understanding. Discharge booklet with SW contact information given to pt daughter.     Apoorva Botello LCSW  Case Management/[facility]  [phone]

## 2022-10-17 NOTE — TREATMENT PLAN
Hepatology Treatment Plan    Jacques Arellano is a 82 y.o. male admitted to hospital 10/14/2022 (Hospital Day: 4) due to <principal problem not specified>.     Interval History  Patient remains alert, but disoriented on bedside assessment. Vital signs normal on room air. Labs notable for on-going leukocytosis (16), stable anemia (Hgb 9.1 from 8.9 without PRBC required this admission), and new TRENT (Cr 1.5). Pending paracentesis today.     Objective  Temp:  [96.9 °F (36.1 °C)-98 °F (36.7 °C)] 97.2 °F (36.2 °C) (10/17 0830)  Pulse:  [61-78] 74 (10/17 0830)  BP: (101-131)/(50-67) 118/67 (10/17 0830)  Resp:  [16-20] 20 (10/17 0830)  SpO2:  [97 %-100 %] 100 % (10/17 0830)    General: Alert, disoriented, no distress  Neurologic: Asterixis minimal  Abdomen: Hypoactive bowel sounds. Non-distended. Normal tympany. Soft. Non-tender. No peritoneal signs.    Laboratory    Lab Results   Component Value Date    WBC 16.39 (H) 10/17/2022    HGB 9.1 (L) 10/17/2022    HCT 28.6 (L) 10/17/2022    MCV 97 10/17/2022     10/17/2022       Lab Results   Component Value Date     10/17/2022    K 3.9 10/17/2022     10/17/2022    CO2 14 (L) 10/17/2022    BUN 29 (H) 10/17/2022    CREATININE 1.5 (H) 10/17/2022    CALCIUM 8.5 (L) 10/17/2022       Lab Results   Component Value Date    ALBUMIN 2.1 (L) 10/17/2022    ALT 15 10/17/2022    AST 19 10/17/2022    ALKPHOS 148 (H) 10/17/2022    BILITOT 1.3 (H) 10/17/2022       Lab Results   Component Value Date    INR 1.4 (H) 10/14/2022    INR 1.5 (H) 09/23/2022       MELD-Na score: 13 at 10/16/2022  6:10 AM  MELD score: 13 at 10/16/2022  6:10 AM  Calculated from:  Serum Creatinine: 1.2 mg/dL at 10/16/2022  6:10 AM  Serum Sodium: 137 mmol/L at 10/16/2022  6:10 AM  Total Bilirubin: 1.5 mg/dL at 10/16/2022  6:10 AM  INR(ratio): 1.4 at 10/14/2022  2:40 PM  Age: 82 years    Assessment  This is an 82 year old male with a PMH significant for atrial fibrillation (status post placement of pacemaker  in 2019 and Watchman device in 03/2022), diverticulosis, HFpEF, HTN, TIA (04/2022), and newly diagnosed cirrhosis and 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 Presentation here notable for CTA A/P incidentally showing  3 cm enhancing lesion of the hepatic dome concerning for HCC with cirrhotic liver morphology. Hepatology consulted for further work-up of cirrhosis and hepatic lesion. Etiology of underlying cirrhosis remains unclear with inability to obtain history from patient in the setting of encephalopathy.      Recommendations:     -Follow-up auto-immune serologies.   -Obtain diagnostic paracentesis to rule out SBP.   -Okay to hold off on Lactulose with diarrhea associated with C.diff, but recommend adding Rifaximin BID.   -Follow-up GI recommendations.   -CMP and INR daily.   -For further evaluation of HCC, it would be helpful to obtain a MRI A/P for further evaluation of liver lesion, however this may not be possible with pacemaker in place. His cranial lesion could be a metastatic lesion of HCC and biopsy should be considered once encephalopathy improves. Regardless, based on age and frailty, patient would not be a candidate for transplant or systemic therapy options. Recommend evaluation by palliative care team.     Thank you for involving us in the care of Jacques Arellano. Please call with any additional concerns or questions.        Surjit Pink MD, PGY-V  Gastroenterology Fellow  [facility]

## 2022-10-17 NOTE — ASSESSMENT & PLAN NOTE
Pt endorses profuse watery diarrhea for the past few days. Concerning for possible C diff with recent aggressive antibiotic regimen     Plan:  - Stool studies pending  - C diff positive; starting Po Vancomycin   - Ceftriaxone for sbp coverage   - Will add flagyl if worsening   - Per Hepatology, added Rifaximin BID.

## 2022-10-17 NOTE — H&P
Inpatient Radiology Pre-procedure Note    History of Present Illness:  Jacques Arellano is a 82 y.o. male who presents for ultrasound guided paracentesis.  Admission H&P reviewed.  Past Medical History:   Diagnosis Date    TRENT (acute kidney injury) 09/24/2022    Anticoagulant long-term use     Aortic aneurysm     Ascites of liver     Atrial fibrillation     Brain lesion     Diabetes mellitus     Diastolic heart failure     Frequent falls     GI bleeding     Hyperlipidemia     Hypertension     Liver disease     Renal disorder     eswl    Sacral decubitus ulcer     Sleep apnea     20 yrs ago    Stroke     TIA    UTI (urinary tract infection)     Vertigo      Past Surgical History:   Procedure Laterality Date    CARDIAC SURGERY  03/30/2022    watchmen    INSERTION OF PACEMAKER      KIDNEY STONE SURGERY      OPEN REDUCTION AND INTERNAL FIXATION (ORIF) OF FRACTURE OF DISTAL RADIUS Right 5/17/2022    Procedure: ORIF, FRACTURE, RADIUS, DISTAL;  Surgeon: Claude S. Williams IV, MD;  Location: [facility];  Service: Orthopedics;  Laterality: Right;    TONSILLECTOMY         Review of Systems:   As documented in primary team H&P    Home Meds:   Prior to Admission medications    Medication Sig Start Date End Date Taking? Authorizing Provider   acetaminophen (TYLENOL) 325 MG tablet Take 2 tablets (650 mg total) by mouth every 6 (six) hours as needed for Pain (DO NOT EXCEED more than 2000 mg in 1 day). 9/30/22  Yes Esteban Valdez, DO   allopurinoL (ZYLOPRIM) 100 MG tablet Take 1 tablet (100 mg total) by mouth once daily. 9/30/22  Yes Esteban Gaonatan, DO   atorvastatin (LIPITOR) 40 MG tablet Take 40 mg by mouth once daily. 1/26/22  Yes Historical Provider   folic acid (FOLVITE) 1 MG tablet Take 1 tablet (1 mg total) by mouth once daily. 9/30/22 9/30/23 Yes Esteban Valdez, DO   furosemide (LASIX) 20 MG tablet TAKE 1 TABLET(20 MG) BY MOUTH TWICE DAILY 10/14/22  Yes Matty Garsia MD   EScitalopram oxalate (LEXAPRO) 10 MG tablet Take  1 tablet (10 mg total) by mouth once daily. 7/19/22 7/19/23  Matty Garsia MD   LIDOcaine (LIDODERM) 5 % Place 1 patch onto the skin once daily. Remove & Discard patch within 12 hours or as directed by MD. Apply to right ribs. 9/30/22   Esteban Valdez,      Scheduled Meds:    allopurinoL  100 mg Oral Daily    atorvastatin  40 mg Oral Daily    cefTRIAXone (ROCEPHIN) IVPB  2 g Intravenous Q24H    EScitalopram oxalate  10 mg Oral Daily    folic acid  1 mg Oral Daily    pantoprazole  40 mg Intravenous BID    vancomycin  125 mg Oral Q6H     Continuous Infusions:   PRN Meds:acetaminophen, dextrose 10%, dextrose 10%, glucagon (human recombinant), glucose, glucose, naloxone, sodium chloride 0.9%, sodium chloride 0.9%  Anticoagulants/Antiplatelets: no anticoagulation    Allergies: Review of patient's allergies indicates:  No Known Allergies  Sedation Hx: have not been any systemic reactions    Vitals:  Temp: 97.2 °F (36.2 °C) (10/17/22 0830)  Pulse: 74 (10/17/22 0830)  Resp: 20 (10/17/22 0830)  BP: 118/67 (10/17/22 0830)  SpO2: 100 % (10/17/22 0830)     Physical Exam:  ASA: 3  Mallampati: n/a    General: no acute distress  Mental Status: arousable and oriented to person, but not place and time  HEENT: normocephalic, atraumatic  Chest: unlabored breathing  Heart: regular heart rate  Abdomen: distended  Extremity: moves all extremities    Plan: ultrasound guided paracentesis  Sedation Plan: local    NORTH Malone, FNP  Interventional Radiology  [phone] [facility]

## 2022-10-17 NOTE — TELEPHONE ENCOUNTER
Spoke to patient's son to discuss paracentesis. Son, Reji, says he will discuss with his sister, Марина Sorto, and call us back. Phone number for [facility] provided: [phone].

## 2022-10-17 NOTE — SUBJECTIVE & OBJECTIVE
Interval History: Pt tachypnea this overnight with confusion. Responded well to liquid tylenol. On PO vanc for C.diff however has difficulty swallowing. Will consider Rectal vanc. GI canceled scope due to C.diff and stable HH. IR planning for diagnostic para for SBP r/o and cytology. Starting Rifaximin for worsening AMS    Review of Systems   Unable to perform ROS: Mental status change   Objective:     Vital Signs (Most Recent):  Temp: 97.8 °F (36.6 °C) (10/17/22 0449)  Pulse: 72 (10/17/22 0449)  Resp: 20 (10/17/22 0449)  BP: (!) 131/57 (10/17/22 0449)  SpO2: 99 % (10/17/22 0449)   Vital Signs (24h Range):  Temp:  [96.9 °F (36.1 °C)-98 °F (36.7 °C)] 97.8 °F (36.6 °C)  Pulse:  [61-78] 72  Resp:  [16-20] 20  SpO2:  [97 %-100 %] 99 %  BP: (101-131)/(50-59) 131/57     Weight: 83.5 kg (184 lb 1.4 oz)  Body mass index is 26.41 kg/m².    Intake/Output Summary (Last 24 hours) at 10/17/2022 0754  Last data filed at 10/17/2022 0717  Gross per 24 hour   Intake 430 ml   Output 450 ml   Net -20 ml        Physical Exam  Constitutional:       Appearance: He is ill-appearing.   HENT:      Head:      Comments: Subcutaneous lesion hard to palpation located on right frontal skull.   Eyes:      General: No scleral icterus.     Conjunctiva/sclera: Conjunctivae normal.   Cardiovascular:      Rate and Rhythm: Normal rate and regular rhythm.      Pulses: Normal pulses.      Heart sounds: Normal heart sounds.   Pulmonary:      Effort: Pulmonary effort is normal. No respiratory distress.      Breath sounds: Normal breath sounds.   Chest:      Comments: Pacemaker in place in right upper chest wall.   Abdominal:      General: Bowel sounds are normal. There is distension.      Palpations: Abdomen is soft.      Tenderness: There is no abdominal tenderness.   Musculoskeletal:      Right lower leg: Edema present.      Left lower leg: Edema present.   Skin:     General: Skin is warm and dry.      Findings: No bruising or rash.   Neurological:       GCS: GCS eye subscore is 4. GCS verbal subscore is 3. GCS motor subscore is 5.       Significant Labs: All pertinent labs within the past 24 hours have been reviewed.  A1C:   Recent Labs   Lab 07/19/22  1149   HGBA1C 6.1*       ABGs:   Recent Labs   Lab 10/16/22  0825   PH 7.401   PCO2 25.5*   HCO3 15.8*   POCSATURATED 41*   BE -9   PO2 23*       Bilirubin:   Recent Labs   Lab 09/26/22  0307 10/14/22  1440 10/15/22  0555 10/16/22  0610 10/17/22  0541   BILITOT 1.1* 1.7* 1.5* 1.5* 1.3*       Blood Culture:   No results for input(s): LABBLOO in the last 48 hours.    BMP:   Recent Labs   Lab 10/17/22  0541   *      K 3.9      CO2 14*   BUN 29*   CREATININE 1.5*   CALCIUM 8.5*   MG 1.7       CBC:   Recent Labs   Lab 10/15/22  1758 10/16/22  0610 10/17/22  0541   WBC 26.32* 15.53* 16.39*   HGB 9.4* 8.9* 9.1*   HCT 28.4* 26.8* 28.6*    151 169       CMP:   Recent Labs   Lab 10/16/22  0610 10/17/22  0541    137   K 3.8 3.9   * 110   CO2 15* 14*   * 146*   BUN 25* 29*   CREATININE 1.2 1.5*   CALCIUM 8.2* 8.5*   PROT 4.3* 4.6*   ALBUMIN 2.0* 2.1*   BILITOT 1.5* 1.3*   ALKPHOS 128 148*   AST 20 19   ALT 14 15   ANIONGAP 11 13       Cardiac Markers:   No results for input(s): CKMB, MYOGLOBIN, BNP, TROPISTAT in the last 48 hours.    Coagulation:   No results for input(s): PT, INR, APTT in the last 48 hours.    Lactic Acid:   Recent Labs   Lab 10/16/22  0822   LACTATE 2.9*       Lipase:   No results for input(s): LIPASE in the last 48 hours.    Lipid Panel: No results for input(s): CHOL, HDL, LDLCALC, TRIG, CHOLHDL in the last 48 hours.  Magnesium:   Recent Labs   Lab 10/16/22  0610 10/17/22  0541   MG 1.7 1.7       Pathology Results  (Last 10 years)      None          POCT Glucose:   Recent Labs   Lab 10/15/22  1348 10/16/22  1739   POCTGLUCOSE 86 192*       Prealbumin: No results for input(s): PREALBUMIN in the last 48 hours.  Respiratory Culture: No results for input(s): GSRESP,  RESPIRATORYC in the last 48 hours.  Troponin:   No results for input(s): TROPONINI, TROPONINIHS in the last 48 hours.    TSH:   Recent Labs   Lab 07/19/22  1149   TSH 0.555       Urine Culture: No results for input(s): LABURIN in the last 48 hours.  Urine Studies:   No results for input(s): COLORU, APPEARANCEUA, PHUR, SPECGRAV, PROTEINUA, GLUCUA, KETONESU, BILIRUBINUA, OCCULTUA, NITRITE, UROBILINOGEN, LEUKOCYTESUR, RBCUA, WBCUA, BACTERIA, SQUAMEPITHEL, HYALINECASTS in the last 48 hours.    Invalid input(s): ROMERO      Significant Imaging: I have reviewed all pertinent imaging results/findings within the past 24 hours.

## 2022-10-17 NOTE — PLAN OF CARE
Paracentesis complete. Removed 2400 mL. Patient AAOx1, no distress noted, respirations even and unlabored. Pt stable for transport back to room at this time.

## 2022-10-17 NOTE — PLAN OF CARE
Problem: Adult Inpatient Plan of Care  Goal: Plan of Care Review  Outcome: Ongoing, Progressing  Goal: Patient-Specific Goal (Individualized)  Outcome: Ongoing, Progressing  Goal: Absence of Hospital-Acquired Illness or Injury  Outcome: Ongoing, Progressing  Goal: Optimal Comfort and Wellbeing  Outcome: Ongoing, Progressing  Goal: Readiness for Transition of Care  Outcome: Ongoing, Progressing     Problem: Fall Injury Risk  Goal: Absence of Fall and Fall-Related Injury  Outcome: Ongoing, Progressing       Patient alert to self only, denies pain when asked.  Patient requires assistance with all ADLS and transfers and repositioning.  Skin with bruising to BUE with skin tears. Wound care consulted.  Mepiplex to skin tears. Incontinent of B/B, BM x 1. Patient remains in contact isolation for c diff in stool, vancomycin regimen ordered.  Patient escorted to IR for paracentesis and 2400 ml fluid removed.  Dressing applied to area near RMQ.  Med's crushed and  HOB up with meals and med pass.  Patient BNP elevated: 544, , albumin: 2.9, creatinine: 1.5 and BUN: 29.  Son visited for a few minutes.  Patient is DNR and patient may benefit from comfort care. POC reviewed with son and discharge date is TBD.

## 2022-10-17 NOTE — PROCEDURES
Radiology Post-Procedure Note    Pre Op Diagnosis: Ascites  Post Op Diagnosis: Same    Procedure: Ultrasound Guided Paracentesis    Procedure performed by: Dale ESTRADA, Brit     Written Informed Consent Obtained: Yes  Specimen Removed: YES clear yellow  Estimated Blood Loss: Minimal    Findings:   Successful paracentesis.  Albumin administered PRN per protocol.    Patient tolerated procedure well.    Brit Hunter, APRN, FNP  Interventional Radiology  [phone] clinic

## 2022-10-17 NOTE — PROGRESS NOTES
[facility] Medicine  Progress Note    Patient Name: Jacques Arellano  MRN: [id number]  Patient Class: IP- Inpatient   Admission Date: 10/14/2022  Length of Stay: 3 days  Attending Physician: Chana Andersen*  Primary Care Provider: Matty Garsia MD        Subjective:     Principal Problem:<principal problem not specified>        HPI:  81 yo M with PMhx of CHF, AFib, Watchman, pacer, expansile brain lesion, pacemaker presents to from [facility] for diarrhea, copious black stool and acute altered mental status. Pt reports he has 3-5 profuse watery BM for the last few days noting a little bit of blood. Pt was recently admitted to [facility] ED on 10/12 for abdominal pain and coffee ground emesis, EGD revealed grade D esophagitis and gastritis with no overt GI bleeding and was subsequently discharged with Protonix. Pt was recently admitted to [facility] ICU on 9/23 for sepsis thought to be secondary to E coli. UTI vs cirrhosis and was treated with antibiotic and pressors . Inpatient workup revealed new dx of cirrhosis with ascites no spb, aortic aneurysm, and expansile Calvarial lesion, pt was DC on 10/13 back to [facility].      Today pt H & H stable. CTA abd shows colitis, cirrhosis/ascites, and new potential HCC. BNP elevated at 544 however XCR and PE unconcerning for CHF exacerbation. Mentation improved while in ED - CT head stable.       Overview/Hospital Course:  CTA abd shows colitis, cirrhosis/ascites, and new potential HCC. BNP elevated at 544 however XCR and PE unconcerning for CHF exacerbation. Mentation improved while in ED - CT head stable. 10/15: Small bloody stools overnight with bright red blood per nurse. Pt has not urinated overnight, Bladder scan with only 180cc. HDS. WBC down trending. Stool studies pending. GI planning to scope on 10/17. H&H slowly down trending but stable. Hepatology following with recommendation for paracentesis and SBP rule out.          Interval History: Pt had tachypnea overnight with confusion. Responded well to liquid tylenol. On PO vanc for C.diff however has difficulty swallowing. Will consider Rectal vanc. GI canceled scope due to C.diff and stable HH. IR planning for diagnostic para for SBP r/o and cytology. Starting Rifaximin for worsening AMS    Review of Systems   Unable to perform ROS: Mental status change   Objective:     Vital Signs (Most Recent):  Temp: 97.8 °F (36.6 °C) (10/17/22 0449)  Pulse: 72 (10/17/22 0449)  Resp: 20 (10/17/22 0449)  BP: (!) 131/57 (10/17/22 0449)  SpO2: 99 % (10/17/22 0449)   Vital Signs (24h Range):  Temp:  [96.9 °F (36.1 °C)-98 °F (36.7 °C)] 97.8 °F (36.6 °C)  Pulse:  [61-78] 72  Resp:  [16-20] 20  SpO2:  [97 %-100 %] 99 %  BP: (101-131)/(50-59) 131/57     Weight: 83.5 kg (184 lb 1.4 oz)  Body mass index is 26.41 kg/m².    Intake/Output Summary (Last 24 hours) at 10/17/2022 0754  Last data filed at 10/17/2022 0717  Gross per 24 hour   Intake 430 ml   Output 450 ml   Net -20 ml        Physical Exam  Constitutional:       Appearance: He is ill-appearing.   HENT:      Head:      Comments: Subcutaneous lesion hard to palpation located on right frontal skull.   Eyes:      General: No scleral icterus.     Conjunctiva/sclera: Conjunctivae normal.   Cardiovascular:      Rate and Rhythm: Normal rate and regular rhythm.      Pulses: Normal pulses.      Heart sounds: Normal heart sounds.   Pulmonary:      Effort: Pulmonary effort is normal. No respiratory distress.      Breath sounds: Normal breath sounds.   Chest:      Comments: Pacemaker in place in right upper chest wall.   Abdominal:      General: Bowel sounds are normal. There is distension.      Palpations: Abdomen is soft.      Tenderness: There is no abdominal tenderness.   Musculoskeletal:      Right lower leg: Edema present.      Left lower leg: Edema present.   Skin:     General: Skin is warm and dry.      Findings: No bruising or rash.    Neurological:      GCS: GCS eye subscore is 4. GCS verbal subscore is 3. GCS motor subscore is 5.       Significant Labs: All pertinent labs within the past 24 hours have been reviewed.  A1C:   Recent Labs   Lab 07/19/22  1149   HGBA1C 6.1*       ABGs:   Recent Labs   Lab 10/16/22  0825   PH 7.401   PCO2 25.5*   HCO3 15.8*   POCSATURATED 41*   BE -9   PO2 23*       Bilirubin:   Recent Labs   Lab 09/26/22  0307 10/14/22  1440 10/15/22  0555 10/16/22  0610 10/17/22  0541   BILITOT 1.1* 1.7* 1.5* 1.5* 1.3*       Blood Culture:   No results for input(s): LABBLOO in the last 48 hours.    BMP:   Recent Labs   Lab 10/17/22  0541   *      K 3.9      CO2 14*   BUN 29*   CREATININE 1.5*   CALCIUM 8.5*   MG 1.7       CBC:   Recent Labs   Lab 10/15/22  1758 10/16/22  0610 10/17/22  0541   WBC 26.32* 15.53* 16.39*   HGB 9.4* 8.9* 9.1*   HCT 28.4* 26.8* 28.6*    151 169       CMP:   Recent Labs   Lab 10/16/22  0610 10/17/22  0541    137   K 3.8 3.9   * 110   CO2 15* 14*   * 146*   BUN 25* 29*   CREATININE 1.2 1.5*   CALCIUM 8.2* 8.5*   PROT 4.3* 4.6*   ALBUMIN 2.0* 2.1*   BILITOT 1.5* 1.3*   ALKPHOS 128 148*   AST 20 19   ALT 14 15   ANIONGAP 11 13       Cardiac Markers:   No results for input(s): CKMB, MYOGLOBIN, BNP, TROPISTAT in the last 48 hours.    Coagulation:   No results for input(s): PT, INR, APTT in the last 48 hours.    Lactic Acid:   Recent Labs   Lab 10/16/22  0822   LACTATE 2.9*       Lipase:   No results for input(s): LIPASE in the last 48 hours.    Lipid Panel: No results for input(s): CHOL, HDL, LDLCALC, TRIG, CHOLHDL in the last 48 hours.  Magnesium:   Recent Labs   Lab 10/16/22  0610 10/17/22  0541   MG 1.7 1.7       Pathology Results  (Last 10 years)      None          POCT Glucose:   Recent Labs   Lab 10/15/22  1348 10/16/22  1739   POCTGLUCOSE 86 192*       Prealbumin: No results for input(s): PREALBUMIN in the last 48 hours.  Respiratory Culture: No results for  input(s): GSRESP, RESPIRATORYC in the last 48 hours.  Troponin:   No results for input(s): TROPONINI, TROPONINIHS in the last 48 hours.    TSH:   Recent Labs   Lab 07/19/22  1149   TSH 0.555       Urine Culture: No results for input(s): LABURIN in the last 48 hours.  Urine Studies:   No results for input(s): COLORU, APPEARANCEUA, PHUR, SPECGRAV, PROTEINUA, GLUCUA, KETONESU, BILIRUBINUA, OCCULTUA, NITRITE, UROBILINOGEN, LEUKOCYTESUR, RBCUA, WBCUA, BACTERIA, SQUAMEPITHEL, HYALINECASTS in the last 48 hours.    Invalid input(s): WRIGHTSUR      Significant Imaging: I have reviewed all pertinent imaging results/findings within the past 24 hours.      Assessment/Plan:      Diarrhea  Pt endorses profuse watery diarrhea for the past few days. Concerning for possible C diff with recent aggressive antibiotic regimen     Plan:  - Stool studies pending  - C diff positive; starting Po Vancomycin   - Ceftriaxone for sbp coverage   - Will add flagyl if worsening   - Per Hepatology, added Rifaximin BID.    GI bleed  Pt endorses melena. CT scan with no evidence of contrast extravasation to suggest active GI bleed at this time. Stable 3 cm enhancing lesion at the hepatic dome with evidence of liver cirrhosis, circumferential wall thickening of the rectum and sigmoid colon concern for colitis, and pancreatic hypodensities measuring up to 2.0 cm. Considering liver disease, suspicion for ruptured esophogeal varcies. Recent EGD at [facility] reviewed, will discuss with GI    Plan:  - CBC q12, trend H&H  - GI planning for endoscopy & sigmoidoscopy deferred due to C.diff and stable HH  - PPI 40mg BID   - CTX for sbp ppx    Other cirrhosis of liver  9/23: Ct A/P:Cirrhotic morphology of the liver with sequelae of portal hypertension as evidence by splenomegaly, intra-abdominal ascites, and possible portal hypertensive enteropathy. Pt denies hx of alcohol abuse.  R preformed diagnostic paracentetics on 9/27 to r/o SBP: which was negative 130  WBC and 10%     Plan:   - Daily CMP   - Hepatology following; paracentesis with IR with 2.4L with cytology collected  - Started on Rifaximin BID per hepatology recommendations  - Cytology from para collected      Chronic gout  Plan:  Continue home allopurinol.        Type 2 diabetes mellitus, without long-term current use of insulin  Last A1c 6.1 3 months ago. Hypoglycemic on admission.      Plan:  - No home medications  Noted    - POCT glucose ACHS   - Low threshold to start LDSSI     Mixed hyperlipidemia  Plan:  - Continue home statin        Hypertension  Home medications: triamterene-HCTZ, Lasix     Plan:  - Hold home medications s/o hypotension     Diastolic heart failure  9/24 ANNE: The left ventricle is normal in size with low normal systolic function. The estimated ejection fraction is 50%.There is abnormal septal wall motion consistent with right ventricular pacing. Mild right ventricular enlargement with mildly reduced right ventricular systolic function. BNP:544    Plan:  - Low threshold for Lasix as CXR/lung sounds clear while saturating well on RA.   - 20 mg today in setting of tachypnea and pulm congestion         Permanent atrial fibrillation  Patient with documented history of permanent Afib s/p watchman, no longer on AC or rate controlling agents.  [id number] 5   HAS-BLED 4      Plan:  - Patient rate controlled, no indication for further agents at this time  - No full AC required s/p Watchman; will hold AC ppx and APT s/o active bleeding from abrasion sites  - Cardiac telemetry  - Maintain K > 4, Mg > 2, Ca wnl; replete PRN   - STAT cardiology consult if hemodynamic instability for DCCV evaluation      VTE Risk Mitigation (From admission, onward)           Ordered     IP VTE HIGH RISK PATIENT  Once         10/14/22 1826     Place sequential compression device  Until discontinued         10/14/22 1826                    Discharge Planning   ELMER: 10/20/2022     Code Status: DNR   Is the patient  medically ready for discharge?: No    Reason for patient still in hospital (select all that apply): Patient trending condition  Discharge Plan A: Return to nursing home                  Starr Hodges DO  Department of Hospital Medicine   Toni DANIELLE

## 2022-10-17 NOTE — ASSESSMENT & PLAN NOTE
9/23: Ct A/P:Cirrhotic morphology of the liver with sequelae of portal hypertension as evidence by splenomegaly, intra-abdominal ascites, and possible portal hypertensive enteropathy. Pt denies hx of alcohol abuse.  R preformed diagnostic paracentetics on 9/27 to r/o SBP: which was negative 130 WBC and 10%     Plan:   - Daily CMP   - Hepatology following; paracentesis with IR with 2.4L with cytology collected  - Started on Rifaximin BID per hepatology recommendations  - Cytology from para collected

## 2022-10-17 NOTE — PT/OT/SLP PROGRESS
Speech Language Pathology  Pt not seen    Jacques Arellano  MRN: [id number]    Patient not seen today secondary to pt NPO for procedure. SLP will follow up.   10/17/2022

## 2022-10-17 NOTE — ASSESSMENT & PLAN NOTE
Pt endorses melena. CT scan with no evidence of contrast extravasation to suggest active GI bleed at this time. Stable 3 cm enhancing lesion at the hepatic dome with evidence of liver cirrhosis, circumferential wall thickening of the rectum and sigmoid colon concern for colitis, and pancreatic hypodensities measuring up to 2.0 cm. Considering liver disease, suspicion for ruptured esophogeal varcies. Recent EGD at [facility] reviewed, will discuss with GI    Plan:  - CBC q12, trend H&H  - GI planning for endoscopy & sigmoidoscopy deferred due to C.diff and stable HH  - PPI 40mg BID   - CTX for sbp ppx

## 2022-10-17 NOTE — PLAN OF CARE
Patient at best alert to self, recognizing being addressed by his first name. Disoriented to place, time, and situation. Patient asked repeatedly what was going on. Reoriented patient indicating he was in the hospital at [facility] in [place]. Patient also hallucinated holding a cup and drinking from it. When awake patient breathing was at times tachypnea with deep agonal breaths 20-24/min. Difficult assessing behavior due to patient disorientation. Patient denied he was in pain but did indicate shortness of breath even though his SpO2 was in the high 90's. Positioned patient high in bed with head of bed > 30 degrees. Once asleep patient resumed a reasonable breathing pattern. Slept much of the evening shift. Patient vital signs remain normal and stable with no complaints of headaches, or dizziness. Urine output adequate with one large unmeasured output to the bed at end of shift. Clear liquid diet with no intake during evening shift. Patient NPO from midnight anticipating AM EGD.  One BM end of shift with scheduled enema. Elixir tylenol and vancomycin given with applesauce. Patient denies pain. Bruising and skin tears from previous nursing home falls on arms, edematous and weeping saturating bandaging. Right forearm 22 gauge IV remaining intact protected with coban from patient pulling it out. Patient is resting quietly with side rails up and call light with in reach. Will continue to monitor patient status.

## 2022-10-17 NOTE — TREATMENT PLAN
Brief GI Treatment Plan    C diff testing returned positive. Hgb remains stable. Will hold off on EGD and flex sig today in setting of active cdiff and stable hemoglobin. Will continue to follow and re-assess daily.    Rosendo Brizuela  Gastroenterology Fellow, PGY-IV

## 2022-10-18 LAB
ALBUMIN SERPL BCP-MCNC: 1.9 G/DL (ref 3.5–5.2)
ALP SERPL-CCNC: 119 U/L (ref 55–135)
ALT SERPL W/O P-5'-P-CCNC: 14 U/L (ref 10–44)
ANION GAP SERPL CALC-SCNC: 10 MMOL/L (ref 8–16)
AST SERPL-CCNC: 18 U/L (ref 10–40)
BACTERIA STL CULT: NORMAL
BASOPHILS # BLD AUTO: 0.04 K/UL (ref 0–0.2)
BASOPHILS NFR BLD: 0.2 % (ref 0–1.9)
BILIRUB SERPL-MCNC: 0.8 MG/DL (ref 0.1–1)
BUN SERPL-MCNC: 31 MG/DL (ref 8–23)
CALCIUM SERPL-MCNC: 8.3 MG/DL (ref 8.7–10.5)
CHLORIDE SERPL-SCNC: 115 MMOL/L (ref 95–110)
CO2 SERPL-SCNC: 14 MMOL/L (ref 23–29)
CREAT SERPL-MCNC: 1.6 MG/DL (ref 0.5–1.4)
DIFFERENTIAL METHOD: ABNORMAL
EOSINOPHIL # BLD AUTO: 0.1 K/UL (ref 0–0.5)
EOSINOPHIL NFR BLD: 0.4 % (ref 0–8)
ERYTHROCYTE [DISTWIDTH] IN BLOOD BY AUTOMATED COUNT: 17.2 % (ref 11.5–14.5)
EST. GFR  (NO RACE VARIABLE): 42.5 ML/MIN/1.73 M^2
GLUCOSE SERPL-MCNC: 140 MG/DL (ref 70–110)
HCT VFR BLD AUTO: 28.3 % (ref 40–54)
HGB BLD-MCNC: 9.1 G/DL (ref 14–18)
IMM GRANULOCYTES # BLD AUTO: 0.22 K/UL (ref 0–0.04)
IMM GRANULOCYTES NFR BLD AUTO: 1.3 % (ref 0–0.5)
LYMPHOCYTES # BLD AUTO: 0.8 K/UL (ref 1–4.8)
LYMPHOCYTES NFR BLD: 4.8 % (ref 18–48)
MAGNESIUM SERPL-MCNC: 1.6 MG/DL (ref 1.6–2.6)
MCH RBC QN AUTO: 30.7 PG (ref 27–31)
MCHC RBC AUTO-ENTMCNC: 32.2 G/DL (ref 32–36)
MCV RBC AUTO: 96 FL (ref 82–98)
MONOCYTES # BLD AUTO: 1 K/UL (ref 0.3–1)
MONOCYTES NFR BLD: 6 % (ref 4–15)
NEUTROPHILS # BLD AUTO: 14.6 K/UL (ref 1.8–7.7)
NEUTROPHILS NFR BLD: 87.3 % (ref 38–73)
NRBC BLD-RTO: 0 /100 WBC
PHOSPHATE SERPL-MCNC: 3.3 MG/DL (ref 2.7–4.5)
PLATELET # BLD AUTO: 122 K/UL (ref 150–450)
PMV BLD AUTO: 10 FL (ref 9.2–12.9)
POTASSIUM SERPL-SCNC: 3.4 MMOL/L (ref 3.5–5.1)
PROT SERPL-MCNC: 4.4 G/DL (ref 6–8.4)
RBC # BLD AUTO: 2.96 M/UL (ref 4.6–6.2)
SODIUM SERPL-SCNC: 139 MMOL/L (ref 136–145)
WBC # BLD AUTO: 16.74 K/UL (ref 3.9–12.7)

## 2022-10-18 PROCEDURE — 25000003 PHARM REV CODE 250: Performed by: STUDENT IN AN ORGANIZED HEALTH CARE EDUCATION/TRAINING PROGRAM

## 2022-10-18 PROCEDURE — 99231 SBSQ HOSP IP/OBS SF/LOW 25: CPT | Mod: GC,,, | Performed by: HOSPITALIST

## 2022-10-18 PROCEDURE — 36415 COLL VENOUS BLD VENIPUNCTURE: CPT | Performed by: STUDENT IN AN ORGANIZED HEALTH CARE EDUCATION/TRAINING PROGRAM

## 2022-10-18 PROCEDURE — 92526 ORAL FUNCTION THERAPY: CPT

## 2022-10-18 PROCEDURE — C9113 INJ PANTOPRAZOLE SODIUM, VIA: HCPCS | Performed by: STUDENT IN AN ORGANIZED HEALTH CARE EDUCATION/TRAINING PROGRAM

## 2022-10-18 PROCEDURE — 51798 US URINE CAPACITY MEASURE: CPT

## 2022-10-18 PROCEDURE — 83735 ASSAY OF MAGNESIUM: CPT | Performed by: STUDENT IN AN ORGANIZED HEALTH CARE EDUCATION/TRAINING PROGRAM

## 2022-10-18 PROCEDURE — 85025 COMPLETE CBC W/AUTO DIFF WBC: CPT | Performed by: STUDENT IN AN ORGANIZED HEALTH CARE EDUCATION/TRAINING PROGRAM

## 2022-10-18 PROCEDURE — 27000207 HC ISOLATION

## 2022-10-18 PROCEDURE — 63600175 PHARM REV CODE 636 W HCPCS

## 2022-10-18 PROCEDURE — 63600175 PHARM REV CODE 636 W HCPCS: Performed by: STUDENT IN AN ORGANIZED HEALTH CARE EDUCATION/TRAINING PROGRAM

## 2022-10-18 PROCEDURE — 80053 COMPREHEN METABOLIC PANEL: CPT | Performed by: STUDENT IN AN ORGANIZED HEALTH CARE EDUCATION/TRAINING PROGRAM

## 2022-10-18 PROCEDURE — 84100 ASSAY OF PHOSPHORUS: CPT | Performed by: STUDENT IN AN ORGANIZED HEALTH CARE EDUCATION/TRAINING PROGRAM

## 2022-10-18 PROCEDURE — 99231 PR SUBSEQUENT HOSPITAL CARE,LEVL I: ICD-10-PCS | Mod: GC,,, | Performed by: HOSPITALIST

## 2022-10-18 PROCEDURE — 51701 INSERT BLADDER CATHETER: CPT

## 2022-10-18 PROCEDURE — 20600001 HC STEP DOWN PRIVATE ROOM

## 2022-10-18 PROCEDURE — 25000003 PHARM REV CODE 250: Performed by: HOSPITALIST

## 2022-10-18 RX ORDER — ALLOPURINOL 100 MG/1
100 TABLET ORAL DAILY
Status: DISCONTINUED | OUTPATIENT
Start: 2022-10-19 | End: 2022-10-27

## 2022-10-18 RX ADMIN — VANCOMYCIN HYDROCHLORIDE 125 MG: KIT at 05:10

## 2022-10-18 RX ADMIN — SODIUM CHLORIDE, SODIUM LACTATE, POTASSIUM CHLORIDE, AND CALCIUM CHLORIDE 500 ML: .6; .31; .03; .02 INJECTION, SOLUTION INTRAVENOUS at 08:10

## 2022-10-18 RX ADMIN — CEFTRIAXONE 2 G: 2 INJECTION, POWDER, FOR SOLUTION INTRAMUSCULAR; INTRAVENOUS at 02:10

## 2022-10-18 RX ADMIN — RIFAXIMIN 550 MG: 550 TABLET ORAL at 08:10

## 2022-10-18 RX ADMIN — ALLOPURINOL 100 MG: 100 TABLET ORAL at 08:10

## 2022-10-18 RX ADMIN — VANCOMYCIN HYDROCHLORIDE 125 MG: KIT at 11:10

## 2022-10-18 RX ADMIN — ESCITALOPRAM OXALATE 10 MG: 10 TABLET ORAL at 08:10

## 2022-10-18 RX ADMIN — PANTOPRAZOLE SODIUM 40 MG: 40 INJECTION, POWDER, FOR SOLUTION INTRAVENOUS at 08:10

## 2022-10-18 RX ADMIN — VANCOMYCIN HYDROCHLORIDE 125 MG: KIT at 12:10

## 2022-10-18 RX ADMIN — ATORVASTATIN CALCIUM 40 MG: 40 TABLET, FILM COATED ORAL at 08:10

## 2022-10-18 RX ADMIN — FOLIC ACID 1 MG: 1 TABLET ORAL at 08:10

## 2022-10-18 NOTE — PLAN OF CARE
Patient A&O to self only, VSS, on RA, on tele (paced rhythm).  Patient on full liquid diet, pills crushed in applesauce.  On PO vancomycin and IV ceftriaxone.  On c-diff special precautions.  Patient with word salad at times, easily confused but able to redirect.  Plan of care discussed with patient, unable to verbalize understanding.  Will continue to monitor.    Problem: Adult Inpatient Plan of Care  Goal: Plan of Care Review  Outcome: Ongoing, Progressing  Goal: Patient-Specific Goal (Individualized)  Outcome: Ongoing, Progressing  Goal: Absence of Hospital-Acquired Illness or Injury  Outcome: Ongoing, Progressing  Goal: Optimal Comfort and Wellbeing  Outcome: Ongoing, Progressing  Goal: Readiness for Transition of Care  Outcome: Ongoing, Progressing     Problem: Fall Injury Risk  Goal: Absence of Fall and Fall-Related Injury  Outcome: Ongoing, Progressing     Problem: Skin Injury Risk Increased  Goal: Skin Health and Integrity  Outcome: Ongoing, Progressing     Problem: Diabetes Comorbidity  Goal: Blood Glucose Level Within Targeted Range  Outcome: Ongoing, Progressing     Problem: Infection  Goal: Absence of Infection Signs and Symptoms  Outcome: Ongoing, Progressing     Problem: Impaired Wound Healing  Goal: Optimal Wound Healing  Outcome: Ongoing, Progressing

## 2022-10-18 NOTE — PT/OT/SLP PROGRESS
"Speech Language Pathology Treatment    Patient Name:  Jacques Arellano   MRN:  [id number]  Admitting Diagnosis: <principal problem not specified>    Recommendations:                 General Recommendations:  Dysphagia therapy  Diet recommendations: Liquid Diet Level: Full liquids, Thin   Aspiration Precautions: 1 bite/sip at a time, Avoid talking while eating, Eliminate distractions, Feed only when awake/alert, Frequent oral care, HOB to 90 degrees, Meds crushed in puree, Monitor for s/s of aspiration, and Strict aspiration precautions   General Precautions: Standard, aspiration, fall  Communication strategies:  none    Subjective     RN cleared pt prior to PO trials. Pt found sleeping.  "Whatever I want." -re to "What do you eat at home?"    Pain/Comfort:  Pain Rating 1: 0/10  Pain Rating Post-Intervention 1: 0/10    Respiratory Status: Room air    Objective:     Has the patient been evaluated by SLP for swallowing?   Yes  Keep patient NPO? No   Pt found sleeping in bed and aroused with moderate verbal cues. Pt benefited from constant verbal cues to remain alert and participate in therapy. Voice clear prior to PO trials. Pt seen with 4oz of thin liquid (cup sips and straw sips) and tsps of puree x2. Impulsive drinking and coughing observed during cups sips. No coughing or s/s of airway compromise observed when utilizing controlled straw (i.e. pinching straw sips). Belching noted across trials. Pt with poor attention to bolus during puree trials. Continue full liquid diet at this time. SLP provided education on aspiration precautions, however pt with poor understanding. MD in room upon SLP exit.     Assessment:     Jacques Arellano is a 82 y.o. male with an SLP diagnosis of Dysphagia.      Goals:   Multidisciplinary Problems       SLP Goals          Problem: SLP    Goal Priority Disciplines Outcome   SLP Goal     SLP    Description: Speech Language Pathology Goals  Goals expected to be met by 10/23:  1.  Pt will " tolerate least restrictive diet without s/s of aspiration.                        Plan:     Patient to be seen:  4 x/week   Plan of Care expires:  11/16/22  Plan of Care reviewed with:  patient   SLP Follow-Up:  Yes       Discharge recommendations:  other (see comments) (TBD)     Time Tracking:     SLP Treatment Date:   10/18/22  Speech Start Time:  1106  Speech Stop Time:  1113     Speech Total Time (min):  7 min    Billable Minutes: Treatment Swallowing Dysfunction 7    10/18/2022

## 2022-10-18 NOTE — PLAN OF CARE
POC reviewed with pt, VSS, On contact for CDIFF, Frequent dark red bloody stools, Incontinent of B/B, Alert to person - confused, Multiple skin tears to BUE, BUE swollen and weeping, Jazzmine area red and pink, bruises and scratches to BLE, Total asst, Full liquid diet, Takes meds crushed in applesauce, paracentesis 10/17 - 2400cc removed, DNR, No issues overnight WCTM.    Problem: Adult Inpatient Plan of Care  Goal: Plan of Care Review  Outcome: Ongoing, Progressing  Goal: Patient-Specific Goal (Individualized)  Outcome: Ongoing, Progressing  Goal: Absence of Hospital-Acquired Illness or Injury  Outcome: Ongoing, Progressing  Goal: Optimal Comfort and Wellbeing  Outcome: Ongoing, Progressing  Goal: Readiness for Transition of Care  Outcome: Ongoing, Progressing     Problem: Fall Injury Risk  Goal: Absence of Fall and Fall-Related Injury  Outcome: Ongoing, Progressing     Problem: Skin Injury Risk Increased  Goal: Skin Health and Integrity  Outcome: Ongoing, Progressing     Problem: Diabetes Comorbidity  Goal: Blood Glucose Level Within Targeted Range  Outcome: Ongoing, Progressing     Problem: Infection  Goal: Absence of Infection Signs and Symptoms  Outcome: Ongoing, Progressing     Problem: Impaired Wound Healing  Goal: Optimal Wound Healing  Outcome: Ongoing, Progressing

## 2022-10-18 NOTE — PROGRESS NOTES
[facility]  Progress Note    Patient Name: Jacques Arellano  MRN: [id number]  Patient Class: IP- Inpatient   Admission Date: 10/14/2022  Length of Stay: 4 days  Attending Physician: Chana Andersen*  Primary Care Provider: Matty Garsia MD        Subjective:     Principal Problem:<principal problem not specified>        HPI:  83 yo M with PMhx of CHF, AFib, Watchman, pacer, expansile brain lesion, pacemaker presents to from [facility] for diarrhea, copious black stool and acute altered mental status. Pt reports he has 3-5 profuse watery BM for the last few days noting a little bit of blood. Pt was recently admitted to [facility] ED on 10/12 for abdominal pain and coffee ground emesis, EGD revealed grade D esophagitis and gastritis with no overt GI bleeding and was subsequently discharged with Protonix. Pt was recently admitted to [facility] ICU on 9/23 for sepsis thought to be secondary to E coli. UTI vs cirrhosis and was treated with antibiotic and pressors . Inpatient workup revealed new dx of cirrhosis with ascites no spb, aortic aneurysm, and expansile Calvarial lesion, pt was DC on 10/13 back to [facility].      Today pt H & H stable. CTA abd shows colitis, cirrhosis/ascites, and new potential HCC. BNP elevated at 544 however XCR and PE unconcerning for CHF exacerbation. Mentation improved while in ED - CT head stable.       Overview/Hospital Course:  CTA abd shows colitis, cirrhosis/ascites, and new potential HCC. BNP elevated at 544 however XCR and PE unconcerning for CHF exacerbation. Mentation improved while in ED - CT head stable. 10/15: Small bloody stools overnight with bright red blood per nurse. Pt has not urinated overnight, Bladder scan with only 180cc. HDS. WBC down trending. Stool studies pending. GI planning to scope on 10/17. H&H slowly down trending but stable. Hepatology following with recommendation for paracentesis and SBP rule out.          Interval History: Pt had 2 BM's overnight. S/p para of 2.4 with IR on 10/17. Cytology pending. On PO vanc for C.diff. Starting rifaximin. Cr slightly trending upwards, likely prerenal. Echo shows EF of 45-50%. Will give gentle fluids today.     Review of Systems   Unable to perform ROS: Mental status change   Objective:     Vital Signs (Most Recent):  Temp: 97.9 °F (36.6 °C) (10/18/22 0549)  Pulse: 80 (10/18/22 0549)  Resp: 18 (10/18/22 0549)  BP: 114/63 (10/18/22 0549)  SpO2: (!) 94 % (10/18/22 0549)   Vital Signs (24h Range):  Temp:  [97.2 °F (36.2 °C)-98.4 °F (36.9 °C)] 97.9 °F (36.6 °C)  Pulse:  [62-80] 80  Resp:  [18-20] 18  SpO2:  [94 %-100 %] 94 %  BP: (112-118)/(56-67) 114/63     Weight: 83.5 kg (184 lb)  Body mass index is 26.4 kg/m².    Intake/Output Summary (Last 24 hours) at 10/18/2022 0744  Last data filed at 10/18/2022 0009  Gross per 24 hour   Intake 140 ml   Output 2805 ml   Net -2665 ml        Physical Exam  Constitutional:       Appearance: He is ill-appearing.   HENT:      Head:      Comments: Subcutaneous lesion hard to palpation located on right frontal skull.   Eyes:      General: No scleral icterus.     Conjunctiva/sclera: Conjunctivae normal.   Cardiovascular:      Rate and Rhythm: Normal rate and regular rhythm.      Pulses: Normal pulses.      Heart sounds: Normal heart sounds.   Pulmonary:      Effort: Pulmonary effort is normal. No respiratory distress.      Breath sounds: Normal breath sounds.   Chest:      Comments: Pacemaker in place in right upper chest wall.   Abdominal:      General: Bowel sounds are normal. There is distension.      Palpations: Abdomen is soft.      Tenderness: There is no abdominal tenderness.   Musculoskeletal:      Right lower leg: Edema present.      Left lower leg: Edema present.   Skin:     General: Skin is warm and dry.      Findings: No bruising or rash.   Neurological:      GCS: GCS eye subscore is 4. GCS verbal subscore is 3. GCS motor subscore is  5.       Significant Labs: All pertinent labs within the past 24 hours have been reviewed.  A1C:   Recent Labs   Lab 07/19/22  1149   HGBA1C 6.1*       ABGs:   Recent Labs   Lab 10/16/22  0825   PH 7.401   PCO2 25.5*   HCO3 15.8*   POCSATURATED 41*   BE -9   PO2 23*       Bilirubin:   Recent Labs   Lab 10/14/22  1440 10/15/22  0555 10/16/22  0610 10/17/22  0541 10/18/22  0458   BILITOT 1.7* 1.5* 1.5* 1.3* 0.8       Blood Culture:   No results for input(s): LABBLOO in the last 48 hours.    BMP:   Recent Labs   Lab 10/18/22  0458   *      K 3.4*   *   CO2 14*   BUN 31*   CREATININE 1.6*   CALCIUM 8.3*   MG 1.6       CBC:   Recent Labs   Lab 10/17/22  0541 10/18/22  0458   WBC 16.39* 16.74*   HGB 9.1* 9.1*   HCT 28.6* 28.3*    122*       CMP:   Recent Labs   Lab 10/17/22  0541 10/18/22  0458    139   K 3.9 3.4*    115*   CO2 14* 14*   * 140*   BUN 29* 31*   CREATININE 1.5* 1.6*   CALCIUM 8.5* 8.3*   PROT 4.6* 4.4*   ALBUMIN 2.1* 1.9*   BILITOT 1.3* 0.8   ALKPHOS 148* 119   AST 19 18   ALT 15 14   ANIONGAP 13 10       Cardiac Markers:   No results for input(s): CKMB, MYOGLOBIN, BNP, TROPISTAT in the last 48 hours.    Coagulation:   No results for input(s): PT, INR, APTT in the last 48 hours.    Lactic Acid:   Recent Labs   Lab 10/16/22  0822   LACTATE 2.9*       Lipase:   No results for input(s): LIPASE in the last 48 hours.    Lipid Panel: No results for input(s): CHOL, HDL, LDLCALC, TRIG, CHOLHDL in the last 48 hours.  Magnesium:   Recent Labs   Lab 10/17/22  0541 10/18/22  0458   MG 1.7 1.6       Pathology Results  (Last 10 years)      None          POCT Glucose:   Recent Labs   Lab 10/16/22  1739   POCTGLUCOSE 192*       Prealbumin: No results for input(s): PREALBUMIN in the last 48 hours.  Respiratory Culture: No results for input(s): GSRESP, RESPIRATORYC in the last 48 hours.  Troponin:   No results for input(s): TROPONINI, TROPONINIHS in the last 48 hours.    TSH:    Recent Labs   Lab 07/19/22  1149   TSH 0.555       Urine Culture: No results for input(s): LABURIN in the last 48 hours.  Urine Studies:   No results for input(s): COLORU, APPEARANCEUA, PHUR, SPECGRAV, PROTEINUA, GLUCUA, KETONESU, BILIRUBINUA, OCCULTUA, NITRITE, UROBILINOGEN, LEUKOCYTESUR, RBCUA, WBCUA, BACTERIA, SQUAMEPITHEL, HYALINECASTS in the last 48 hours.    Invalid input(s): WRIGHTSUR      Significant Imaging: I have reviewed all pertinent imaging results/findings within the past 24 hours.      Assessment/Plan:      Diarrhea  Pt endorses profuse watery diarrhea for the past few days. Concerning for possible C diff with recent aggressive antibiotic regimen     Plan:  - Stool studies pending  - C diff positive; starting Po Vancomycin   - Ceftriaxone for sbp coverage   - Will add flagyl if worsening   - Per Hepatology, added Rifaximin BID.    GI bleed  Pt endorses melena. CT scan with no evidence of contrast extravasation to suggest active GI bleed at this time. Stable 3 cm enhancing lesion at the hepatic dome with evidence of liver cirrhosis, circumferential wall thickening of the rectum and sigmoid colon concern for colitis, and pancreatic hypodensities measuring up to 2.0 cm. Considering liver disease, suspicion for ruptured esophogeal varcies. Recent EGD at [facility] reviewed, will discuss with GI    Plan:  - CBC q12, trend H&H  - GI planning for endoscopy & sigmoidoscopy deferred due to C.diff and stable HH  - PPI 40mg BID   - CTX for sbp ppx    Other cirrhosis of liver  9/23: Ct A/P:Cirrhotic morphology of the liver with sequelae of portal hypertension as evidence by splenomegaly, intra-abdominal ascites, and possible portal hypertensive enteropathy. Pt denies hx of alcohol abuse.  R preformed diagnostic paracentetics on 9/27 to r/o SBP: which was negative 130 WBC and 10%     Plan:   - Daily CMP   - Hepatology following; paracentesis with IR with 2.4L with cytology collected  - Started on Rifaximin BID  per hepatology recommendations  - Cytology from para collected      Chronic gout  Plan:  Continue home allopurinol.        Type 2 diabetes mellitus, without long-term current use of insulin  Last A1c 6.1 3 months ago. Hypoglycemic on admission.      Plan:  - No home medications  Noted    - POCT glucose ACHS   - Low threshold to start LDSSI     Mixed hyperlipidemia  Plan:  - Continue home statin        Hypertension  Home medications: triamterene-HCTZ, Lasix     Plan:  - Hold home medications s/o hypotension     Diastolic heart failure  9/24 ANNE: The left ventricle is normal in size with low normal systolic function. The estimated ejection fraction is 50%.There is abnormal septal wall motion consistent with right ventricular pacing. Mild right ventricular enlargement with mildly reduced right ventricular systolic function. BNP:544    Plan:  - Low threshold for Lasix as CXR/lung sounds clear while saturating well on RA.   - 20 mg today in setting of tachypnea and pulm congestion         Permanent atrial fibrillation  Patient with documented history of permanent Afib s/p watchman, no longer on AC or rate controlling agents.  [id number]   HAS-BLED 4      Plan:  - Patient rate controlled, no indication for further agents at this time  - No full AC required s/p Watchman; will hold AC ppx and APT s/o active bleeding from abrasion sites  - Cardiac telemetry  - Maintain K > 4, Mg > 2, Ca wnl; replete PRN   - STAT cardiology consult if hemodynamic instability for DCCV evaluation      VTE Risk Mitigation (From admission, onward)         Ordered     IP VTE HIGH RISK PATIENT  Once         10/14/22 1826     Place sequential compression device  Until discontinued         10/14/22 1826                Discharge Planning   ELMER: 10/20/2022     Code Status: DNR   Is the patient medically ready for discharge?: No    Reason for patient still in hospital (select all that apply): Patient trending condition  Discharge Plan A: Return to  nursing home   Discharge Delays: None known at this time              Starr Hodges DO  Department of Hospital Medicine   [facility]

## 2022-10-18 NOTE — PLAN OF CARE
[facility] - [facility]  Discharge Reassessment    Primary Care Provider: Matty Garsia MD    Expected Discharge Date: 10/20/2022    Reassessment (most recent)       Discharge Reassessment - 10/18/22 1047          Discharge Reassessment    Assessment Type Discharge Planning Reassessment     Did the patient's condition or plan change since previous assessment? Yes     Discharge Plan discussed with: Patient     Communicated ELMER with patient/caregiver Yes     Discharge Plan A Return to nursing home     DME Needed Upon Discharge  none     Discharge Barriers Identified None     Why the patient remains in the hospital Requires continued medical care        Post-Acute Status    Post-Acute Authorization Placement     Post-Acute Placement Status Set-up Complete/Auth obtained     Coverage Medicare     Hospital Resources/Appts/Education Provided Provided patient/caregiver with written discharge plan information     Discharge Delays None known at this time                         Pt not ready for discharge due to: Not medically ready  SW will remain available for families in [facility].  Currently pt has d/c plans in progress at this time.    [facility]- [phone] willing to take pt back into care at d/c.    Apoorva Botello LCSW  Case Management/[facility]  [phone]

## 2022-10-18 NOTE — TREATMENT PLAN
Hepatology Treatment Plan    Jacques Arellano is a 82 y.o. male admitted to hospital 10/14/2022 (Hospital Day: 5) due to <principal problem not specified>.     Interval History  Paracentesis completed yesterday with 2.4 L of fluid removed with SAAG of 1.6 and total protein <1; studies negative for SBP. Patient remains alert, but disoriented on bedside assessment. Vital signs normal on room air. Labs notable for on-going leukocytosis (16), stable anemia (Hgb 9.1 from 9.1 yesterday without PRBC required this admission), and new TRENT (Cr 1.6).     Objective  Temp:  [97.9 °F (36.6 °C)-98.4 °F (36.9 °C)] 97.9 °F (36.6 °C) (10/18 0809)  Pulse:  [62-80] 75 (10/18 1125)  BP: (102-116)/(56-66) 102/57 (10/18 0809)  Resp:  [18] 18 (10/18 0809)  SpO2:  [94 %-98 %] 97 % (10/18 0809)    General: Alert, disoriented, no distress  Neurologic: Asterixis minimal  Abdomen: Hypoactive bowel sounds. Non-distended. Normal tympany. Soft. Non-tender. No peritoneal signs.    Laboratory    Lab Results   Component Value Date    WBC 16.74 (H) 10/18/2022    HGB 9.1 (L) 10/18/2022    HCT 28.3 (L) 10/18/2022    MCV 96 10/18/2022     (L) 10/18/2022       Lab Results   Component Value Date     10/18/2022    K 3.4 (L) 10/18/2022     (H) 10/18/2022    CO2 14 (L) 10/18/2022    BUN 31 (H) 10/18/2022    CREATININE 1.6 (H) 10/18/2022    CALCIUM 8.3 (L) 10/18/2022       Lab Results   Component Value Date    ALBUMIN 1.9 (L) 10/18/2022    ALT 14 10/18/2022    AST 18 10/18/2022    ALKPHOS 119 10/18/2022    BILITOT 0.8 10/18/2022       Lab Results   Component Value Date    INR 1.4 (H) 10/14/2022    INR 1.5 (H) 09/23/2022       MELD-Na score: 13 at 10/16/2022  6:10 AM  MELD score: 13 at 10/16/2022  6:10 AM  Calculated from:  Serum Creatinine: 1.2 mg/dL at 10/16/2022  6:10 AM  Serum Sodium: 137 mmol/L at 10/16/2022  6:10 AM  Total Bilirubin: 1.5 mg/dL at 10/16/2022  6:10 AM  INR(ratio): 1.4 at 10/14/2022  2:40 PM  Age: 82  years    Assessment  This is an 82 year old male with a PMH significant for atrial fibrillation (status post placement of pacemaker in 2019 and Watchman device in 03/2022), diverticulosis, HFpEF, HTN, TIA (04/2022), and newly diagnosed cirrhosis and calvarial lesion involving the right frontal bone in 09/2022 during admission for septic shock from UTI (CT C/A/P showing evidence of cirrhotic liver morphology with sequela of portal hypertension with splenomegaly and ascites; diagnostic paracentesis showing a SAAG of 1.7 with total fluid protein of <1) who presented from [facility] on 10/14/2022 with decompensated cirrhosis in the setting of encephalopathy associated with GI bleeding (hematochezia) and C.diff. Presentation here notable for CTA A/P incidentally showing  3 cm enhancing lesion of the hepatic dome concerning for HCC with cirrhotic liver morphology. Hepatology consulted for further work-up of cirrhosis and hepatic lesion. Auto-immune serologies and screening for hepatitis negative. Repeat paracentesis here negative for SBP and consistent with uncomplicated ascites in the setting of cirrhosis.      Recommendations:    -Etiology of underlying cirrhosis remains unclear with inability to obtain history from patient in the setting of encephalopathy.   -Okay to hold off on Lactulose with diarrhea associated with C.diff, but recommend adding Rifaximin BID.   -Follow-up GI recommendations.   -Continued work-up of TRENT.   -CMP and INR daily.   -For further evaluation of HCC, it would be helpful to obtain a MRI A/P for further evaluation of liver lesion, however this may not be possible with pacemaker in place. His cranial lesion could be a metastatic lesion of HCC and biopsy should be considered once encephalopathy improves. Regardless, based on age and frailty, patient would not be a candidate for transplant or systemic therapy options. Recommend evaluation by palliative care team.     Thank you for involving us in the  care of Jacques Arellano. Please call with additional concerns or questions.        Surjit Pink MD, PGY-V  Gastroenterology Fellow  [facility]

## 2022-10-18 NOTE — SUBJECTIVE & OBJECTIVE
Interval History: Pt had 2 BM's overnight. S/p para of 2.4 with IR on 10/17. Cytology pending. On PO vanc for C.diff. Starting rifaximin. Cr slightly trending upwards, likely prerenal. Echo shows EF of 45-50%. Will give gentle fluids today.     Review of Systems   Unable to perform ROS: Mental status change   Objective:     Vital Signs (Most Recent):  Temp: 97.9 °F (36.6 °C) (10/18/22 0549)  Pulse: 80 (10/18/22 0549)  Resp: 18 (10/18/22 0549)  BP: 114/63 (10/18/22 0549)  SpO2: (!) 94 % (10/18/22 0549)   Vital Signs (24h Range):  Temp:  [97.2 °F (36.2 °C)-98.4 °F (36.9 °C)] 97.9 °F (36.6 °C)  Pulse:  [62-80] 80  Resp:  [18-20] 18  SpO2:  [94 %-100 %] 94 %  BP: (112-118)/(56-67) 114/63     Weight: 83.5 kg (184 lb)  Body mass index is 26.4 kg/m².    Intake/Output Summary (Last 24 hours) at 10/18/2022 0744  Last data filed at 10/18/2022 0009  Gross per 24 hour   Intake 140 ml   Output 2805 ml   Net -2665 ml        Physical Exam  Constitutional:       Appearance: He is ill-appearing.   HENT:      Head:      Comments: Subcutaneous lesion hard to palpation located on right frontal skull.   Eyes:      General: No scleral icterus.     Conjunctiva/sclera: Conjunctivae normal.   Cardiovascular:      Rate and Rhythm: Normal rate and regular rhythm.      Pulses: Normal pulses.      Heart sounds: Normal heart sounds.   Pulmonary:      Effort: Pulmonary effort is normal. No respiratory distress.      Breath sounds: Normal breath sounds.   Chest:      Comments: Pacemaker in place in right upper chest wall.   Abdominal:      General: Bowel sounds are normal. There is distension.      Palpations: Abdomen is soft.      Tenderness: There is no abdominal tenderness.   Musculoskeletal:      Right lower leg: Edema present.      Left lower leg: Edema present.   Skin:     General: Skin is warm and dry.      Findings: No bruising or rash.   Neurological:      GCS: GCS eye subscore is 4. GCS verbal subscore is 3. GCS motor subscore is 5.        Significant Labs: All pertinent labs within the past 24 hours have been reviewed.  A1C:   Recent Labs   Lab 07/19/22  1149   HGBA1C 6.1*       ABGs:   Recent Labs   Lab 10/16/22  0825   PH 7.401   PCO2 25.5*   HCO3 15.8*   POCSATURATED 41*   BE -9   PO2 23*       Bilirubin:   Recent Labs   Lab 10/14/22  1440 10/15/22  0555 10/16/22  0610 10/17/22  0541 10/18/22  0458   BILITOT 1.7* 1.5* 1.5* 1.3* 0.8       Blood Culture:   No results for input(s): LABBLOO in the last 48 hours.    BMP:   Recent Labs   Lab 10/18/22  0458   *      K 3.4*   *   CO2 14*   BUN 31*   CREATININE 1.6*   CALCIUM 8.3*   MG 1.6       CBC:   Recent Labs   Lab 10/17/22  0541 10/18/22  0458   WBC 16.39* 16.74*   HGB 9.1* 9.1*   HCT 28.6* 28.3*    122*       CMP:   Recent Labs   Lab 10/17/22  0541 10/18/22  0458    139   K 3.9 3.4*    115*   CO2 14* 14*   * 140*   BUN 29* 31*   CREATININE 1.5* 1.6*   CALCIUM 8.5* 8.3*   PROT 4.6* 4.4*   ALBUMIN 2.1* 1.9*   BILITOT 1.3* 0.8   ALKPHOS 148* 119   AST 19 18   ALT 15 14   ANIONGAP 13 10       Cardiac Markers:   No results for input(s): CKMB, MYOGLOBIN, BNP, TROPISTAT in the last 48 hours.    Coagulation:   No results for input(s): PT, INR, APTT in the last 48 hours.    Lactic Acid:   Recent Labs   Lab 10/16/22  0822   LACTATE 2.9*       Lipase:   No results for input(s): LIPASE in the last 48 hours.    Lipid Panel: No results for input(s): CHOL, HDL, LDLCALC, TRIG, CHOLHDL in the last 48 hours.  Magnesium:   Recent Labs   Lab 10/17/22  0541 10/18/22  0458   MG 1.7 1.6       Pathology Results  (Last 10 years)      None          POCT Glucose:   Recent Labs   Lab 10/16/22  1739   POCTGLUCOSE 192*       Prealbumin: No results for input(s): PREALBUMIN in the last 48 hours.  Respiratory Culture: No results for input(s): GSRESP, RESPIRATORYC in the last 48 hours.  Troponin:   No results for input(s): TROPONINI, TROPONINIHS in the last 48 hours.    TSH:    Recent Labs   Lab 07/19/22  1149   TSH 0.555       Urine Culture: No results for input(s): LABURIN in the last 48 hours.  Urine Studies:   No results for input(s): COLORU, APPEARANCEUA, PHUR, SPECGRAV, PROTEINUA, GLUCUA, KETONESU, BILIRUBINUA, OCCULTUA, NITRITE, UROBILINOGEN, LEUKOCYTESUR, RBCUA, WBCUA, BACTERIA, SQUAMEPITHEL, HYALINECASTS in the last 48 hours.    Invalid input(s): ROMERO      Significant Imaging: I have reviewed all pertinent imaging results/findings within the past 24 hours.

## 2022-10-19 LAB
ALBUMIN SERPL BCP-MCNC: 1.9 G/DL (ref 3.5–5.2)
ALP SERPL-CCNC: 166 U/L (ref 55–135)
ALT SERPL W/O P-5'-P-CCNC: 15 U/L (ref 10–44)
ANION GAP SERPL CALC-SCNC: 10 MMOL/L (ref 8–16)
AST SERPL-CCNC: 30 U/L (ref 10–40)
BACTERIA BLD CULT: NORMAL
BACTERIA BLD CULT: NORMAL
BASOPHILS # BLD AUTO: 0.04 K/UL (ref 0–0.2)
BASOPHILS NFR BLD: 0.2 % (ref 0–1.9)
BILIRUB SERPL-MCNC: 0.8 MG/DL (ref 0.1–1)
BUN SERPL-MCNC: 31 MG/DL (ref 8–23)
CALCIUM SERPL-MCNC: 8.2 MG/DL (ref 8.7–10.5)
CHLORIDE SERPL-SCNC: 115 MMOL/L (ref 95–110)
CO2 SERPL-SCNC: 16 MMOL/L (ref 23–29)
CREAT SERPL-MCNC: 1.6 MG/DL (ref 0.5–1.4)
DIFFERENTIAL METHOD: ABNORMAL
EOSINOPHIL # BLD AUTO: 0.1 K/UL (ref 0–0.5)
EOSINOPHIL NFR BLD: 0.5 % (ref 0–8)
ERYTHROCYTE [DISTWIDTH] IN BLOOD BY AUTOMATED COUNT: 17.1 % (ref 11.5–14.5)
EST. GFR  (NO RACE VARIABLE): 42.5 ML/MIN/1.73 M^2
FINAL PATHOLOGIC DIAGNOSIS: NORMAL
GLUCOSE SERPL-MCNC: 142 MG/DL (ref 70–110)
HCT VFR BLD AUTO: 27.7 % (ref 40–54)
HGB BLD-MCNC: 9.4 G/DL (ref 14–18)
IMM GRANULOCYTES # BLD AUTO: 0.53 K/UL (ref 0–0.04)
IMM GRANULOCYTES NFR BLD AUTO: 3 % (ref 0–0.5)
LYMPHOCYTES # BLD AUTO: 1 K/UL (ref 1–4.8)
LYMPHOCYTES NFR BLD: 5.5 % (ref 18–48)
Lab: NORMAL
MAGNESIUM SERPL-MCNC: 1.8 MG/DL (ref 1.6–2.6)
MCH RBC QN AUTO: 31.2 PG (ref 27–31)
MCHC RBC AUTO-ENTMCNC: 33.9 G/DL (ref 32–36)
MCV RBC AUTO: 92 FL (ref 82–98)
MONOCYTES # BLD AUTO: 1.1 K/UL (ref 0.3–1)
MONOCYTES NFR BLD: 6.3 % (ref 4–15)
NEUTROPHILS # BLD AUTO: 15 K/UL (ref 1.8–7.7)
NEUTROPHILS NFR BLD: 84.5 % (ref 38–73)
NRBC BLD-RTO: 0 /100 WBC
PHOSPHATE SERPL-MCNC: 3.1 MG/DL (ref 2.7–4.5)
PLATELET # BLD AUTO: 140 K/UL (ref 150–450)
PMV BLD AUTO: 10.3 FL (ref 9.2–12.9)
POTASSIUM SERPL-SCNC: 4.3 MMOL/L (ref 3.5–5.1)
PROT SERPL-MCNC: 4.5 G/DL (ref 6–8.4)
RBC # BLD AUTO: 3.01 M/UL (ref 4.6–6.2)
SODIUM SERPL-SCNC: 141 MMOL/L (ref 136–145)
WBC # BLD AUTO: 17.69 K/UL (ref 3.9–12.7)

## 2022-10-19 PROCEDURE — 80053 COMPREHEN METABOLIC PANEL: CPT | Performed by: STUDENT IN AN ORGANIZED HEALTH CARE EDUCATION/TRAINING PROGRAM

## 2022-10-19 PROCEDURE — 99232 SBSQ HOSP IP/OBS MODERATE 35: CPT | Mod: GC,,, | Performed by: HOSPITALIST

## 2022-10-19 PROCEDURE — 51798 US URINE CAPACITY MEASURE: CPT

## 2022-10-19 PROCEDURE — 85025 COMPLETE CBC W/AUTO DIFF WBC: CPT | Performed by: STUDENT IN AN ORGANIZED HEALTH CARE EDUCATION/TRAINING PROGRAM

## 2022-10-19 PROCEDURE — 99232 PR SUBSEQUENT HOSPITAL CARE,LEVL II: ICD-10-PCS | Mod: GC,,, | Performed by: HOSPITALIST

## 2022-10-19 PROCEDURE — C9113 INJ PANTOPRAZOLE SODIUM, VIA: HCPCS | Performed by: STUDENT IN AN ORGANIZED HEALTH CARE EDUCATION/TRAINING PROGRAM

## 2022-10-19 PROCEDURE — 25000003 PHARM REV CODE 250: Performed by: STUDENT IN AN ORGANIZED HEALTH CARE EDUCATION/TRAINING PROGRAM

## 2022-10-19 PROCEDURE — 63600175 PHARM REV CODE 636 W HCPCS: Performed by: STUDENT IN AN ORGANIZED HEALTH CARE EDUCATION/TRAINING PROGRAM

## 2022-10-19 PROCEDURE — 51701 INSERT BLADDER CATHETER: CPT

## 2022-10-19 PROCEDURE — 27000207 HC ISOLATION

## 2022-10-19 PROCEDURE — 97535 SELF CARE MNGMENT TRAINING: CPT

## 2022-10-19 PROCEDURE — 25000003 PHARM REV CODE 250: Performed by: HOSPITALIST

## 2022-10-19 PROCEDURE — 83735 ASSAY OF MAGNESIUM: CPT | Performed by: STUDENT IN AN ORGANIZED HEALTH CARE EDUCATION/TRAINING PROGRAM

## 2022-10-19 PROCEDURE — 36415 COLL VENOUS BLD VENIPUNCTURE: CPT | Performed by: STUDENT IN AN ORGANIZED HEALTH CARE EDUCATION/TRAINING PROGRAM

## 2022-10-19 PROCEDURE — 84100 ASSAY OF PHOSPHORUS: CPT | Performed by: STUDENT IN AN ORGANIZED HEALTH CARE EDUCATION/TRAINING PROGRAM

## 2022-10-19 PROCEDURE — 20600001 HC STEP DOWN PRIVATE ROOM

## 2022-10-19 PROCEDURE — 51702 INSERT TEMP BLADDER CATH: CPT

## 2022-10-19 PROCEDURE — 92526 ORAL FUNCTION THERAPY: CPT

## 2022-10-19 PROCEDURE — S0030 INJECTION, METRONIDAZOLE: HCPCS | Performed by: STUDENT IN AN ORGANIZED HEALTH CARE EDUCATION/TRAINING PROGRAM

## 2022-10-19 RX ORDER — METRONIDAZOLE 500 MG/100ML
500 INJECTION, SOLUTION INTRAVENOUS
Status: DISCONTINUED | OUTPATIENT
Start: 2022-10-19 | End: 2022-10-27

## 2022-10-19 RX ORDER — METRONIDAZOLE 500 MG/1
500 TABLET ORAL EVERY 8 HOURS
Status: DISCONTINUED | OUTPATIENT
Start: 2022-10-19 | End: 2022-10-19

## 2022-10-19 RX ORDER — PANTOPRAZOLE SODIUM 40 MG/1
40 TABLET, DELAYED RELEASE ORAL
Status: DISCONTINUED | OUTPATIENT
Start: 2022-10-19 | End: 2022-10-27

## 2022-10-19 RX ADMIN — ALLOPURINOL 100 MG: 100 TABLET ORAL at 10:10

## 2022-10-19 RX ADMIN — VANCOMYCIN HYDROCHLORIDE 125 MG: KIT at 05:10

## 2022-10-19 RX ADMIN — CEFTRIAXONE 2 G: 2 INJECTION, POWDER, FOR SOLUTION INTRAMUSCULAR; INTRAVENOUS at 01:10

## 2022-10-19 RX ADMIN — RIFAXIMIN 550 MG: 550 TABLET ORAL at 10:10

## 2022-10-19 RX ADMIN — METRONIDAZOLE 500 MG: 500 INJECTION, SOLUTION INTRAVENOUS at 08:10

## 2022-10-19 RX ADMIN — VANCOMYCIN HYDROCHLORIDE 125 MG: KIT at 12:10

## 2022-10-19 RX ADMIN — PANTOPRAZOLE SODIUM 40 MG: 40 TABLET, DELAYED RELEASE ORAL at 05:10

## 2022-10-19 RX ADMIN — FOLIC ACID 1 MG: 1 TABLET ORAL at 10:10

## 2022-10-19 RX ADMIN — PANTOPRAZOLE SODIUM 40 MG: 40 INJECTION, POWDER, FOR SOLUTION INTRAVENOUS at 10:10

## 2022-10-19 RX ADMIN — METRONIDAZOLE 500 MG: 500 INJECTION, SOLUTION INTRAVENOUS at 12:10

## 2022-10-19 RX ADMIN — ESCITALOPRAM OXALATE 10 MG: 10 TABLET ORAL at 10:10

## 2022-10-19 RX ADMIN — ATORVASTATIN CALCIUM 40 MG: 40 TABLET, FILM COATED ORAL at 10:10

## 2022-10-19 NOTE — SUBJECTIVE & OBJECTIVE
Interval History: Pt had 2 BM's overnight. S/p para of 2.4 with IR on 10/17. Cytology pending. On PO vanc for C.diff, starting Flagyl. Rifaximin added for AMS in setting of cirrhosis. TRENT with a Cr of 1.6, likely prerenal s/p paracentesis.     Review of Systems   Unable to perform ROS: Mental status change   Objective:     Vital Signs (Most Recent):  Temp: 97.9 °F (36.6 °C) (10/19/22 0732)  Pulse: (!) 54 (10/19/22 0741)  Resp: 18 (10/19/22 0732)  BP: (!) 107/57 (10/19/22 0732)  SpO2: 97 % (10/19/22 0732)   Vital Signs (24h Range):  Temp:  [97.3 °F (36.3 °C)-98 °F (36.7 °C)] 97.9 °F (36.6 °C)  Pulse:  [54-85] 54  Resp:  [16-20] 18  SpO2:  [94 %-99 %] 97 %  BP: (103-154)/(54-67) 107/57     Weight: 83.5 kg (184 lb)  Body mass index is 26.4 kg/m².    Intake/Output Summary (Last 24 hours) at 10/19/2022 0839  Last data filed at 10/19/2022 0231  Gross per 24 hour   Intake 550 ml   Output 400 ml   Net 150 ml        Physical Exam  Constitutional:       Appearance: He is ill-appearing.   HENT:      Head:      Comments: Subcutaneous lesion hard to palpation located on right frontal skull.   Eyes:      General: No scleral icterus.     Conjunctiva/sclera: Conjunctivae normal.   Cardiovascular:      Rate and Rhythm: Normal rate and regular rhythm.      Pulses: Normal pulses.      Heart sounds: Normal heart sounds.   Pulmonary:      Effort: Pulmonary effort is normal. No respiratory distress.      Breath sounds: Normal breath sounds.   Chest:      Comments: Pacemaker in place in right upper chest wall.   Abdominal:      General: Bowel sounds are normal. There is distension.      Palpations: Abdomen is soft.      Tenderness: There is no abdominal tenderness.   Musculoskeletal:      Right lower leg: Edema present.      Left lower leg: Edema present.   Skin:     General: Skin is warm and dry.      Findings: No bruising or rash.   Neurological:      GCS: GCS eye subscore is 4. GCS verbal subscore is 3. GCS motor subscore is 5.        Significant Labs: All pertinent labs within the past 24 hours have been reviewed.  A1C:   Recent Labs   Lab 07/19/22  1149   HGBA1C 6.1*       ABGs:   No results for input(s): PH, PCO2, HCO3, POCSATURATED, BE, TOTALHB, COHB, METHB, O2HB, POCFIO2, PO2 in the last 48 hours.    Bilirubin:   Recent Labs   Lab 10/15/22  0555 10/16/22  0610 10/17/22  0541 10/18/22  0458 10/19/22  0545   BILITOT 1.5* 1.5* 1.3* 0.8 0.8       Blood Culture:   No results for input(s): LABBLOO in the last 48 hours.    BMP:   Recent Labs   Lab 10/19/22  0545   *      K 4.3   *   CO2 16*   BUN 31*   CREATININE 1.6*   CALCIUM 8.2*   MG 1.8       CBC:   Recent Labs   Lab 10/18/22  0458 10/19/22  0545   WBC 16.74* 17.69*   HGB 9.1* 9.4*   HCT 28.3* 27.7*   * 140*       CMP:   Recent Labs   Lab 10/18/22  0458 10/19/22  0545    141   K 3.4* 4.3   * 115*   CO2 14* 16*   * 142*   BUN 31* 31*   CREATININE 1.6* 1.6*   CALCIUM 8.3* 8.2*   PROT 4.4* 4.5*   ALBUMIN 1.9* 1.9*   BILITOT 0.8 0.8   ALKPHOS 119 166*   AST 18 30   ALT 14 15   ANIONGAP 10 10       Cardiac Markers:   No results for input(s): CKMB, MYOGLOBIN, BNP, TROPISTAT in the last 48 hours.    Coagulation:   No results for input(s): PT, INR, APTT in the last 48 hours.    Lactic Acid:   No results for input(s): LACTATE in the last 48 hours.    Lipase:   No results for input(s): LIPASE in the last 48 hours.    Lipid Panel: No results for input(s): CHOL, HDL, LDLCALC, TRIG, CHOLHDL in the last 48 hours.  Magnesium:   Recent Labs   Lab 10/18/22  0458 10/19/22  0545   MG 1.6 1.8       Pathology Results  (Last 10 years)      None          POCT Glucose:   No results for input(s): POCTGLUCOSE in the last 48 hours.    Prealbumin: No results for input(s): PREALBUMIN in the last 48 hours.  Respiratory Culture: No results for input(s): GSRESP, RESPIRATORYC in the last 48 hours.  Troponin:   No results for input(s): TROPONINI, TROPONINIHS in the last 48  hours.    TSH:   Recent Labs   Lab 07/19/22  1149   TSH 0.555       Urine Culture: No results for input(s): LABURIN in the last 48 hours.  Urine Studies:   No results for input(s): COLORU, APPEARANCEUA, PHUR, SPECGRAV, PROTEINUA, GLUCUA, KETONESU, BILIRUBINUA, OCCULTUA, NITRITE, UROBILINOGEN, LEUKOCYTESUR, RBCUA, WBCUA, BACTERIA, SQUAMEPITHEL, HYALINECASTS in the last 48 hours.    Invalid input(s): ROMERO      Significant Imaging: I have reviewed all pertinent imaging results/findings within the past 24 hours.

## 2022-10-19 NOTE — PLAN OF CARE
Pt having bright red blood in stool. Performed bladder scan and in and out cath. Doctor aware of both. Pt remains confused, only oriented to self mostly. Safety precautions in place.      Problem: Adult Inpatient Plan of Care  Goal: Absence of Hospital-Acquired Illness or Injury  Outcome: Ongoing, Progressing  Goal: Optimal Comfort and Wellbeing  Outcome: Ongoing, Progressing     Problem: Fall Injury Risk  Goal: Absence of Fall and Fall-Related Injury  Outcome: Ongoing, Progressing     Problem: Skin Injury Risk Increased  Goal: Skin Health and Integrity  Outcome: Ongoing, Progressing     Problem: Infection  Goal: Absence of Infection Signs and Symptoms  Outcome: Ongoing, Progressing     Problem: Impaired Wound Healing  Goal: Optimal Wound Healing  Outcome: Ongoing, Progressing

## 2022-10-19 NOTE — TREATMENT PLAN
GI Treatment Plan    Jacques Arellano is a 82 y.o. male admitted to hospital 10/14/2022 (Hospital Day: 6) due to <principal problem not specified>.     Interval History  Hgb remains stable  Patient not responding to questions  Remains hemodynamically stable    Objective  Temp:  [97.3 °F (36.3 °C)-98 °F (36.7 °C)] 97.9 °F (36.6 °C) (10/19 0732)  Pulse:  [54-85] 54 (10/19 0741)  BP: (103-154)/(54-67) 107/57 (10/19 0732)  Resp:  [16-20] 18 (10/19 0732)  SpO2:  [94 %-99 %] 97 % (10/19 0732)    General: awake but not participating in interview  Abdomen: Non-distended. Normal tympany. Soft. Non-tender. No peritoneal signs.    Laboratory    Recent Labs   Lab 10/17/22  0541 10/18/22  0458 10/19/22  0545   HGB 9.1* 9.1* 9.4*         Assessment and Plan  - With stable H/H and active c diff infection, will hold off on endoscopic evaluation  - Can transition to PO PPI BID  - We are signing-off. Please call with any questions.    Thank you for involving us in the care of Jacques Arellano. Please call with any additional questions, concerns or changes in the patient's clinical status.    Rosendo Brizuela MD  Gastroenterology Fellow, PGY IV

## 2022-10-19 NOTE — ASSESSMENT & PLAN NOTE
Pt endorses profuse watery diarrhea for the past few days. Concerning for possible C diff with recent aggressive antibiotic regimen     Plan:  - Stool studies pending  - C diff positive; starting Po Vancomycin, Adding flagyl  10/19  - Ceftriaxone for sbp coverage   - Will add flagyl if worsening   - Per Hepatology, added Rifaximin BID.

## 2022-10-19 NOTE — PROGRESS NOTES
[facility] Medicine  Progress Note    Patient Name: Jacques Arellano  MRN: [id number]  Patient Class: IP- Inpatient   Admission Date: 10/14/2022  Length of Stay: 5 days  Attending Physician: Chana Andersen*  Primary Care Provider: Matty Garsia MD        Subjective:     Principal Problem:<principal problem not specified>        HPI:  81 yo M with PMhx of CHF, AFib, Watchman, pacer, expansile brain lesion, pacemaker presents to from [facility] for diarrhea, copious black stool and acute altered mental status. Pt reports he has 3-5 profuse watery BM for the last few days noting a little bit of blood. Pt was recently admitted to [facility] ED on 10/12 for abdominal pain and coffee ground emesis, EGD revealed grade D esophagitis and gastritis with no overt GI bleeding and was subsequently discharged with Protonix. Pt was recently admitted to [facility] ICU on 9/23 for sepsis thought to be secondary to E coli. UTI vs cirrhosis and was treated with antibiotic and pressors . Inpatient workup revealed new dx of cirrhosis with ascites no spb, aortic aneurysm, and expansile Calvarial lesion, pt was DC on 10/13 back to [facility].      Today pt H & H stable. CTA abd shows colitis, cirrhosis/ascites, and new potential HCC. BNP elevated at 544 however XCR and PE unconcerning for CHF exacerbation. Mentation improved while in ED - CT head stable.       Overview/Hospital Course:  CTA A/P was performed urgently, without evidence of contrast extravasation though showing aforementioned liver lesion and rectosigmoid colitis. Stool cultures returned C diff +ve and he was placed on PO vancomycin therapy. He underwent paracentesis and analysis of ascitic fluid was not indicative of SBP. Cultures and cytology are pending. He has been on IV PPI and octreotide gtt (since discontinued) with initial plans to scope, however, with stabilization of blood counts and CDI, this is no longer indicated. More awake  today and conversant.         Interval History: Pt had 2 BM's overnight. S/p para of 2.4 with IR on 10/17. Cytology pending. On PO vanc for C.diff, starting Flagyl. Rifaximin added for AMS in setting of cirrhosis. TRENT with a Cr of 1.6, likely prerenal s/p paracentesis. SLP recommends pure liquid diet due to difficulty swallowing, oral boost supplementation added.     Review of Systems   Unable to perform ROS: Mental status change   Objective:     Vital Signs (Most Recent):  Temp: 97.9 °F (36.6 °C) (10/19/22 0732)  Pulse: (!) 54 (10/19/22 0741)  Resp: 18 (10/19/22 0732)  BP: (!) 107/57 (10/19/22 0732)  SpO2: 97 % (10/19/22 0732)   Vital Signs (24h Range):  Temp:  [97.3 °F (36.3 °C)-98 °F (36.7 °C)] 97.9 °F (36.6 °C)  Pulse:  [54-85] 54  Resp:  [16-20] 18  SpO2:  [94 %-99 %] 97 %  BP: (103-154)/(54-67) 107/57     Weight: 83.5 kg (184 lb)  Body mass index is 26.4 kg/m².    Intake/Output Summary (Last 24 hours) at 10/19/2022 0839  Last data filed at 10/19/2022 0231  Gross per 24 hour   Intake 550 ml   Output 400 ml   Net 150 ml        Physical Exam  Constitutional:       Appearance: He is ill-appearing.   HENT:      Head:      Comments: Subcutaneous lesion hard to palpation located on right frontal skull.   Eyes:      General: No scleral icterus.     Conjunctiva/sclera: Conjunctivae normal.   Cardiovascular:      Rate and Rhythm: Normal rate and regular rhythm.      Pulses: Normal pulses.      Heart sounds: Normal heart sounds.   Pulmonary:      Effort: Pulmonary effort is normal. No respiratory distress.      Breath sounds: Normal breath sounds.   Chest:      Comments: Pacemaker in place in right upper chest wall.   Abdominal:      General: Bowel sounds are normal. There is distension.      Palpations: Abdomen is soft.      Tenderness: There is no abdominal tenderness.   Musculoskeletal:      Right lower leg: Edema present.      Left lower leg: Edema present.   Skin:     General: Skin is warm and dry.      Findings: No  bruising or rash.   Neurological:      GCS: GCS eye subscore is 4. GCS verbal subscore is 3. GCS motor subscore is 5.       Significant Labs: All pertinent labs within the past 24 hours have been reviewed.  A1C:   Recent Labs   Lab 07/19/22  1149   HGBA1C 6.1*       ABGs:   No results for input(s): PH, PCO2, HCO3, POCSATURATED, BE, TOTALHB, COHB, METHB, O2HB, POCFIO2, PO2 in the last 48 hours.    Bilirubin:   Recent Labs   Lab 10/15/22  0555 10/16/22  0610 10/17/22  0541 10/18/22  0458 10/19/22  0545   BILITOT 1.5* 1.5* 1.3* 0.8 0.8       Blood Culture:   No results for input(s): LABBLOO in the last 48 hours.    BMP:   Recent Labs   Lab 10/19/22  0545   *      K 4.3   *   CO2 16*   BUN 31*   CREATININE 1.6*   CALCIUM 8.2*   MG 1.8       CBC:   Recent Labs   Lab 10/18/22  0458 10/19/22  0545   WBC 16.74* 17.69*   HGB 9.1* 9.4*   HCT 28.3* 27.7*   * 140*       CMP:   Recent Labs   Lab 10/18/22  0458 10/19/22  0545    141   K 3.4* 4.3   * 115*   CO2 14* 16*   * 142*   BUN 31* 31*   CREATININE 1.6* 1.6*   CALCIUM 8.3* 8.2*   PROT 4.4* 4.5*   ALBUMIN 1.9* 1.9*   BILITOT 0.8 0.8   ALKPHOS 119 166*   AST 18 30   ALT 14 15   ANIONGAP 10 10       Cardiac Markers:   No results for input(s): CKMB, MYOGLOBIN, BNP, TROPISTAT in the last 48 hours.    Coagulation:   No results for input(s): PT, INR, APTT in the last 48 hours.    Lactic Acid:   No results for input(s): LACTATE in the last 48 hours.    Lipase:   No results for input(s): LIPASE in the last 48 hours.    Lipid Panel: No results for input(s): CHOL, HDL, LDLCALC, TRIG, CHOLHDL in the last 48 hours.  Magnesium:   Recent Labs   Lab 10/18/22  0458 10/19/22  0545   MG 1.6 1.8       Pathology Results  (Last 10 years)      None          POCT Glucose:   No results for input(s): POCTGLUCOSE in the last 48 hours.    Prealbumin: No results for input(s): PREALBUMIN in the last 48 hours.  Respiratory Culture: No results for input(s):  GSRESP, RESPIRATORYC in the last 48 hours.  Troponin:   No results for input(s): TROPONINI, TROPONINIHS in the last 48 hours.    TSH:   Recent Labs   Lab 07/19/22  1149   TSH 0.555       Urine Culture: No results for input(s): LABURIN in the last 48 hours.  Urine Studies:   No results for input(s): COLORU, APPEARANCEUA, PHUR, SPECGRAV, PROTEINUA, GLUCUA, KETONESU, BILIRUBINUA, OCCULTUA, NITRITE, UROBILINOGEN, LEUKOCYTESUR, RBCUA, WBCUA, BACTERIA, SQUAMEPITHEL, HYALINECASTS in the last 48 hours.    Invalid input(s): WRIGHTSUR      Significant Imaging: I have reviewed all pertinent imaging results/findings within the past 24 hours.      Assessment/Plan:      Diarrhea  Pt endorses profuse watery diarrhea for the past few days. Concerning for possible C diff with recent aggressive antibiotic regimen     Plan:  - Stool studies pending  - C diff positive; starting Po Vancomycin, Adding flagyl  10/19  - Ceftriaxone for sbp coverage   - Will add flagyl if worsening   - Per Hepatology, added Rifaximin BID.     GI bleed  Pt endorses melena. CT scan with no evidence of contrast extravasation to suggest active GI bleed at this time. Stable 3 cm enhancing lesion at the hepatic dome with evidence of liver cirrhosis, circumferential wall thickening of the rectum and sigmoid colon concern for colitis, and pancreatic hypodensities measuring up to 2.0 cm. Considering liver disease, suspicion for ruptured esophogeal varcies. Recent EGD at [facility] reviewed, will discuss with GI    Plan:  - CBC q12, trend H&H  - GI planning for endoscopy & sigmoidoscopy deferred due to C.diff and stable HH  - PPI 40mg BID   - CTX for sbp ppx    Other cirrhosis of liver  9/23: Ct A/P:Cirrhotic morphology of the liver with sequelae of portal hypertension as evidence by splenomegaly, intra-abdominal ascites, and possible portal hypertensive enteropathy. Pt denies hx of alcohol abuse.  R preformed diagnostic paracentetics on 9/27 to r/o SBP: which was  negative 130 WBC and 10%     Plan:   - Daily CMP   - Hepatology following; paracentesis with IR with 2.4L with cytology collected  - Started on Rifaximin BID per hepatology recommendations  - Cytology from para collected      Chronic gout  Plan:  Continue home allopurinol.        Type 2 diabetes mellitus, without long-term current use of insulin  Last A1c 6.1 3 months ago. Hypoglycemic on admission.      Plan:  - No home medications  Noted    - POCT glucose ACHS   - Low threshold to start LDSSI     Mixed hyperlipidemia  Plan:  - Continue home statin        Hypertension  Home medications: triamterene-HCTZ, Lasix     Plan:  - Hold home medications s/o hypotension     Diastolic heart failure  9/24 ANNE: The left ventricle is normal in size with low normal systolic function. The estimated ejection fraction is 50%.There is abnormal septal wall motion consistent with right ventricular pacing. Mild right ventricular enlargement with mildly reduced right ventricular systolic function. BNP:544    Plan:  - Low threshold for Lasix as CXR/lung sounds clear while saturating well on RA.   - 20 mg today in setting of tachypnea and pulm congestion         Permanent atrial fibrillation  Patient with documented history of permanent Afib s/p watchman, no longer on AC or rate controlling agents.  [id number]   HAS-BLED 4      Plan:  - Patient rate controlled, no indication for further agents at this time  - No full AC required s/p Watchman; will hold AC ppx and APT s/o active bleeding from abrasion sites  - Cardiac telemetry  - Maintain K > 4, Mg > 2, Ca wnl; replete PRN   - STAT cardiology consult if hemodynamic instability for DCCV evaluation      VTE Risk Mitigation (From admission, onward)           Ordered     IP VTE HIGH RISK PATIENT  Once         10/14/22 1826     Place sequential compression device  Until discontinued         10/14/22 1826                    Discharge Planning   ELMER: 10/25/2022     Code Status: DNR   Is the  patient medically ready for discharge?: No    Reason for patient still in hospital (select all that apply): Patient trending condition  Discharge Plan A: Return to nursing home   Discharge Delays: None known at this time              Starr Hodges DO  Department of Hospital Medicine   [facility]

## 2022-10-19 NOTE — PT/OT/SLP PROGRESS
Speech Language Pathology Treatment    Patient Name:  Jacques Arellano   MRN:  [id number]  Admitting Diagnosis: <principal problem not specified>    Recommendations:                 General Recommendations:  Dysphagia therapy  Diet recommendations:  NPO, Liquid Diet Level: Full liquids, Thin   Aspiration Precautions: Assistance with meals, Eliminate distractions, Feed only when awake/alert, Frequent oral care, HOB to 90 degrees, Meds crushed in puree, Monitor for s/s of aspiration, Remain upright 30 minutes post meal, Small bites/sips, and Strict aspiration precautions   General Precautions: Standard, aspiration, fall  Communication strategies:  provide increased time to answer and go to room if call light pushed    Subjective     Spoke with RN prior to entering pt's room. Per last ST note, full liquid diet was recommended but order not placed and pt remained NPO. Pt seen bedside for session. Asleep upon arrival, but roused given verbal and tactile stim. Required consistent cues to maintain alertness during session. Nonverbal.        Pain/Comfort:  Pain Rating 1: 0/10  Pain Rating Post-Intervention 1: 0/10    Respiratory Status: Room air    Objective:     Has the patient been evaluated by SLP for swallowing?   Yes  Keep patient NPO? No   Current Respiratory Status:    room air    Pt seen for ongoing dysphagia therapy. Accepted trials of thin liquids via tsp x5 and cup edge sip x3. Able to 'slurp' liquid off spoon without difficulty. Min anterior loss noted. Presented straw, but pt unable to siphon despite cues. No observable s/sx airway threat noted. Did not open mouth to accept trials of puree or soft solids. Provided education to pt re: role of ST, POC, swallow precautions, recommendations for full liquid diet, and plan to f/u to ensure tolerance. No evidence of learning. White board updated. RN and MD notified of results and recs.      Assessment:     Jacques Arellano is a 82 y.o. male with an SLP diagnosis of  Dysphagia.      Goals:   Multidisciplinary Problems       SLP Goals          Problem: SLP    Goal Priority Disciplines Outcome   SLP Goal     SLP    Description: Speech Language Pathology Goals  Goals expected to be met by 10/23:  1.  Pt will tolerate least restrictive diet without s/s of aspiration.                        Plan:     Patient to be seen:  4 x/week   Plan of Care expires:  11/16/22  Plan of Care reviewed with:  patient   SLP Follow-Up:  Yes       Discharge recommendations:  other (see comments) (tbd)   Barriers to Discharge:  Level of Skilled Assistance Needed      Time Tracking:     SLP Treatment Date:   10/19/22  Speech Start Time:  0915  Speech Stop Time:  0928     Speech Total Time (min):  13 min    Billable Minutes: Treatment Swallowing Dysfunction 5 and Self Care/Home Management Training 8    10/19/2022

## 2022-10-19 NOTE — PLAN OF CARE
Patient A&Ox1 (self), VSS, on tele (paced), on RA.  Upgraded to full liquid diet by speech.  Able to swallow pills crushed in applesauce/pudding.  Started catheter on patient due to retention.  Son at bedside.  Still on PO vanco, also receiving IV flagyll/ceftriaxone.  Plan of care discussed with patient who can not verbalize understanding.  Will continue to monitor.    Problem: Adult Inpatient Plan of Care  Goal: Plan of Care Review  Outcome: Ongoing, Progressing  Goal: Patient-Specific Goal (Individualized)  Outcome: Ongoing, Progressing  Goal: Absence of Hospital-Acquired Illness or Injury  Outcome: Ongoing, Progressing  Goal: Optimal Comfort and Wellbeing  Outcome: Ongoing, Progressing  Goal: Readiness for Transition of Care  Outcome: Ongoing, Progressing     Problem: Fall Injury Risk  Goal: Absence of Fall and Fall-Related Injury  Outcome: Ongoing, Progressing     Problem: Skin Injury Risk Increased  Goal: Skin Health and Integrity  Outcome: Ongoing, Progressing     Problem: Diabetes Comorbidity  Goal: Blood Glucose Level Within Targeted Range  Outcome: Ongoing, Progressing     Problem: Infection  Goal: Absence of Infection Signs and Symptoms  Outcome: Ongoing, Progressing     Problem: Impaired Wound Healing  Goal: Optimal Wound Healing  Outcome: Ongoing, Progressing

## 2022-10-19 NOTE — PLAN OF CARE
Pt is safe for full liquid diet. Recommend oral supplement. Order requested from team. ST to f/u. Zayra Sahu CCC-SLP 10/19/2022 11:11 AM

## 2022-10-20 PROBLEM — I50.32 CHRONIC DIASTOLIC HEART FAILURE: Status: ACTIVE | Noted: 2022-03-24

## 2022-10-20 PROBLEM — E11.42 DIABETIC POLYNEUROPATHY ASSOCIATED WITH TYPE 2 DIABETES MELLITUS: Status: ACTIVE | Noted: 2018-10-04

## 2022-10-20 PROBLEM — A04.72 C. DIFFICILE COLITIS: Status: ACTIVE | Noted: 2022-10-14

## 2022-10-20 PROBLEM — I49.5 TACHYCARDIA-BRADYCARDIA SYNDROME: Status: ACTIVE | Noted: 2018-06-28

## 2022-10-20 LAB
ALBUMIN SERPL BCP-MCNC: 1.8 G/DL (ref 3.5–5.2)
ALP SERPL-CCNC: 149 U/L (ref 55–135)
ALT SERPL W/O P-5'-P-CCNC: 15 U/L (ref 10–44)
ANION GAP SERPL CALC-SCNC: 10 MMOL/L (ref 8–16)
AST SERPL-CCNC: 23 U/L (ref 10–40)
BACTERIA SPEC AEROBE CULT: NO GROWTH
BASOPHILS # BLD AUTO: 0.06 K/UL (ref 0–0.2)
BASOPHILS NFR BLD: 0.3 % (ref 0–1.9)
BILIRUB SERPL-MCNC: 0.6 MG/DL (ref 0.1–1)
BUN SERPL-MCNC: 30 MG/DL (ref 8–23)
CALCIUM SERPL-MCNC: 8.1 MG/DL (ref 8.7–10.5)
CHLORIDE SERPL-SCNC: 112 MMOL/L (ref 95–110)
CLINICAL BIOCHEMIST REVIEW: NORMAL
CO2 SERPL-SCNC: 17 MMOL/L (ref 23–29)
CREAT SERPL-MCNC: 1.5 MG/DL (ref 0.5–1.4)
DIFFERENTIAL METHOD: ABNORMAL
EOSINOPHIL # BLD AUTO: 0.1 K/UL (ref 0–0.5)
EOSINOPHIL NFR BLD: 0.6 % (ref 0–8)
ERYTHROCYTE [DISTWIDTH] IN BLOOD BY AUTOMATED COUNT: 17.2 % (ref 11.5–14.5)
EST. GFR  (NO RACE VARIABLE): 45.9 ML/MIN/1.73 M^2
GLUCOSE SERPL-MCNC: 162 MG/DL (ref 70–110)
HCT VFR BLD AUTO: 30.3 % (ref 40–54)
HGB BLD-MCNC: 9.5 G/DL (ref 14–18)
IMM GRANULOCYTES # BLD AUTO: 0.45 K/UL (ref 0–0.04)
IMM GRANULOCYTES NFR BLD AUTO: 2.4 % (ref 0–0.5)
LYMPHOCYTES # BLD AUTO: 1 K/UL (ref 1–4.8)
LYMPHOCYTES NFR BLD: 5.3 % (ref 18–48)
MAGNESIUM SERPL-MCNC: 1.7 MG/DL (ref 1.6–2.6)
MCH RBC QN AUTO: 30.4 PG (ref 27–31)
MCHC RBC AUTO-ENTMCNC: 31.4 G/DL (ref 32–36)
MCV RBC AUTO: 97 FL (ref 82–98)
MONOCYTES # BLD AUTO: 1.2 K/UL (ref 0.3–1)
MONOCYTES NFR BLD: 6.6 % (ref 4–15)
NEUTROPHILS # BLD AUTO: 15.8 K/UL (ref 1.8–7.7)
NEUTROPHILS NFR BLD: 84.8 % (ref 38–73)
NRBC BLD-RTO: 0 /100 WBC
PETH 16:0/18.1 (POPETH): <10 NG/ML
PETH 16:0/18.2 (PLPETH): <10 NG/ML
PHOSPHATE SERPL-MCNC: 2.4 MG/DL (ref 2.7–4.5)
PLATELET # BLD AUTO: 155 K/UL (ref 150–450)
PMV BLD AUTO: 10.6 FL (ref 9.2–12.9)
POTASSIUM SERPL-SCNC: 3.1 MMOL/L (ref 3.5–5.1)
PROT SERPL-MCNC: 4.3 G/DL (ref 6–8.4)
RBC # BLD AUTO: 3.12 M/UL (ref 4.6–6.2)
SODIUM SERPL-SCNC: 139 MMOL/L (ref 136–145)
WBC # BLD AUTO: 18.65 K/UL (ref 3.9–12.7)

## 2022-10-20 PROCEDURE — 99233 SBSQ HOSP IP/OBS HIGH 50: CPT | Mod: GC,,, | Performed by: INTERNAL MEDICINE

## 2022-10-20 PROCEDURE — 25000003 PHARM REV CODE 250: Performed by: STUDENT IN AN ORGANIZED HEALTH CARE EDUCATION/TRAINING PROGRAM

## 2022-10-20 PROCEDURE — 99233 PR SUBSEQUENT HOSPITAL CARE,LEVL III: ICD-10-PCS | Mod: GC,,, | Performed by: INTERNAL MEDICINE

## 2022-10-20 PROCEDURE — 85025 COMPLETE CBC W/AUTO DIFF WBC: CPT | Performed by: STUDENT IN AN ORGANIZED HEALTH CARE EDUCATION/TRAINING PROGRAM

## 2022-10-20 PROCEDURE — 84100 ASSAY OF PHOSPHORUS: CPT | Performed by: STUDENT IN AN ORGANIZED HEALTH CARE EDUCATION/TRAINING PROGRAM

## 2022-10-20 PROCEDURE — 20600001 HC STEP DOWN PRIVATE ROOM

## 2022-10-20 PROCEDURE — 63600175 PHARM REV CODE 636 W HCPCS: Mod: JG

## 2022-10-20 PROCEDURE — 25000003 PHARM REV CODE 250: Performed by: HOSPITALIST

## 2022-10-20 PROCEDURE — 27000207 HC ISOLATION

## 2022-10-20 PROCEDURE — S0030 INJECTION, METRONIDAZOLE: HCPCS | Performed by: STUDENT IN AN ORGANIZED HEALTH CARE EDUCATION/TRAINING PROGRAM

## 2022-10-20 PROCEDURE — 36415 COLL VENOUS BLD VENIPUNCTURE: CPT | Performed by: STUDENT IN AN ORGANIZED HEALTH CARE EDUCATION/TRAINING PROGRAM

## 2022-10-20 PROCEDURE — 92526 ORAL FUNCTION THERAPY: CPT

## 2022-10-20 PROCEDURE — 80053 COMPREHEN METABOLIC PANEL: CPT | Performed by: STUDENT IN AN ORGANIZED HEALTH CARE EDUCATION/TRAINING PROGRAM

## 2022-10-20 PROCEDURE — 94761 N-INVAS EAR/PLS OXIMETRY MLT: CPT

## 2022-10-20 PROCEDURE — P9045 ALBUMIN (HUMAN), 5%, 250 ML: HCPCS | Mod: JG

## 2022-10-20 PROCEDURE — 83735 ASSAY OF MAGNESIUM: CPT | Performed by: STUDENT IN AN ORGANIZED HEALTH CARE EDUCATION/TRAINING PROGRAM

## 2022-10-20 RX ORDER — POTASSIUM CHLORIDE 750 MG/1
30 CAPSULE, EXTENDED RELEASE ORAL ONCE
Status: DISCONTINUED | OUTPATIENT
Start: 2022-10-20 | End: 2022-10-20

## 2022-10-20 RX ORDER — ALBUMIN HUMAN 50 G/1000ML
25 SOLUTION INTRAVENOUS ONCE
Status: COMPLETED | OUTPATIENT
Start: 2022-10-20 | End: 2022-10-20

## 2022-10-20 RX ORDER — POTASSIUM CHLORIDE 750 MG/1
30 CAPSULE, EXTENDED RELEASE ORAL
Status: COMPLETED | OUTPATIENT
Start: 2022-10-20 | End: 2022-10-20

## 2022-10-20 RX ADMIN — METRONIDAZOLE 500 MG: 500 INJECTION, SOLUTION INTRAVENOUS at 03:10

## 2022-10-20 RX ADMIN — PANTOPRAZOLE SODIUM 40 MG: 40 TABLET, DELAYED RELEASE ORAL at 05:10

## 2022-10-20 RX ADMIN — VANCOMYCIN HYDROCHLORIDE 125 MG: KIT at 05:10

## 2022-10-20 RX ADMIN — VANCOMYCIN HYDROCHLORIDE 125 MG: KIT at 12:10

## 2022-10-20 RX ADMIN — FOLIC ACID 1 MG: 1 TABLET ORAL at 09:10

## 2022-10-20 RX ADMIN — POTASSIUM CHLORIDE 30 MEQ: 10 CAPSULE, COATED, EXTENDED RELEASE ORAL at 09:10

## 2022-10-20 RX ADMIN — ALBUMIN (HUMAN) 25 G: 12.5 SOLUTION INTRAVENOUS at 02:10

## 2022-10-20 RX ADMIN — POTASSIUM CHLORIDE 30 MEQ: 10 CAPSULE, COATED, EXTENDED RELEASE ORAL at 11:10

## 2022-10-20 RX ADMIN — ESCITALOPRAM OXALATE 10 MG: 10 TABLET ORAL at 09:10

## 2022-10-20 RX ADMIN — POTASSIUM CHLORIDE 30 MEQ: 10 CAPSULE, COATED, EXTENDED RELEASE ORAL at 02:10

## 2022-10-20 RX ADMIN — ALLOPURINOL 100 MG: 100 TABLET ORAL at 09:10

## 2022-10-20 RX ADMIN — RIFAXIMIN 550 MG: 550 TABLET ORAL at 09:10

## 2022-10-20 RX ADMIN — PANTOPRAZOLE SODIUM 40 MG: 40 TABLET, DELAYED RELEASE ORAL at 06:10

## 2022-10-20 RX ADMIN — ATORVASTATIN CALCIUM 40 MG: 40 TABLET, FILM COATED ORAL at 09:10

## 2022-10-20 RX ADMIN — METRONIDAZOLE 500 MG: 500 INJECTION, SOLUTION INTRAVENOUS at 10:10

## 2022-10-20 RX ADMIN — METRONIDAZOLE 500 MG: 500 INJECTION, SOLUTION INTRAVENOUS at 09:10

## 2022-10-20 NOTE — PROGRESS NOTES
[facility] Medicine  Progress Note    Patient Name: Jacques Arellano  MRN: [id number]  Patient Class: IP- Inpatient   Admission Date: 10/14/2022  Length of Stay: 6 days  Attending Physician: Nemesio Pearson MD  Primary Care Provider: Matty Garsia MD        Subjective:     Principal Problem:<principal problem not specified>        HPI:  83 yo M with PMhx of CHF, AFib, Watchman, pacer, expansile brain lesion, pacemaker presents to from [facility] for diarrhea, copious black stool and acute altered mental status. Pt reports he has 3-5 profuse watery BM for the last few days noting a little bit of blood. Pt was recently admitted to [facility] ED on 10/12 for abdominal pain and coffee ground emesis, EGD revealed grade D esophagitis and gastritis with no overt GI bleeding and was subsequently discharged with Protonix. Pt was recently admitted to [facility] ICU on 9/23 for sepsis thought to be secondary to E coli. UTI vs cirrhosis and was treated with antibiotic and pressors . Inpatient workup revealed new dx of cirrhosis with ascites no spb, aortic aneurysm, and expansile Calvarial lesion, pt was DC on 10/13 back to [facility].      Today pt H & H stable. CTA abd shows colitis, cirrhosis/ascites, and new potential HCC. BNP elevated at 544 however XCR and PE unconcerning for CHF exacerbation. Mentation improved while in ED - CT head stable.       Overview/Hospital Course:  CTA A/P was performed urgently, without evidence of contrast extravasation though showing aforementioned liver lesion and rectosigmoid colitis. Stool cultures returned C diff +ve and he was placed on PO vancomycin therapy. He underwent paracentesis and analysis of ascitic fluid was not indicative of SBP. Cultures and cytology are pending. He has been on IV PPI and octreotide gtt (since discontinued) with initial plans to scope, however, with stabilization of blood counts and CDI, this is no longer indicated. More awake today and  conversant.         Interval History: NAEON. Pt more alert however more altered. Pt on PO vanc and Flagyl for C.diff, however wbc uptrending. KUB to r/o toxic megacolon. Rifaximin added for AMS in setting of cirrhosis. TRENT with a Cr of 1.5 today, slowly improving.     Review of Systems   Unable to perform ROS: Mental status change   Objective:     Vital Signs (Most Recent):  Temp: 96.4 °F (35.8 °C) (10/20/22 0729)  Pulse: 85 (10/20/22 0736)  Resp: 18 (10/20/22 0729)  BP: 108/60 (10/20/22 0729)  SpO2: (!) 94 % (10/20/22 0729)   Vital Signs (24h Range):  Temp:  [96.4 °F (35.8 °C)-98.3 °F (36.8 °C)] 96.4 °F (35.8 °C)  Pulse:  [61-85] 85  Resp:  [16-20] 18  SpO2:  [94 %-99 %] 94 %  BP: ()/(53-86) 108/60     Weight: 83.5 kg (184 lb)  Body mass index is 26.4 kg/m².    Intake/Output Summary (Last 24 hours) at 10/20/2022 0759  Last data filed at 10/20/2022 0526  Gross per 24 hour   Intake 720 ml   Output 600 ml   Net 120 ml        Physical Exam  Constitutional:       Appearance: He is ill-appearing.   HENT:      Head:      Comments: Subcutaneous lesion hard to palpation located on right frontal skull.   Eyes:      General: No scleral icterus.     Conjunctiva/sclera: Conjunctivae normal.   Cardiovascular:      Rate and Rhythm: Normal rate and regular rhythm.      Pulses: Normal pulses.      Heart sounds: Normal heart sounds.   Pulmonary:      Effort: Pulmonary effort is normal. No respiratory distress.      Breath sounds: Normal breath sounds.   Chest:      Comments: Pacemaker in place in right upper chest wall.   Abdominal:      General: Bowel sounds are normal. There is distension.      Palpations: Abdomen is soft.      Tenderness: There is no abdominal tenderness.   Musculoskeletal:      Right lower leg: Edema present.      Left lower leg: Edema present.   Skin:     General: Skin is warm and dry.      Findings: No bruising or rash.   Neurological:      GCS: GCS eye subscore is 4. GCS verbal subscore is 3. GCS motor  subscore is 5.       Significant Labs: All pertinent labs within the past 24 hours have been reviewed.  A1C:   Recent Labs   Lab 07/19/22  1149   HGBA1C 6.1*       ABGs:   No results for input(s): PH, PCO2, HCO3, POCSATURATED, BE, TOTALHB, COHB, METHB, O2HB, POCFIO2, PO2 in the last 48 hours.    Bilirubin:   Recent Labs   Lab 10/16/22  0610 10/17/22  0541 10/18/22  0458 10/19/22  0545 10/20/22  0614   BILITOT 1.5* 1.3* 0.8 0.8 0.6       Blood Culture:   No results for input(s): LABBLOO in the last 48 hours.    BMP:   Recent Labs   Lab 10/20/22  0614   *      K 3.1*   *   CO2 17*   BUN 30*   CREATININE 1.5*   CALCIUM 8.1*   MG 1.7       CBC:   Recent Labs   Lab 10/19/22  0545 10/20/22  0614   WBC 17.69* 18.65*   HGB 9.4* 9.5*   HCT 27.7* 30.3*   * 155       CMP:   Recent Labs   Lab 10/19/22  0545 10/20/22  0614    139   K 4.3 3.1*   * 112*   CO2 16* 17*   * 162*   BUN 31* 30*   CREATININE 1.6* 1.5*   CALCIUM 8.2* 8.1*   PROT 4.5* 4.3*   ALBUMIN 1.9* 1.8*   BILITOT 0.8 0.6   ALKPHOS 166* 149*   AST 30 23   ALT 15 15   ANIONGAP 10 10       Cardiac Markers:   No results for input(s): CKMB, MYOGLOBIN, BNP, TROPISTAT in the last 48 hours.    Coagulation:   No results for input(s): PT, INR, APTT in the last 48 hours.    Lactic Acid:   No results for input(s): LACTATE in the last 48 hours.    Lipase:   No results for input(s): LIPASE in the last 48 hours.    Lipid Panel: No results for input(s): CHOL, HDL, LDLCALC, TRIG, CHOLHDL in the last 48 hours.  Magnesium:   Recent Labs   Lab 10/19/22  0545 10/20/22  0614   MG 1.8 1.7       Pathology Results  (Last 10 years)      None          POCT Glucose:   No results for input(s): POCTGLUCOSE in the last 48 hours.    Prealbumin: No results for input(s): PREALBUMIN in the last 48 hours.  Respiratory Culture: No results for input(s): GSRESP, RESPIRATORYC in the last 48 hours.  Troponin:   No results for input(s): TROPONINI, TROPONINIHS in  the last 48 hours.    TSH:   Recent Labs   Lab 07/19/22  1149   TSH 0.555       Urine Culture: No results for input(s): LABURIN in the last 48 hours.  Urine Studies:   No results for input(s): COLORU, APPEARANCEUA, PHUR, SPECGRAV, PROTEINUA, GLUCUA, KETONESU, BILIRUBINUA, OCCULTUA, NITRITE, UROBILINOGEN, LEUKOCYTESUR, RBCUA, WBCUA, BACTERIA, SQUAMEPITHEL, HYALINECASTS in the last 48 hours.    Invalid input(s): WRIGHTSUR      Significant Imaging: I have reviewed all pertinent imaging results/findings within the past 24 hours.      Assessment/Plan:      Diarrhea  Pt endorses profuse watery diarrhea for the past few days. Concerning for possible C diff with recent aggressive antibiotic regimen     Plan:  - Stool studies pending  - C diff positive; starting Po Vancomycin, Adding flagyl  10/19  - Per Hepatology, added Rifaximin BID.   - WBC up trending, KUB to r/o toxic megacolon     GI bleed  Pt endorses melena. CT scan with no evidence of contrast extravasation to suggest active GI bleed at this time. Stable 3 cm enhancing lesion at the hepatic dome with evidence of liver cirrhosis, circumferential wall thickening of the rectum and sigmoid colon concern for colitis, and pancreatic hypodensities measuring up to 2.0 cm. Considering liver disease, suspicion for ruptured esophogeal varcies. Recent EGD at [facility], will discuss with GI    Plan:  - CBC q12, trend H&H  - GI planning for endoscopy & sigmoidoscopy deferred due to C.diff and stable HH  - PPI 40mg BID   - CTX for sbp ppx    Other cirrhosis of liver  9/23: Ct A/P:Cirrhotic morphology of the liver with sequelae of portal hypertension as evidence by splenomegaly, intra-abdominal ascites, and possible portal hypertensive enteropathy. Pt denies hx of alcohol abuse.  R preformed diagnostic paracentetics on 9/27 to r/o SBP: which was negative 130 WBC and 10%     Plan:   - Daily CMP   - Hepatology following; paracentesis with IR with 2.4L with cytology  collected  - Started on Rifaximin BID per hepatology recommendations  - Cytology from para collected, negative for malignancy  - Albumin 25g to optimize fluid status.      Chronic gout  Plan:  Continue home allopurinol.        Type 2 diabetes mellitus, without long-term current use of insulin  Last A1c 6.1 3 months ago. Hypoglycemic on admission.      Plan:  - No home medications  Noted    - POCT glucose ACHS   - Low threshold to start LDSSI     Mixed hyperlipidemia  Plan:  - Continue home statin        Hypertension  Home medications: triamterene-HCTZ, Lasix     Plan:  - Hold home medications s/o hypotension     Diastolic heart failure  9/24 ANNE: The left ventricle is normal in size with low normal systolic function. The estimated ejection fraction is 50%.There is abnormal septal wall motion consistent with right ventricular pacing. Mild right ventricular enlargement with mildly reduced right ventricular systolic function. BNP:544    Plan:  - Low threshold for Lasix as CXR/lung sounds clear while saturating well on RA.   - 20 mg today in setting of tachypnea and pulm congestion         Permanent atrial fibrillation  Patient with documented history of permanent Afib s/p watchman, no longer on AC or rate controlling agents.  [id number] 5   HAS-BLED 4      Plan:  - Patient rate controlled, no indication for further agents at this time  - No full AC required s/p Watchman; will hold AC ppx and APT s/o active bleeding from abrasion sites  - Cardiac telemetry  - Maintain K > 4, Mg > 2, Ca wnl; replete PRN   - STAT cardiology consult if hemodynamic instability for DCCV evaluation      VTE Risk Mitigation (From admission, onward)         Ordered     IP VTE HIGH RISK PATIENT  Once         10/14/22 1826     Place sequential compression device  Until discontinued         10/14/22 1826                Discharge Planning   ELMER: 10/25/2022     Code Status: DNR   Is the patient medically ready for discharge?: No    Reason for  patient still in hospital (select all that apply): Patient trending condition  Discharge Plan A: Return to nursing home   Discharge Delays: None known at this time              Starr Hodges DO  Department of Hospital Medicine   [facility] D[facility]EL[facility]

## 2022-10-20 NOTE — PLAN OF CARE
Patient A&Ox1 (self), confused throughout shift, disoriented to place/situation/time.  VSS,  on tele (paced), on RA.  On full liquid diet, pills crushed in applesauce/pudding.  Childers catheter draining straw colored urine, minimal output.  Given albumin and K+ replacement. Receiving IV flagyll/PO vanc.  Turning q2h per protocol.  Plan of care discussed with patient who can not verbalize understanding.  Safety and contact precautions maintained.  Will continue to monitor.      Problem: Adult Inpatient Plan of Care  Goal: Plan of Care Review  Outcome: Ongoing, Progressing  Goal: Patient-Specific Goal (Individualized)  Outcome: Ongoing, Progressing  Goal: Absence of Hospital-Acquired Illness or Injury  Outcome: Ongoing, Progressing  Goal: Optimal Comfort and Wellbeing  Outcome: Ongoing, Progressing  Goal: Readiness for Transition of Care  Outcome: Ongoing, Progressing     Problem: Fall Injury Risk  Goal: Absence of Fall and Fall-Related Injury  Outcome: Ongoing, Progressing     Problem: Skin Injury Risk Increased  Goal: Skin Health and Integrity  Outcome: Ongoing, Progressing     Problem: Diabetes Comorbidity  Goal: Blood Glucose Level Within Targeted Range  Outcome: Ongoing, Progressing     Problem: Infection  Goal: Absence of Infection Signs and Symptoms  Outcome: Ongoing, Progressing     Problem: Impaired Wound Healing  Goal: Optimal Wound Healing  Outcome: Ongoing, Progressing

## 2022-10-20 NOTE — PT/OT/SLP PROGRESS
"Speech Language Pathology Treatment    Patient Name:  Jacques Arellaon   MRN:  [id number]  Admitting Diagnosis: C. difficile colitis    Recommendations:                 General Recommendations:   ongoing swallowing assessment  Diet recommendations:Liquid Diet Level: Full liquids, Thin   Aspiration Precautions: 1 bite/sip at a time, Alternating bites/sips, Assistance with meals, Avoid talking while eating, Eliminate distractions, Feed only when awake/alert, Frequent oral care, HOB to 90 degrees, Meds crushed in puree, Monitor for s/s of aspiration, Remain upright 30 minutes post meal, Small bites/sips, and Strict aspiration precautions   General Precautions: Standard, aspiration, fall  Communication strategies:  go to room if call light pushed    Subjective     Pt awake/alert, but exhibiting significant confusion with perseveration and difficulty being redirected.     Pain/Comfort:  Pain Rating 1: 0/10    Respiratory Status: Room air    Objective:     Has the patient been evaluated by SLP for swallowing?   Yes  Keep patient NPO? No   Current Respiratory Status:        Pt seen for follow up to monitor diet tolerance and assess for readiness for diet advancement.  Pt noted to have full liquid tray at bedside, with mostly only Boost consumed.  Pt willing to accept straw sips of water, but unwilling to accept offerings of pudding, applesauce, yogurt, cream of wheat, or cracker.  SLP offered to assist with placing lower dental plate in oral cavity, as upper plate was already in place.  Pt was too confused and focused on off-task topic related to water and "finishing a deal," therefore, dental plate was not placed.  SLP educated pt on her role, ongoing swallowing assessment, and inability to recommend diet advancement without observing with other consistencies. Pt was unable to demonstrate understanding.  SLP recommends pt remain on current diet at this time.  Will continue POC.     Assessment:     Jacques Arellano is a 82 " y.o. male with an SLP diagnosis of Dysphagia/risk of aspiration associated with altered mental status.     Goals:   Multidisciplinary Problems       SLP Goals          Problem: SLP    Goal Priority Disciplines Outcome   SLP Goal     SLP    Description: Speech Language Pathology Goals  Goals expected to be met by 10/23:  1.  Pt will tolerate least restrictive diet without s/s of aspiration.                        Plan:     Patient to be seen:  4 x/week   Plan of Care expires:  11/16/22  Plan of Care reviewed with:  patient   SLP Follow-Up:  Yes       Discharge recommendations:   (tbd)     Time Tracking:     SLP Treatment Date:   10/20/22  Speech Start Time:  0852  Speech Stop Time:  0903     Speech Total Time (min):  11 min    Billable Minutes: Treatment Swallowing Dysfunction 11    10/20/2022

## 2022-10-20 NOTE — ASSESSMENT & PLAN NOTE
[facility]    Brief Operative Note    Pre-operative diagnosis: Mediastinal lymphadenopathy [R59.0]  Post-operative diagnosis Same as pre-operative diagnosis    Procedure: Procedure(s):  TRANSCERVICAL EXTENDED MEDIASTINAL LYMPHADENECTOMY  Surgeon: Surgeon(s) and Role:     * Star Narvaez MD - Primary     * Gary Wray MD - Assisting  Anesthesia: General   Estimated Blood Loss: 20mL    Drains: None  Specimens:   ID Type Source Tests Collected by Time Destination   1 : Right level 2 Tissue Lymph Node(s) SURGICAL PATHOLOGY EXAM Star Narvaez MD 5/24/2022 12:07 PM    2 : 2R Tissue Lymph Node(s) SURGICAL PATHOLOGY EXAM Star Narvaez MD 5/24/2022 12:10 PM      Findings:   None.  Complications: None.  Implants: * No implants in log *     - Ok to discharge home from PACU if doing well  - Tarik Tylenol, prn oxycodone to be filled in [facility]             9/23: Ct A/P:Cirrhotic morphology of the liver with sequelae of portal hypertension as evidence by splenomegaly, intra-abdominal ascites, and possible portal hypertensive enteropathy. Pt denies hx of alcohol abuse.  R preformed diagnostic paracentetics on 9/27 to r/o SBP: which was negative 130 WBC and 10%     Plan:   - Daily CMP   - Hepatology following; paracentesis with IR with 2.4L with cytology collected  - Started on Rifaximin BID per hepatology recommendations  - Cytology from para collected, negative for malignancy  - Albumin 25g to optimize fluid status.

## 2022-10-20 NOTE — SUBJECTIVE & OBJECTIVE
Interval History: NAEON. Pt more alert however more altered. Pt on PO vanc and Flagyl for C.diff, however wbc uptrending. KUB to r/o toxic megacolon. Rifaximin added for AMS in setting of cirrhosis. TRENT with a Cr of 1.5 today, slowly improving.     Review of Systems   Unable to perform ROS: Mental status change   Objective:     Vital Signs (Most Recent):  Temp: 96.4 °F (35.8 °C) (10/20/22 0729)  Pulse: 85 (10/20/22 0736)  Resp: 18 (10/20/22 0729)  BP: 108/60 (10/20/22 0729)  SpO2: (!) 94 % (10/20/22 0729)   Vital Signs (24h Range):  Temp:  [96.4 °F (35.8 °C)-98.3 °F (36.8 °C)] 96.4 °F (35.8 °C)  Pulse:  [61-85] 85  Resp:  [16-20] 18  SpO2:  [94 %-99 %] 94 %  BP: ()/(53-86) 108/60     Weight: 83.5 kg (184 lb)  Body mass index is 26.4 kg/m².    Intake/Output Summary (Last 24 hours) at 10/20/2022 0759  Last data filed at 10/20/2022 0526  Gross per 24 hour   Intake 720 ml   Output 600 ml   Net 120 ml        Physical Exam  Constitutional:       Appearance: He is ill-appearing.   HENT:      Head:      Comments: Subcutaneous lesion hard to palpation located on right frontal skull.   Eyes:      General: No scleral icterus.     Conjunctiva/sclera: Conjunctivae normal.   Cardiovascular:      Rate and Rhythm: Normal rate and regular rhythm.      Pulses: Normal pulses.      Heart sounds: Normal heart sounds.   Pulmonary:      Effort: Pulmonary effort is normal. No respiratory distress.      Breath sounds: Normal breath sounds.   Chest:      Comments: Pacemaker in place in right upper chest wall.   Abdominal:      General: Bowel sounds are normal. There is distension.      Palpations: Abdomen is soft.      Tenderness: There is no abdominal tenderness.   Musculoskeletal:      Right lower leg: Edema present.      Left lower leg: Edema present.   Skin:     General: Skin is warm and dry.      Findings: No bruising or rash.   Neurological:      GCS: GCS eye subscore is 4. GCS verbal subscore is 3. GCS motor subscore is 5.        Significant Labs: All pertinent labs within the past 24 hours have been reviewed.  A1C:   Recent Labs   Lab 07/19/22  1149   HGBA1C 6.1*       ABGs:   No results for input(s): PH, PCO2, HCO3, POCSATURATED, BE, TOTALHB, COHB, METHB, O2HB, POCFIO2, PO2 in the last 48 hours.    Bilirubin:   Recent Labs   Lab 10/16/22  0610 10/17/22  0541 10/18/22  0458 10/19/22  0545 10/20/22  0614   BILITOT 1.5* 1.3* 0.8 0.8 0.6       Blood Culture:   No results for input(s): LABBLOO in the last 48 hours.    BMP:   Recent Labs   Lab 10/20/22  0614   *      K 3.1*   *   CO2 17*   BUN 30*   CREATININE 1.5*   CALCIUM 8.1*   MG 1.7       CBC:   Recent Labs   Lab 10/19/22  0545 10/20/22  0614   WBC 17.69* 18.65*   HGB 9.4* 9.5*   HCT 27.7* 30.3*   * 155       CMP:   Recent Labs   Lab 10/19/22  0545 10/20/22  0614    139   K 4.3 3.1*   * 112*   CO2 16* 17*   * 162*   BUN 31* 30*   CREATININE 1.6* 1.5*   CALCIUM 8.2* 8.1*   PROT 4.5* 4.3*   ALBUMIN 1.9* 1.8*   BILITOT 0.8 0.6   ALKPHOS 166* 149*   AST 30 23   ALT 15 15   ANIONGAP 10 10       Cardiac Markers:   No results for input(s): CKMB, MYOGLOBIN, BNP, TROPISTAT in the last 48 hours.    Coagulation:   No results for input(s): PT, INR, APTT in the last 48 hours.    Lactic Acid:   No results for input(s): LACTATE in the last 48 hours.    Lipase:   No results for input(s): LIPASE in the last 48 hours.    Lipid Panel: No results for input(s): CHOL, HDL, LDLCALC, TRIG, CHOLHDL in the last 48 hours.  Magnesium:   Recent Labs   Lab 10/19/22  0545 10/20/22  0614   MG 1.8 1.7       Pathology Results  (Last 10 years)      None          POCT Glucose:   No results for input(s): POCTGLUCOSE in the last 48 hours.    Prealbumin: No results for input(s): PREALBUMIN in the last 48 hours.  Respiratory Culture: No results for input(s): GSRESP, RESPIRATORYC in the last 48 hours.  Troponin:   No results for input(s): TROPONINI, TROPONINIHS in the last 48  hours.    TSH:   Recent Labs   Lab 07/19/22  1149   TSH 0.555       Urine Culture: No results for input(s): LABURIN in the last 48 hours.  Urine Studies:   No results for input(s): COLORU, APPEARANCEUA, PHUR, SPECGRAV, PROTEINUA, GLUCUA, KETONESU, BILIRUBINUA, OCCULTUA, NITRITE, UROBILINOGEN, LEUKOCYTESUR, RBCUA, WBCUA, BACTERIA, SQUAMEPITHEL, HYALINECASTS in the last 48 hours.    Invalid input(s): ROMERO      Significant Imaging: I have reviewed all pertinent imaging results/findings within the past 24 hours.

## 2022-10-20 NOTE — ASSESSMENT & PLAN NOTE
Pt endorses profuse watery diarrhea for the past few days. Concerning for possible C diff with recent aggressive antibiotic regimen     Plan:  - Stool studies pending  - C diff positive; starting Po Vancomycin, Adding flagyl  10/19  - Per Hepatology, added Rifaximin BID.   - WBC up trending, KUB to r/o toxic megacolon

## 2022-10-20 NOTE — PLAN OF CARE
[id number]  Patient A&Ox1 (self), confused throughout shift, DO to place/situation/time.  VSS, ex BP soft, on tele (paced), on RA.  Upgraded to full liquid diet by speech.  Able to swallow pills crushed in applesauce/pudding.  hopkins catheter placed today due to retention. Minimal OP, tea colored.  receiving IV flagyll/ceftriaxone/PO vanc.  T/R q2h per protocol. Plan of care discussed with patient who can not verbalize understanding. Safety and contact precautions maintained. [facility]                Problem: Adult Inpatient Plan of Care  Goal: Plan of Care Review  Outcome: Ongoing, Progressing  Goal: Patient-Specific Goal (Individualized)  Outcome: Ongoing, Progressing  Goal: Absence of Hospital-Acquired Illness or Injury  Outcome: Ongoing, Progressing  Goal: Optimal Comfort and Wellbeing  Outcome: Ongoing, Progressing  Goal: Readiness for Transition of Care  Outcome: Ongoing, Progressing     Problem: Fall Injury Risk  Goal: Absence of Fall and Fall-Related Injury  Outcome: Ongoing, Progressing     Problem: Skin Injury Risk Increased  Goal: Skin Health and Integrity  Outcome: Ongoing, Progressing     Problem: Diabetes Comorbidity  Goal: Blood Glucose Level Within Targeted Range  Outcome: Ongoing, Progressing     Problem: Infection  Goal: Absence of Infection Signs and Symptoms  Outcome: Ongoing, Progressing     Problem: Impaired Wound Healing  Goal: Optimal Wound Healing  Outcome: Ongoing, Progressing

## 2022-10-21 PROBLEM — E43 SEVERE MALNUTRITION: Status: ACTIVE | Noted: 2022-10-21

## 2022-10-21 LAB
ALBUMIN SERPL BCP-MCNC: 2.2 G/DL (ref 3.5–5.2)
ALP SERPL-CCNC: 119 U/L (ref 55–135)
ALT SERPL W/O P-5'-P-CCNC: 12 U/L (ref 10–44)
ANION GAP SERPL CALC-SCNC: 9 MMOL/L (ref 8–16)
AST SERPL-CCNC: 22 U/L (ref 10–40)
BASOPHILS # BLD AUTO: 0.03 K/UL (ref 0–0.2)
BASOPHILS NFR BLD: 0.2 % (ref 0–1.9)
BILIRUB SERPL-MCNC: 0.7 MG/DL (ref 0.1–1)
BUN SERPL-MCNC: 27 MG/DL (ref 8–23)
BURR CELLS BLD QL SMEAR: ABNORMAL
CALCIUM SERPL-MCNC: 8.2 MG/DL (ref 8.7–10.5)
CHLORIDE SERPL-SCNC: 113 MMOL/L (ref 95–110)
CO2 SERPL-SCNC: 15 MMOL/L (ref 23–29)
CREAT SERPL-MCNC: 1.3 MG/DL (ref 0.5–1.4)
DIFFERENTIAL METHOD: ABNORMAL
EOSINOPHIL # BLD AUTO: 0.2 K/UL (ref 0–0.5)
EOSINOPHIL NFR BLD: 1.1 % (ref 0–8)
ERYTHROCYTE [DISTWIDTH] IN BLOOD BY AUTOMATED COUNT: 17.1 % (ref 11.5–14.5)
EST. GFR  (NO RACE VARIABLE): 54.5 ML/MIN/1.73 M^2
GLUCOSE SERPL-MCNC: 135 MG/DL (ref 70–110)
HCT VFR BLD AUTO: 29.3 % (ref 40–54)
HGB BLD-MCNC: 9.8 G/DL (ref 14–18)
IMM GRANULOCYTES # BLD AUTO: 0.26 K/UL (ref 0–0.04)
IMM GRANULOCYTES NFR BLD AUTO: 1.6 % (ref 0–0.5)
LYMPHOCYTES # BLD AUTO: 0.8 K/UL (ref 1–4.8)
LYMPHOCYTES NFR BLD: 4.9 % (ref 18–48)
MAGNESIUM SERPL-MCNC: 1.7 MG/DL (ref 1.6–2.6)
MCH RBC QN AUTO: 31.2 PG (ref 27–31)
MCHC RBC AUTO-ENTMCNC: 33.4 G/DL (ref 32–36)
MCV RBC AUTO: 93 FL (ref 82–98)
MONOCYTES # BLD AUTO: 1.1 K/UL (ref 0.3–1)
MONOCYTES NFR BLD: 7 % (ref 4–15)
NEUTROPHILS # BLD AUTO: 13.4 K/UL (ref 1.8–7.7)
NEUTROPHILS NFR BLD: 85.2 % (ref 38–73)
NRBC BLD-RTO: 0 /100 WBC
PHOSPHATE SERPL-MCNC: 2.3 MG/DL (ref 2.7–4.5)
PLATELET # BLD AUTO: 88 K/UL (ref 150–450)
PLATELET BLD QL SMEAR: ABNORMAL
PMV BLD AUTO: 11.2 FL (ref 9.2–12.9)
POIKILOCYTOSIS BLD QL SMEAR: SLIGHT
POTASSIUM SERPL-SCNC: 3.2 MMOL/L (ref 3.5–5.1)
PROT SERPL-MCNC: 4.5 G/DL (ref 6–8.4)
RBC # BLD AUTO: 3.14 M/UL (ref 4.6–6.2)
SODIUM SERPL-SCNC: 137 MMOL/L (ref 136–145)
WBC # BLD AUTO: 15.76 K/UL (ref 3.9–12.7)

## 2022-10-21 PROCEDURE — 99233 PR SUBSEQUENT HOSPITAL CARE,LEVL III: ICD-10-PCS | Mod: GC,,, | Performed by: INTERNAL MEDICINE

## 2022-10-21 PROCEDURE — 36415 COLL VENOUS BLD VENIPUNCTURE: CPT | Performed by: STUDENT IN AN ORGANIZED HEALTH CARE EDUCATION/TRAINING PROGRAM

## 2022-10-21 PROCEDURE — 99233 SBSQ HOSP IP/OBS HIGH 50: CPT | Mod: GC,,, | Performed by: INTERNAL MEDICINE

## 2022-10-21 PROCEDURE — 25000003 PHARM REV CODE 250: Performed by: STUDENT IN AN ORGANIZED HEALTH CARE EDUCATION/TRAINING PROGRAM

## 2022-10-21 PROCEDURE — 20600001 HC STEP DOWN PRIVATE ROOM

## 2022-10-21 PROCEDURE — 25000003 PHARM REV CODE 250

## 2022-10-21 PROCEDURE — 25000003 PHARM REV CODE 250: Performed by: HOSPITALIST

## 2022-10-21 PROCEDURE — 85025 COMPLETE CBC W/AUTO DIFF WBC: CPT | Performed by: STUDENT IN AN ORGANIZED HEALTH CARE EDUCATION/TRAINING PROGRAM

## 2022-10-21 PROCEDURE — 80053 COMPREHEN METABOLIC PANEL: CPT | Performed by: STUDENT IN AN ORGANIZED HEALTH CARE EDUCATION/TRAINING PROGRAM

## 2022-10-21 PROCEDURE — 83735 ASSAY OF MAGNESIUM: CPT | Performed by: STUDENT IN AN ORGANIZED HEALTH CARE EDUCATION/TRAINING PROGRAM

## 2022-10-21 PROCEDURE — 84100 ASSAY OF PHOSPHORUS: CPT | Performed by: STUDENT IN AN ORGANIZED HEALTH CARE EDUCATION/TRAINING PROGRAM

## 2022-10-21 PROCEDURE — 27000207 HC ISOLATION

## 2022-10-21 PROCEDURE — S0030 INJECTION, METRONIDAZOLE: HCPCS | Performed by: STUDENT IN AN ORGANIZED HEALTH CARE EDUCATION/TRAINING PROGRAM

## 2022-10-21 RX ORDER — SODIUM,POTASSIUM PHOSPHATES 280-250MG
1 POWDER IN PACKET (EA) ORAL ONCE
Status: COMPLETED | OUTPATIENT
Start: 2022-10-21 | End: 2022-10-21

## 2022-10-21 RX ADMIN — PANTOPRAZOLE SODIUM 40 MG: 40 TABLET, DELAYED RELEASE ORAL at 04:10

## 2022-10-21 RX ADMIN — RIFAXIMIN 550 MG: 550 TABLET ORAL at 10:10

## 2022-10-21 RX ADMIN — PANTOPRAZOLE SODIUM 40 MG: 40 TABLET, DELAYED RELEASE ORAL at 05:10

## 2022-10-21 RX ADMIN — VANCOMYCIN HYDROCHLORIDE 125 MG: KIT at 06:10

## 2022-10-21 RX ADMIN — RIFAXIMIN 550 MG: 550 TABLET ORAL at 09:10

## 2022-10-21 RX ADMIN — FOLIC ACID 1 MG: 1 TABLET ORAL at 09:10

## 2022-10-21 RX ADMIN — ATORVASTATIN CALCIUM 40 MG: 40 TABLET, FILM COATED ORAL at 09:10

## 2022-10-21 RX ADMIN — METRONIDAZOLE 500 MG: 500 INJECTION, SOLUTION INTRAVENOUS at 05:10

## 2022-10-21 RX ADMIN — POTASSIUM & SODIUM PHOSPHATES POWDER PACK 280-160-250 MG 1 PACKET: 280-160-250 PACK at 10:10

## 2022-10-21 RX ADMIN — VANCOMYCIN HYDROCHLORIDE 125 MG: KIT at 05:10

## 2022-10-21 RX ADMIN — METRONIDAZOLE 500 MG: 500 INJECTION, SOLUTION INTRAVENOUS at 10:10

## 2022-10-21 RX ADMIN — ALLOPURINOL 100 MG: 100 TABLET ORAL at 09:10

## 2022-10-21 RX ADMIN — VANCOMYCIN HYDROCHLORIDE 125 MG: KIT at 12:10

## 2022-10-21 RX ADMIN — ESCITALOPRAM OXALATE 10 MG: 10 TABLET ORAL at 09:10

## 2022-10-21 RX ADMIN — METRONIDAZOLE 500 MG: 500 INJECTION, SOLUTION INTRAVENOUS at 12:10

## 2022-10-21 NOTE — PLAN OF CARE
Problem: Adult Inpatient Plan of Care  Goal: Plan of Care Review  Outcome: Ongoing, Progressing  Goal: Patient-Specific Goal (Individualized)  Outcome: Ongoing, Progressing  Goal: Absence of Hospital-Acquired Illness or Injury  Outcome: Ongoing, Progressing  Goal: Optimal Comfort and Wellbeing  Outcome: Ongoing, Progressing  Goal: Readiness for Transition of Care  Outcome: Ongoing, Progressing     Problem: Fall Injury Risk  Goal: Absence of Fall and Fall-Related Injury  Outcome: Ongoing, Progressing     Problem: Infection  Goal: Absence of Infection Signs and Symptoms  Outcome: Ongoing, Progressing     Problem: Impaired Wound Healing  Goal: Optimal Wound Healing  Outcome: Ongoing, Progressing

## 2022-10-21 NOTE — PLAN OF CARE
Problem: Adult Inpatient Plan of Care  Goal: Plan of Care Review  Outcome: Ongoing, Progressing  Goal: Patient-Specific Goal (Individualized)  Outcome: Ongoing, Progressing  Goal: Absence of Hospital-Acquired Illness or Injury  Outcome: Ongoing, Progressing  Goal: Optimal Comfort and Wellbeing  Outcome: Ongoing, Progressing  Goal: Readiness for Transition of Care  Outcome: Ongoing, Progressing     Problem: Fall Injury Risk  Goal: Absence of Fall and Fall-Related Injury  Outcome: Ongoing, Progressing     Problem: Skin Injury Risk Increased  Goal: Skin Health and Integrity  Outcome: Ongoing, Progressing     Problem: Diabetes Comorbidity  Goal: Blood Glucose Level Within Targeted Range  Outcome: Ongoing, Progressing     Problem: Infection  Goal: Absence of Infection Signs and Symptoms  Outcome: Ongoing, Progressing     Problem: Impaired Wound Healing  Goal: Optimal Wound Healing  Outcome: Ongoing, Progressing

## 2022-10-21 NOTE — PROGRESS NOTES
[facility]  Adult Nutrition  Progress Note    SUMMARY       Recommendations    When medically able, ADAT low fiber/residue diet with texture per SLP    Continue Boost Plus and Boost Breeze TID to optimize nutrient intake     Add Manish BID to promote wound healing    If PO intake remains low and TF warranted: Makenzie Dahl Standard 1.4 @ 60ml/hr to provide 2016 kcal, 89g protein and 1037ml fluid.     If unable to tolerate PO and EN, initiate TPN: 100g AA, 300g D + IL to provide 1920 kcal, 100g protein. GIR = 2.49mg/kg/min    RD to monitor and follow    Goals: Meet % EEN, EPN by RD f/u  Nutrition Goal Status: new  Communication of RD Recs:  (POC)    Assessment and Plan    Nutrition Problem:  Severe Protein-Calorie Malnutrition  Malnutrition in the context of Chronic Illness/Injury    Related to (etiology):  Inadequate nutrient intake     Signs and Symptoms (as evidenced by):  Body Fat Depletion: moderate depletion of orbitals   Muscle Mass Depletion: mild and severe depletion of temples, clavicle region, and interosseous muscle   Weight Loss: 14% x 5 months     Interventions(treatment strategy):  Collaboration with other providers    Nutrition Diagnosis Status:  New      Malnutrition Assessment  Weight Loss (Malnutrition):  (14% x 5 months)   Orbital Region (Subcutaneous Fat Loss): moderate depletion   [place] (Muscle Loss): severe depletion  Clavicle Bone Region (Muscle Loss): mild depletion  Clavicle and Acromion Bone Region (Muscle Loss): mild depletion  Dorsal Hand (Muscle Loss): mild depletion    Reason for Assessment    Reason For Assessment: RD follow-up  Diagnosis:  (C.diff colitis)  Relevant Medical History: HF, HTN, HLD, T2DM, gout, cirrhosis, GI bleed  Interdisciplinary Rounds: did not attend    General Information Comments:   Pt present from SNF for diarrhea, copious black stool and acute altered mental status. C.diff positive. BM had decreased in volume but still moderate with dark red blood  "noted. Pt was asleep during RD visits. Per chart review, pt tolerating 25% of meals. Pt was Npo/FLD since admit, briefly on Regular diet on 10/15. UBW 215lb per chart. Noted wt loss of 31lb (14%) x 5 months. NFPE completed today, noted mild-severe fat and muscle depletion. Pt meet criteria for severe malnutrition in the context of chronic illness.     Nutrition Discharge Planning: Pending medical course    Nutrition Risk Screen    Nutrition Risk Screen: dysphagia or difficulty swallowing    Nutrition/Diet History    Spiritual, Cultural Beliefs, Episcopal Practices, Values that Affect Care: no    Anthropometrics    Temp: 97.7 °F (36.5 °C)  Height: 5' 10" (177.8 cm)  Height (inches): 70 in  Weight Method: Bed Scale  Weight: 83.5 kg (184 lb)  Weight (lb): 184 lb  Ideal Body Weight (IBW), Male: 166 lb  % Ideal Body Weight, Male (lb): 110.84 %  BMI (Calculated): 26.4     Lab/Procedures/Meds    Pertinent Labs Reviewed: reviewed  Pertinent Labs Comments: glucose 135, BUN 27, eGFR 54.5, P 2.3, Hgb 9.8, Hct 29.3  Pertinent Medications Reviewed: reviewed  Pertinent Medications Comments: pantoprazole, folic acid    Estimated/Assessed Needs    Weight Used For Calorie Calculations: 83.5 kg (184 lb)  Energy Calorie Requirements (kcal): 1920 kcal  Energy Need Method: [place]-St Jeor (PAL 1.25)  Protein Requirements: 83-100g (1-1.2g/kg)  Weight Used For Protein Calculations: 83.5 kg (184 lb)  Fluid Requirements (mL): 1ml/kcal or per MD  Estimated Fluid Requirement Method: RDA Method  RDA Method (mL): 1920  CHO Requirement: 240g    Nutrition Prescription Ordered    Current Diet Order: FLD  Oral Nutrition Supplement: Boost Plus, Boost Breeze    Evaluation of Received Nutrient/Fluid Intake    I/O: - 2.3L since admit  Energy Calories Required: not meeting needs  Protein Required: not meeting needs  Comments: LBM 10/20  Tolerance: tolerating    Nutrition Risk    Level of Risk/Frequency of Follow-up: low     Monitor and " Evaluation    Food and Nutrient Intake: energy intake, food and beverage intake  Food and Nutrient Adminstration: diet order, enteral and parenteral nutrition administration  Physical Activity and Function: nutrition-related ADLs and IADLs  Anthropometric Measurements: height/length, weight, weight change, body mass index  Biochemical Data, Medical Tests and Procedures: electrolyte and renal panel, gastrointestinal profile, glucose/endocrine profile, inflammatory profile, lipid profile  Nutrition-Focused Physical Findings: overall appearance     Nutrition Follow-Up    RD Follow-up?: Yes    Steph SANDERS

## 2022-10-21 NOTE — ASSESSMENT & PLAN NOTE
Nutrition Problem:  Severe Protein-Calorie Malnutrition  Malnutrition in the context of Chronic Illness/Injury    Related to (etiology):  Inadequate nutrient intake     Signs and Symptoms (as evidenced by):  Body Fat Depletion: moderate depletion of orbitals   Muscle Mass Depletion: mild and severe depletion of temples, clavicle region, and interosseous muscle   Weight Loss: 14% x 5 months     Interventions(treatment strategy):  Collaboration with other providers    Nutrition Diagnosis Status:  New

## 2022-10-21 NOTE — PLAN OF CARE
[facility] - [facility]  Discharge Reassessment    Primary Care Provider: Matty Garsia MD    Expected Discharge Date: 10/25/2022    Reassessment (most recent)       Discharge Reassessment - 10/21/22 1103          Discharge Reassessment    Assessment Type Discharge Planning Reassessment     Did the patient's condition or plan change since previous assessment? No     Discharge Plan discussed with: Patient     Communicated ELMER with patient/caregiver Yes     Discharge Plan A Return to nursing home     Discharge Plan B Home Health     DME Needed Upon Discharge  other (see comments)   Unknonw at this time    Discharge Barriers Identified None        Post-Acute Status    Post-Acute Authorization Placement     Post-Acute Placement Status Set-up Complete/Auth obtained     Coverage Medicare     Hospital Resources/Appts/Education Provided Provided patient/caregiver with written discharge plan information     Discharge Delays None known at this time                     Pt not ready for discharge due to: Not medically ready  SW will remain available for families in [facility].  Currently pt has d/c plans in progress at this time.     Pt will return to [facility]- [phone], at d/c.    Apoorva Botello LCSW  Case Management/[facility]  [phone]

## 2022-10-21 NOTE — SUBJECTIVE & OBJECTIVE
Interval History: NAEON. Pt improving clinically. BM have decreased in volume however still moderate with dark red blood noted. HH stable.  Pt more alert however more altered. Pt on PO vanc and Flagyl for C.diff. KUB negative for any findings. TRENT improved with a Cr of 1.3 today.    Review of Systems   Unable to perform ROS: Mental status change   Objective:     Vital Signs (Most Recent):  Temp: 97.7 °F (36.5 °C) (10/21/22 0823)  Pulse: 68 (10/21/22 0823)  Resp: 18 (10/21/22 0823)  BP: 122/67 (10/21/22 0823)  SpO2: 98 % (10/21/22 0823)   Vital Signs (24h Range):  Temp:  [96 °F (35.6 °C)-98 °F (36.7 °C)] 97.7 °F (36.5 °C)  Pulse:  [65-74] 68  Resp:  [16-20] 18  SpO2:  [91 %-99 %] 98 %  BP: ()/(51-68) 122/67     Weight: 83.5 kg (184 lb)  Body mass index is 26.4 kg/m².    Intake/Output Summary (Last 24 hours) at 10/21/2022 0949  Last data filed at 10/21/2022 0605  Gross per 24 hour   Intake --   Output 375 ml   Net -375 ml        Physical Exam  Constitutional:       Appearance: He is ill-appearing.   HENT:      Head:      Comments: Subcutaneous lesion hard to palpation located on right frontal skull.   Eyes:      General: No scleral icterus.     Conjunctiva/sclera: Conjunctivae normal.   Cardiovascular:      Rate and Rhythm: Normal rate and regular rhythm.      Pulses: Normal pulses.      Heart sounds: Normal heart sounds.   Pulmonary:      Effort: Pulmonary effort is normal. No respiratory distress.      Breath sounds: Normal breath sounds.   Chest:      Comments: Pacemaker in place in right upper chest wall.   Abdominal:      General: Bowel sounds are normal. There is distension.      Palpations: Abdomen is soft.      Tenderness: There is no abdominal tenderness.   Musculoskeletal:      Right lower leg: Edema present.      Left lower leg: Edema present.   Skin:     General: Skin is warm and dry.      Findings: No bruising or rash.   Neurological:      GCS: GCS eye subscore is 4. GCS verbal subscore is 3. GCS  motor subscore is 5.       Significant Labs: All pertinent labs within the past 24 hours have been reviewed.  A1C:   Recent Labs   Lab 07/19/22  1149   HGBA1C 6.1*       ABGs:   No results for input(s): PH, PCO2, HCO3, POCSATURATED, BE, TOTALHB, COHB, METHB, O2HB, POCFIO2, PO2 in the last 48 hours.    Bilirubin:   Recent Labs   Lab 10/17/22  0541 10/18/22  0458 10/19/22  0545 10/20/22  0614 10/21/22  0449   BILITOT 1.3* 0.8 0.8 0.6 0.7       Blood Culture:   No results for input(s): LABBLOO in the last 48 hours.    BMP:   Recent Labs   Lab 10/21/22  0449   *      K 3.2*   *   CO2 15*   BUN 27*   CREATININE 1.3   CALCIUM 8.2*   MG 1.7       CBC:   Recent Labs   Lab 10/20/22  0614 10/21/22  0449   WBC 18.65* 15.76*   HGB 9.5* 9.8*   HCT 30.3* 29.3*    88*       CMP:   Recent Labs   Lab 10/20/22  0614 10/21/22  0449    137   K 3.1* 3.2*   * 113*   CO2 17* 15*   * 135*   BUN 30* 27*   CREATININE 1.5* 1.3   CALCIUM 8.1* 8.2*   PROT 4.3* 4.5*   ALBUMIN 1.8* 2.2*   BILITOT 0.6 0.7   ALKPHOS 149* 119   AST 23 22   ALT 15 12   ANIONGAP 10 9       Cardiac Markers:   No results for input(s): CKMB, MYOGLOBIN, BNP, TROPISTAT in the last 48 hours.    Coagulation:   No results for input(s): PT, INR, APTT in the last 48 hours.    Lactic Acid:   No results for input(s): LACTATE in the last 48 hours.    Lipase:   No results for input(s): LIPASE in the last 48 hours.    Lipid Panel: No results for input(s): CHOL, HDL, LDLCALC, TRIG, CHOLHDL in the last 48 hours.  Magnesium:   Recent Labs   Lab 10/20/22  0614 10/21/22  0449   MG 1.7 1.7       Pathology Results  (Last 10 years)      None          POCT Glucose:   No results for input(s): POCTGLUCOSE in the last 48 hours.    Prealbumin: No results for input(s): PREALBUMIN in the last 48 hours.  Respiratory Culture: No results for input(s): GSRESP, RESPIRATORYC in the last 48 hours.  Troponin:   No results for input(s): TROPONINI, TROPONINIHS in  the last 48 hours.    TSH:   Recent Labs   Lab 07/19/22  1149   TSH 0.555       Urine Culture: No results for input(s): LABURIN in the last 48 hours.  Urine Studies:   No results for input(s): COLORU, APPEARANCEUA, PHUR, SPECGRAV, PROTEINUA, GLUCUA, KETONESU, BILIRUBINUA, OCCULTUA, NITRITE, UROBILINOGEN, LEUKOCYTESUR, RBCUA, WBCUA, BACTERIA, SQUAMEPITHEL, HYALINECASTS in the last 48 hours.    Invalid input(s): ROMERO      Significant Imaging: I have reviewed all pertinent imaging results/findings within the past 24 hours.

## 2022-10-21 NOTE — PLAN OF CARE
Recommendations    When medically able, ADAT low fiber/residue diet with texture per SLP    Continue Boost Plus and Boost Breeze TID to optimize nutrient intake     Add Manish BID to promote wound healing    If PO intake remains low and TF warranted: Makenzie [place] Standard 1.4 @ 60ml/hr to provide 2016 kcal, 89g protein and 1037ml fluid.     If unable to tolerate PO and EN, initiate TPN: 100g AA, 300g D + IL to provide 1920 kcal, 100g protein. GIR = 2.49mg/kg/min    RD to monitor and follow    Goals: Meet % EEN, EPN by RD f/u  Nutrition Goal Status: new  Communication of RD Recs:  (POC)

## 2022-10-21 NOTE — PROGRESS NOTES
[facility] Medicine  Progress Note    Patient Name: Jacques Arellano  MRN: [id number]  Patient Class: IP- Inpatient   Admission Date: 10/14/2022  Length of Stay: 7 days  Attending Physician: Nemesio Pearson MD  Primary Care Provider: Matty Garsia MD        Subjective:     Principal Problem:C. difficile colitis        HPI:  83 yo M with PMhx of CHF, AFib, Watchman, pacer, expansile brain lesion, pacemaker [place] [facility], copious black stool and acute altered mental status. Pt reports he has 3-5 profuse watery BM for the last few days noting a little bit of blood. Pt was recently admitted to [facility] ED on 10/12 for abdominal pain and coffee ground emesis, EGD revealed grade D esophagitis and gastritis with no overt GI bleeding and was subsequently discharged with Protonix. Pt was recently admitted to [facility] ICU on 9/23 for sepsis thought to be secondary to E coli. UTI vs cirrhosis and was treated with antibiotic and pressors . Inpatient workup revealed new dx of cirrhosis with ascites no spb, aortic aneurysm, and expansile Calvarial lesion, pt was DC on 10/13 back to [facility] [place].      Today pt H & H stable. CTA abd shows colitis, cirrhosis/ascites, and new potential HCC. BNP elevated at 544 however XCR and PE unconcerning for CHF exacerbation. Mentation improved while in ED - CT head stable.       Overview/Hospital Course:  CTA A/P was performed urgently, without evidence of contrast extravasation though showing aforementioned liver lesion and rectosigmoid colitis. Stool cultures returned C diff +ve and he was placed on PO vancomycin therapy. He underwent paracentesis and analysis of ascitic fluid was not indicative of SBP. Cultures and cytology are pending. He has been on IV PPI and octreotide gtt (since discontinued) with initial plans to scope, however, with stabilization of blood counts and CDI, this is no longer indicated. More awake today and conversant.          Interval History: NAEON. Pt improving clinically. BM have decreased in volume however still moderate with dark red blood noted. HH stable.  Pt more alert however more altered. Pt on PO vanc and Flagyl for C.diff. KUB negative for any findings. TRENT improved with a Cr of 1.3 today.    Review of Systems   Unable to perform ROS: Mental status change   Objective:     Vital Signs (Most Recent):  Temp: 97.7 °F (36.5 °C) (10/21/22 0823)  Pulse: 68 (10/21/22 0823)  Resp: 18 (10/21/22 0823)  BP: 122/67 (10/21/22 0823)  SpO2: 98 % (10/21/22 0823)   Vital Signs (24h Range):  Temp:  [96 °F (35.6 °C)-98 °F (36.7 °C)] 97.7 °F (36.5 °C)  Pulse:  [65-74] 68  Resp:  [16-20] 18  SpO2:  [91 %-99 %] 98 %  BP: ()/(51-68) 122/67     Weight: 83.5 kg (184 lb)  Body mass index is 26.4 kg/m².    Intake/Output Summary (Last 24 hours) at 10/21/2022 0949  Last data filed at 10/21/2022 0605  Gross per 24 hour   Intake --   Output 375 ml   Net -375 ml        Physical Exam  Constitutional:       Appearance: He is ill-appearing.   HENT:      Head:      Comments: Subcutaneous lesion hard to palpation located on right frontal skull.   Eyes:      General: No scleral icterus.     Conjunctiva/sclera: Conjunctivae normal.   Cardiovascular:      Rate and Rhythm: Normal rate and regular rhythm.      Pulses: Normal pulses.      Heart sounds: Normal heart sounds.   Pulmonary:      Effort: Pulmonary effort is normal. No respiratory distress.      Breath sounds: Normal breath sounds.   Chest:      Comments: Pacemaker in place in right upper chest wall.   Abdominal:      General: Bowel sounds are normal. There is distension.      Palpations: Abdomen is soft.      Tenderness: There is no abdominal tenderness.   Musculoskeletal:      Right lower leg: Edema present.      Left lower leg: Edema present.   Skin:     General: Skin is warm and dry.      Findings: No bruising or rash.   Neurological:      GCS: GCS eye subscore is 4. GCS verbal subscore is  3. GCS motor subscore is 5.       Significant Labs: All pertinent labs within the past 24 hours have been reviewed.  A1C:   Recent Labs   Lab 07/19/22  1149   HGBA1C 6.1*       ABGs:   No results for input(s): PH, PCO2, HCO3, POCSATURATED, BE, TOTALHB, COHB, METHB, O2HB, POCFIO2, PO2 in the last 48 hours.    Bilirubin:   Recent Labs   Lab 10/17/22  0541 10/18/22  0458 10/19/22  0545 10/20/22  0614 10/21/22  0449   BILITOT 1.3* 0.8 0.8 0.6 0.7       Blood Culture:   No results for input(s): LABBLOO in the last 48 hours.    BMP:   Recent Labs   Lab 10/21/22  0449   *      K 3.2*   *   CO2 15*   BUN 27*   CREATININE 1.3   CALCIUM 8.2*   MG 1.7       CBC:   Recent Labs   Lab 10/20/22  0614 10/21/22  0449   WBC 18.65* 15.76*   HGB 9.5* 9.8*   HCT 30.3* 29.3*    88*       CMP:   Recent Labs   Lab 10/20/22  0614 10/21/22  0449    137   K 3.1* 3.2*   * 113*   CO2 17* 15*   * 135*   BUN 30* 27*   CREATININE 1.5* 1.3   CALCIUM 8.1* 8.2*   PROT 4.3* 4.5*   ALBUMIN 1.8* 2.2*   BILITOT 0.6 0.7   ALKPHOS 149* 119   AST 23 22   ALT 15 12   ANIONGAP 10 9       Cardiac Markers:   No results for input(s): CKMB, MYOGLOBIN, BNP, TROPISTAT in the last 48 hours.    Coagulation:   No results for input(s): PT, INR, APTT in the last 48 hours.    Lactic Acid:   No results for input(s): LACTATE in the last 48 hours.    Lipase:   No results for input(s): LIPASE in the last 48 hours.    Lipid Panel: No results for input(s): CHOL, HDL, LDLCALC, TRIG, CHOLHDL in the last 48 hours.  Magnesium:   Recent Labs   Lab 10/20/22  0614 10/21/22  0449   MG 1.7 1.7       Pathology Results  (Last 10 years)      None          POCT Glucose:   No results for input(s): POCTGLUCOSE in the last 48 hours.    Prealbumin: No results for input(s): PREALBUMIN in the last 48 hours.  Respiratory Culture: No results for input(s): GSRESP, RESPIRATORYC in the last 48 hours.  Troponin:   No results for input(s): TROPONINI,  TROPONINIHS in the last 48 hours.    TSH:   Recent Labs   Lab 07/19/22  1149   TSH 0.555       Urine Culture: No results for input(s): LABURIN in the last 48 hours.  Urine Studies:   No results for input(s): COLORU, APPEARANCEUA, PHUR, SPECGRAV, PROTEINUA, GLUCUA, KETONESU, BILIRUBINUA, OCCULTUA, NITRITE, UROBILINOGEN, LEUKOCYTESUR, RBCUA, WBCUA, BACTERIA, SQUAMEPITHEL, HYALINECASTS in the last 48 hours.    Invalid input(s): WRIGHTSUR      Significant Imaging: I have reviewed all pertinent imaging results/findings within the past 24 hours.      Assessment/Plan:      * C. difficile colitis  Pt endorses profuse watery diarrhea for the past few days. Concerning for possible C diff with recent aggressive antibiotic regimen     Plan:  - Stool studies pending  - C diff positive; starting Po Vancomycin, Adding flagyl  10/19  - Per Hepatology, added Rifaximin BID.   - WBC up trending, KUB to r/o toxic megacolon     GI bleed  Pt endorses melena. CT scan with no evidence of contrast extravasation to suggest active GI bleed at this time. Stable 3 cm enhancing lesion at the hepatic dome with evidence of liver cirrhosis, circumferential wall thickening of the rectum and sigmoid colon concern for colitis, and pancreatic hypodensities measuring up to 2.0 cm. Considering liver disease, suspicion for ruptured esophogeal varcies. Recent EGD at [facility] reviewed, will discuss with GI    Plan:  - CBC q12, trend H&H  - GI planning for endoscopy & sigmoidoscopy deferred due to C.diff and stable HH  - PPI 40mg BID   - CTX for sbp ppx    Other cirrhosis of liver  9/23: Ct A/P:Cirrhotic morphology of the liver with sequelae of portal hypertension as evidence by splenomegaly, intra-abdominal ascites, and possible portal hypertensive enteropathy. Pt denies hx of alcohol abuse.  R preformed diagnostic paracentetics on 9/27 to r/o SBP: which was negative 130 WBC and 10%     Plan:   - Daily CMP   - Hepatology following; paracentesis with IR  with 2.4L with cytology collected  - Started on Rifaximin BID per hepatology recommendations  - Cytology from para collected, negative for malignancy  - Albumin 25g to optimize fluid status.      Chronic gout  Plan:  Continue home allopurinol.        Type 2 diabetes mellitus without complication, without long-term current use of insulin  Last A1c 6.1 3 months ago. Hypoglycemic on admission.      Plan:  - No home medications  Noted    - POCT glucose ACHS   - Low threshold to start LDSSI     Mixed hyperlipidemia  Plan:  - Continue home statin        Primary hypertension  Home medications: triamterene-HCTZ, Lasix     Plan:  - Hold home medications s/o hypotension     Chronic diastolic heart failure  9/24 ANNE: The left ventricle is normal in size with low normal systolic function. The estimated ejection fraction is 50%.There is abnormal septal wall motion consistent with right ventricular pacing. Mild right ventricular enlargement with mildly reduced right ventricular systolic function. BNP:544    Plan:  - Low threshold for Lasix as CXR/lung sounds clear while saturating well on RA.   - 20 mg today in setting of tachypnea and pulm congestion         Permanent atrial fibrillation  Patient with documented history of permanent Afib s/p watchman, no longer on AC or rate controlling agents.  [id number] 5   HAS-BLED 4      Plan:  - Patient rate controlled, no indication for further agents at this time  - No full AC required s/p Watchman; will hold AC ppx and APT s/o active bleeding from abrasion sites  - Cardiac telemetry  - Maintain K > 4, Mg > 2, Ca wnl; replete PRN   - STAT cardiology consult if hemodynamic instability for DCCV evaluation      VTE Risk Mitigation (From admission, onward)         Ordered     IP VTE HIGH RISK PATIENT  Once         10/14/22 1826     Place sequential compression device  Until discontinued         10/14/22 1826                Discharge Planning   ELMER: 10/25/2022     Code Status: DNR   Is the  patient medically ready for discharge?: No    Reason for patient still in hospital (select all that apply): Patient trending condition  Discharge Plan A: Return to nursing home   Discharge Delays: None known at this time              Starr Hodges DO  Department of Hospital Medicine   [facility]

## 2022-10-22 LAB
ALBUMIN SERPL BCP-MCNC: 2.1 G/DL (ref 3.5–5.2)
ALP SERPL-CCNC: 121 U/L (ref 55–135)
ALT SERPL W/O P-5'-P-CCNC: 13 U/L (ref 10–44)
ANION GAP SERPL CALC-SCNC: 9 MMOL/L (ref 8–16)
AST SERPL-CCNC: 23 U/L (ref 10–40)
BASOPHILS # BLD AUTO: 0.07 K/UL (ref 0–0.2)
BASOPHILS NFR BLD: 0.4 % (ref 0–1.9)
BILIRUB SERPL-MCNC: 1 MG/DL (ref 0.1–1)
BUN SERPL-MCNC: 24 MG/DL (ref 8–23)
CALCIUM SERPL-MCNC: 7.9 MG/DL (ref 8.7–10.5)
CHLORIDE SERPL-SCNC: 110 MMOL/L (ref 95–110)
CO2 SERPL-SCNC: 15 MMOL/L (ref 23–29)
CREAT SERPL-MCNC: 1.2 MG/DL (ref 0.5–1.4)
DIFFERENTIAL METHOD: ABNORMAL
EOSINOPHIL # BLD AUTO: 0.1 K/UL (ref 0–0.5)
EOSINOPHIL NFR BLD: 0.5 % (ref 0–8)
ERYTHROCYTE [DISTWIDTH] IN BLOOD BY AUTOMATED COUNT: 17.3 % (ref 11.5–14.5)
EST. GFR  (NO RACE VARIABLE): >60 ML/MIN/1.73 M^2
GLUCOSE SERPL-MCNC: 125 MG/DL (ref 70–110)
HCT VFR BLD AUTO: 32.1 % (ref 40–54)
HGB BLD-MCNC: 10.2 G/DL (ref 14–18)
IMM GRANULOCYTES # BLD AUTO: 0.42 K/UL (ref 0–0.04)
IMM GRANULOCYTES NFR BLD AUTO: 2.1 % (ref 0–0.5)
LYMPHOCYTES # BLD AUTO: 1 K/UL (ref 1–4.8)
LYMPHOCYTES NFR BLD: 4.9 % (ref 18–48)
MAGNESIUM SERPL-MCNC: 1.6 MG/DL (ref 1.6–2.6)
MCH RBC QN AUTO: 30.4 PG (ref 27–31)
MCHC RBC AUTO-ENTMCNC: 31.8 G/DL (ref 32–36)
MCV RBC AUTO: 96 FL (ref 82–98)
MONOCYTES # BLD AUTO: 1.2 K/UL (ref 0.3–1)
MONOCYTES NFR BLD: 5.8 % (ref 4–15)
NEUTROPHILS # BLD AUTO: 17.2 K/UL (ref 1.8–7.7)
NEUTROPHILS NFR BLD: 86.3 % (ref 38–73)
NRBC BLD-RTO: 0 /100 WBC
PHOSPHATE SERPL-MCNC: 2.4 MG/DL (ref 2.7–4.5)
PLATELET # BLD AUTO: 116 K/UL (ref 150–450)
PMV BLD AUTO: 10.8 FL (ref 9.2–12.9)
POCT GLUCOSE: 230 MG/DL (ref 70–110)
POTASSIUM SERPL-SCNC: 3.4 MMOL/L (ref 3.5–5.1)
PROT SERPL-MCNC: 4.4 G/DL (ref 6–8.4)
RBC # BLD AUTO: 3.35 M/UL (ref 4.6–6.2)
SODIUM SERPL-SCNC: 134 MMOL/L (ref 136–145)
WBC # BLD AUTO: 19.96 K/UL (ref 3.9–12.7)

## 2022-10-22 PROCEDURE — 20600001 HC STEP DOWN PRIVATE ROOM

## 2022-10-22 PROCEDURE — 25000003 PHARM REV CODE 250: Performed by: HOSPITALIST

## 2022-10-22 PROCEDURE — 84100 ASSAY OF PHOSPHORUS: CPT | Performed by: STUDENT IN AN ORGANIZED HEALTH CARE EDUCATION/TRAINING PROGRAM

## 2022-10-22 PROCEDURE — 85025 COMPLETE CBC W/AUTO DIFF WBC: CPT | Performed by: STUDENT IN AN ORGANIZED HEALTH CARE EDUCATION/TRAINING PROGRAM

## 2022-10-22 PROCEDURE — 25000003 PHARM REV CODE 250: Performed by: STUDENT IN AN ORGANIZED HEALTH CARE EDUCATION/TRAINING PROGRAM

## 2022-10-22 PROCEDURE — 83735 ASSAY OF MAGNESIUM: CPT | Performed by: STUDENT IN AN ORGANIZED HEALTH CARE EDUCATION/TRAINING PROGRAM

## 2022-10-22 PROCEDURE — 80053 COMPREHEN METABOLIC PANEL: CPT | Performed by: STUDENT IN AN ORGANIZED HEALTH CARE EDUCATION/TRAINING PROGRAM

## 2022-10-22 PROCEDURE — 36415 COLL VENOUS BLD VENIPUNCTURE: CPT | Performed by: STUDENT IN AN ORGANIZED HEALTH CARE EDUCATION/TRAINING PROGRAM

## 2022-10-22 PROCEDURE — 99232 SBSQ HOSP IP/OBS MODERATE 35: CPT | Mod: GC,,, | Performed by: INTERNAL MEDICINE

## 2022-10-22 PROCEDURE — S0030 INJECTION, METRONIDAZOLE: HCPCS | Performed by: STUDENT IN AN ORGANIZED HEALTH CARE EDUCATION/TRAINING PROGRAM

## 2022-10-22 PROCEDURE — 27000207 HC ISOLATION

## 2022-10-22 PROCEDURE — 63600175 PHARM REV CODE 636 W HCPCS: Performed by: STUDENT IN AN ORGANIZED HEALTH CARE EDUCATION/TRAINING PROGRAM

## 2022-10-22 PROCEDURE — 99232 PR SUBSEQUENT HOSPITAL CARE,LEVL II: ICD-10-PCS | Mod: GC,,, | Performed by: INTERNAL MEDICINE

## 2022-10-22 RX ORDER — HEPARIN SODIUM 5000 [USP'U]/ML
5000 INJECTION, SOLUTION INTRAVENOUS; SUBCUTANEOUS EVERY 8 HOURS
Status: DISCONTINUED | OUTPATIENT
Start: 2022-10-22 | End: 2022-10-27

## 2022-10-22 RX ADMIN — PANTOPRAZOLE SODIUM 40 MG: 40 TABLET, DELAYED RELEASE ORAL at 05:10

## 2022-10-22 RX ADMIN — RIFAXIMIN 550 MG: 550 TABLET ORAL at 08:10

## 2022-10-22 RX ADMIN — METRONIDAZOLE 500 MG: 500 INJECTION, SOLUTION INTRAVENOUS at 12:10

## 2022-10-22 RX ADMIN — VANCOMYCIN HYDROCHLORIDE 125 MG: KIT at 12:10

## 2022-10-22 RX ADMIN — METRONIDAZOLE 500 MG: 500 INJECTION, SOLUTION INTRAVENOUS at 08:10

## 2022-10-22 RX ADMIN — VANCOMYCIN HYDROCHLORIDE 125 MG: KIT at 05:10

## 2022-10-22 RX ADMIN — FOLIC ACID 1 MG: 1 TABLET ORAL at 08:10

## 2022-10-22 RX ADMIN — ALLOPURINOL 100 MG: 100 TABLET ORAL at 08:10

## 2022-10-22 RX ADMIN — VANCOMYCIN HYDROCHLORIDE 125 MG: KIT at 11:10

## 2022-10-22 RX ADMIN — HEPARIN SODIUM 5000 UNITS: 5000 INJECTION INTRAVENOUS; SUBCUTANEOUS at 10:10

## 2022-10-22 RX ADMIN — ESCITALOPRAM OXALATE 10 MG: 10 TABLET ORAL at 08:10

## 2022-10-22 RX ADMIN — METRONIDAZOLE 500 MG: 500 INJECTION, SOLUTION INTRAVENOUS at 05:10

## 2022-10-22 RX ADMIN — ATORVASTATIN CALCIUM 40 MG: 40 TABLET, FILM COATED ORAL at 08:10

## 2022-10-22 NOTE — PROGRESS NOTES
[facility] Medicine  Progress Note    Patient Name: Jacques Arellano  MRN: [id number]  Patient Class: IP- Inpatient   Admission Date: 10/14/2022  Length of Stay: 8 days  Attending Physician: Nemesio Pearson MD  Primary Care Provider: Matyt Garsia MD        Subjective:     Principal Problem:C. difficile colitis        HPI:  83 yo M with PMhx of CHF, AFib, Watchman, pacer, expansile brain lesion, pacemaker presents to from [facility] for diarrhea, copious black stool and acute altered mental status. Pt reports he has 3-5 profuse watery BM for the last few days noting a little bit of blood. Pt was recently admitted to [facility] ED on 10/12 for abdominal pain and coffee ground emesis, EGD revealed grade D esophagitis and gastritis with no overt GI bleeding and was subsequently discharged with Protonix. Pt was recently admitted to [facility] ICU on 9/23 for sepsis thought to be secondary to E coli. UTI vs cirrhosis and was treated with antibiotic and pressors . Inpatient workup revealed new dx of cirrhosis with ascites no spb, aortic aneurysm, and expansile Calvarial lesion, pt was DC on 10/13 back to [facility].      Today pt H & H stable. CTA abd shows colitis, cirrhosis/ascites, and new potential HCC. BNP elevated at 544 however XCR and PE unconcerning for CHF exacerbation. Mentation improved while in ED - CT head stable.       Overview/Hospital Course:  CTA A/P was performed urgently, without evidence of contrast extravasation though showing aforementioned liver lesion and rectosigmoid colitis. Stool cultures returned C diff +ve and he was placed on PO vancomycin therapy. He underwent paracentesis and analysis of ascitic fluid was not indicative of SBP. Cultures and cytology are pending. He has been on IV PPI and octreotide gtt (since discontinued) with initial plans to scope, however, with stabilization of blood counts and CDI, this is no longer indicated. More awake today and conversant.          Interval History: No acute events overnight. Able to tolerate lq diet and BM are decreasing.   All questions answered.       Review of Systems   Unable to perform ROS: Mental status change   Objective:     Vital Signs (Most Recent):  Temp: 98.6 °F (37 °C) (10/22/22 1215)  Pulse: 74 (10/22/22 1400)  Resp: 18 (10/22/22 1215)  BP: 101/60 (10/22/22 1215)  SpO2: (!) 94 % (10/22/22 1215)   Vital Signs (24h Range):  Temp:  [97.6 °F (36.4 °C)-98.6 °F (37 °C)] 98.6 °F (37 °C)  Pulse:  [67-84] 74  Resp:  [16-18] 18  SpO2:  [92 %-98 %] 94 %  BP: ()/(58-79) 101/60     Weight: 83.5 kg (184 lb)  Body mass index is 26.4 kg/m².    Intake/Output Summary (Last 24 hours) at 10/22/2022 1440  Last data filed at 10/22/2022 0537  Gross per 24 hour   Intake --   Output 501 ml   Net -501 ml      Physical Exam  Constitutional:       Appearance: He is not ill-appearing.   HENT:      Head:      Comments: Subcutaneous lesion hard to palpation located on right frontal skull.   Eyes:      General: No scleral icterus.     Conjunctiva/sclera: Conjunctivae normal.   Cardiovascular:      Rate and Rhythm: Normal rate and regular rhythm.      Pulses: Normal pulses.      Heart sounds: Normal heart sounds.   Pulmonary:      Effort: Pulmonary effort is normal. No respiratory distress.      Breath sounds: Normal breath sounds.   Chest:      Comments: Pacemaker in place in right upper chest wall.   Abdominal:      General: Bowel sounds are normal. There is distension.      Palpations: Abdomen is soft.      Tenderness: There is no abdominal tenderness.   Musculoskeletal:      Right lower leg: Edema present.      Left lower leg: Edema present.   Skin:     General: Skin is warm and dry.      Findings: No bruising or rash.   Neurological:      GCS: GCS eye subscore is 4. GCS verbal subscore is 3. GCS motor subscore is 5.       Significant Labs: All pertinent labs within the past 24 hours have been reviewed.  A1C:   Recent Labs   Lab 07/19/22  1149    HGBA1C 6.1*     ABGs: No results for input(s): PH, PCO2, HCO3, POCSATURATED, BE, TOTALHB, COHB, METHB, O2HB, POCFIO2, PO2 in the last 48 hours.  Bilirubin:   Recent Labs   Lab 10/18/22  0458 10/19/22  0545 10/20/22  0614 10/21/22  0449 10/22/22  0817   BILITOT 0.8 0.8 0.6 0.7 1.0     Blood Culture: No results for input(s): LABBLOO in the last 48 hours.  BMP:   Recent Labs   Lab 10/22/22  0817   *   *   K 3.4*      CO2 15*   BUN 24*   CREATININE 1.2   CALCIUM 7.9*   MG 1.6     CBC:   Recent Labs   Lab 10/21/22  0449 10/22/22  0817   WBC 15.76* 19.96*   HGB 9.8* 10.2*   HCT 29.3* 32.1*   PLT 88* 116*     CMP:   Recent Labs   Lab 10/21/22  0449 10/22/22  0817    134*   K 3.2* 3.4*   * 110   CO2 15* 15*   * 125*   BUN 27* 24*   CREATININE 1.3 1.2   CALCIUM 8.2* 7.9*   PROT 4.5* 4.4*   ALBUMIN 2.2* 2.1*   BILITOT 0.7 1.0   ALKPHOS 119 121   AST 22 23   ALT 12 13   ANIONGAP 9 9     Cardiac Markers: No results for input(s): CKMB, MYOGLOBIN, BNP, TROPISTAT in the last 48 hours.  Coagulation: No results for input(s): PT, INR, APTT in the last 48 hours.  Lactic Acid: No results for input(s): LACTATE in the last 48 hours.  Lipase: No results for input(s): LIPASE in the last 48 hours.  Lipid Panel: No results for input(s): CHOL, HDL, LDLCALC, TRIG, CHOLHDL in the last 48 hours.  Magnesium:   Recent Labs   Lab 10/21/22  0449 10/22/22  0817   MG 1.7 1.6     Pathology Results  (Last 10 years)      None          POCT Glucose: No results for input(s): POCTGLUCOSE in the last 48 hours.  Prealbumin: No results for input(s): PREALBUMIN in the last 48 hours.  Respiratory Culture: No results for input(s): GSRESP, RESPIRATORYC in the last 48 hours.  Troponin: No results for input(s): TROPONINI, TROPONINIHS in the last 48 hours.  TSH:   Recent Labs   Lab 07/19/22  1149   TSH 0.555     Urine Culture: No results for input(s): LABURIN in the last 48 hours.  Urine Studies: No results for input(s):  COLORU, APPEARANCEUA, PHUR, SPECGRAV, PROTEINUA, GLUCUA, KETONESU, BILIRUBINUA, OCCULTUA, NITRITE, UROBILINOGEN, LEUKOCYTESUR, RBCUA, WBCUA, BACTERIA, SQUAMEPITHEL, HYALINECASTS in the last 48 hours.    Invalid input(s): ROMERO    Significant Imaging: I have reviewed all pertinent imaging results/findings within the past 24 hours.      Assessment/Plan:      * C. difficile colitis  Pt endorses profuse watery diarrhea for the past few days. Concerning for possible C diff with recent aggressive antibiotic regimen     Plan:  - Stool studies pending   - C diff positive; starting Po Vancomycin, Adding flagyl  10/19  - Per Hepatology, added Rifaximin BID.   - WBC up trending, KUB to r/o toxic megacolon     GI bleed  Pt endorses melena. CT scan with no evidence of contrast extravasation to suggest active GI bleed at this time. Stable 3 cm enhancing lesion at the hepatic dome with evidence of liver cirrhosis, circumferential wall thickening of the rectum and sigmoid colon concern for colitis, and pancreatic hypodensities measuring up to 2.0 cm. Considering liver disease, suspicion for ruptured esophogeal varcies. Recent EGD at [facility] reviewed, will discuss with GI     Plan:  - CBC q12, trend H&H  - GI planning for endoscopy & sigmoidoscopy deferred due to C.diff and stable HH  - PPI 40mg BID   - CTX for sbp ppx    Other cirrhosis of liver  9/23: Ct A/P:Cirrhotic morphology of the liver with sequelae of portal hypertension as evidence by splenomegaly, intra-abdominal ascites, and possible portal hypertensive enteropathy. Pt denies hx of alcohol abuse.  R preformed diagnostic paracentetics on 9/27 to r/o SBP: which was negative 130 WBC and 10%     Plan:    - Daily CMP   - Hepatology following; paracentesis with IR with 2.4L with cytology collected  - Started on Rifaximin BID per hepatology recommendations  - Cytology from para collected, negative for malignancy  - Albumin 25g to optimize fluid status.      Chronic  gout  Plan:  Continue home allopurinol.         Type 2 diabetes mellitus without complication, without long-term current use of insulin  Last A1c 6.1 3 months ago. Hypoglycemic on admission.      Plan:  - No home medications  Noted    - POCT glucose ACHS    - Low threshold to start LDSSI     Mixed hyperlipidemia  Plan:  - Continue home statin         Primary hypertension  Home medications: [id number]-HCTZ, Lasix     Plan:  - Hold home medications s/o hypotension      Chronic diastolic heart failure  9/24 ANNE: The left ventricle is normal in size with low normal systolic function. The estimated ejection fraction is 50%.There is abnormal septal wall motion consistent with right ventricular pacing. Mild right ventricular enlargement with mildly reduced right ventricular systolic function. BNP:544     Plan:  - Low threshold for Lasix as CXR/lung sounds clear while saturating well on RA.   - 20 mg today in setting of tachypnea and pulm congestion         Permanent atrial fibrillation  Patient with documented history of permanent Afib s/p watchman, no longer on AC or rate controlling agents.  [id number] 5   HAS-BLED 4      Plan:   - Patient rate controlled, no indication for further agents at this time  - No full AC required s/p Watchman; will hold AC ppx and APT s/o active bleeding from abrasion sites  - Cardiac telemetry  - Maintain K > 4, Mg > 2, Ca wnl; replete PRN     cardiology consult if hemodynamic instability for DCCV evaluation      VTE Risk Mitigation (From admission, onward)         Ordered     IP VTE HIGH RISK PATIENT  Once         10/14/22 1826     Place sequential compression device  Until discontinued         10/14/22 1826                Discharge Planning   ELMER: 10/26/2022     Code Status: DNR   Is the patient medically ready for discharge?: No    Reason for patient still in hospital (select all that apply): Patient trending condition  Discharge Plan A: Return to nursing home   Discharge Delays: None  known at this time              Serg Boone MD  Department of Hospital Medicine   [facility]

## 2022-10-22 NOTE — ASSESSMENT & PLAN NOTE
9/23: Ct A/P:Cirrhotic morphology of the liver with sequelae of portal hypertension as evidence by splenomegaly, intra-abdominal ascites, and possible portal hypertensive enteropathy. Pt denies hx of alcohol abuse.  R preformed diagnostic paracentetics on 9/27 to r/o SBP: which was negative 130 WBC and 10%     Plan:    - Daily CMP   - Hepatology following; paracentesis with IR with 2.4L with cytology collected  - Started on Rifaximin BID per hepatology recommendations  - Cytology from para collected, negative for malignancy  - Albumin 25g to optimize fluid status.

## 2022-10-22 NOTE — SUBJECTIVE & OBJECTIVE
Interval History: No acute events overnight. Able to tolerate lq diet and BM are decreasing.   All questions answered.       Review of Systems   Unable to perform ROS: Mental status change   Objective:     Vital Signs (Most Recent):  Temp: 98.6 °F (37 °C) (10/22/22 1215)  Pulse: 74 (10/22/22 1400)  Resp: 18 (10/22/22 1215)  BP: 101/60 (10/22/22 1215)  SpO2: (!) 94 % (10/22/22 1215)   Vital Signs (24h Range):  Temp:  [97.6 °F (36.4 °C)-98.6 °F (37 °C)] 98.6 °F (37 °C)  Pulse:  [67-84] 74  Resp:  [16-18] 18  SpO2:  [92 %-98 %] 94 %  BP: ()/(58-79) 101/60     Weight: 83.5 kg (184 lb)  Body mass index is 26.4 kg/m².    Intake/Output Summary (Last 24 hours) at 10/22/2022 1440  Last data filed at 10/22/2022 0537  Gross per 24 hour   Intake --   Output 501 ml   Net -501 ml      Physical Exam  Constitutional:       Appearance: He is not ill-appearing.   HENT:      Head:      Comments: Subcutaneous lesion hard to palpation located on right frontal skull.   Eyes:      General: No scleral icterus.     Conjunctiva/sclera: Conjunctivae normal.   Cardiovascular:      Rate and Rhythm: Normal rate and regular rhythm.      Pulses: Normal pulses.      Heart sounds: Normal heart sounds.   Pulmonary:      Effort: Pulmonary effort is normal. No respiratory distress.      Breath sounds: Normal breath sounds.   Chest:      Comments: Pacemaker in place in right upper chest wall.   Abdominal:      General: Bowel sounds are normal. There is distension.      Palpations: Abdomen is soft.      Tenderness: There is no abdominal tenderness.   Musculoskeletal:      Right lower leg: Edema present.      Left lower leg: Edema present.   Skin:     General: Skin is warm and dry.      Findings: No bruising or rash.   Neurological:      GCS: GCS eye subscore is 4. GCS verbal subscore is 3. GCS motor subscore is 5.       Significant Labs: All pertinent labs within the past 24 hours have been reviewed.  A1C:   Recent Labs   Lab 07/19/22  1149    HGBA1C 6.1*     ABGs: No results for input(s): PH, PCO2, HCO3, POCSATURATED, BE, TOTALHB, COHB, METHB, O2HB, POCFIO2, PO2 in the last 48 hours.  Bilirubin:   Recent Labs   Lab 10/18/22  0458 10/19/22  0545 10/20/22  0614 10/21/22  0449 10/22/22  0817   BILITOT 0.8 0.8 0.6 0.7 1.0     Blood Culture: No results for input(s): LABBLOO in the last 48 hours.  BMP:   Recent Labs   Lab 10/22/22  0817   *   *   K 3.4*      CO2 15*   BUN 24*   CREATININE 1.2   CALCIUM 7.9*   MG 1.6     CBC:   Recent Labs   Lab 10/21/22  0449 10/22/22  0817   WBC 15.76* 19.96*   HGB 9.8* 10.2*   HCT 29.3* 32.1*   PLT 88* 116*     CMP:   Recent Labs   Lab 10/21/22  0449 10/22/22  0817    134*   K 3.2* 3.4*   * 110   CO2 15* 15*   * 125*   BUN 27* 24*   CREATININE 1.3 1.2   CALCIUM 8.2* 7.9*   PROT 4.5* 4.4*   ALBUMIN 2.2* 2.1*   BILITOT 0.7 1.0   ALKPHOS 119 121   AST 22 23   ALT 12 13   ANIONGAP 9 9     Cardiac Markers: No results for input(s): CKMB, MYOGLOBIN, BNP, TROPISTAT in the last 48 hours.  Coagulation: No results for input(s): PT, INR, APTT in the last 48 hours.  Lactic Acid: No results for input(s): LACTATE in the last 48 hours.  Lipase: No results for input(s): LIPASE in the last 48 hours.  Lipid Panel: No results for input(s): CHOL, HDL, LDLCALC, TRIG, CHOLHDL in the last 48 hours.  Magnesium:   Recent Labs   Lab 10/21/22  0449 10/22/22  0817   MG 1.7 1.6     Pathology Results  (Last 10 years)      None          POCT Glucose: No results for input(s): POCTGLUCOSE in the last 48 hours.  Prealbumin: No results for input(s): PREALBUMIN in the last 48 hours.  Respiratory Culture: No results for input(s): GSRESP, RESPIRATORYC in the last 48 hours.  Troponin: No results for input(s): TROPONINI, TROPONINIHS in the last 48 hours.  TSH:   Recent Labs   Lab 07/19/22  1149   TSH 0.555     Urine Culture: No results for input(s): LABURIN in the last 48 hours.  Urine Studies: No results for input(s):  COLORU, APPEARANCEUA, PHUR, SPECGRAV, PROTEINUA, GLUCUA, KETONESU, BILIRUBINUA, OCCULTUA, NITRITE, UROBILINOGEN, LEUKOCYTESUR, RBCUA, WBCUA, BACTERIA, SQUAMEPITHEL, HYALINECASTS in the last 48 hours.    Invalid input(s): ROMERO    Significant Imaging: I have reviewed all pertinent imaging results/findings within the past 24 hours.

## 2022-10-22 NOTE — PLAN OF CARE
POC reviewed with pt, Pt does not verbalized understanding. AAOx1, to self only, speech is confused. VSS, paced on tele, pt frequently removing tele leads. Bruises and skin tears covering bilat UE and LE. Full liquid diet, tolerating well.  Childers intact, draing concentrated yellow urine, adequate UOP. 1 loose BM this shift. [employer]as[employer] camera in room.   All needs met, no complaints offered at this time. Bed locked in lowest position, call bell within reach. Frequent rounds for safety.   Problem: Adult Inpatient Plan of Care  Goal: Plan of Care Review  Outcome: Ongoing, Progressing  Goal: Patient-Specific Goal (Individualized)  Outcome: Ongoing, Progressing  Goal: Absence of Hospital-Acquired Illness or Injury  Outcome: Ongoing, Progressing  Goal: Optimal Comfort and Wellbeing  Outcome: Ongoing, Progressing  Goal: Readiness for Transition of Care  Outcome: Ongoing, Progressing     Problem: Fall Injury Risk  Goal: Absence of Fall and Fall-Related Injury  Outcome: Ongoing, Progressing     Problem: Skin Injury Risk Increased  Goal: Skin Health and Integrity  Outcome: Ongoing, Progressing     Problem: Infection  Goal: Absence of Infection Signs and Symptoms  Outcome: Ongoing, Progressing     Problem: Impaired Wound Healing  Goal: Optimal Wound Healing  Outcome: Ongoing, Progressing

## 2022-10-22 NOTE — ASSESSMENT & PLAN NOTE
Patient with documented history of permanent Afib s/p watchman, no longer on AC or rate controlling agents.  [id number]   HAS-BLED 4      Plan:   - Patient rate controlled, no indication for further agents at this time  - No full AC required s/p Watchman; will hold AC ppx and APT s/o active bleeding from abrasion sites  - Cardiac telemetry  - Maintain K > 4, Mg > 2, Ca wnl; replete PRN     cardiology consult if hemodynamic instability for DCCV evaluation

## 2022-10-22 NOTE — PLAN OF CARE
Pt is alert to self. Breathing is regular equal and unlabored bilaterally on room air. Pt had no complaints of pain. The pt had one large loose green/brown b/m at the start of night shift. Pt IV antibiotics infused as ordered.

## 2022-10-23 LAB
ALBUMIN SERPL BCP-MCNC: 2 G/DL (ref 3.5–5.2)
ALP SERPL-CCNC: 123 U/L (ref 55–135)
ALT SERPL W/O P-5'-P-CCNC: 12 U/L (ref 10–44)
ANION GAP SERPL CALC-SCNC: 7 MMOL/L (ref 8–16)
AST SERPL-CCNC: 27 U/L (ref 10–40)
BASOPHILS # BLD AUTO: 0.06 K/UL (ref 0–0.2)
BASOPHILS NFR BLD: 0.3 % (ref 0–1.9)
BILIRUB SERPL-MCNC: 1 MG/DL (ref 0.1–1)
BUN SERPL-MCNC: 24 MG/DL (ref 8–23)
CALCIUM SERPL-MCNC: 8.1 MG/DL (ref 8.7–10.5)
CHLORIDE SERPL-SCNC: 109 MMOL/L (ref 95–110)
CO2 SERPL-SCNC: 17 MMOL/L (ref 23–29)
CREAT SERPL-MCNC: 1.3 MG/DL (ref 0.5–1.4)
DIFFERENTIAL METHOD: ABNORMAL
EOSINOPHIL # BLD AUTO: 0.1 K/UL (ref 0–0.5)
EOSINOPHIL NFR BLD: 0.4 % (ref 0–8)
ERYTHROCYTE [DISTWIDTH] IN BLOOD BY AUTOMATED COUNT: 17.3 % (ref 11.5–14.5)
EST. GFR  (NO RACE VARIABLE): 54.5 ML/MIN/1.73 M^2
GLUCOSE SERPL-MCNC: 134 MG/DL (ref 70–110)
HCT VFR BLD AUTO: 31.2 % (ref 40–54)
HGB BLD-MCNC: 10.4 G/DL (ref 14–18)
IMM GRANULOCYTES # BLD AUTO: 0.3 K/UL (ref 0–0.04)
IMM GRANULOCYTES NFR BLD AUTO: 1.6 % (ref 0–0.5)
LYMPHOCYTES # BLD AUTO: 1.1 K/UL (ref 1–4.8)
LYMPHOCYTES NFR BLD: 6 % (ref 18–48)
MAGNESIUM SERPL-MCNC: 1.6 MG/DL (ref 1.6–2.6)
MCH RBC QN AUTO: 31.2 PG (ref 27–31)
MCHC RBC AUTO-ENTMCNC: 33.3 G/DL (ref 32–36)
MCV RBC AUTO: 94 FL (ref 82–98)
MONOCYTES # BLD AUTO: 1.1 K/UL (ref 0.3–1)
MONOCYTES NFR BLD: 6 % (ref 4–15)
NEUTROPHILS # BLD AUTO: 16.4 K/UL (ref 1.8–7.7)
NEUTROPHILS NFR BLD: 85.7 % (ref 38–73)
NRBC BLD-RTO: 0 /100 WBC
PHOSPHATE SERPL-MCNC: 2.3 MG/DL (ref 2.7–4.5)
PLATELET # BLD AUTO: 133 K/UL (ref 150–450)
PMV BLD AUTO: 11.2 FL (ref 9.2–12.9)
POTASSIUM SERPL-SCNC: 4.1 MMOL/L (ref 3.5–5.1)
PROT SERPL-MCNC: 4.5 G/DL (ref 6–8.4)
RBC # BLD AUTO: 3.33 M/UL (ref 4.6–6.2)
SODIUM SERPL-SCNC: 133 MMOL/L (ref 136–145)
WBC # BLD AUTO: 19.12 K/UL (ref 3.9–12.7)

## 2022-10-23 PROCEDURE — 20600001 HC STEP DOWN PRIVATE ROOM

## 2022-10-23 PROCEDURE — 84100 ASSAY OF PHOSPHORUS: CPT | Performed by: STUDENT IN AN ORGANIZED HEALTH CARE EDUCATION/TRAINING PROGRAM

## 2022-10-23 PROCEDURE — 85025 COMPLETE CBC W/AUTO DIFF WBC: CPT | Performed by: INTERNAL MEDICINE

## 2022-10-23 PROCEDURE — 36415 COLL VENOUS BLD VENIPUNCTURE: CPT | Performed by: STUDENT IN AN ORGANIZED HEALTH CARE EDUCATION/TRAINING PROGRAM

## 2022-10-23 PROCEDURE — 63600175 PHARM REV CODE 636 W HCPCS: Performed by: STUDENT IN AN ORGANIZED HEALTH CARE EDUCATION/TRAINING PROGRAM

## 2022-10-23 PROCEDURE — S0030 INJECTION, METRONIDAZOLE: HCPCS | Performed by: STUDENT IN AN ORGANIZED HEALTH CARE EDUCATION/TRAINING PROGRAM

## 2022-10-23 PROCEDURE — 80053 COMPREHEN METABOLIC PANEL: CPT | Performed by: STUDENT IN AN ORGANIZED HEALTH CARE EDUCATION/TRAINING PROGRAM

## 2022-10-23 PROCEDURE — 99232 SBSQ HOSP IP/OBS MODERATE 35: CPT | Mod: GC,,, | Performed by: INTERNAL MEDICINE

## 2022-10-23 PROCEDURE — 25000003 PHARM REV CODE 250

## 2022-10-23 PROCEDURE — 27000207 HC ISOLATION

## 2022-10-23 PROCEDURE — 25000003 PHARM REV CODE 250: Performed by: HOSPITALIST

## 2022-10-23 PROCEDURE — 83735 ASSAY OF MAGNESIUM: CPT | Performed by: STUDENT IN AN ORGANIZED HEALTH CARE EDUCATION/TRAINING PROGRAM

## 2022-10-23 PROCEDURE — 25000003 PHARM REV CODE 250: Performed by: STUDENT IN AN ORGANIZED HEALTH CARE EDUCATION/TRAINING PROGRAM

## 2022-10-23 PROCEDURE — 36415 COLL VENOUS BLD VENIPUNCTURE: CPT | Performed by: INTERNAL MEDICINE

## 2022-10-23 PROCEDURE — 99232 PR SUBSEQUENT HOSPITAL CARE,LEVL II: ICD-10-PCS | Mod: GC,,, | Performed by: INTERNAL MEDICINE

## 2022-10-23 RX ORDER — SODIUM,POTASSIUM PHOSPHATES 280-250MG
1 POWDER IN PACKET (EA) ORAL ONCE
Status: COMPLETED | OUTPATIENT
Start: 2022-10-23 | End: 2022-10-23

## 2022-10-23 RX ADMIN — HEPARIN SODIUM 5000 UNITS: 5000 INJECTION INTRAVENOUS; SUBCUTANEOUS at 01:10

## 2022-10-23 RX ADMIN — RIFAXIMIN 550 MG: 550 TABLET ORAL at 08:10

## 2022-10-23 RX ADMIN — ALLOPURINOL 100 MG: 100 TABLET ORAL at 08:10

## 2022-10-23 RX ADMIN — POTASSIUM & SODIUM PHOSPHATES POWDER PACK 280-160-250 MG 1 PACKET: 280-160-250 PACK at 11:10

## 2022-10-23 RX ADMIN — METRONIDAZOLE 500 MG: 500 INJECTION, SOLUTION INTRAVENOUS at 01:10

## 2022-10-23 RX ADMIN — VANCOMYCIN HYDROCHLORIDE 125 MG: KIT at 05:10

## 2022-10-23 RX ADMIN — VANCOMYCIN HYDROCHLORIDE 125 MG: KIT at 06:10

## 2022-10-23 RX ADMIN — METRONIDAZOLE 500 MG: 500 INJECTION, SOLUTION INTRAVENOUS at 06:10

## 2022-10-23 RX ADMIN — METRONIDAZOLE 500 MG: 500 INJECTION, SOLUTION INTRAVENOUS at 08:10

## 2022-10-23 RX ADMIN — FOLIC ACID 1 MG: 1 TABLET ORAL at 08:10

## 2022-10-23 RX ADMIN — HEPARIN SODIUM 5000 UNITS: 5000 INJECTION INTRAVENOUS; SUBCUTANEOUS at 08:10

## 2022-10-23 RX ADMIN — ESCITALOPRAM OXALATE 10 MG: 10 TABLET ORAL at 08:10

## 2022-10-23 RX ADMIN — PANTOPRAZOLE SODIUM 40 MG: 40 TABLET, DELAYED RELEASE ORAL at 06:10

## 2022-10-23 RX ADMIN — HEPARIN SODIUM 5000 UNITS: 5000 INJECTION INTRAVENOUS; SUBCUTANEOUS at 06:10

## 2022-10-23 RX ADMIN — PANTOPRAZOLE SODIUM 40 MG: 40 TABLET, DELAYED RELEASE ORAL at 05:10

## 2022-10-23 RX ADMIN — ATORVASTATIN CALCIUM 40 MG: 40 TABLET, FILM COATED ORAL at 08:10

## 2022-10-23 RX ADMIN — VANCOMYCIN HYDROCHLORIDE 125 MG: KIT at 11:10

## 2022-10-23 NOTE — PLAN OF CARE
POC reviewed with pt, Pt does not verbalized understanding. AAOx1, to self only, speech is confused. VSS, paced on tele, pt frequently removing tele leads. Bruises and skin tears covering bilat UE and LE. Full liquid diet, tolerating well.  Childers intact, draing concentrated yellow urine, adequate UOP. No BM this shift. Avasys camera in room.   All needs met, no complaints offered at this time. Bed locked in lowest position, call bell within reach. Frequent rounds for safety.   Problem: Adult Inpatient Plan of Care  Goal: Plan of Care Review  Outcome: Ongoing, Progressing  Goal: Patient-Specific Goal (Individualized)  Outcome: Ongoing, Progressing  Goal: Absence of Hospital-Acquired Illness or Injury  Outcome: Ongoing, Progressing  Goal: Optimal Comfort and Wellbeing  Outcome: Ongoing, Progressing  Goal: Readiness for Transition of Care  Outcome: Ongoing, Progressing     Problem: Fall Injury Risk  Goal: Absence of Fall and Fall-Related Injury  Outcome: Ongoing, Progressing     Problem: Skin Injury Risk Increased  Goal: Skin Health and Integrity  Outcome: Ongoing, Progressing     Problem: Infection  Goal: Absence of Infection Signs and Symptoms  Outcome: Ongoing, Progressing     Problem: Impaired Wound Healing  Goal: Optimal Wound Healing  Outcome: Ongoing, Progressing

## 2022-10-23 NOTE — SUBJECTIVE & OBJECTIVE
Interval History: NAEON. Pt endorses feeling better, BM have decreased on volume. Able to tolerate lq diet.     Review of Systems   Unable to perform ROS: Mental status change   Objective:     Vital Signs (Most Recent):  Temp: 97.7 °F (36.5 °C) (10/23/22 0848)  Pulse: 78 (10/23/22 0848)  Resp: 18 (10/23/22 0848)  BP: (!) 93/57 (10/23/22 0848)  SpO2: 97 % (10/23/22 0848)   Vital Signs (24h Range):  Temp:  [97.5 °F (36.4 °C)-98.6 °F (37 °C)] 97.7 °F (36.5 °C)  Pulse:  [68-78] 78  Resp:  [14-18] 18  SpO2:  [92 %-97 %] 97 %  BP: ()/(52-60) 93/57     Weight: 83.5 kg (184 lb)  Body mass index is 26.4 kg/m².    Intake/Output Summary (Last 24 hours) at 10/23/2022 1102  Last data filed at 10/22/2022 1715  Gross per 24 hour   Intake 240 ml   Output --   Net 240 ml        Physical Exam  Constitutional:       Appearance: He is not ill-appearing.   HENT:      Head:      Comments: Subcutaneous lesion hard to palpation located on right frontal skull.   Eyes:      General: No scleral icterus.     Conjunctiva/sclera: Conjunctivae normal.   Cardiovascular:      Rate and Rhythm: Normal rate and regular rhythm.      Pulses: Normal pulses.      Heart sounds: Normal heart sounds.   Pulmonary:      Effort: Pulmonary effort is normal. No respiratory distress.      Breath sounds: Normal breath sounds.   Chest:      Comments: Pacemaker in place in right upper chest wall.   Abdominal:      General: Bowel sounds are normal. There is distension.      Palpations: Abdomen is soft.      Tenderness: There is no abdominal tenderness.   Musculoskeletal:      Right lower leg: Edema present.      Left lower leg: Edema present.   Skin:     General: Skin is warm and dry.      Findings: No bruising or rash.   Neurological:      GCS: GCS eye subscore is 4. GCS verbal subscore is 3. GCS motor subscore is 5.       Significant Labs: All pertinent labs within the past 24 hours have been reviewed.  A1C:   Recent Labs   Lab 07/19/22  1149   HGBA1C 6.1*        ABGs: No results for input(s): PH, PCO2, HCO3, POCSATURATED, BE, TOTALHB, COHB, METHB, O2HB, POCFIO2, PO2 in the last 48 hours.  Bilirubin:   Recent Labs   Lab 10/19/22  0545 10/20/22  0614 10/21/22  0449 10/22/22  0817 10/23/22  0657   BILITOT 0.8 0.6 0.7 1.0 1.0       Blood Culture: No results for input(s): LABBLOO in the last 48 hours.  BMP:   Recent Labs   Lab 10/23/22  0657   *   *   K 4.1      CO2 17*   BUN 24*   CREATININE 1.3   CALCIUM 8.1*   MG 1.6       CBC:   Recent Labs   Lab 10/22/22  0817   WBC 19.96*   HGB 10.2*   HCT 32.1*   *       CMP:   Recent Labs   Lab 10/22/22  0817 10/23/22  0657   * 133*   K 3.4* 4.1    109   CO2 15* 17*   * 134*   BUN 24* 24*   CREATININE 1.2 1.3   CALCIUM 7.9* 8.1*   PROT 4.4* 4.5*   ALBUMIN 2.1* 2.0*   BILITOT 1.0 1.0   ALKPHOS 121 123   AST 23 27   ALT 13 12   ANIONGAP 9 7*       Cardiac Markers: No results for input(s): CKMB, MYOGLOBIN, BNP, TROPISTAT in the last 48 hours.  Coagulation: No results for input(s): PT, INR, APTT in the last 48 hours.  Lactic Acid: No results for input(s): LACTATE in the last 48 hours.  Lipase: No results for input(s): LIPASE in the last 48 hours.  Lipid Panel: No results for input(s): CHOL, HDL, LDLCALC, TRIG, CHOLHDL in the last 48 hours.  Magnesium:   Recent Labs   Lab 10/22/22  0817 10/23/22  0657   MG 1.6 1.6       Pathology Results  (Last 10 years)      None          POCT Glucose: No results for input(s): POCTGLUCOSE in the last 48 hours.  Prealbumin: No results for input(s): PREALBUMIN in the last 48 hours.  Respiratory Culture: No results for input(s): GSRESP, RESPIRATORYC in the last 48 hours.  Troponin: No results for input(s): TROPONINI, TROPONINIHS in the last 48 hours.  TSH:   Recent Labs   Lab 07/19/22  1149   TSH 0.555       Urine Culture: No results for input(s): LABURIN in the last 48 hours.  Urine Studies: No results for input(s): COLORU, APPEARANCEUA, PHUR, SPECGRAV,  PROTEINUA, GLUCUA, KETONESU, BILIRUBINUA, OCCULTUA, NITRITE, UROBILINOGEN, LEUKOCYTESUR, RBCUA, WBCUA, BACTERIA, SQUAMEPITHEL, HYALINECASTS in the last 48 hours.    Invalid input(s): ROMERO    Significant Imaging: I have reviewed all pertinent imaging results/findings within the past 24 hours.

## 2022-10-24 PROBLEM — L30.8 DERMATITIS ASSOCIATED WITH MOISTURE: Status: ACTIVE | Noted: 2022-10-24

## 2022-10-24 LAB
ALBUMIN SERPL BCP-MCNC: 2 G/DL (ref 3.5–5.2)
ALP SERPL-CCNC: 121 U/L (ref 55–135)
ALT SERPL W/O P-5'-P-CCNC: 13 U/L (ref 10–44)
ANION GAP SERPL CALC-SCNC: 9 MMOL/L (ref 8–16)
AST SERPL-CCNC: 24 U/L (ref 10–40)
BACTERIA SPEC ANAEROBE CULT: NORMAL
BASOPHILS # BLD AUTO: 0.06 K/UL (ref 0–0.2)
BASOPHILS NFR BLD: 0.3 % (ref 0–1.9)
BILIRUB SERPL-MCNC: 1.2 MG/DL (ref 0.1–1)
BUN SERPL-MCNC: 24 MG/DL (ref 8–23)
CALCIUM SERPL-MCNC: 8.1 MG/DL (ref 8.7–10.5)
CHLORIDE SERPL-SCNC: 109 MMOL/L (ref 95–110)
CO2 SERPL-SCNC: 16 MMOL/L (ref 23–29)
CREAT SERPL-MCNC: 1.1 MG/DL (ref 0.5–1.4)
DIFFERENTIAL METHOD: ABNORMAL
EOSINOPHIL # BLD AUTO: 0.1 K/UL (ref 0–0.5)
EOSINOPHIL NFR BLD: 0.5 % (ref 0–8)
ERYTHROCYTE [DISTWIDTH] IN BLOOD BY AUTOMATED COUNT: 17.2 % (ref 11.5–14.5)
EST. GFR  (NO RACE VARIABLE): >60 ML/MIN/1.73 M^2
GLUCOSE SERPL-MCNC: 121 MG/DL (ref 70–110)
HCT VFR BLD AUTO: 30.5 % (ref 40–54)
HGB BLD-MCNC: 10.5 G/DL (ref 14–18)
IMM GRANULOCYTES # BLD AUTO: 0.29 K/UL (ref 0–0.04)
IMM GRANULOCYTES NFR BLD AUTO: 1.4 % (ref 0–0.5)
LYMPHOCYTES # BLD AUTO: 1.1 K/UL (ref 1–4.8)
LYMPHOCYTES NFR BLD: 5.3 % (ref 18–48)
MAGNESIUM SERPL-MCNC: 1.6 MG/DL (ref 1.6–2.6)
MCH RBC QN AUTO: 31.3 PG (ref 27–31)
MCHC RBC AUTO-ENTMCNC: 34.4 G/DL (ref 32–36)
MCV RBC AUTO: 91 FL (ref 82–98)
MONOCYTES # BLD AUTO: 1.3 K/UL (ref 0.3–1)
MONOCYTES NFR BLD: 6 % (ref 4–15)
NEUTROPHILS # BLD AUTO: 17.9 K/UL (ref 1.8–7.7)
NEUTROPHILS NFR BLD: 86.5 % (ref 38–73)
NRBC BLD-RTO: 0 /100 WBC
PHOSPHATE SERPL-MCNC: 2.3 MG/DL (ref 2.7–4.5)
PLATELET # BLD AUTO: 144 K/UL (ref 150–450)
PMV BLD AUTO: 11.5 FL (ref 9.2–12.9)
POTASSIUM SERPL-SCNC: 3.4 MMOL/L (ref 3.5–5.1)
PROT SERPL-MCNC: 4.4 G/DL (ref 6–8.4)
RBC # BLD AUTO: 3.36 M/UL (ref 4.6–6.2)
SODIUM SERPL-SCNC: 134 MMOL/L (ref 136–145)
WBC # BLD AUTO: 20.71 K/UL (ref 3.9–12.7)

## 2022-10-24 PROCEDURE — 99223 PR INITIAL HOSPITAL CARE,LEVL III: ICD-10-PCS | Mod: ,,, | Performed by: NURSE PRACTITIONER

## 2022-10-24 PROCEDURE — 25000003 PHARM REV CODE 250: Performed by: STUDENT IN AN ORGANIZED HEALTH CARE EDUCATION/TRAINING PROGRAM

## 2022-10-24 PROCEDURE — 20600001 HC STEP DOWN PRIVATE ROOM

## 2022-10-24 PROCEDURE — 99233 PR SUBSEQUENT HOSPITAL CARE,LEVL III: ICD-10-PCS | Mod: GC,,, | Performed by: INTERNAL MEDICINE

## 2022-10-24 PROCEDURE — 80053 COMPREHEN METABOLIC PANEL: CPT | Performed by: STUDENT IN AN ORGANIZED HEALTH CARE EDUCATION/TRAINING PROGRAM

## 2022-10-24 PROCEDURE — 63600175 PHARM REV CODE 636 W HCPCS: Performed by: STUDENT IN AN ORGANIZED HEALTH CARE EDUCATION/TRAINING PROGRAM

## 2022-10-24 PROCEDURE — 25000003 PHARM REV CODE 250: Performed by: HOSPITALIST

## 2022-10-24 PROCEDURE — 25000003 PHARM REV CODE 250: Performed by: INTERNAL MEDICINE

## 2022-10-24 PROCEDURE — 83735 ASSAY OF MAGNESIUM: CPT | Performed by: STUDENT IN AN ORGANIZED HEALTH CARE EDUCATION/TRAINING PROGRAM

## 2022-10-24 PROCEDURE — 36415 COLL VENOUS BLD VENIPUNCTURE: CPT | Performed by: STUDENT IN AN ORGANIZED HEALTH CARE EDUCATION/TRAINING PROGRAM

## 2022-10-24 PROCEDURE — 99223 1ST HOSP IP/OBS HIGH 75: CPT | Mod: ,,, | Performed by: NURSE PRACTITIONER

## 2022-10-24 PROCEDURE — S0030 INJECTION, METRONIDAZOLE: HCPCS | Performed by: STUDENT IN AN ORGANIZED HEALTH CARE EDUCATION/TRAINING PROGRAM

## 2022-10-24 PROCEDURE — 27000207 HC ISOLATION

## 2022-10-24 PROCEDURE — 85025 COMPLETE CBC W/AUTO DIFF WBC: CPT | Performed by: INTERNAL MEDICINE

## 2022-10-24 PROCEDURE — 92526 ORAL FUNCTION THERAPY: CPT

## 2022-10-24 PROCEDURE — 97535 SELF CARE MNGMENT TRAINING: CPT

## 2022-10-24 PROCEDURE — 84100 ASSAY OF PHOSPHORUS: CPT | Performed by: STUDENT IN AN ORGANIZED HEALTH CARE EDUCATION/TRAINING PROGRAM

## 2022-10-24 PROCEDURE — 99233 SBSQ HOSP IP/OBS HIGH 50: CPT | Mod: GC,,, | Performed by: INTERNAL MEDICINE

## 2022-10-24 RX ORDER — TAMSULOSIN HYDROCHLORIDE 0.4 MG/1
0.4 CAPSULE ORAL DAILY
Status: DISCONTINUED | OUTPATIENT
Start: 2022-10-24 | End: 2022-10-27

## 2022-10-24 RX ORDER — POTASSIUM CHLORIDE 20 MEQ/1
40 TABLET, EXTENDED RELEASE ORAL ONCE
Status: COMPLETED | OUTPATIENT
Start: 2022-10-24 | End: 2022-10-24

## 2022-10-24 RX ADMIN — RIFAXIMIN 550 MG: 550 TABLET ORAL at 09:10

## 2022-10-24 RX ADMIN — ALLOPURINOL 100 MG: 100 TABLET ORAL at 09:10

## 2022-10-24 RX ADMIN — HEPARIN SODIUM 5000 UNITS: 5000 INJECTION INTRAVENOUS; SUBCUTANEOUS at 10:10

## 2022-10-24 RX ADMIN — HEPARIN SODIUM 5000 UNITS: 5000 INJECTION INTRAVENOUS; SUBCUTANEOUS at 02:10

## 2022-10-24 RX ADMIN — FOLIC ACID 1 MG: 1 TABLET ORAL at 09:10

## 2022-10-24 RX ADMIN — PANTOPRAZOLE SODIUM 40 MG: 40 TABLET, DELAYED RELEASE ORAL at 05:10

## 2022-10-24 RX ADMIN — POTASSIUM CHLORIDE 40 MEQ: 20 TABLET, EXTENDED RELEASE ORAL at 11:10

## 2022-10-24 RX ADMIN — VANCOMYCIN HYDROCHLORIDE 125 MG: KIT at 12:10

## 2022-10-24 RX ADMIN — RIFAXIMIN 550 MG: 550 TABLET ORAL at 10:10

## 2022-10-24 RX ADMIN — TAMSULOSIN HYDROCHLORIDE 0.4 MG: 0.4 CAPSULE ORAL at 04:10

## 2022-10-24 RX ADMIN — METRONIDAZOLE 500 MG: 500 INJECTION, SOLUTION INTRAVENOUS at 12:10

## 2022-10-24 RX ADMIN — HEPARIN SODIUM 5000 UNITS: 5000 INJECTION INTRAVENOUS; SUBCUTANEOUS at 05:10

## 2022-10-24 RX ADMIN — METRONIDAZOLE 500 MG: 500 INJECTION, SOLUTION INTRAVENOUS at 09:10

## 2022-10-24 RX ADMIN — VANCOMYCIN HYDROCHLORIDE 125 MG: KIT at 05:10

## 2022-10-24 RX ADMIN — METRONIDAZOLE 500 MG: 500 INJECTION, SOLUTION INTRAVENOUS at 05:10

## 2022-10-24 RX ADMIN — ATORVASTATIN CALCIUM 40 MG: 40 TABLET, FILM COATED ORAL at 09:10

## 2022-10-24 RX ADMIN — PANTOPRAZOLE SODIUM 40 MG: 40 TABLET, DELAYED RELEASE ORAL at 04:10

## 2022-10-24 RX ADMIN — ESCITALOPRAM OXALATE 10 MG: 10 TABLET ORAL at 09:10

## 2022-10-24 RX ADMIN — SODIUM CHLORIDE, SODIUM LACTATE, POTASSIUM CHLORIDE, AND CALCIUM CHLORIDE 500 ML: .6; .31; .03; .02 INJECTION, SOLUTION INTRAVENOUS at 07:10

## 2022-10-24 RX ADMIN — Medication 1 CAPSULE: at 10:10

## 2022-10-24 NOTE — SUBJECTIVE & OBJECTIVE
Interval History: NAEON. Pt denies abdominal pain, states he is still having diarrhea but is improving. SLP eval for diet advancements as tolerated. K repletion    Review of Systems   Unable to perform ROS: Mental status change   Objective:     Vital Signs (Most Recent):  Temp: 96.7 °F (35.9 °C) (10/24/22 0743)  Pulse: 70 (10/24/22 0743)  Resp: 14 (10/24/22 0743)  BP: (!) 98/55 (10/24/22 0743)  SpO2: 98 % (10/24/22 0743)   Vital Signs (24h Range):  Temp:  [96.7 °F (35.9 °C)-98.1 °F (36.7 °C)] 96.7 °F (35.9 °C)  Pulse:  [70-75] 70  Resp:  [14-17] 14  SpO2:  [96 %-98 %] 98 %  BP: ()/(52-59) 98/55     Weight: 83.5 kg (184 lb)  Body mass index is 26.4 kg/m².    Intake/Output Summary (Last 24 hours) at 10/24/2022 0905  Last data filed at 10/24/2022 0652  Gross per 24 hour   Intake 1396.16 ml   Output 650 ml   Net 746.16 ml      Physical Exam  Constitutional:       Appearance: He is not ill-appearing.   HENT:      Head:      Comments: Subcutaneous lesion hard to palpation located on right frontal skull.   Eyes:      General: No scleral icterus.     Conjunctiva/sclera: Conjunctivae normal.   Cardiovascular:      Rate and Rhythm: Normal rate and regular rhythm.      Pulses: Normal pulses.      Heart sounds: Normal heart sounds.   Pulmonary:      Effort: Pulmonary effort is normal. No respiratory distress.      Breath sounds: Normal breath sounds.   Chest:      Comments: Pacemaker in place in right upper chest wall.   Abdominal:      General: Bowel sounds are normal. There is distension.      Palpations: Abdomen is soft.      Tenderness: There is no abdominal tenderness.   Musculoskeletal:      Right lower leg: Edema present.      Left lower leg: Edema present.   Skin:     General: Skin is warm and dry.      Findings: No bruising or rash.   Neurological:      GCS: GCS eye subscore is 4. GCS verbal subscore is 3. GCS motor subscore is 5.      Comments: AAOx2       Significant Labs: All pertinent labs within the past 24  hours have been reviewed.  A1C:   Recent Labs   Lab 07/19/22  1149   HGBA1C 6.1*     ABGs: No results for input(s): PH, PCO2, HCO3, POCSATURATED, BE, TOTALHB, COHB, METHB, O2HB, POCFIO2, PO2 in the last 48 hours.  Bilirubin:   Recent Labs   Lab 10/20/22  0614 10/21/22  0449 10/22/22  0817 10/23/22  0657 10/24/22  0811   BILITOT 0.6 0.7 1.0 1.0 1.2*     Blood Culture: No results for input(s): LABBLOO in the last 48 hours.  BMP:   Recent Labs   Lab 10/24/22  0811   *   *   K 3.4*      CO2 16*   BUN 24*   CREATININE 1.1   CALCIUM 8.1*   MG 1.6     CBC:   Recent Labs   Lab 10/23/22  1151 10/24/22  0811   WBC 19.12* 20.71*   HGB 10.4* 10.5*   HCT 31.2* 30.5*   * 144*     CMP:   Recent Labs   Lab 10/23/22  0657 10/24/22  0811   * 134*   K 4.1 3.4*    109   CO2 17* 16*   * 121*   BUN 24* 24*   CREATININE 1.3 1.1   CALCIUM 8.1* 8.1*   PROT 4.5* 4.4*   ALBUMIN 2.0* 2.0*   BILITOT 1.0 1.2*   ALKPHOS 123 121   AST 27 24   ALT 12 13   ANIONGAP 7* 9     Cardiac Markers: No results for input(s): CKMB, MYOGLOBIN, BNP, TROPISTAT in the last 48 hours.  Coagulation: No results for input(s): PT, INR, APTT in the last 48 hours.  Lactic Acid: No results for input(s): LACTATE in the last 48 hours.  Lipase: No results for input(s): LIPASE in the last 48 hours.  Lipid Panel: No results for input(s): CHOL, HDL, LDLCALC, TRIG, CHOLHDL in the last 48 hours.  Magnesium:   Recent Labs   Lab 10/23/22  0657 10/24/22  0811   MG 1.6 1.6     Pathology Results  (Last 10 years)      None          POCT Glucose: No results for input(s): POCTGLUCOSE in the last 48 hours.  Prealbumin: No results for input(s): PREALBUMIN in the last 48 hours.  Respiratory Culture: No results for input(s): GSRESP, RESPIRATORYC in the last 48 hours.  Troponin: No results for input(s): TROPONINI, TROPONINIHS in the last 48 hours.  TSH:   Recent Labs   Lab 07/19/22  1149   TSH 0.555     Urine Culture: No results for input(s):  LABURIN in the last 48 hours.  Urine Studies: No results for input(s): COLORU, APPEARANCEUA, PHUR, SPECGRAV, PROTEINUA, GLUCUA, KETONESU, BILIRUBINUA, OCCULTUA, NITRITE, UROBILINOGEN, LEUKOCYTESUR, RBCUA, WBCUA, BACTERIA, SQUAMEPITHEL, HYALINECASTS in the last 48 hours.    Invalid input(s): ROMERO    Significant Imaging: I have reviewed all pertinent imaging results/findings within the past 24 hours.

## 2022-10-24 NOTE — PROGRESS NOTES
[facility] Medicine  Progress Note    Patient Name: Jacques Arellano  MRN: [id number]  Patient Class: IP- Inpatient   Admission Date: 10/14/2022  Length of Stay: 10 days  Attending Physician: Nemesio Pearson MD  Primary Care Provider: Matty Garsia MD        Subjective:     Principal Problem:C. difficile colitis        HPI:  81 yo M with PMhx of CHF, AFib, Watchman, pacer, expansile brain lesion, pacemaker presents to from [facility] for diarrhea, copious black stool and acute altered mental status. Pt reports he has 3-5 profuse watery BM for the last few days noting a little bit of blood. Pt was recently admitted to [facility] ED on 10/12 for abdominal pain and coffee ground emesis, EGD revealed grade D esophagitis and gastritis with no overt GI bleeding and was subsequently discharged with Protonix. Pt was recently admitted to [facility] ICU on 9/23 for sepsis thought to be secondary to E coli. UTI vs cirrhosis and was treated with antibiotic and pressors . Inpatient workup revealed new dx of cirrhosis with ascites no spb, aortic aneurysm, and expansile Calvarial lesion, pt was DC on 10/13 back to [facility].      Today pt H & H stable. CTA abd shows colitis, cirrhosis/ascites, and new potential HCC. BNP elevated at 544 however XCR and PE unconcerning for CHF exacerbation. Mentation improved while in ED - CT head stable.       Overview/Hospital Course:  CTA A/P was performed urgently, without evidence of contrast extravasation though showing aforementioned liver lesion and rectosigmoid colitis. Stool cultures returned C diff +ve and he was placed on PO vancomycin therapy. He underwent paracentesis and analysis of ascitic fluid was not indicative of SBP. Cultures and cytology are pending. He has been on IV PPI and octreotide gtt (since discontinued) with initial plans to scope, however, with stabilization of blood counts and CDI, this is no longer indicated. More awake today and conversant.          Interval History: NAEON. Pt denies abdominal pain, states he is still having diarrhea but is improving. SLP eval for diet advancements as tolerated. K repletion    Review of Systems   Unable to perform ROS: Mental status change   Objective:     Vital Signs (Most Recent):  Temp: 96.7 °F (35.9 °C) (10/24/22 0743)  Pulse: 70 (10/24/22 0743)  Resp: 14 (10/24/22 0743)  BP: (!) 98/55 (10/24/22 0743)  SpO2: 98 % (10/24/22 0743)   Vital Signs (24h Range):  Temp:  [96.7 °F (35.9 °C)-98.1 °F (36.7 °C)] 96.7 °F (35.9 °C)  Pulse:  [70-75] 70  Resp:  [14-17] 14  SpO2:  [96 %-98 %] 98 %  BP: ()/(52-59) 98/55     Weight: 83.5 kg (184 lb)  Body mass index is 26.4 kg/m².    Intake/Output Summary (Last 24 hours) at 10/24/2022 0905  Last data filed at 10/24/2022 0652  Gross per 24 hour   Intake 1396.16 ml   Output 650 ml   Net 746.16 ml      Physical Exam  Constitutional:       Appearance: He is not ill-appearing.   HENT:      Head:      Comments: Subcutaneous lesion hard to palpation located on right frontal skull.   Eyes:      General: No scleral icterus.     Conjunctiva/sclera: Conjunctivae normal.   Cardiovascular:      Rate and Rhythm: Normal rate and regular rhythm.      Pulses: Normal pulses.      Heart sounds: Normal heart sounds.   Pulmonary:      Effort: Pulmonary effort is normal. No respiratory distress.      Breath sounds: Normal breath sounds.   Chest:      Comments: Pacemaker in place in right upper chest wall.   Abdominal:      General: Bowel sounds are normal. There is distension.      Palpations: Abdomen is soft.      Tenderness: There is no abdominal tenderness.   Musculoskeletal:      Right lower leg: Edema present.      Left lower leg: Edema present.   Skin:     General: Skin is warm and dry.      Findings: No bruising or rash.   Neurological:      GCS: GCS eye subscore is 4. GCS verbal subscore is 3. GCS motor subscore is 5.      Comments: AAOx2       Significant Labs: All pertinent labs within the  past 24 hours have been reviewed.  A1C:   Recent Labs   Lab 07/19/22  1149   HGBA1C 6.1*     ABGs: No results for input(s): PH, PCO2, HCO3, POCSATURATED, BE, TOTALHB, COHB, METHB, O2HB, POCFIO2, PO2 in the last 48 hours.  Bilirubin:   Recent Labs   Lab 10/20/22  0614 10/21/22  0449 10/22/22  0817 10/23/22  0657 10/24/22  0811   BILITOT 0.6 0.7 1.0 1.0 1.2*     Blood Culture: No results for input(s): LABBLOO in the last 48 hours.  BMP:   Recent Labs   Lab 10/24/22  0811   *   *   K 3.4*      CO2 16*   BUN 24*   CREATININE 1.1   CALCIUM 8.1*   MG 1.6     CBC:   Recent Labs   Lab 10/23/22  1151 10/24/22  0811   WBC 19.12* 20.71*   HGB 10.4* 10.5*   HCT 31.2* 30.5*   * 144*     CMP:   Recent Labs   Lab 10/23/22  0657 10/24/22  0811   * 134*   K 4.1 3.4*    109   CO2 17* 16*   * 121*   BUN 24* 24*   CREATININE 1.3 1.1   CALCIUM 8.1* 8.1*   PROT 4.5* 4.4*   ALBUMIN 2.0* 2.0*   BILITOT 1.0 1.2*   ALKPHOS 123 121   AST 27 24   ALT 12 13   ANIONGAP 7* 9     Cardiac Markers: No results for input(s): CKMB, MYOGLOBIN, BNP, TROPISTAT in the last 48 hours.  Coagulation: No results for input(s): PT, INR, APTT in the last 48 hours.  Lactic Acid: No results for input(s): LACTATE in the last 48 hours.  Lipase: No results for input(s): LIPASE in the last 48 hours.  Lipid Panel: No results for input(s): CHOL, HDL, LDLCALC, TRIG, CHOLHDL in the last 48 hours.  Magnesium:   Recent Labs   Lab 10/23/22  0657 10/24/22  0811   MG 1.6 1.6     Pathology Results  (Last 10 years)      None          POCT Glucose: No results for input(s): POCTGLUCOSE in the last 48 hours.  Prealbumin: No results for input(s): PREALBUMIN in the last 48 hours.  Respiratory Culture: No results for input(s): GSRESP, RESPIRATORYC in the last 48 hours.  Troponin: No results for input(s): TROPONINI, TROPONINIHS in the last 48 hours.  TSH:   Recent Labs   Lab 07/19/22  1149   TSH 0.555     Urine Culture: No results for  input(s): LABURIN in the last 48 hours.  Urine Studies: No results for input(s): COLORU, APPEARANCEUA, PHUR, SPECGRAV, PROTEINUA, GLUCUA, KETONESU, BILIRUBINUA, OCCULTUA, NITRITE, UROBILINOGEN, LEUKOCYTESUR, RBCUA, WBCUA, BACTERIA, SQUAMEPITHEL, HYALINECASTS in the last 48 hours.    Invalid input(s): WRIGHTSUR    Significant Imaging: I have reviewed all pertinent imaging results/findings within the past 24 hours.      Assessment/Plan:      * C. difficile colitis  Pt endorses profuse watery diarrhea for the past few days. Concerning for possible C diff with recent aggressive antibiotic regimen     Plan:  - Stool studies pending   - C diff positive; starting Po Vancomycin, Adding flagyl  10/19  - Per Hepatology, added Rifaximin BID.   - WBC up trending, KUB to r/o toxic megacolon     GI bleed  xPt endorses melena. CT scan with no evidence of contrast extravasation to suggest active GI bleed at this time. Stable 3 cm enhancing lesion at the hepatic dome with evidence of liver cirrhosis, circumferential wall thickening of the rectum and sigmoid colon concern for colitis, and pancreatic hypodensities measuring up to 2.0 cm. Considering liver disease, suspicion for ruptured esophogeal varcies. Recent EGD at [facility] reviewed, will discuss with GI     Plan:  - CBC qd  - GI planning for endoscopy & sigmoidoscopy deferred due to C.diff and stable HH  - PPI 40mg BID   - CTX discontinued    Other cirrhosis of liver  9/23: Ct A/P:Cirrhotic morphology of the liver with sequelae of portal hypertension as evidence by splenomegaly, intra-abdominal ascites, and possible portal hypertensive enteropathy. Pt denies hx of alcohol abuse.  R preformed diagnostic paracentetics on 9/27 to r/o SBP: which was negative 130 WBC and 10%     Plan:    - Daily CMP   - Hepatology following; paracentesis with IR with 2.4L with cytology collected  - Started on Rifaximin BID per hepatology recommendations  - Cytology from para collected, negative for  malignancy  - Albumin 25g to optimize fluid status given x1 10/20      Chronic gout  Plan:  Continue home allopurinol.         Type 2 diabetes mellitus without complication, without long-term current use of insulin  Last A1c 6.1 3 months ago. Hypoglycemic on admission.      Plan:  - No home medications  Noted    - POCT glucose ACHS    - Low threshold to start LDSSI     Mixed hyperlipidemia  Plan:  - Continue home statin         Primary hypertension  Home medications: triamterene-HCTZ, Lasix     Plan:  - Hold home medications s/o hypotension      Chronic diastolic heart failure  9/24 ANNE: The left ventricle is normal in size with low normal systolic function. The estimated ejection fraction is 50%.There is abnormal septal wall motion consistent with right ventricular pacing. Mild right ventricular enlargement with mildly reduced right ventricular systolic function. BNP:544     Plan:  - Low threshold for Lasix as CXR/lung sounds clear while saturating well on RA.         Permanent atrial fibrillation  Patient with documented history of permanent Afib s/p watchman, no longer on AC or rate controlling agents.  [id number] 5   HAS-BLED 4      Plan:   - Patient rate controlled, no indication for further agents at this time  - No full AC required s/p Watchman; will hold AC ppx and APT s/o active bleeding from abrasion sites  - Cardiac telemetry  - Maintain K > 4, Mg > 2, Ca wnl; replete PRN     cardiology consult if hemodynamic instability for DCCV evaluation    VTE Risk Mitigation (From admission, onward)         Ordered     heparin (porcine) injection 5,000 Units  Every 8 hours         10/22/22 1447     IP VTE HIGH RISK PATIENT  Once         10/14/22 1826     Place sequential compression device  Until discontinued         10/14/22 1826                Discharge Planning   ELMER: 10/26/2022     Code Status: DNR   Is the patient medically ready for discharge?: No    Reason for patient still in hospital (select all that  apply): Patient trending condition  Discharge Plan A: Return to nursing home   Discharge Delays: None known at this time              Kristy Patricio MD  Department of Hospital Medicine   [facility]

## 2022-10-24 NOTE — SUBJECTIVE & OBJECTIVE
Scheduled Meds:   allopurinoL  100 mg Oral Daily    atorvastatin  40 mg Oral Daily    EScitalopram oxalate  10 mg Oral Daily    folic acid  1 mg Oral Daily    heparin (porcine)  5,000 Units Subcutaneous Q8H    metronidazole  500 mg Intravenous Q8H    pantoprazole  40 mg Oral BID AC    rifAXImin  550 mg Oral BID    vancomycin  125 mg Oral Q6H     Continuous Infusions:  PRN Meds:acetaminophen, dextrose 10%, dextrose 10%, glucagon (human recombinant), glucose, glucose, naloxone, sodium chloride 0.9%, sodium chloride 0.9%    Review of patient's allergies indicates:  No Known Allergies     Past Medical History:   Diagnosis Date    TRENT (acute kidney injury) 09/24/2022    Anticoagulant long-term use     Aortic aneurysm     Ascites of liver     Atrial fibrillation     Brain lesion     C. difficile colitis 10/14/2022    Diabetes mellitus     Diastolic heart failure     Frequent falls     GI bleeding     Hyperlipidemia     Hypertension     Liver disease     Renal disorder     eswl    Sacral decubitus ulcer     Sleep apnea     20 yrs ago    Stroke     TIA    Type 2 diabetes mellitus without complication, without long-term current use of insulin 3/24/2022    UTI (urinary tract infection)     Vertigo      Past Surgical History:   Procedure Laterality Date    CARDIAC SURGERY  03/30/2022    watchmen    ESOPHAGOGASTRODUODENOSCOPY N/A 10/17/2022    Procedure: EGD (ESOPHAGOGASTRODUODENOSCOPY);  Surgeon: Fahad Peña MD;  Location: [address]);  Service: Endoscopy;  Laterality: N/A;    FLEXIBLE SIGMOIDOSCOPY N/A 10/17/2022    Procedure: SIGMOIDOSCOPY, FLEXIBLE;  Surgeon: Fahad Peña MD;  Location: [address]);  Service: Endoscopy;  Laterality: N/A;    INSERTION OF PACEMAKER      KIDNEY STONE SURGERY      OPEN REDUCTION AND INTERNAL FIXATION (ORIF) OF FRACTURE OF DISTAL RADIUS Right 5/17/2022    Procedure: ORIF, FRACTURE, RADIUS, DISTAL;  Surgeon: Claude S. Williams IV, MD;  Location: [facility];  Service:  Orthopedics;  Laterality: Right;    TONSILLECTOMY         Family History    None       Tobacco Use    Smoking status: Never    Smokeless tobacco: Never   Substance and Sexual Activity    Alcohol use: Not Currently     Comment: SOCIAL    Drug use: Never    Sexual activity: Not Currently     Review of Systems   Skin:  Positive for wound.     Objective:     Vital Signs (Most Recent):  Temp: 96.1 °F (35.6 °C) (10/24/22 1159)  Pulse: 73 (10/24/22 1159)  Resp: 14 (10/24/22 1159)  BP: (!) 98/58 (10/24/22 1159)  SpO2: 96 % (10/24/22 1159)   Vital Signs (24h Range):  Temp:  [96.1 °F (35.6 °C)-98.1 °F (36.7 °C)] 96.1 °F (35.6 °C)  Pulse:  [70-75] 73  Resp:  [14-16] 14  SpO2:  [96 %-98 %] 96 %  BP: ()/(55-59) 98/58     Weight: 83.5 kg (184 lb)  Body mass index is 26.4 kg/m².  Physical Exam  Constitutional:       Appearance: Normal appearance.   Skin:     General: Skin is warm and dry.      Findings: Lesion present.   Neurological:      Mental Status: He is alert.       Laboratory:  All pertinent labs reviewed within the last 24 hours.    Diagnostic Results:  None

## 2022-10-24 NOTE — ASSESSMENT & PLAN NOTE
- consult received for evaluation of skin injury.  - pt presents to from [facility] for diarrhea, copious black stool and acute altered mental status.   - pt has scattered healing areas over sacrum and buttocks which appear to be related to moisture dermatitis.  - pt is on contact precautions for C diff with multiple loose stools reported.  - no open areas of skin breakdown noted.  - continue Triad bid/prn for moisture management.  - pt seen wearing a foam dressing and brief.  - please avoid briefs if possible.  - gunner surface with waffle overlay in place.  - heel boots for offloading.  - RN and tech assisted with turning.  - q2h turns.  - hopkins catheter in place.  - nursing to maintain pressure injury prevention measures and continue wound care per orders.

## 2022-10-24 NOTE — ASSESSMENT & PLAN NOTE
9/23: Ct A/P:Cirrhotic morphology of the liver with sequelae of portal hypertension as evidence by splenomegaly, intra-abdominal ascites, and possible portal hypertensive enteropathy. Pt denies hx of alcohol abuse.  R preformed diagnostic paracentetics on 9/27 to r/o SBP: which was negative 130 WBC and 10%     Plan:    - Daily CMP   - Hepatology following; paracentesis with IR with 2.4L with cytology collected  - Started on Rifaximin BID per hepatology recommendations  - Cytology from para collected, negative for malignancy  - Albumin 25g to optimize fluid status given x1 10/20

## 2022-10-24 NOTE — NURSING
MD notified of little bladder volume, will give small bolus and bladder scan again in 4hours, WCTM for now

## 2022-10-24 NOTE — PLAN OF CARE
Pt is alert to self. Breathing is regular equal and unlabored bilaterally on room air. Pt had no complaints of pain. The pt had no b/m during the night shift. Pt IV antibiotics infused as ordered. the pt's IV infused KVO to keep IV patent. Childers to gravity, urine concentrated.

## 2022-10-24 NOTE — CONSULTS
[facility]  Skin Integrity LATOSHA  Consult Note    Patient Name: Jacques Arellano  MRN: [id number]  Admission Date: 10/14/2022  Hospital Length of Stay: 10 days  Attending Physician: Nemesio Pearson MD  Primary Care Provider: Matty Garsia MD     Consults  Subjective:     History of Present Illness:  Jacques Arellano is an 82 year old male with PMhx of CHF, AFib, Watchman, pacer, expansile brain lesion, pacemaker presents to from [facility] for diarrhea, copious black stool and acute altered mental status. Pt reports he has 3-5 profuse watery BM for the last few days noting a little bit of blood. Pt was recently admitted to [facility] ED on 10/12 for abdominal pain and coffee ground emesis, EGD revealed grade D esophagitis and gastritis with no overt GI bleeding and was subsequently discharged with Protonix. Pt was recently admitted to [facility] ICU on 9/23 for sepsis thought to be secondary to E coli. UTI vs cirrhosis and was treated with antibiotic and pressors . Inpatient workup revealed new dx of cirrhosis with ascites no spb, aortic aneurysm, and expansile Calvarial lesion, pt was DC on 10/13 back to [facility]. Pt admitted to hospital medicine service for further management. Skin integrity LATOSHA consulted for evaluation of skin injury.      Scheduled Meds:   allopurinoL  100 mg Oral Daily    atorvastatin  40 mg Oral Daily    EScitalopram oxalate  10 mg Oral Daily    folic acid  1 mg Oral Daily    heparin (porcine)  5,000 Units Subcutaneous Q8H    metronidazole  500 mg Intravenous Q8H    pantoprazole  40 mg Oral BID AC    rifAXImin  550 mg Oral BID    vancomycin  125 mg Oral Q6H     Continuous Infusions:  PRN Meds:acetaminophen, dextrose 10%, dextrose 10%, glucagon (human recombinant), glucose, glucose, naloxone, sodium chloride 0.9%, sodium chloride 0.9%    Review of patient's allergies indicates:  No Known Allergies     Past Medical History:   Diagnosis Date    TRENT (acute kidney injury)  09/24/2022    Anticoagulant long-term use     Aortic aneurysm     Ascites of liver     Atrial fibrillation     Brain lesion     C. difficile colitis 10/14/2022    Diabetes mellitus     Diastolic heart failure     Frequent falls     GI bleeding     Hyperlipidemia     Hypertension     Liver disease     Renal disorder     eswl    Sacral decubitus ulcer     Sleep apnea     20 yrs ago    Stroke     TIA    Type 2 diabetes mellitus without complication, without long-term current use of insulin 3/24/2022    UTI (urinary tract infection)     Vertigo      Past Surgical History:   Procedure Laterality Date    CARDIAC SURGERY  03/30/2022    watchmen    ESOPHAGOGASTRODUODENOSCOPY N/A 10/17/2022    Procedure: EGD (ESOPHAGOGASTRODUODENOSCOPY);  Surgeon: Fahad Peña MD;  Location: [facility] ENDO (2ND FLR);  Service: Endoscopy;  Laterality: N/A;    FLEXIBLE SIGMOIDOSCOPY N/A 10/17/2022    Procedure: SIGMOIDOSCOPY, FLEXIBLE;  Surgeon: Fahad Peña MD;  Location: [facility] ENDO (2ND FLR);  Service: Endoscopy;  Laterality: N/A;    INSERTION OF PACEMAKER      KIDNEY STONE SURGERY      OPEN REDUCTION AND INTERNAL FIXATION (ORIF) OF FRACTURE OF DISTAL RADIUS Right 5/17/2022    Procedure: ORIF, FRACTURE, RADIUS, DISTAL;  Surgeon: Claude S. Williams IV, MD;  Location: [facility];  Service: Orthopedics;  Laterality: Right;    TONSILLECTOMY         Family History    None       Tobacco Use    Smoking status: Never    Smokeless tobacco: Never   Substance and Sexual Activity    Alcohol use: Not Currently     Comment: SOCIAL    Drug use: Never    Sexual activity: Not Currently     Review of Systems   Skin:  Positive for wound.     Objective:     Vital Signs (Most Recent):  Temp: 96.1 °F (35.6 °C) (10/24/22 1159)  Pulse: 73 (10/24/22 1159)  Resp: 14 (10/24/22 1159)  BP: (!) 98/58 (10/24/22 1159)  SpO2: 96 % (10/24/22 1159)   Vital Signs (24h Range):  Temp:  [96.1 °F (35.6 °C)-98.1 °F (36.7 °C)] 96.1 °F (35.6 °C)  Pulse:   [70-75] 73  Resp:  [14-16] 14  SpO2:  [96 %-98 %] 96 %  BP: ()/(55-59) 98/58     Weight: 83.5 kg (184 lb)  Body mass index is 26.4 kg/m².  Physical Exam  Constitutional:       Appearance: Normal appearance.   Skin:     General: Skin is warm and dry.      Findings: Lesion present.   Neurological:      Mental Status: He is alert.       Laboratory:  All pertinent labs reviewed within the last 24 hours.    Diagnostic Results:  None        Assessment/Plan:          LATOSHA Skin Integrity Evaluation    Skin Integrity LATOSHA evaluation of patient as part of the comprehensive skin care team.   He has been admitted for 10 days. Skin injury was noted on 10/14/22. POA yes.    Sacrum            Dermatitis associated with moisture  - consult received for evaluation of skin injury.  - pt presents to from [facility] for diarrhea, copious black stool and acute altered mental status.   - pt has scattered healing areas over sacrum and buttocks which appear to be related to moisture dermatitis.  - pt is on contact precautions for C diff with multiple loose stools reported.  - no open areas of skin breakdown noted.  - continue Triad bid/prn for moisture management.  - pt seen wearing a foam dressing and brief.  - please avoid briefs if possible.  - gunner surface with waffle overlay in place.  - heel boots for offloading.  - RN and tech assisted with turning.  - q2h turns.  - hopkins catheter in place.  - nursing to maintain pressure injury prevention measures and continue wound care per orders.         Thank you for your consult. I will follow-up with patient. Please contact us if you have any additional questions.       Shilpa Bahena NP  Skin Integrity LATOSHA  [facility]

## 2022-10-24 NOTE — HPI
Jacques Arellano is an 82 year old male with PMhx of CHF, AFib, Watchman, pacer, expansile brain lesion, pacemaker presents to from [facility] for diarrhea, copious black stool and acute altered mental status. Pt reports he has 3-5 profuse watery BM for the last few days noting a little bit of blood. Pt was recently admitted to [facility] ED on 10/12 for abdominal pain and coffee ground emesis, EGD revealed grade D esophagitis and gastritis with no overt GI bleeding and was subsequently discharged with Protonix. Pt was recently admitted to [facility] ICU on 9/23 for sepsis thought to be secondary to E coli. UTI vs cirrhosis and was treated with antibiotic and pressors . Inpatient workup revealed new dx of cirrhosis with ascites no spb, aortic aneurysm, and expansile Calvarial lesion, pt was DC on 10/13 back to [facility]. Pt admitted to hospital medicine service for further management. Skin integrity LATOSHA consulted for evaluation of skin injury.

## 2022-10-24 NOTE — PT/OT/SLP PROGRESS
"Speech Language Pathology Treatment    Patient Name:  Jacques Arellano   MRN:  [id number]  Admitting Diagnosis: C. difficile colitis    Recommendations:                 General Recommendations:   ongoing swallowing assessment  Diet recommendations:  NPO, Liquid Diet Level: Full liquids, Thin   Aspiration Precautions: 1 bite/sip at a time, Alternating bites/sips, Assistance with meals, Eliminate distractions, Feed only when awake/alert, Frequent oral care, HOB to 90 degrees, Meds crushed in puree, Monitor for s/s of aspiration, Small bites/sips, and Strict aspiration precautions   General Precautions: Standard, aspiration, fall  Communication strategies:  go to room if call light pushed    Subjective     "I want to hold your hand, so I don't fall."    Pain/Comfort:  Pain Rating 1: 0/10    Respiratory Status: Room air    Objective:     Has the patient been evaluated by SLP for swallowing?   Yes  Keep patient NPO? No   Current Respiratory Status:        Team placed new orders for SLP services to assess for appropriateness for diet advancement.  Pt awake/alert and responsive, but confusion evident.  Pt with upper dentures in place. Pt agreed to allow SLP to assist with placing lower dentures, but pt was unable to lower mandible enough in order to place.  SLP had to remove upper dentures and place lower dentures first in order to efficiently place both dental plates.  Pt observed self presenting straw sips of apple juice and water.  Pt accepted  a teaspoon of pudding and crumbled nat crackers mixed in small amount of pudding.  Pt had difficulty accepting puree and soft solid boluses from spoon 2/2 decreased mandibular lowering.  Decreased rotary jaw movement during mastication noted.  Pt requesting water frequently, which likely assisted with clearing oral cavity of soft solid trial.  Pt was not interested in accepting cream of wheat, yogurt, Boost or Boost Breeze from breakfast tray.  Appetite remains poor.  SLP does " not feel pt is appropriate for further diet upgrade at this time due to limited mandibular lowering impacting oral acceptance and mastication of soft solids.  Also, advancing to a pureed diet would likely not increase PO intake due to appearance of pureed foods being less appetizing.  Education provided to pt regarding role of SLP, ongoing swallowing assessment, importance of increasing caloric intake to maintain nutrition, recommendations to remain on current diet, and SLP treatment plan and POC.  Pt remains confused and full understanding and carryover limited.       Assessment:     Jacques Arellano is a 82 y.o. male with an SLP diagnosis of Dysphagia.      Goals:   Multidisciplinary Problems       SLP Goals          Problem: SLP    Goal Priority Disciplines Outcome   SLP Goal     SLP    Description: Speech Language Pathology Goals  Goals expected to be met by 10/23:  1.  Pt will tolerate least restrictive diet without s/s of aspiration.                        Plan:     Patient to be seen:  4 x/week   Plan of Care expires:  11/16/22  Plan of Care reviewed with:  patient   SLP Follow-Up:  Yes       Discharge recommendations:   (tbd)     Time Tracking:     SLP Treatment Date:   10/24/22  Speech Start Time:  0854  Speech Stop Time:  0914     Speech Total Time (min):  20 min    Billable Minutes: Eval Swallow and Oral Function 12 and Self Care/Home Management Training 8    10/24/2022

## 2022-10-24 NOTE — PLAN OF CARE
POC reviewed with pt.  - VSS on RA, AAOx4  - Skin with multiple skin tears all over body mepilex over many of them. Sacrum with non-blanchable redness oitnment placed PRN  - Turn q2hr  - 2 Bms this shift formed not liquid, no blood  - Childers removed today no urine output after 6 hours and bladder scan performed.  - 500 cc bolus given  - no c/o pain  - no c/o nausea eating small amount of full liquid diet  - Call light in reach, WCTM

## 2022-10-25 LAB
ALBUMIN SERPL BCP-MCNC: 1.8 G/DL (ref 3.5–5.2)
ALBUMIN SERPL BCP-MCNC: 1.9 G/DL (ref 3.5–5.2)
ALP SERPL-CCNC: 127 U/L (ref 55–135)
ALP SERPL-CCNC: 134 U/L (ref 55–135)
ALT SERPL W/O P-5'-P-CCNC: 13 U/L (ref 10–44)
ALT SERPL W/O P-5'-P-CCNC: 15 U/L (ref 10–44)
ANION GAP SERPL CALC-SCNC: 11 MMOL/L (ref 8–16)
ANION GAP SERPL CALC-SCNC: 8 MMOL/L (ref 8–16)
AST SERPL-CCNC: 26 U/L (ref 10–40)
AST SERPL-CCNC: 27 U/L (ref 10–40)
BACTERIA #/AREA URNS AUTO: ABNORMAL /HPF
BASOPHILS # BLD AUTO: 0.04 K/UL (ref 0–0.2)
BASOPHILS # BLD AUTO: 0.05 K/UL (ref 0–0.2)
BASOPHILS NFR BLD: 0.2 % (ref 0–1.9)
BASOPHILS NFR BLD: 0.2 % (ref 0–1.9)
BILIRUB SERPL-MCNC: 1 MG/DL (ref 0.1–1)
BILIRUB SERPL-MCNC: 1.1 MG/DL (ref 0.1–1)
BILIRUB UR QL STRIP: NEGATIVE
BUN SERPL-MCNC: 23 MG/DL (ref 8–23)
BUN SERPL-MCNC: 23 MG/DL (ref 8–23)
CALCIUM SERPL-MCNC: 7.8 MG/DL (ref 8.7–10.5)
CALCIUM SERPL-MCNC: 7.9 MG/DL (ref 8.7–10.5)
CHLORIDE SERPL-SCNC: 105 MMOL/L (ref 95–110)
CHLORIDE SERPL-SCNC: 108 MMOL/L (ref 95–110)
CLARITY UR REFRACT.AUTO: ABNORMAL
CO2 SERPL-SCNC: 13 MMOL/L (ref 23–29)
CO2 SERPL-SCNC: 14 MMOL/L (ref 23–29)
COLOR UR AUTO: YELLOW
CREAT SERPL-MCNC: 1 MG/DL (ref 0.5–1.4)
CREAT SERPL-MCNC: 1.1 MG/DL (ref 0.5–1.4)
DIFFERENTIAL METHOD: ABNORMAL
DIFFERENTIAL METHOD: ABNORMAL
EOSINOPHIL # BLD AUTO: 0 K/UL (ref 0–0.5)
EOSINOPHIL # BLD AUTO: 0 K/UL (ref 0–0.5)
EOSINOPHIL NFR BLD: 0.1 % (ref 0–8)
EOSINOPHIL NFR BLD: 0.1 % (ref 0–8)
ERYTHROCYTE [DISTWIDTH] IN BLOOD BY AUTOMATED COUNT: 16.7 % (ref 11.5–14.5)
ERYTHROCYTE [DISTWIDTH] IN BLOOD BY AUTOMATED COUNT: 17.2 % (ref 11.5–14.5)
EST. GFR  (NO RACE VARIABLE): >60 ML/MIN/1.73 M^2
EST. GFR  (NO RACE VARIABLE): >60 ML/MIN/1.73 M^2
GLUCOSE SERPL-MCNC: 111 MG/DL (ref 70–110)
GLUCOSE SERPL-MCNC: 116 MG/DL (ref 70–110)
GLUCOSE UR QL STRIP: NEGATIVE
HCT VFR BLD AUTO: 27.2 % (ref 40–54)
HCT VFR BLD AUTO: 28.9 % (ref 40–54)
HGB BLD-MCNC: 9.2 G/DL (ref 14–18)
HGB BLD-MCNC: 9.6 G/DL (ref 14–18)
HGB UR QL STRIP: ABNORMAL
IMM GRANULOCYTES # BLD AUTO: 0.3 K/UL (ref 0–0.04)
IMM GRANULOCYTES # BLD AUTO: 0.39 K/UL (ref 0–0.04)
IMM GRANULOCYTES NFR BLD AUTO: 1.5 % (ref 0–0.5)
IMM GRANULOCYTES NFR BLD AUTO: 2 % (ref 0–0.5)
KETONES UR QL STRIP: NEGATIVE
LACTATE SERPL-SCNC: 2.7 MMOL/L (ref 0.5–2.2)
LACTATE SERPL-SCNC: 3.1 MMOL/L (ref 0.5–2.2)
LACTATE SERPL-SCNC: 3.2 MMOL/L (ref 0.5–2.2)
LACTATE SERPL-SCNC: 3.2 MMOL/L (ref 0.5–2.2)
LEUKOCYTE ESTERASE UR QL STRIP: ABNORMAL
LYMPHOCYTES # BLD AUTO: 1 K/UL (ref 1–4.8)
LYMPHOCYTES # BLD AUTO: 1 K/UL (ref 1–4.8)
LYMPHOCYTES NFR BLD: 4.9 % (ref 18–48)
LYMPHOCYTES NFR BLD: 5.1 % (ref 18–48)
MAGNESIUM SERPL-MCNC: 1.5 MG/DL (ref 1.6–2.6)
MAGNESIUM SERPL-MCNC: 1.5 MG/DL (ref 1.6–2.6)
MCH RBC QN AUTO: 31 PG (ref 27–31)
MCH RBC QN AUTO: 31.2 PG (ref 27–31)
MCHC RBC AUTO-ENTMCNC: 33.2 G/DL (ref 32–36)
MCHC RBC AUTO-ENTMCNC: 33.8 G/DL (ref 32–36)
MCV RBC AUTO: 92 FL (ref 82–98)
MCV RBC AUTO: 93 FL (ref 82–98)
MICROSCOPIC COMMENT: ABNORMAL
MONOCYTES # BLD AUTO: 1.2 K/UL (ref 0.3–1)
MONOCYTES # BLD AUTO: 1.3 K/UL (ref 0.3–1)
MONOCYTES NFR BLD: 5.7 % (ref 4–15)
MONOCYTES NFR BLD: 6.6 % (ref 4–15)
NEUTROPHILS # BLD AUTO: 17.2 K/UL (ref 1.8–7.7)
NEUTROPHILS # BLD AUTO: 17.9 K/UL (ref 1.8–7.7)
NEUTROPHILS NFR BLD: 86 % (ref 38–73)
NEUTROPHILS NFR BLD: 87.6 % (ref 38–73)
NITRITE UR QL STRIP: NEGATIVE
NRBC BLD-RTO: 0 /100 WBC
NRBC BLD-RTO: 0 /100 WBC
PH UR STRIP: 6 [PH] (ref 5–8)
PHOSPHATE SERPL-MCNC: 2.2 MG/DL (ref 2.7–4.5)
PLATELET # BLD AUTO: 106 K/UL (ref 150–450)
PLATELET # BLD AUTO: 118 K/UL (ref 150–450)
PMV BLD AUTO: 11 FL (ref 9.2–12.9)
PMV BLD AUTO: 11.1 FL (ref 9.2–12.9)
POCT GLUCOSE: 131 MG/DL (ref 70–110)
POTASSIUM SERPL-SCNC: 3.4 MMOL/L (ref 3.5–5.1)
POTASSIUM SERPL-SCNC: 3.6 MMOL/L (ref 3.5–5.1)
PROT SERPL-MCNC: 4 G/DL (ref 6–8.4)
PROT SERPL-MCNC: 4.3 G/DL (ref 6–8.4)
PROT UR QL STRIP: ABNORMAL
RBC # BLD AUTO: 2.95 M/UL (ref 4.6–6.2)
RBC # BLD AUTO: 3.1 M/UL (ref 4.6–6.2)
RBC #/AREA URNS AUTO: 21 /HPF (ref 0–4)
SODIUM SERPL-SCNC: 129 MMOL/L (ref 136–145)
SODIUM SERPL-SCNC: 130 MMOL/L (ref 136–145)
SP GR UR STRIP: 1.02 (ref 1–1.03)
SQUAMOUS #/AREA URNS AUTO: 0 /HPF
URN SPEC COLLECT METH UR: ABNORMAL
WBC # BLD AUTO: 19.98 K/UL (ref 3.9–12.7)
WBC # BLD AUTO: 20.43 K/UL (ref 3.9–12.7)
WBC #/AREA URNS AUTO: >100 /HPF (ref 0–5)
WBC CLUMPS UR QL AUTO: ABNORMAL
YEAST UR QL AUTO: ABNORMAL

## 2022-10-25 PROCEDURE — 36415 COLL VENOUS BLD VENIPUNCTURE: CPT | Performed by: STUDENT IN AN ORGANIZED HEALTH CARE EDUCATION/TRAINING PROGRAM

## 2022-10-25 PROCEDURE — 63600175 PHARM REV CODE 636 W HCPCS: Mod: JG | Performed by: NEUROLOGICAL SURGERY

## 2022-10-25 PROCEDURE — 87040 BLOOD CULTURE FOR BACTERIA: CPT | Mod: 59 | Performed by: STUDENT IN AN ORGANIZED HEALTH CARE EDUCATION/TRAINING PROGRAM

## 2022-10-25 PROCEDURE — 99233 PR SUBSEQUENT HOSPITAL CARE,LEVL III: ICD-10-PCS | Mod: GC,,, | Performed by: INTERNAL MEDICINE

## 2022-10-25 PROCEDURE — 99233 SBSQ HOSP IP/OBS HIGH 50: CPT | Mod: GC,,, | Performed by: INTERNAL MEDICINE

## 2022-10-25 PROCEDURE — 25000003 PHARM REV CODE 250: Performed by: STUDENT IN AN ORGANIZED HEALTH CARE EDUCATION/TRAINING PROGRAM

## 2022-10-25 PROCEDURE — 93010 ELECTROCARDIOGRAM REPORT: CPT | Mod: ,,, | Performed by: INTERNAL MEDICINE

## 2022-10-25 PROCEDURE — P9045 ALBUMIN (HUMAN), 5%, 250 ML: HCPCS | Mod: JG | Performed by: NEUROLOGICAL SURGERY

## 2022-10-25 PROCEDURE — 92526 ORAL FUNCTION THERAPY: CPT

## 2022-10-25 PROCEDURE — 63600175 PHARM REV CODE 636 W HCPCS

## 2022-10-25 PROCEDURE — 80053 COMPREHEN METABOLIC PANEL: CPT | Mod: 91 | Performed by: STUDENT IN AN ORGANIZED HEALTH CARE EDUCATION/TRAINING PROGRAM

## 2022-10-25 PROCEDURE — 83735 ASSAY OF MAGNESIUM: CPT | Performed by: INTERNAL MEDICINE

## 2022-10-25 PROCEDURE — 80053 COMPREHEN METABOLIC PANEL: CPT | Performed by: INTERNAL MEDICINE

## 2022-10-25 PROCEDURE — 20600001 HC STEP DOWN PRIVATE ROOM

## 2022-10-25 PROCEDURE — 83605 ASSAY OF LACTIC ACID: CPT | Mod: 91 | Performed by: NEUROLOGICAL SURGERY

## 2022-10-25 PROCEDURE — 83735 ASSAY OF MAGNESIUM: CPT | Mod: 91 | Performed by: STUDENT IN AN ORGANIZED HEALTH CARE EDUCATION/TRAINING PROGRAM

## 2022-10-25 PROCEDURE — 97535 SELF CARE MNGMENT TRAINING: CPT

## 2022-10-25 PROCEDURE — 36415 COLL VENOUS BLD VENIPUNCTURE: CPT | Performed by: NEUROLOGICAL SURGERY

## 2022-10-25 PROCEDURE — 84100 ASSAY OF PHOSPHORUS: CPT | Performed by: STUDENT IN AN ORGANIZED HEALTH CARE EDUCATION/TRAINING PROGRAM

## 2022-10-25 PROCEDURE — 93005 ELECTROCARDIOGRAM TRACING: CPT

## 2022-10-25 PROCEDURE — 81001 URINALYSIS AUTO W/SCOPE: CPT | Performed by: INTERNAL MEDICINE

## 2022-10-25 PROCEDURE — 27000207 HC ISOLATION

## 2022-10-25 PROCEDURE — 25000003 PHARM REV CODE 250: Performed by: NEUROLOGICAL SURGERY

## 2022-10-25 PROCEDURE — 25000003 PHARM REV CODE 250: Performed by: INTERNAL MEDICINE

## 2022-10-25 PROCEDURE — 25000003 PHARM REV CODE 250: Performed by: HOSPITALIST

## 2022-10-25 PROCEDURE — 85025 COMPLETE CBC W/AUTO DIFF WBC: CPT | Performed by: INTERNAL MEDICINE

## 2022-10-25 PROCEDURE — 87086 URINE CULTURE/COLONY COUNT: CPT | Performed by: INTERNAL MEDICINE

## 2022-10-25 PROCEDURE — S0030 INJECTION, METRONIDAZOLE: HCPCS | Performed by: STUDENT IN AN ORGANIZED HEALTH CARE EDUCATION/TRAINING PROGRAM

## 2022-10-25 PROCEDURE — 83605 ASSAY OF LACTIC ACID: CPT | Mod: 91 | Performed by: INTERNAL MEDICINE

## 2022-10-25 PROCEDURE — 63600175 PHARM REV CODE 636 W HCPCS: Performed by: STUDENT IN AN ORGANIZED HEALTH CARE EDUCATION/TRAINING PROGRAM

## 2022-10-25 PROCEDURE — 93010 EKG 12-LEAD: ICD-10-PCS | Mod: ,,, | Performed by: INTERNAL MEDICINE

## 2022-10-25 RX ORDER — ALBUMIN HUMAN 50 G/1000ML
25 SOLUTION INTRAVENOUS ONCE
Status: COMPLETED | OUTPATIENT
Start: 2022-10-25 | End: 2022-10-25

## 2022-10-25 RX ORDER — SODIUM CHLORIDE 9 MG/ML
INJECTION, SOLUTION INTRAVENOUS CONTINUOUS
Status: DISCONTINUED | OUTPATIENT
Start: 2022-10-25 | End: 2022-10-25

## 2022-10-25 RX ORDER — MIDODRINE HYDROCHLORIDE 5 MG/1
5 TABLET ORAL 3 TIMES DAILY
Status: DISCONTINUED | OUTPATIENT
Start: 2022-10-25 | End: 2022-10-25

## 2022-10-25 RX ORDER — SODIUM,POTASSIUM PHOSPHATES 280-250MG
2 POWDER IN PACKET (EA) ORAL
Status: COMPLETED | OUTPATIENT
Start: 2022-10-25 | End: 2022-10-25

## 2022-10-25 RX ORDER — MAGNESIUM SULFATE HEPTAHYDRATE 40 MG/ML
2 INJECTION, SOLUTION INTRAVENOUS ONCE
Status: COMPLETED | OUTPATIENT
Start: 2022-10-25 | End: 2022-10-25

## 2022-10-25 RX ORDER — MIDODRINE HYDROCHLORIDE 5 MG/1
10 TABLET ORAL 3 TIMES DAILY
Status: DISCONTINUED | OUTPATIENT
Start: 2022-10-25 | End: 2022-10-25

## 2022-10-25 RX ORDER — MIDODRINE HYDROCHLORIDE 5 MG/1
10 TABLET ORAL 3 TIMES DAILY
Status: DISCONTINUED | OUTPATIENT
Start: 2022-10-25 | End: 2022-10-27

## 2022-10-25 RX ORDER — SODIUM BICARBONATE 650 MG/1
1300 TABLET ORAL ONCE
Status: COMPLETED | OUTPATIENT
Start: 2022-10-25 | End: 2022-10-25

## 2022-10-25 RX ORDER — SODIUM BICARBONATE 650 MG/1
650 TABLET ORAL ONCE
Status: DISCONTINUED | OUTPATIENT
Start: 2022-10-25 | End: 2022-10-25

## 2022-10-25 RX ADMIN — MIDODRINE HYDROCHLORIDE 10 MG: 5 TABLET ORAL at 10:10

## 2022-10-25 RX ADMIN — Medication 1 CAPSULE: at 10:10

## 2022-10-25 RX ADMIN — SODIUM CHLORIDE, SODIUM LACTATE, POTASSIUM CHLORIDE, AND CALCIUM CHLORIDE 1000 ML: .6; .31; .03; .02 INJECTION, SOLUTION INTRAVENOUS at 01:10

## 2022-10-25 RX ADMIN — HEPARIN SODIUM 5000 UNITS: 5000 INJECTION INTRAVENOUS; SUBCUTANEOUS at 05:10

## 2022-10-25 RX ADMIN — ESCITALOPRAM OXALATE 10 MG: 10 TABLET ORAL at 10:10

## 2022-10-25 RX ADMIN — MAGNESIUM SULFATE 2 G: 2 INJECTION INTRAVENOUS at 04:10

## 2022-10-25 RX ADMIN — POTASSIUM & SODIUM PHOSPHATES POWDER PACK 280-160-250 MG 2 PACKET: 280-160-250 PACK at 10:10

## 2022-10-25 RX ADMIN — VANCOMYCIN HYDROCHLORIDE 125 MG: KIT at 05:10

## 2022-10-25 RX ADMIN — FOLIC ACID 1 MG: 1 TABLET ORAL at 10:10

## 2022-10-25 RX ADMIN — HEPARIN SODIUM 5000 UNITS: 5000 INJECTION INTRAVENOUS; SUBCUTANEOUS at 02:10

## 2022-10-25 RX ADMIN — PANTOPRAZOLE SODIUM 40 MG: 40 TABLET, DELAYED RELEASE ORAL at 05:10

## 2022-10-25 RX ADMIN — SODIUM BICARBONATE 650 MG TABLET 1300 MG: at 10:10

## 2022-10-25 RX ADMIN — METRONIDAZOLE 500 MG: 500 INJECTION, SOLUTION INTRAVENOUS at 06:10

## 2022-10-25 RX ADMIN — METRONIDAZOLE 500 MG: 500 INJECTION, SOLUTION INTRAVENOUS at 12:10

## 2022-10-25 RX ADMIN — HEPARIN SODIUM 5000 UNITS: 5000 INJECTION INTRAVENOUS; SUBCUTANEOUS at 10:10

## 2022-10-25 RX ADMIN — VANCOMYCIN HYDROCHLORIDE 125 MG: KIT at 12:10

## 2022-10-25 RX ADMIN — ATORVASTATIN CALCIUM 40 MG: 40 TABLET, FILM COATED ORAL at 10:10

## 2022-10-25 RX ADMIN — PANTOPRAZOLE SODIUM 40 MG: 40 TABLET, DELAYED RELEASE ORAL at 06:10

## 2022-10-25 RX ADMIN — METRONIDAZOLE 500 MG: 500 INJECTION, SOLUTION INTRAVENOUS at 10:10

## 2022-10-25 RX ADMIN — ALBUMIN (HUMAN) 25 G: 12.5 SOLUTION INTRAVENOUS at 06:10

## 2022-10-25 RX ADMIN — SODIUM CHLORIDE: 0.9 INJECTION, SOLUTION INTRAVENOUS at 08:10

## 2022-10-25 RX ADMIN — ALLOPURINOL 100 MG: 100 TABLET ORAL at 10:10

## 2022-10-25 RX ADMIN — VANCOMYCIN HYDROCHLORIDE 125 MG: KIT at 11:10

## 2022-10-25 RX ADMIN — POTASSIUM & SODIUM PHOSPHATES POWDER PACK 280-160-250 MG 2 PACKET: 280-160-250 PACK at 05:10

## 2022-10-25 RX ADMIN — POTASSIUM & SODIUM PHOSPHATES POWDER PACK 280-160-250 MG 2 PACKET: 280-160-250 PACK at 08:10

## 2022-10-25 RX ADMIN — MIDODRINE HYDROCHLORIDE 5 MG: 5 TABLET ORAL at 02:10

## 2022-10-25 RX ADMIN — RIFAXIMIN 550 MG: 550 TABLET ORAL at 10:10

## 2022-10-25 RX ADMIN — POTASSIUM & SODIUM PHOSPHATES POWDER PACK 280-160-250 MG 2 PACKET: 280-160-250 PACK at 11:10

## 2022-10-25 RX ADMIN — TAMSULOSIN HYDROCHLORIDE 0.4 MG: 0.4 CAPSULE ORAL at 10:10

## 2022-10-25 NOTE — CARE UPDATE
RAPID RESPONSE NURSE ROUND       Rounding completed with charge RNAmrita for hypotension reports pt stable at this time. No additional concerns verbalized at this time. Instructed to call [phone] for further concerns or assistance.

## 2022-10-25 NOTE — PT/OT/SLP PROGRESS
"Speech Language Pathology Treatment    Patient Name:  Jacques Arellano   MRN:  [id number]  Admitting Diagnosis: C. difficile colitis    Recommendations:                 General Recommendations:   ongoing swallowing assessment/monitoring diet tolerance  Diet recommendations:  Puree, Liquid Diet Level: Thin   Aspiration Precautions: 1 bite/sip at a time, Alternating bites/sips, Alternate means of nutrition/hydration, Avoid talking while eating, Eliminate distractions, Feed only when awake/alert, Frequent oral care, HOB to 90 degrees, Meds whole 1 at a time, Monitor for s/s of aspiration, Remain upright 30 minutes post meal, Small bites/sips, and Strict aspiration precautions   General Precautions: Standard, aspiration, fall  Communication strategies:  go to room if call light pushed    Subjective     "Who planned this party?" Pt continues to exhibit confusion, but is awake/alert and cooperative.     Pain/Comfort:  Pain Rating 1: 0/10    Respiratory Status: Room air    Objective:     Has the patient been evaluated by SLP for swallowing?   Yes  Keep patient NPO? No   Current Respiratory Status:        Pt seen for follow up ongoing swallowing assessment. Pt awake/alert and sitting upright in bed upon entry.  Nurse was administering medications one at a time whole with straw sips of water. Pt was able to manage without difficulty. Pt was willing to accept 1 small bites of yogurt, but did not like taste to accept additional trials.  He did not wish to accept cream of wheat. Pt agreed to trial one bite of cornflakes softened in cereal.  However, oral acceptance and spoon stripping of bolus was hindered by limited ability to open mouth/lower mandible.  This resulted in SLP mostly having to "dump" portion of soft solid bolus into oral cavity as pt was unable to strip from spoon.  Prolonged mastication and liquid wash required to clear small soft solid bolus from oral cavity.  Pt did not wish to attempt any further soft solid " trials.  No overt s/s of aspiration were observed with any trials accepted.  Education was provided to pt regarding role of SLP, recommendations to advance to puree diet at this time in an effort to increase calories/nutrition, and SLP treatment plan and POC.  Pt's full understanding and carryover likely limited due to confusion.     Assessment:     Jacques Arellano is a 82 y.o. male with an SLP diagnosis of Dysphagia.      Goals:   Multidisciplinary Problems       SLP Goals          Problem: SLP    Goal Priority Disciplines Outcome   SLP Goal     SLP    Description: Speech Language Pathology Goals  Goals expected to be met by 10/23 (goal still appropriate and expected to be met 10/31):  1.  Pt will tolerate least restrictive diet without s/s of aspiration.                        Plan:     Patient to be seen:  4 x/week   Plan of Care expires:  11/16/22  Plan of Care reviewed with:  patient   SLP Follow-Up:  Yes       Discharge recommendations:   (tbd)     Time Tracking:     SLP Treatment Date:   10/25/22  Speech Start Time:  1003  Speech Stop Time:  1019     Speech Total Time (min):  16 min    Billable Minutes: Treatment Swallowing Dysfunction 8 and Self Care/Home Management Training 8    10/25/2022

## 2022-10-25 NOTE — PLAN OF CARE
Pt had a rapid response due to a low BP with a systolic of 76 after several attempts. I did a manual BP check and got the same reading. Pt was asymptomatic. MD on call ordered a couple of boluses. Pt still had not voided since the hopkins was removed. Pt bladder scanned with a reading of 101. Around 0530, pt was bladder scanned again with a reading of 294. A straight cath was ordered and done. 150cc of tea colored urine noted from straight cath. Pt also had an EKG done due to abnormal readings on telemetry. Pt ordered now to be on telemetry. No c/o pain noted. Early morning BP was a bit better as it was 89/53 with a MAP of 67. MD and rapid response notified of improvement in SBP.

## 2022-10-25 NOTE — PROGRESS NOTES
[facility]  Progress Note    Patient Name: Jacques Arellano  MRN: [id number]  Patient Class: IP- Inpatient   Admission Date: 10/14/2022  Length of Stay: 11 days  Attending Physician: Nemesio Pearson MD  Primary Care Provider: Matty Garsia MD        Subjective:     Principal Problem:C. difficile colitis        HPI:  81 yo M with PMhx of CHF, AFib, Watchman, pacer, expansile brain lesion, pacemaker presents to from [facility] for diarrhea, copious black stool and acute altered mental status. Pt reports he has 3-5 profuse watery BM for the last few days noting a little bit of blood. Pt was recently admitted to [facility] ED on 10/12 for abdominal pain and coffee ground emesis, EGD revealed grade D esophagitis and gastritis with no overt GI bleeding and was subsequently discharged with Protonix. Pt was recently admitted to [facility] ICU on 9/23 for sepsis thought to be secondary to E coli. UTI vs cirrhosis and was treated with antibiotic and pressors . Inpatient workup revealed new dx of cirrhosis with ascites no spb, aortic aneurysm, and expansile Calvarial lesion, pt was DC on 10/13 back to [facility].      Today pt H & H stable. CTA abd shows colitis, cirrhosis/ascites, and new potential HCC. BNP elevated at 544 however XCR and PE unconcerning for CHF exacerbation. Mentation improved while in ED - CT head stable.       Overview/Hospital Course:  CTA A/P was performed urgently, without evidence of contrast extravasation though showing aforementioned liver lesion and rectosigmoid colitis. Stool cultures returned C diff +ve and he was placed on PO vancomycin therapy. He underwent paracentesis and analysis of ascitic fluid was not indicative of SBP. Cultures and cytology are pending. He has been on IV PPI and octreotide gtt (since discontinued) with initial plans to scope, however, with stabilization of blood counts and CDI, this is no longer indicated. More awake today and conversant.          Interval History: Rapid response called for asymptomatic hypotension overnight, SBP 70s per chart review. Given 1.5L LR bolus with adequate response. LA coming down. Continues to have diarrhea.    Review of Systems   Unable to perform ROS: Mental status change   Objective:     Vital Signs (Most Recent):  Temp: 96.7 °F (35.9 °C) (10/25/22 0811)  Pulse: 75 (10/25/22 0811)  Resp: 14 (10/25/22 0811)  BP: (!) 81/51 (10/25/22 0811)  SpO2: 95 % (10/25/22 0811)   Vital Signs (24h Range):  Temp:  [96.1 °F (35.6 °C)-97.7 °F (36.5 °C)] 96.7 °F (35.9 °C)  Pulse:  [55-76] 75  Resp:  [14-18] 14  SpO2:  [90 %-98 %] 95 %  BP: ()/(0-61) 81/51     Weight: 83.5 kg (184 lb)  Body mass index is 26.4 kg/m².    Intake/Output Summary (Last 24 hours) at 10/25/2022 0911  Last data filed at 10/25/2022 0904  Gross per 24 hour   Intake 2382.19 ml   Output 250 ml   Net 2132.19 ml      Physical Exam  Constitutional:       Appearance: He is not ill-appearing.   HENT:      Head:      Comments: Subcutaneous lesion hard to palpation located on right frontal skull.   Eyes:      General: No scleral icterus.     Conjunctiva/sclera: Conjunctivae normal.   Cardiovascular:      Rate and Rhythm: Normal rate and regular rhythm.      Pulses: Normal pulses.      Heart sounds: Normal heart sounds.   Pulmonary:      Effort: Pulmonary effort is normal. No respiratory distress.      Breath sounds: Normal breath sounds.   Chest:      Comments: Pacemaker in place in right upper chest wall.   Abdominal:      General: Bowel sounds are normal. There is distension.      Palpations: Abdomen is soft.      Tenderness: There is no abdominal tenderness.   Musculoskeletal:      Right lower leg: Edema present.      Left lower leg: Edema present.   Skin:     General: Skin is warm and dry.      Findings: No bruising or rash.   Neurological:      GCS: GCS eye subscore is 4. GCS verbal subscore is 3. GCS motor subscore is 5.      Comments: AAOx2       Significant  Labs: All pertinent labs within the past 24 hours have been reviewed.  A1C:   Recent Labs   Lab 07/19/22  1149   HGBA1C 6.1*     ABGs: No results for input(s): PH, PCO2, HCO3, POCSATURATED, BE, TOTALHB, COHB, METHB, O2HB, POCFIO2, PO2 in the last 48 hours.  Bilirubin:   Recent Labs   Lab 10/22/22  0817 10/23/22  0657 10/24/22  0811 10/25/22  0125 10/25/22  0421   BILITOT 1.0 1.0 1.2* 1.1* 1.0     Blood Culture: No results for input(s): LABBLOO in the last 48 hours.  BMP:   Recent Labs   Lab 10/25/22  0421   *   *   K 3.4*      CO2 13*   BUN 23   CREATININE 1.0   CALCIUM 7.8*   MG 1.5*     CBC:   Recent Labs   Lab 10/24/22  0811 10/25/22  0125 10/25/22  0421   WBC 20.71* 20.43* 19.98*   HGB 10.5* 9.6* 9.2*   HCT 30.5* 28.9* 27.2*   * 118* 106*     CMP:   Recent Labs   Lab 10/24/22  0811 10/25/22  0125 10/25/22  0421   * 130* 129*   K 3.4* 3.6 3.4*    108 105   CO2 16* 14* 13*   * 116* 111*   BUN 24* 23 23   CREATININE 1.1 1.1 1.0   CALCIUM 8.1* 7.9* 7.8*   PROT 4.4* 4.3* 4.0*   ALBUMIN 2.0* 1.9* 1.8*   BILITOT 1.2* 1.1* 1.0   ALKPHOS 121 134 127   AST 24 27 26   ALT 13 15 13   ANIONGAP 9 8 11     Cardiac Markers: No results for input(s): CKMB, MYOGLOBIN, BNP, TROPISTAT in the last 48 hours.  Coagulation: No results for input(s): PT, INR, APTT in the last 48 hours.  Lactic Acid:   Recent Labs   Lab 10/25/22  0125 10/25/22  0421 10/25/22  0639   LACTATE 3.2* 3.1* 2.7*     Lipase: No results for input(s): LIPASE in the last 48 hours.  Lipid Panel: No results for input(s): CHOL, HDL, LDLCALC, TRIG, CHOLHDL in the last 48 hours.  Magnesium:   Recent Labs   Lab 10/24/22  0811 10/25/22  0125 10/25/22  0421   MG 1.6 1.5* 1.5*     Pathology Results  (Last 10 years)      None          POCT Glucose:   Recent Labs   Lab 10/25/22  0115   POCTGLUCOSE 131*     Prealbumin: No results for input(s): PREALBUMIN in the last 48 hours.  Respiratory Culture: No results for input(s): GSRESP,  RESPIRATORYC in the last 48 hours.  Troponin: No results for input(s): TROPONINI, TROPONINIHS in the last 48 hours.  TSH:   Recent Labs   Lab 07/19/22  1149   TSH 0.555     Urine Culture: No results for input(s): LABURIN in the last 48 hours.  Urine Studies: No results for input(s): COLORU, APPEARANCEUA, PHUR, SPECGRAV, PROTEINUA, GLUCUA, KETONESU, BILIRUBINUA, OCCULTUA, NITRITE, UROBILINOGEN, LEUKOCYTESUR, RBCUA, WBCUA, BACTERIA, SQUAMEPITHEL, HYALINECASTS in the last 48 hours.    Invalid input(s): WRIGHTSUR    Significant Imaging: I have reviewed all pertinent imaging results/findings within the past 24 hours.      Assessment/Plan:      * C. difficile colitis  Pt endorses profuse watery diarrhea for the past few days. Concerning for possible C diff with recent aggressive antibiotic regimen     Plan:  - Stool studies pending   - C diff positive; starting Po Vancomycin, Adding flagyl  10/19  - Per Hepatology, added Rifaximin BID.   - WBC up trending, KUB to r/o toxic megacolon     GI bleed  xPt endorses melena. CT scan with no evidence of contrast extravasation to suggest active GI bleed at this time. Stable 3 cm enhancing lesion at the hepatic dome with evidence of liver cirrhosis, circumferential wall thickening of the rectum and sigmoid colon concern for colitis, and pancreatic hypodensities measuring up to 2.0 cm. Considering liver disease, suspicion for ruptured esophogeal varcies. Recent EGD at [facility] reviewed, will discuss with GI     Plan:  - CBC qd  - GI planning for endoscopy & sigmoidoscopy deferred due to C.diff and stable HH  - PPI 40mg BID   - CTX discontinued    Other cirrhosis of liver  9/23: Ct A/P:Cirrhotic morphology of the liver with sequelae of portal hypertension as evidence by splenomegaly, intra-abdominal ascites, and possible portal hypertensive enteropathy. Pt denies hx of alcohol abuse.  R preformed diagnostic paracentetics on 9/27 to r/o SBP: which was negative 130 WBC and 10%      Plan:    - Daily CMP   - Hepatology following; paracentesis with IR with 2.4L with cytology collected  - Started on Rifaximin BID per hepatology recommendations  - Cytology from para collected, negative for malignancy  - Albumin 25g to optimize fluid status given x1 10/20      Chronic gout  Plan:  Continue home allopurinol.         Type 2 diabetes mellitus without complication, without long-term current use of insulin  Last A1c 6.1 3 months ago. Hypoglycemic on admission.      Plan:  - No home medications  Noted    - POCT glucose ACHS    - Low threshold to start LDSSI     Mixed hyperlipidemia  Plan:  - Continue home statin         Primary hypertension  Home medications: triamterene-HCTZ, Lasix     Plan:  - Hold home medications s/o hypotension      Chronic diastolic heart failure  9/24 ANNE: The left ventricle is normal in size with low normal systolic function. The estimated ejection fraction is 50%.There is abnormal septal wall motion consistent with right ventricular pacing. Mild right ventricular enlargement with mildly reduced right ventricular systolic function. BNP:544     Plan:  - Low threshold for Lasix as CXR/lung sounds clear while saturating well on RA.         Permanent atrial fibrillation  Patient with documented history of permanent Afib s/p watchman, no longer on AC or rate controlling agents.  [id number] 5   HAS-BLED 4      Plan:   - Patient rate controlled, no indication for further agents at this time  - No full AC required s/p Watchman; will hold AC ppx and APT s/o active bleeding from abrasion sites  - Cardiac telemetry  - Maintain K > 4, Mg > 2, Ca wnl; replete PRN     cardiology consult if hemodynamic instability for DCCV evaluation    VTE Risk Mitigation (From admission, onward)         Ordered     heparin (porcine) injection 5,000 Units  Every 8 hours         10/22/22 1447     IP VTE HIGH RISK PATIENT  Once         10/14/22 1826     Place sequential compression device  Until discontinued          10/14/22 1826                Discharge Planning   ELMER: 10/28/2022     Code Status: DNR   Is the patient medically ready for discharge?: No    Reason for patient still in hospital (select all that apply): Patient trending condition  Discharge Plan A: Return to nursing home   Discharge Delays: None known at this time              Kristy Patricio MD  Department of Hospital Medicine   [facility]

## 2022-10-25 NOTE — CODE/ RAPID DOCUMENTATION
RAPID RESPONSE NURSE NOTE        Admit Date: 10/14/2022  LOS: 11  Code Status: DNR   Date of Consult: 10/25/2022  : 1940  Age: 82 y.o.  Weight:   Wt Readings from Last 1 Encounters:   10/17/22 83.5 kg (184 lb)     Sex: male  Race: White   Bed: [address] [place] [address] A:   MRN: [id number]  Time Rapid Response Team page Received: 0053  Time Rapid Response Team at Bedside: 0100  Time Rapid Response Team left Bedside: 0135  Was the patient discharged from an ICU this admission? No   Was the patient discharged from a PACU within last 24 hours? No   Did the patient receive conscious sedation/general anesthesia in last 24 hours? No  Was the patient in the ED within the past 24 hours? No  Was the patient on NIPPV within the past 24 hours? No   Did this progress into an ARC or CPA: no  Attending Physician: Nemesio Pearson MD  Primary Service: [facility] 3       SITUATION    Notified by bedside RN via phone call.  Reason for alert: hypotension  Called to evaluate the patient for Circulatory    BACKGROUND     Why is the patient in the hospital?: C. difficile colitis    Patient has a past medical history of TRENT (acute kidney injury), Anticoagulant long-term use, Aortic aneurysm, Ascites of liver, Atrial fibrillation, Brain lesion, C. difficile colitis, Diabetes mellitus, Diastolic heart failure, Frequent falls, GI bleeding, Hyperlipidemia, Hypertension, Liver disease, Renal disorder, Sacral decubitus ulcer, Sleep apnea, Stroke, Type 2 diabetes mellitus without complication, without long-term current use of insulin, UTI (urinary tract infection), and Vertigo.    Last Vitals:  Temp: 97.3 °F (36.3 °C) (10/24 2052)  Pulse: 72 (10/25 0108)  Resp: 16 (10/25 0108)  BP: 70/0 (10/25 0110)  SpO2: 96 % (10/25 0108)    24 Hours Vitals Range:  Temp:  [96.1 °F (35.6 °C)-97.3 °F (36.3 °C)]   Pulse:  [69-73]   Resp:  [14-16]   BP: ()/(0-61)   SpO2:  [96 %-98 %]     Labs:  Recent Labs     10/22/22  0817 10/23/22  1151 10/24/22  0811   WBC 19.96*  19.12* 20.71*   HGB 10.2* 10.4* 10.5*   HCT 32.1* 31.2* 30.5*   * 133* 144*       Recent Labs     10/22/22  0817 10/23/22  0657 10/24/22  0811   * 133* 134*   K 3.4* 4.1 3.4*    109 109   CO2 15* 17* 16*   CREATININE 1.2 1.3 1.1   * 134* 121*   PHOS 2.4* 2.3* 2.3*   MG 1.6 1.6 1.6        No results for input(s): PH, PCO2, PO2, HCO3, POCSATURATED, BE in the last 72 hours.     ASSESSMENT    Physical Exam  Constitutional:       Appearance: He is ill-appearing.   Cardiovascular:      Rate and Rhythm: Normal rate and regular rhythm.   Pulmonary:      Effort: Pulmonary effort is normal.      Breath sounds: Normal breath sounds.   Musculoskeletal:      Right lower leg: Edema present.      Left lower leg: Edema present.   Skin:     General: Skin is warm and moist.      Capillary Refill: Capillary refill takes 2 to 3 seconds.      Coloration: Skin is pale and sallow.      Findings: Bruising present.      Comments: Copious weeping from RUE   Neurological:      Mental Status: He is alert. Mental status is at baseline.      GCS: GCS eye subscore is 4. GCS verbal subscore is 4. GCS motor subscore is 6.     HR 72  BP 73/50 (58)  RR 16, SpO2 96%  Temp 97.7      Repeat manual doppler pressure 70/0    Per bedside RN, patient's hopkins was removed on dayshift and he has not put out any urine. Last bladder scan at 0030 showed a volume of ~100cc, pt denies urge to void    INTERVENTIONS    The patient was seen for Cardiac problem. Staff concerns included hypotension. The following interventions were performed: CBC, CMP, Magnesium, continuous cardiac monitoring, and lactic acid, and 1L LR bolus.    Patient BP improved to 89/53 (65) after the first 250cc of fluid, will continue with remainder of bolus as patient appears volume down. Per chart review pt baseline BP appears to be 90s/50s with MAPs in low 70s to high 60s. Patient remained asymptomatic for hypotension for duration of rapid.    RRN IV START        IV started during emergency event. 20g placed to LAC, 20g placed to R wrist.     Lactic came back elevated at 3.6, recheck ordered for 0600 to ensure IVF are effective. Suspect patient is dehydrated, as he has had poor oral intake per RN, he has copious weeping drainage from RUE, and his lips and oral mucosa are dry.    RECOMMENDATIONS    We recommend: Follow up with lab results  Monitor VS closely  Strict I&O  Notify MD, RRN if patient becomes hypotensive again or becomes symptomatic for hypotension    PROVIDER ESCALATION    Orders received and case discussed with Dr. Stafford with  .    Primary team arrival time: 0100    Disposition: Remain in room 1026.    FOLLOW UP    bedside RNSamantha, charge RN Apoorva  updated on plan of care. Instructed to call the Rapid Response Nurse, Jenae Fulton RN at [phone] for additional questions or concerns.

## 2022-10-25 NOTE — CARE UPDATE
Rapid Response Nurse Follow-up Note     Followed up with patient for proactive rounding.  No acute issues or additional concerns at this time. Reviewed plan of care with Charge RN, Amrita .   Please call Rapid Response RN, Mine Munroe RN if any additional monitoring required or questions or concerns arise at [phone].

## 2022-10-25 NOTE — PLAN OF CARE
[facility] - [facility]  Discharge Reassessment    Primary Care Provider: Matty Garsia MD    Expected Discharge Date: 10/28/2022    Reassessment (most recent)       Discharge Reassessment - 10/25/22 1540          Discharge Reassessment    Assessment Type Discharge Planning Brief Assessment     Did the patient's condition or plan change since previous assessment? No     Discharge Plan discussed with: Patient     Communicated ELMER with patient/caregiver Yes     Discharge Plan A Return to nursing home     DME Needed Upon Discharge  other (see comments)   Unknown at this time    Discharge Barriers Identified None     Why the patient remains in the hospital Requires continued medical care        Post-Acute Status    Hospital Resources/Appts/Education Provided Provided patient/caregiver with written discharge plan information     Discharge Delays None known at this time                     Pt not ready for discharge due to: Not medically ready  SW will remain available for families in [facility].  Currently pt has d/c plans in progress at this time.     Pt will [facility] to [facility]- [phone], at d/c.    Apoorva Botello LCSW  Case Management/[facility]  [phone]

## 2022-10-25 NOTE — SUBJECTIVE & OBJECTIVE
Interval History: Rapid response called for asymptomatic hypotension overnight, SBP 70s per chart review. Given 1.5L LR bolus with adequate response. LA coming down. Continues to have diarrhea.    Review of Systems   Unable to perform ROS: Mental status change   Objective:     Vital Signs (Most Recent):  Temp: 96.7 °F (35.9 °C) (10/25/22 0811)  Pulse: 75 (10/25/22 0811)  Resp: 14 (10/25/22 0811)  BP: (!) 81/51 (10/25/22 0811)  SpO2: 95 % (10/25/22 0811)   Vital Signs (24h Range):  Temp:  [96.1 °F (35.6 °C)-97.7 °F (36.5 °C)] 96.7 °F (35.9 °C)  Pulse:  [55-76] 75  Resp:  [14-18] 14  SpO2:  [90 %-98 %] 95 %  BP: ()/(0-61) 81/51     Weight: 83.5 kg (184 lb)  Body mass index is 26.4 kg/m².    Intake/Output Summary (Last 24 hours) at 10/25/2022 0911  Last data filed at 10/25/2022 0904  Gross per 24 hour   Intake 2382.19 ml   Output 250 ml   Net 2132.19 ml      Physical Exam  Constitutional:       Appearance: He is not ill-appearing.   HENT:      Head:      Comments: Subcutaneous lesion hard to palpation located on right frontal skull.   Eyes:      General: No scleral icterus.     Conjunctiva/sclera: Conjunctivae normal.   Cardiovascular:      Rate and Rhythm: Normal rate and regular rhythm.      Pulses: Normal pulses.      Heart sounds: Normal heart sounds.   Pulmonary:      Effort: Pulmonary effort is normal. No respiratory distress.      Breath sounds: Normal breath sounds.   Chest:      Comments: Pacemaker in place in right upper chest wall.   Abdominal:      General: Bowel sounds are normal. There is distension.      Palpations: Abdomen is soft.      Tenderness: There is no abdominal tenderness.   Musculoskeletal:      Right lower leg: Edema present.      Left lower leg: Edema present.   Skin:     General: Skin is warm and dry.      Findings: No bruising or rash.   Neurological:      GCS: GCS eye subscore is 4. GCS verbal subscore is 3. GCS motor subscore is 5.      Comments: AAOx2       Significant Labs: All  pertinent labs within the past 24 hours have been reviewed.  A1C:   Recent Labs   Lab 07/19/22  1149   HGBA1C 6.1*     ABGs: No results for input(s): PH, PCO2, HCO3, POCSATURATED, BE, TOTALHB, COHB, METHB, O2HB, POCFIO2, PO2 in the last 48 hours.  Bilirubin:   Recent Labs   Lab 10/22/22  0817 10/23/22  0657 10/24/22  0811 10/25/22  0125 10/25/22  0421   BILITOT 1.0 1.0 1.2* 1.1* 1.0     Blood Culture: No results for input(s): LABBLOO in the last 48 hours.  BMP:   Recent Labs   Lab 10/25/22  0421   *   *   K 3.4*      CO2 13*   BUN 23   CREATININE 1.0   CALCIUM 7.8*   MG 1.5*     CBC:   Recent Labs   Lab 10/24/22  0811 10/25/22  0125 10/25/22  0421   WBC 20.71* 20.43* 19.98*   HGB 10.5* 9.6* 9.2*   HCT 30.5* 28.9* 27.2*   * 118* 106*     CMP:   Recent Labs   Lab 10/24/22  0811 10/25/22  0125 10/25/22  0421   * 130* 129*   K 3.4* 3.6 3.4*    108 105   CO2 16* 14* 13*   * 116* 111*   BUN 24* 23 23   CREATININE 1.1 1.1 1.0   CALCIUM 8.1* 7.9* 7.8*   PROT 4.4* 4.3* 4.0*   ALBUMIN 2.0* 1.9* 1.8*   BILITOT 1.2* 1.1* 1.0   ALKPHOS 121 134 127   AST 24 27 26   ALT 13 15 13   ANIONGAP 9 8 11     Cardiac Markers: No results for input(s): CKMB, MYOGLOBIN, BNP, TROPISTAT in the last 48 hours.  Coagulation: No results for input(s): PT, INR, APTT in the last 48 hours.  Lactic Acid:   Recent Labs   Lab 10/25/22  0125 10/25/22  0421 10/25/22  0639   LACTATE 3.2* 3.1* 2.7*     Lipase: No results for input(s): LIPASE in the last 48 hours.  Lipid Panel: No results for input(s): CHOL, HDL, LDLCALC, TRIG, CHOLHDL in the last 48 hours.  Magnesium:   Recent Labs   Lab 10/24/22  0811 10/25/22  0125 10/25/22  0421   MG 1.6 1.5* 1.5*     Pathology Results  (Last 10 years)      None          POCT Glucose:   Recent Labs   Lab 10/25/22  0115   POCTGLUCOSE 131*     Prealbumin: No results for input(s): PREALBUMIN in the last 48 hours.  Respiratory Culture: No results for input(s): GSRESP, RESPIRATORYC  in the last 48 hours.  Troponin: No results for input(s): TROPONINI, TROPONINIHS in the last 48 hours.  TSH:   Recent Labs   Lab 07/19/22  1149   TSH 0.555     Urine Culture: No results for input(s): LABURIN in the last 48 hours.  Urine Studies: No results for input(s): COLORU, APPEARANCEUA, PHUR, SPECGRAV, PROTEINUA, GLUCUA, KETONESU, BILIRUBINUA, OCCULTUA, NITRITE, UROBILINOGEN, LEUKOCYTESUR, RBCUA, WBCUA, BACTERIA, SQUAMEPITHEL, HYALINECASTS in the last 48 hours.    Invalid input(s): ROMERO    Significant Imaging: I have reviewed all pertinent imaging results/findings within the past 24 hours.

## 2022-10-26 PROBLEM — N39.0 UTI (URINARY TRACT INFECTION): Status: ACTIVE | Noted: 2022-10-26

## 2022-10-26 LAB
ALBUMIN SERPL BCP-MCNC: 2.2 G/DL (ref 3.5–5.2)
ALP SERPL-CCNC: 118 U/L (ref 55–135)
ALT SERPL W/O P-5'-P-CCNC: 13 U/L (ref 10–44)
ANION GAP SERPL CALC-SCNC: 12 MMOL/L (ref 8–16)
AST SERPL-CCNC: 26 U/L (ref 10–40)
BACTERIA UR CULT: NORMAL
BACTERIA UR CULT: NORMAL
BASOPHILS # BLD AUTO: 0.03 K/UL (ref 0–0.2)
BASOPHILS NFR BLD: 0.1 % (ref 0–1.9)
BILIRUB SERPL-MCNC: 1.1 MG/DL (ref 0.1–1)
BUN SERPL-MCNC: 22 MG/DL (ref 8–23)
CALCIUM SERPL-MCNC: 7.9 MG/DL (ref 8.7–10.5)
CHLORIDE SERPL-SCNC: 110 MMOL/L (ref 95–110)
CO2 SERPL-SCNC: 12 MMOL/L (ref 23–29)
CREAT SERPL-MCNC: 1.3 MG/DL (ref 0.5–1.4)
DIFFERENTIAL METHOD: ABNORMAL
EOSINOPHIL # BLD AUTO: 0 K/UL (ref 0–0.5)
EOSINOPHIL NFR BLD: 0.2 % (ref 0–8)
ERYTHROCYTE [DISTWIDTH] IN BLOOD BY AUTOMATED COUNT: 16.7 % (ref 11.5–14.5)
EST. GFR  (NO RACE VARIABLE): 54.5 ML/MIN/1.73 M^2
GLUCOSE SERPL-MCNC: 123 MG/DL (ref 70–110)
HCT VFR BLD AUTO: 23.8 % (ref 40–54)
HGB BLD-MCNC: 8.5 G/DL (ref 14–18)
IMM GRANULOCYTES # BLD AUTO: 0.24 K/UL (ref 0–0.04)
IMM GRANULOCYTES NFR BLD AUTO: 1.2 % (ref 0–0.5)
LYMPHOCYTES # BLD AUTO: 0.9 K/UL (ref 1–4.8)
LYMPHOCYTES NFR BLD: 4.4 % (ref 18–48)
MAGNESIUM SERPL-MCNC: 1.8 MG/DL (ref 1.6–2.6)
MCH RBC QN AUTO: 30.9 PG (ref 27–31)
MCHC RBC AUTO-ENTMCNC: 35.7 G/DL (ref 32–36)
MCV RBC AUTO: 87 FL (ref 82–98)
MONOCYTES # BLD AUTO: 1.1 K/UL (ref 0.3–1)
MONOCYTES NFR BLD: 5.3 % (ref 4–15)
NEUTROPHILS # BLD AUTO: 18.5 K/UL (ref 1.8–7.7)
NEUTROPHILS NFR BLD: 88.8 % (ref 38–73)
NRBC BLD-RTO: 0 /100 WBC
PHOSPHATE SERPL-MCNC: 3 MG/DL (ref 2.7–4.5)
PLATELET # BLD AUTO: 112 K/UL (ref 150–450)
PMV BLD AUTO: 10.3 FL (ref 9.2–12.9)
POCT GLUCOSE: 115 MG/DL (ref 70–110)
POTASSIUM SERPL-SCNC: 3.2 MMOL/L (ref 3.5–5.1)
PROT SERPL-MCNC: 4.2 G/DL (ref 6–8.4)
RBC # BLD AUTO: 2.75 M/UL (ref 4.6–6.2)
SODIUM SERPL-SCNC: 134 MMOL/L (ref 136–145)
WBC # BLD AUTO: 20.86 K/UL (ref 3.9–12.7)

## 2022-10-26 PROCEDURE — 20600001 HC STEP DOWN PRIVATE ROOM

## 2022-10-26 PROCEDURE — 25000003 PHARM REV CODE 250: Performed by: STUDENT IN AN ORGANIZED HEALTH CARE EDUCATION/TRAINING PROGRAM

## 2022-10-26 PROCEDURE — 84100 ASSAY OF PHOSPHORUS: CPT | Performed by: STUDENT IN AN ORGANIZED HEALTH CARE EDUCATION/TRAINING PROGRAM

## 2022-10-26 PROCEDURE — 80053 COMPREHEN METABOLIC PANEL: CPT | Performed by: STUDENT IN AN ORGANIZED HEALTH CARE EDUCATION/TRAINING PROGRAM

## 2022-10-26 PROCEDURE — 27000207 HC ISOLATION

## 2022-10-26 PROCEDURE — 99233 SBSQ HOSP IP/OBS HIGH 50: CPT | Mod: GC,,, | Performed by: INTERNAL MEDICINE

## 2022-10-26 PROCEDURE — 83735 ASSAY OF MAGNESIUM: CPT | Performed by: STUDENT IN AN ORGANIZED HEALTH CARE EDUCATION/TRAINING PROGRAM

## 2022-10-26 PROCEDURE — 51798 US URINE CAPACITY MEASURE: CPT

## 2022-10-26 PROCEDURE — S0030 INJECTION, METRONIDAZOLE: HCPCS | Performed by: STUDENT IN AN ORGANIZED HEALTH CARE EDUCATION/TRAINING PROGRAM

## 2022-10-26 PROCEDURE — 85025 COMPLETE CBC W/AUTO DIFF WBC: CPT | Performed by: INTERNAL MEDICINE

## 2022-10-26 PROCEDURE — 92526 ORAL FUNCTION THERAPY: CPT

## 2022-10-26 PROCEDURE — 25000003 PHARM REV CODE 250: Performed by: INTERNAL MEDICINE

## 2022-10-26 PROCEDURE — 25000003 PHARM REV CODE 250: Performed by: HOSPITALIST

## 2022-10-26 PROCEDURE — 63600175 PHARM REV CODE 636 W HCPCS: Performed by: STUDENT IN AN ORGANIZED HEALTH CARE EDUCATION/TRAINING PROGRAM

## 2022-10-26 PROCEDURE — 36415 COLL VENOUS BLD VENIPUNCTURE: CPT | Performed by: STUDENT IN AN ORGANIZED HEALTH CARE EDUCATION/TRAINING PROGRAM

## 2022-10-26 PROCEDURE — 99233 PR SUBSEQUENT HOSPITAL CARE,LEVL III: ICD-10-PCS | Mod: GC,,, | Performed by: INTERNAL MEDICINE

## 2022-10-26 PROCEDURE — 25000003 PHARM REV CODE 250: Performed by: NEUROLOGICAL SURGERY

## 2022-10-26 RX ORDER — SODIUM BICARBONATE 650 MG/1
650 TABLET ORAL 2 TIMES DAILY
Status: DISCONTINUED | OUTPATIENT
Start: 2022-10-26 | End: 2022-10-27

## 2022-10-26 RX ORDER — CEFEPIME HYDROCHLORIDE 1 G/50ML
2 INJECTION, SOLUTION INTRAVENOUS
Status: DISCONTINUED | OUTPATIENT
Start: 2022-10-26 | End: 2022-10-27

## 2022-10-26 RX ORDER — METRONIDAZOLE 500 MG/1
500 TABLET ORAL EVERY 8 HOURS
Status: CANCELLED | OUTPATIENT
Start: 2022-10-26

## 2022-10-26 RX ORDER — MUPIROCIN 20 MG/G
OINTMENT TOPICAL 2 TIMES DAILY
Status: CANCELLED | OUTPATIENT
Start: 2022-10-26 | End: 2022-10-31

## 2022-10-26 RX ORDER — CIPROFLOXACIN 500 MG/1
500 TABLET ORAL EVERY 12 HOURS
Status: CANCELLED | OUTPATIENT
Start: 2022-10-26

## 2022-10-26 RX ADMIN — HEPARIN SODIUM 5000 UNITS: 5000 INJECTION INTRAVENOUS; SUBCUTANEOUS at 02:10

## 2022-10-26 RX ADMIN — POTASSIUM BICARBONATE 40 MEQ: 391 TABLET, EFFERVESCENT ORAL at 06:10

## 2022-10-26 RX ADMIN — POTASSIUM BICARBONATE 40 MEQ: 391 TABLET, EFFERVESCENT ORAL at 09:10

## 2022-10-26 RX ADMIN — CEFEPIME HYDROCHLORIDE 2 G: 2 INJECTION, SOLUTION INTRAVENOUS at 10:10

## 2022-10-26 RX ADMIN — HEPARIN SODIUM 5000 UNITS: 5000 INJECTION INTRAVENOUS; SUBCUTANEOUS at 06:10

## 2022-10-26 RX ADMIN — HEPARIN SODIUM 5000 UNITS: 5000 INJECTION INTRAVENOUS; SUBCUTANEOUS at 10:10

## 2022-10-26 RX ADMIN — SODIUM BICARBONATE 650 MG TABLET 650 MG: at 06:10

## 2022-10-26 RX ADMIN — METRONIDAZOLE 500 MG: 500 INJECTION, SOLUTION INTRAVENOUS at 02:10

## 2022-10-26 RX ADMIN — METRONIDAZOLE 500 MG: 500 INJECTION, SOLUTION INTRAVENOUS at 10:10

## 2022-10-26 RX ADMIN — METRONIDAZOLE 500 MG: 500 INJECTION, SOLUTION INTRAVENOUS at 04:10

## 2022-10-26 RX ADMIN — PANTOPRAZOLE SODIUM 40 MG: 40 TABLET, DELAYED RELEASE ORAL at 06:10

## 2022-10-26 RX ADMIN — VANCOMYCIN HYDROCHLORIDE 125 MG: KIT at 05:10

## 2022-10-26 NOTE — PLAN OF CARE
POC reviewed with pt.  - VSS on RA, AAOx4  - Skin with multiple skin tears all over body mepilex over all of them and wound care performed per nursing. All tears cleaned with NS. Sacrum with non-blanchable redness oitnment placed PRN (q2hr)  - Turn q2hr  - 2 Bms this shift formed not liquid, no blood  - Childers replaced today putting very little dark concentrated urine.  - Albumin given today with moderate increase to BP.  - no c/o pain  - no c/o nausea eating small amount of pureed.   - Call light in reach, [facility]

## 2022-10-26 NOTE — PLAN OF CARE
Pt is more altered this morning. Refused morning meds, breakfast and offered fluids. Pt not easily arousable. Team notified.

## 2022-10-26 NOTE — PLAN OF CARE
POC reviewed with pt, does not verbalize understanding. Pt not alert, confused and sleeping most of shift. Pt refused all PO meds, food and liquids offered. VSS, paced on tele. Indicators of pain absent.  Bruises and skin tears covering bilat UE and LE. Full liquid diet, refused. Childers removed, due to void. 2 loose BM this shift. [employer] camera in room.   All needs met, no complaints offered at this time. Bed locked in lowest position, call bell within reach. Frequent rounds for safety.   Problem: Adult Inpatient Plan of Care  Goal: Plan of Care Review  Outcome: Ongoing, Progressing  Goal: Patient-Specific Goal (Individualized)  Outcome: Ongoing, Progressing  Goal: Absence of Hospital-Acquired Illness or Injury  Outcome: Ongoing, Progressing  Goal: Optimal Comfort and Wellbeing  Outcome: Ongoing, Progressing  Goal: Readiness for Transition of Care  Outcome: Ongoing, Progressing     Problem: Fall Injury Risk  Goal: Absence of Fall and Fall-Related Injury  Outcome: Ongoing, Progressing     Problem: Skin Injury Risk Increased  Goal: Skin Health and Integrity  Outcome: Ongoing, Progressing     Problem: Infection  Goal: Absence of Infection Signs and Symptoms  Outcome: Ongoing, Progressing     Problem: Impaired Wound Healing  Goal: Optimal Wound Healing  Outcome: Ongoing, Progressing

## 2022-10-26 NOTE — PT/OT/SLP PROGRESS
Speech Language Pathology Treatment    Patient Name:  Jacques Arellano   MRN:  [id number]  Admitting Diagnosis: C. difficile colitis    Recommendations:                 General Recommendations:   monitor PO tolerance/ongoing swallowing assessment  Diet recommendations:  Puree, Liquid Diet Level: Thin   Aspiration Precautions: DO NOT FEED UNLESS PT IS FULLY AWAKE/ALERT AND READILY ACCEPTING PO PRESENTATIONS!!!!!  1 bite/sip at a time, Alternating bites/sips, Assistance with meals, Eliminate distractions, Feed only when awake/alert, Frequent oral care, HOB to 90 degrees, Monitor for s/s of aspiration, Remain upright 30 minutes post meal, Small bites/sips, and Strict aspiration precautions   General Precautions: Standard, aspiration, fall, pureed diet  Communication strategies:  go to room if call light pushed    Subjective     Pt with poor VENICE and unable to accept PO presentations on this service date.  Nurse and primary team notified.  MD attributing to presence of UTI.     Pain/Comfort:  Pain Rating 1:  (unable to offer complaint due to decreased VENICE)    Respiratory Status: Room air    Objective:     Has the patient been evaluated by SLP for swallowing?   Yes  Keep patient NPO? No   Current Respiratory Status:        Pt seen for follow up to monitor diet tolerance since advancing diet to pureed consistencies on 10/25.  Pt with altered mental status on this service date, however, and unable to accept PO presentations upon SLP's attempts.  Pt with poor alertness and responsiveness.  When straw was placed to lips to offer a sip in effort to increase alertness, pt blew unto straw creating bubble in the water.  SLP traced lips with ice chip. Pt opened mouth slightly to accept. SLP placed ice chip in oral cavity. Minimal oral manipulation of ice chip observed and no swallow was elicited.  Oral cavity suctioned to remove melted ice chip.  No further attempts to offer PO presentations due to high risk of aspiration with  current mental status.  Nurse notified of observations and agreed with change in mental status.  She stated she was unable to administer PO medications so far this morning due to mental status.  SLP explained that pt should not be given PO unless he is awake/alert and able to readily accept PO offerings. Nurse expressed full understanding. MD also notified via [employer] of this morning's observations, which he attributes to pt having UTI. SLP services to continue to monitor for appropriateness for PO intake.     Assessment:     Jacques Arellano is a 82 y.o. male with an SLP diagnosis of Dysphagia.      Goals:   Multidisciplinary Problems       SLP Goals          Problem: SLP    Goal Priority Disciplines Outcome   SLP Goal     SLP    Description: Speech Language Pathology Goals  Goals expected to be met by 10/23 (goal still appropriate and expected to be met 10/31):  1.  Pt will tolerate least restrictive diet without s/s of aspiration.                        Plan:     Patient to be seen:  4 x/week   Plan of Care expires:  11/16/22  Plan of Care reviewed with:  patient   SLP Follow-Up:  Yes       Discharge recommendations:   (tbd)     Time Tracking:     SLP Treatment Date:   10/26/22  Speech Start Time:  1017  Speech Stop Time:  1028     Speech Total Time (min):  11 min    Billable Minutes: Treatment Swallowing Dysfunction 11    10/26/2022

## 2022-10-26 NOTE — PROGRESS NOTES
[facility] Medicine  Progress Note    Patient Name: Jacques Arellano  MRN: [id number]  Patient Class: IP- Inpatient   Admission Date: 10/14/2022  Length of Stay: 12 days  Attending Physician: Nemesio Pearson MD  Primary Care Provider: Matty Garsia MD        Subjective:     Principal Problem:C. difficile colitis        HPI:  81 yo M with PMhx of CHF, AFib, Watchman, pacer, expansile brain lesion, pacemaker presents to from [facility] for diarrhea, copious black stool and acute altered mental status. Pt reports he has 3-5 profuse watery BM for the last few days noting a little bit of blood. Pt was recently admitted to [facility] ED on 10/12 for abdominal pain and coffee ground emesis, EGD revealed grade D esophagitis and gastritis with no overt GI bleeding and was subsequently discharged with Protonix. Pt was recently admitted to [facility] ICU on 9/23 for sepsis thought to be secondary to E coli. UTI vs cirrhosis and was treated with antibiotic and pressors . Inpatient workup revealed new dx of cirrhosis with ascites no spb, aortic aneurysm, and expansile Calvarial lesion, pt was DC on 10/13 back to [facility].      Today pt H & H stable. CTA abd shows colitis, cirrhosis/ascites, and new potential HCC. BNP elevated at 544 however XCR and PE unconcerning for CHF exacerbation. Mentation improved while in ED - CT head stable.       Overview/Hospital Course:  CTA A/P was performed urgently, without evidence of contrast extravasation though showing aforementioned liver lesion and rectosigmoid colitis. Stool cultures returned C diff +ve and he was placed on PO vancomycin therapy. He underwent paracentesis and analysis of ascitic fluid was not indicative of SBP. Cultures and cytology are pending. He has been on IV PPI and octreotide gtt (since discontinued) with initial plans to scope, however, with stabilization of blood counts and CDI, this is no longer indicated. Hypotensive requiring fluid  boluses and albumin with slight recovery of BP. Interval development of UTI, now starting cefepime. Now more encephalopathic      Interval History: More encephalopathic in the setting of a new UTI. BP holding up after resucitation yesterday. Starting cefepime and continuing Vanc/Flagyl    Review of Systems   Unable to perform ROS: Mental status change   Objective:     Vital Signs (Most Recent):  Temp: 97.3 °F (36.3 °C) (10/26/22 1204)  Pulse: 70 (10/26/22 1219)  Resp: 20 (10/26/22 1204)  BP: (!) 90/55 (10/26/22 1204)  SpO2: (!) 94 % (10/26/22 1204)   Vital Signs (24h Range):  Temp:  [97.2 °F (36.2 °C)-98 °F (36.7 °C)] 97.3 °F (36.3 °C)  Pulse:  [52-72] 70  Resp:  [14-20] 20  SpO2:  [94 %-98 %] 94 %  BP: ()/(50-64) 90/55     Weight: 83.5 kg (184 lb)  Body mass index is 26.4 kg/m².    Intake/Output Summary (Last 24 hours) at 10/26/2022 1435  Last data filed at 10/26/2022 1402  Gross per 24 hour   Intake 289.92 ml   Output 350 ml   Net -60.08 ml      Physical Exam  Constitutional:       Appearance: He is not ill-appearing.   HENT:      Head:      Comments: Subcutaneous lesion hard to palpation located on right frontal skull.   Eyes:      General: No scleral icterus.     Conjunctiva/sclera: Conjunctivae normal.   Cardiovascular:      Rate and Rhythm: Normal rate and regular rhythm.      Pulses: Normal pulses.      Heart sounds: Normal heart sounds.   Pulmonary:      Effort: Pulmonary effort is normal. No respiratory distress.      Breath sounds: Normal breath sounds.   Chest:      Comments: Pacemaker in place in right upper chest wall.   Abdominal:      General: Bowel sounds are normal. There is distension.      Palpations: Abdomen is soft.      Tenderness: There is no abdominal tenderness.   Musculoskeletal:      Right lower leg: Edema present.      Left lower leg: Edema present.   Skin:     General: Skin is warm and dry.      Findings: No bruising or rash.   Neurological:      GCS: GCS eye subscore is 4. GCS  verbal subscore is 3. GCS motor subscore is 5.      Comments: Disoriented, obtunded        Significant Labs: All pertinent labs within the past 24 hours have been reviewed.    Significant Imaging: I have reviewed all pertinent imaging results/findings within the past 24 hours.      Assessment/Plan:      * C. difficile colitis  Pt endorses profuse watery diarrhea for the past few days. Concerning for possible C diff with recent aggressive antibiotic regimen     Plan:  - Stool studies pending   - C diff positive; starting Po Vancomycin, Adding flagyl  10/19  - Per Hepatology, added Rifaximin BID.     UTI (urinary tract infection)  UA consistent with UTI and patient now more encephalopathic.     -adding IV cefepime  -following urine cx  -switching hopkins      GI bleed  xPt endorses melena. CT scan with no evidence of contrast extravasation to suggest active GI bleed at this time. Stable 3 cm enhancing lesion at the hepatic dome with evidence of liver cirrhosis, circumferential wall thickening of the rectum and sigmoid colon concern for colitis, and pancreatic hypodensities measuring up to 2.0 cm. Considering liver disease, suspicion for ruptured esophogeal varcies. Recent EGD at [facility] reviewed, will discuss with GI     Plan:  - CBC qd  - GI planning for endoscopy & sigmoidoscopy deferred due to C.diff and stable HH  - PPI 40mg BID   - CTX discontinued    Other cirrhosis of liver  9/23: Ct A/P:Cirrhotic morphology of the liver with sequelae of portal hypertension as evidence by splenomegaly, intra-abdominal ascites, and possible portal hypertensive enteropathy. Pt denies hx of alcohol abuse.  R preformed diagnostic paracentetics on 9/27 to r/o SBP: which was negative 130 WBC and 10%     Plan:    - Daily CMP   - Hepatology following; paracentesis with IR with 2.4L with cytology collected  - Started on Rifaximin BID per hepatology recommendations  - Cytology from para collected, negative for malignancy  - Tenuous fluid  status and BP, will be cautious with fluid resus if needed      Chronic gout  Plan:  Continue home allopurinol.         Type 2 diabetes mellitus without complication, without long-term current use of insulin  Last A1c 6.1 3 months ago. Hypoglycemic on admission.      Plan:  - No home medications  Noted    - POCT glucose ACHS    - Low threshold to start LDSSI     Mixed hyperlipidemia  Plan:  - Continue home statin         Primary hypertension  Home medications: triamterene-HCTZ, Lasix     Plan:  - Hold home medications s/o hypotension      Chronic diastolic heart failure  9/24 ANNE: The left ventricle is normal in size with low normal systolic function. The estimated ejection fraction is 50%.There is abnormal septal wall motion consistent with right ventricular pacing. Mild right ventricular enlargement with mildly reduced right ventricular systolic function. BNP:544     Plan:  - Low threshold for Lasix as CXR/lung sounds clear while saturating well on RA.         Permanent atrial fibrillation  Patient with documented history of permanent Afib s/p watchman, no longer on AC or rate controlling agents.  [id number] 5   HAS-BLED 4      Plan:   - Patient rate controlled, no indication for further agents at this time  - No full AC required s/p Watchman; will hold AC ppx and APT s/o active bleeding from abrasion sites  - Cardiac telemetry  - Maintain K > 4, Mg > 2, Ca wnl; replete PRN     cardiology consult if hemodynamic instability for DCCV evaluation      VTE Risk Mitigation (From admission, onward)         Ordered     heparin (porcine) injection 5,000 Units  Every 8 hours         10/22/22 1447     IP VTE HIGH RISK PATIENT  Once         10/14/22 1826     Place sequential compression device  Until discontinued         10/14/22 1826                Discharge Planning   ELMER: 10/31/2022     Code Status: DNR   Is the patient medically ready for discharge?: No    Reason for patient still in hospital (select all that apply):  Treatment  Discharge Plan A: Return to nursing home   Discharge Delays: None known at this time              Prasad Lou MD  Department of Hospital Medicine   [facility]

## 2022-10-26 NOTE — ASSESSMENT & PLAN NOTE
Pt endorses profuse watery diarrhea for the past few days. Concerning for possible C diff with recent aggressive antibiotic regimen     Plan:  - Stool studies pending   - C diff positive; starting Po Vancomycin, Adding flagyl  10/19  - Per Hepatology, added Rifaximin BID.

## 2022-10-26 NOTE — ASSESSMENT & PLAN NOTE
9/23: Ct A/P:Cirrhotic morphology of the liver with sequelae of portal hypertension as evidence by splenomegaly, intra-abdominal ascites, and possible portal hypertensive enteropathy. Pt denies hx of alcohol abuse.  R preformed diagnostic paracentetics on 9/27 to r/o SBP: which was negative 130 WBC and 10%     Plan:    - Daily CMP   - Hepatology following; paracentesis with IR with 2.4L with cytology collected  - Started on Rifaximin BID per hepatology recommendations  - Cytology from para collected, negative for malignancy  - Tenuous fluid status and BP, will be cautious with fluid resus if needed

## 2022-10-26 NOTE — ASSESSMENT & PLAN NOTE
UA consistent with UTI and patient now more encephalopathic.     -adding IV cefepime  -following urine cx  -switching hopkins

## 2022-10-27 VITALS
BODY MASS INDEX: 26.34 KG/M2 | OXYGEN SATURATION: 97 % | RESPIRATION RATE: 12 BRPM | SYSTOLIC BLOOD PRESSURE: 69 MMHG | TEMPERATURE: 98 F | DIASTOLIC BLOOD PRESSURE: 43 MMHG | HEART RATE: 70 BPM | HEIGHT: 70 IN | WEIGHT: 184 LBS

## 2022-10-27 LAB
ALBUMIN SERPL BCP-MCNC: 2.4 G/DL (ref 3.5–5.2)
ALP SERPL-CCNC: 107 U/L (ref 55–135)
ALT SERPL W/O P-5'-P-CCNC: 14 U/L (ref 10–44)
ANION GAP SERPL CALC-SCNC: 7 MMOL/L (ref 8–16)
AST SERPL-CCNC: 26 U/L (ref 10–40)
BASOPHILS # BLD AUTO: 0.03 K/UL (ref 0–0.2)
BASOPHILS NFR BLD: 0.2 % (ref 0–1.9)
BILIRUB SERPL-MCNC: 1 MG/DL (ref 0.1–1)
BUN SERPL-MCNC: 23 MG/DL (ref 8–23)
CALCIUM SERPL-MCNC: 7.7 MG/DL (ref 8.7–10.5)
CHLORIDE SERPL-SCNC: 110 MMOL/L (ref 95–110)
CO2 SERPL-SCNC: 15 MMOL/L (ref 23–29)
CREAT SERPL-MCNC: 1.2 MG/DL (ref 0.5–1.4)
DIFFERENTIAL METHOD: ABNORMAL
EOSINOPHIL # BLD AUTO: 0 K/UL (ref 0–0.5)
EOSINOPHIL NFR BLD: 0.2 % (ref 0–8)
ERYTHROCYTE [DISTWIDTH] IN BLOOD BY AUTOMATED COUNT: 16.7 % (ref 11.5–14.5)
EST. GFR  (NO RACE VARIABLE): >60 ML/MIN/1.73 M^2
GLUCOSE SERPL-MCNC: 99 MG/DL (ref 70–110)
HCT VFR BLD AUTO: 23.3 % (ref 40–54)
HGB BLD-MCNC: 8.2 G/DL (ref 14–18)
IMM GRANULOCYTES # BLD AUTO: 0.22 K/UL (ref 0–0.04)
IMM GRANULOCYTES NFR BLD AUTO: 1.1 % (ref 0–0.5)
LYMPHOCYTES # BLD AUTO: 0.8 K/UL (ref 1–4.8)
LYMPHOCYTES NFR BLD: 4 % (ref 18–48)
MAGNESIUM SERPL-MCNC: 1.7 MG/DL (ref 1.6–2.6)
MCH RBC QN AUTO: 31.7 PG (ref 27–31)
MCHC RBC AUTO-ENTMCNC: 35.2 G/DL (ref 32–36)
MCV RBC AUTO: 90 FL (ref 82–98)
MONOCYTES # BLD AUTO: 0.9 K/UL (ref 0.3–1)
MONOCYTES NFR BLD: 4.6 % (ref 4–15)
NEUTROPHILS # BLD AUTO: 17.5 K/UL (ref 1.8–7.7)
NEUTROPHILS NFR BLD: 89.9 % (ref 38–73)
NRBC BLD-RTO: 0 /100 WBC
PHOSPHATE SERPL-MCNC: 3.1 MG/DL (ref 2.7–4.5)
PLATELET # BLD AUTO: 78 K/UL (ref 150–450)
PMV BLD AUTO: 11.1 FL (ref 9.2–12.9)
POTASSIUM SERPL-SCNC: 2.9 MMOL/L (ref 3.5–5.1)
PROT SERPL-MCNC: 4 G/DL (ref 6–8.4)
RBC # BLD AUTO: 2.59 M/UL (ref 4.6–6.2)
SARS-COV-2 RNA RESP QL NAA+PROBE: NOT DETECTED
SODIUM SERPL-SCNC: 132 MMOL/L (ref 136–145)
WBC # BLD AUTO: 19.46 K/UL (ref 3.9–12.7)

## 2022-10-27 PROCEDURE — 36415 COLL VENOUS BLD VENIPUNCTURE: CPT | Performed by: STUDENT IN AN ORGANIZED HEALTH CARE EDUCATION/TRAINING PROGRAM

## 2022-10-27 PROCEDURE — 25000003 PHARM REV CODE 250: Performed by: STUDENT IN AN ORGANIZED HEALTH CARE EDUCATION/TRAINING PROGRAM

## 2022-10-27 PROCEDURE — U0003 INFECTIOUS AGENT DETECTION BY NUCLEIC ACID (DNA OR RNA); SEVERE ACUTE RESPIRATORY SYNDROME CORONAVIRUS 2 (SARS-COV-2) (CORONAVIRUS DISEASE [COVID-19]), AMPLIFIED PROBE TECHNIQUE, MAKING USE OF HIGH THROUGHPUT TECHNOLOGIES AS DESCRIBED BY CMS-2020-01-R: HCPCS | Performed by: HOSPITALIST

## 2022-10-27 PROCEDURE — S0030 INJECTION, METRONIDAZOLE: HCPCS | Performed by: STUDENT IN AN ORGANIZED HEALTH CARE EDUCATION/TRAINING PROGRAM

## 2022-10-27 PROCEDURE — 85025 COMPLETE CBC W/AUTO DIFF WBC: CPT | Performed by: INTERNAL MEDICINE

## 2022-10-27 PROCEDURE — 83735 ASSAY OF MAGNESIUM: CPT | Performed by: STUDENT IN AN ORGANIZED HEALTH CARE EDUCATION/TRAINING PROGRAM

## 2022-10-27 PROCEDURE — 63600175 PHARM REV CODE 636 W HCPCS

## 2022-10-27 PROCEDURE — 99238 PR HOSPITAL DISCHARGE DAY,<30 MIN: ICD-10-PCS | Mod: GC,,, | Performed by: HOSPITALIST

## 2022-10-27 PROCEDURE — P9047 ALBUMIN (HUMAN), 25%, 50ML: HCPCS | Mod: JG

## 2022-10-27 PROCEDURE — U0005 INFEC AGEN DETEC AMPLI PROBE: HCPCS | Performed by: HOSPITALIST

## 2022-10-27 PROCEDURE — 84100 ASSAY OF PHOSPHORUS: CPT | Performed by: STUDENT IN AN ORGANIZED HEALTH CARE EDUCATION/TRAINING PROGRAM

## 2022-10-27 PROCEDURE — 25000003 PHARM REV CODE 250: Performed by: NEUROLOGICAL SURGERY

## 2022-10-27 PROCEDURE — 80053 COMPREHEN METABOLIC PANEL: CPT | Performed by: STUDENT IN AN ORGANIZED HEALTH CARE EDUCATION/TRAINING PROGRAM

## 2022-10-27 PROCEDURE — 99238 HOSP IP/OBS DSCHRG MGMT 30/<: CPT | Mod: GC,,, | Performed by: HOSPITALIST

## 2022-10-27 RX ORDER — MORPHINE SULFATE 2 MG/ML
2 INJECTION, SOLUTION INTRAMUSCULAR; INTRAVENOUS
Status: DISCONTINUED | OUTPATIENT
Start: 2022-10-27 | End: 2022-10-27 | Stop reason: HOSPADM

## 2022-10-27 RX ORDER — ATROPINE SULFATE 10 MG/ML
2 SOLUTION/ DROPS OPHTHALMIC EVERY 4 HOURS PRN
Status: DISCONTINUED | OUTPATIENT
Start: 2022-10-27 | End: 2022-10-27 | Stop reason: HOSPADM

## 2022-10-27 RX ORDER — ONDANSETRON 8 MG/1
8 TABLET, ORALLY DISINTEGRATING ORAL EVERY 8 HOURS PRN
Status: DISCONTINUED | OUTPATIENT
Start: 2022-10-27 | End: 2022-10-27 | Stop reason: HOSPADM

## 2022-10-27 RX ORDER — ALBUMIN HUMAN 250 G/1000ML
25 SOLUTION INTRAVENOUS ONCE
Status: COMPLETED | OUTPATIENT
Start: 2022-10-27 | End: 2022-10-27

## 2022-10-27 RX ORDER — LORAZEPAM 1 MG/1
1 TABLET ORAL EVERY 30 MIN PRN
Status: DISCONTINUED | OUTPATIENT
Start: 2022-10-27 | End: 2022-10-27 | Stop reason: HOSPADM

## 2022-10-27 RX ADMIN — VANCOMYCIN HYDROCHLORIDE 125 MG: KIT at 06:10

## 2022-10-27 RX ADMIN — PANTOPRAZOLE SODIUM 40 MG: 40 TABLET, DELAYED RELEASE ORAL at 06:10

## 2022-10-27 RX ADMIN — ALBUMIN (HUMAN) 25 G: 12.5 SOLUTION INTRAVENOUS at 01:10

## 2022-10-27 RX ADMIN — METRONIDAZOLE 500 MG: 500 INJECTION, SOLUTION INTRAVENOUS at 06:10

## 2022-10-27 RX ADMIN — MIDODRINE HYDROCHLORIDE 10 MG: 5 TABLET ORAL at 02:10

## 2022-10-27 RX ADMIN — SODIUM CHLORIDE, SODIUM LACTATE, POTASSIUM CHLORIDE, AND CALCIUM CHLORIDE 500 ML: .6; .31; .03; .02 INJECTION, SOLUTION INTRAVENOUS at 01:10

## 2022-10-27 NOTE — SUBJECTIVE & OBJECTIVE
Interval History: Hypotensive with MAP high 50s overnight; given albumin and LR without improvement, NGT placed yesterday for medications; midodrine given around midnight wihtout improvement. Patients daughter called regarding GOC. Discussed patients condition and requirement for pressors; daughter denied and would like comfort measures only. DNR/DNI/comfort measures. SW/CM notified.     Review of Systems   Unable to perform ROS: Mental status change   Objective:     Vital Signs (Most Recent):  Temp: 97.7 °F (36.5 °C) (10/27/22 0735)  Pulse: 70 (10/27/22 0735)  Resp: 12 (10/27/22 0735)  BP: (!) 69/43 (10/27/22 0735)  SpO2: 97 % (10/27/22 0735)   Vital Signs (24h Range):  Temp:  [94.8 °F (34.9 °C)-98.6 °F (37 °C)] 97.7 °F (36.5 °C)  Pulse:  [69-72] 70  Resp:  [12-24] 12  SpO2:  [90 %-99 %] 97 %  BP: ()/(38-60) 69/43     Weight: 83.5 kg (184 lb)  Body mass index is 26.4 kg/m².    Intake/Output Summary (Last 24 hours) at 10/27/2022 0837  Last data filed at 10/26/2022 1402  Gross per 24 hour   Intake 49.92 ml   Output 200 ml   Net -150.08 ml      Physical Exam  Constitutional:       Appearance: He is ill-appearing.   HENT:      Head:      Comments: Subcutaneous lesion hard to palpation located on right frontal skull.   Eyes:      General: No scleral icterus.     Conjunctiva/sclera: Conjunctivae normal.   Cardiovascular:      Rate and Rhythm: Normal rate and regular rhythm.      Pulses: Normal pulses.      Heart sounds: Normal heart sounds.   Pulmonary:      Effort: Pulmonary effort is normal. No respiratory distress.      Breath sounds: Normal breath sounds.   Chest:      Comments: Pacemaker in place in right upper chest wall.   Abdominal:      General: Bowel sounds are normal. There is distension.      Palpations: Abdomen is soft.      Tenderness: There is no abdominal tenderness.   Musculoskeletal:      Right lower leg: Edema present.      Left lower leg: Edema present.   Skin:     General: Skin is warm and dry.       Findings: No bruising or rash.   Neurological:      GCS: GCS eye subscore is 3. GCS verbal subscore is 1. GCS motor subscore is 5.      Comments: Disoriented, obtunded        Significant Labs: All pertinent labs within the past 24 hours have been reviewed.    Significant Imaging: I have reviewed all pertinent imaging results/findings within the past 24 hours.

## 2022-10-27 NOTE — CARE UPDATE
RAPID RESPONSE NURSE PROACTIVE ROUNDING NOTE       Time of Visit:     Admit Date: 10/14/2022  LOS: 12  Code Status: DNR   Date of Visit: 10/26/2022  : 1940  Age: 82 y.o.  Sex: male  Race: White  Bed: [facility] A:   MRN: [id number]  Was the patient discharged from an ICU this admission? No   Was the patient discharged from a PACU within last 24 hours? No   Did the patient receive conscious sedation/general anesthesia in last 24 hours? No  Was the patient in the ED within the past 24 hours? No  Was the patient on NIPPV within the past 24 hours? No   Attending Physician: Nemesio Pearson MD  Primary Service: [facility] MED 3   Time spent at the bedside: 15 -30 min    SITUATION    Notified by [email or web address] patient alert.  Reason for alert: Hypothermia  Called to evaluate the patient for Circulatory    BACKGROUND     Why is the patient in the hospital?: C. difficile colitis    Patient has a past medical history of TRENT (acute kidney injury), Anticoagulant long-term use, Aortic aneurysm, Ascites of liver, Atrial fibrillation, Brain lesion, C. difficile colitis, Diabetes mellitus, Diastolic heart failure, Frequent falls, GI bleeding, Hyperlipidemia, Hypertension, Liver disease, Renal disorder, Sacral decubitus ulcer, Sleep apnea, Stroke, Type 2 diabetes mellitus without complication, without long-term current use of insulin, UTI (urinary tract infection), and Vertigo.    Last Vitals:  Temp: 96.2 °F (35.7 °C) (10/26 2302)  Pulse: 70 (10/26 2302)  Resp: 24 (10/26 2002)  BP: 93/52 (10/26 2302)  SpO2: 96 % (10/26 2302)    24 Hours Vitals Range:  Temp:  [94.8 °F (34.9 °C)-98.6 °F (37 °C)]   Pulse:  [66-71]   Resp:  [16-24]   BP: ()/(52-64)   SpO2:  [90 %-99 %]     Labs:  Recent Labs     10/25/22  0125 10/25/22  0421 10/26/22  0256   WBC 20.43* 19.98* 20.86*   HGB 9.6* 9.2* 8.5*   HCT 28.9* 27.2* 23.8*   * 106* 112*       Recent Labs     10/24/22  0811 10/25/22  0125 10/25/22  0421 10/26/22  0256   * 130* 129* 134*    K 3.4* 3.6 3.4* 3.2*    108 105 110   CO2 16* 14* 13* 12*   CREATININE 1.1 1.1 1.0 1.3   * 116* 111* 123*   PHOS 2.3*  --  2.2* 3.0   MG 1.6 1.5* 1.5* 1.8        No results for input(s): PH, PCO2, PO2, HCO3, POCSATURATED, BE in the last 72 hours.     ASSESSMENT    Physical Exam  Constitutional:       General: He is not in acute distress.  Cardiovascular:      Rate and Rhythm: Normal rate and regular rhythm.      Pulses: Normal pulses.   Pulmonary:      Effort: Pulmonary effort is normal.   Skin:     General: Skin is warm.      Capillary Refill: Capillary refill takes less than 2 seconds.   Neurological:      General: No focal deficit present.      Mental Status: He is alert. Mental status is at baseline.      GCS: GCS eye subscore is 4. GCS verbal subscore is 5. GCS motor subscore is 6.       INTERVENTIONS    The patient was seen for Cardiac problem. Staff concerns included hypotension. The following interventions were performed: No additional interventions needed at this time..    RECOMMENDATIONS    Pt rounded on for hypothermia and BP 88/57 (68). Primary RN also concerned with mental status. Pt assessed, SBP 93/52 MAP >65, Spo2 96%, HR 70. Pt alert and following commands. . Temp reassessed and found to be 96 axillary.     PROVIDER ESCALATION    Yes/No  no    Orders received and case discussed with NA.    Disposition: Remain in room 1026.    FOLLOW-UP    bedside RNAbhi  updated on plan of care. Instructed to call the Rapid Response Nurse, Frederick Herrera RN at [phone] for additional questions or concerns.

## 2022-10-27 NOTE — CARE UPDATE
RAPID RESPONSE NURSE FOLLOW-UP NOTE       Followed up with patient for proactive rounding. Chart review showed pt hypotensive this AM despite midodrine and albumin. MD at bedside, plan to reach out to family regarding plan of care. Reviewed plan of care with bedside RNAbhi .   Team will continue to follow.  Please call Rapid Response RN, Frederick Herrera RN with any questions or concerns at [phone].

## 2022-10-27 NOTE — PLAN OF CARE
Pt is disorientated x 4. Pt opens eyes to verbal command and attempts to answer questions but falls back asleep soon after eyes open to verbal command. Per order NG tube placed for medication and possible nutrition. Pt DTV doppler showed 163 residual. Albumin given to produce urine, hypotension and map of less than 65. Will continue to monitor pt for changes

## 2022-10-27 NOTE — PLAN OF CARE
[facility]  Department of Hospital Medicine  [address] [place] [address]  [phone] after hours  [phone] fax    HOSPICE  ORDERS    10/27/2022    Admit to Hospice:  Inpatient Service     Diagnoses:   Active Hospital Problems    Diagnosis  POA    *C. difficile colitis [A04.72]  Yes    UTI (urinary tract infection) [N39.0]  No    Dermatitis associated with moisture [L30.8]  Yes    Severe malnutrition [E43]  Yes    GI bleed [K92.2]  Yes    Other cirrhosis of liver [K74.69]  Yes    Primary hypertension [I10]  Yes    Presence of Watchman left atrial appendage closure device [Z95.818]  Yes     Chronic    Chronic anticoagulation [Z79.01]  Not Applicable    Chronic diastolic heart failure [I50.32]  Yes     10-  The left ventricle is normal in size with concentric remodeling and normal systolic function.  There is abnormal septal wall motion consistent with right ventricular pacemaker.  The estimated ejection fraction is 65%.  Indeterminate left ventricular diastolic function.  Mild mitral regurgitation.  Moderate tricuspid regurgitation.  Mild right ventricular enlargement with mildly reduced right ventricular systolic function.  Moderate left atrial enlargement.  Study was difficult due to patient's poor endocardial visualization.           Mixed hyperlipidemia [E78.2]  Yes     Chronic    Type 2 diabetes mellitus without complication, without long-term current use of insulin [E11.9]  Yes    Chronic gout [M1A.9XX0]  Yes     Chronic    Presence of cardiac pacemaker [Z95.0]  Yes    Permanent atrial fibrillation [I48.21]  Yes     Chronic      Resolved Hospital Problems   No resolved problems to display.       Hospice Qualifying Diagnoses:        Patient has a life expectancy < 6 months due to:  Primary Hospice Diagnosis:  decompensated liver cirrhosis   Comorbid Conditions Contributing to Decline:  C. Diff; UTI    Vital Signs: Routine per Hospice Protocol.    Code  Status: DNR/DNI    Allergies: Review of patient's allergies indicates:  No Known Allergies    Diet: per hospice protocol    Activities: As tolerated    Goals of Care Treatment Preferences:  Code Status: DNR    Living Will: Yes              Nursing: Per Hospice Routine.      Childers Care: Empty Childers bag Q shift and PRN.  Change Childers every month.    Routine Skin for Bedridden Patients: Apply moisture barrier cream to all skin folds and   wet areas in perineal area daily and after baths and all bowel movements.    Oxygen: per hospice agency, on room air on discharge    Other Miscellaneous Care: N/A          Medications:          Medication List        CONTINUE taking these medications      acetaminophen 325 MG tablet  Commonly known as: TYLENOL  Take 2 tablets (650 mg total) by mouth every 6 (six) hours as needed for Pain (DO NOT EXCEED more than 2000 mg in 1 day).     allopurinoL 100 MG tablet  Commonly known as: ZYLOPRIM  Take 1 tablet (100 mg total) by mouth once daily.     atorvastatin 40 MG tablet  Commonly known as: LIPITOR  Take 40 mg by mouth once daily.     EScitalopram oxalate 10 MG tablet  Commonly known as: LEXAPRO  Take 1 tablet (10 mg total) by mouth once daily.     folic acid 1 MG tablet  Commonly known as: FOLVITE  Take 1 tablet (1 mg total) by mouth once daily.     furosemide 20 MG tablet  Commonly known as: LASIX  TAKE 1 TABLET(20 MG) BY MOUTH TWICE DAILY     LIDOcaine 5 %  Commonly known as: LIDODERM  Place 1 patch onto the skin once daily. Remove & Discard patch within 12 hours or as directed by MD. Apply to right ribs.                Future Orders:  Hospice Medical Director may dictate new orders for comfortable care measures & sign death certificate.        _________________________________  Kristy Patricio MD  10/27/2022

## 2022-10-27 NOTE — ASSESSMENT & PLAN NOTE
Pt endorses profuse watery diarrhea for the past few days. Concerning for possible C diff with recent aggressive antibiotic regimen     Comfort measures

## 2022-10-27 NOTE — ASSESSMENT & PLAN NOTE
xPt endorses melena. CT scan with no evidence of contrast extravasation to suggest active GI bleed at this time. Stable 3 cm enhancing lesion at the hepatic dome with evidence of liver cirrhosis, circumferential wall thickening of the rectum and sigmoid colon concern for colitis, and pancreatic hypodensities measuring up to 2.0 cm. Considering liver disease, suspicion for ruptured esophogeal varcies. Recent EGD at [facility] reviewed, will discuss with GI     CBC stable  Comfort measures

## 2022-10-27 NOTE — PLAN OF CARE
Patient transitioned to comfort measures early this morning per patients POA (daughter) request. DNR/DNI and comfort measures only. Patient status is imminent. Discussed with social work the need for hospice.     Kristy Patricio MD

## 2022-10-27 NOTE — PLAN OF CARE
Patient discharged to [facility] inpatient hospice. Report given to Aracelis AGUILA. IVs left in place per request of accepting RN. Patient remains free of falls with no acute pain or distress noted. Patient left floor via [facility] transport.

## 2022-10-27 NOTE — PROGRESS NOTES
[facility]  Adult Nutrition  Progress Note    SUMMARY       Recommendations    Continue Dysphagia Level 4      Decrease ONS: Boost Plus and Boost Breeze as needed to optimize nutrient intake      Add Manish BID to promote wound healing     If PO intake remains low and TF warranted: Makenzie Dahl Standard 1.4 @ 60ml/hr to provide 2016 kcal, 89g protein and 1037ml fluid.      If unable to tolerate PO and EN, initiate TPN: 100g AA, 300g D + IL to provide 1920 kcal, 100g protein. GIR = 2.49mg/kg/min    Goals: Meet % EEN, EPN by RD f/u  Nutrition Goal Status: progressing towards goal  Communication of RD Recs:  (POC)    Assessment and Plan    Severe malnutrition  Nutrition Problem:  Severe Protein-Calorie Malnutrition  Malnutrition in the context of Chronic Illness/Injury    Related to (etiology):  Inadequate nutrient intake     Signs and Symptoms (as evidenced by):  Body Fat Depletion: moderate depletion of orbitals   Muscle Mass Depletion: mild and severe depletion of temples, clavicle region, and interosseous muscle   Weight Loss: 14% x 5 months     Interventions(treatment strategy):  Collaboration with other providers    Nutrition Diagnosis Status:  New             Malnutrition Assessment             Weight Loss (Malnutrition):  (14% x 5 months)   Orbital Region (Subcutaneous Fat Loss): moderate depletion   Moravian Region (Muscle Loss): severe depletion  Clavicle Bone Region (Muscle Loss): mild depletion  Clavicle and Acromion Bone Region (Muscle Loss): mild depletion  Dorsal Hand (Muscle Loss): mild depletion                 Reason for Assessment    Reason For Assessment: RD follow-up  Diagnosis:  (C.diff colitis)  Relevant Medical History: HF, HTN, HLD, T2DM, gout, cirrhosis, GI bleed  Interdisciplinary Rounds: did not attend  General Information Comments: Pt seen for f/u, pt with poor arousal x2. Per Chart appears to have poor appetite, 0-25% intake of meals. Noted with 10+ unopened ONS at bedside. UBW 215lb  "per chart. Noted wt loss of 31lb (14%) x 5 months. NFPE completed today, noted mild-severe fat and muscle depletion. Pt meet criteria for severe malnutrition in the context of chronic illness.  Nutrition Discharge Planning: Pending medical course    Nutrition Risk Screen    Nutrition Risk Screen: dysphagia or difficulty swallowing    Nutrition/Diet History    Spiritual, Cultural Beliefs, Congregational Practices, Values that Affect Care: no    Anthropometrics    Temp: 97.7 °F (36.5 °C)  Height: 5' 10" (177.8 cm)  Height (inches): 70 in  Weight Method: Bed Scale  Weight: 83.5 kg (184 lb)  Weight (lb): 184 lb  Ideal Body Weight (IBW), Male: 166 lb  % Ideal Body Weight, Male (lb): 110.84 %  BMI (Calculated): 26.4       Lab/Procedures/Meds    Pertinent Labs Reviewed: reviewed  Pertinent Labs Comments: H/H:8.2/23.3, MCH:31.7, na:132, Potassium:2.9,Deniz:7.7  Pertinent Medications Reviewed: reviewed  Pertinent Medications Comments: N/A      Estimated/Assessed Needs    Weight Used For Calorie Calculations: 83.5 kg (184 lb)  Energy Calorie Requirements (kcal): 1920 kcal  Energy Need Method: [place]-St Jeor (PAL 1.25)  Protein Requirements: 83-100g (1-1.2g/kg)  Weight Used For Protein Calculations: 83.5 kg (184 lb)  Fluid Requirements (mL): 1ml/kcal or per MD  Estimated Fluid Requirement Method: RDA Method  RDA Method (mL): 1920  CHO Requirement: 240g      Nutrition Prescription Ordered    Current Diet Order: FLD  Oral Nutrition Supplement: Boost Plus, Boost Breeze    Evaluation of Received Nutrient/Fluid Intake    I/O: -225.1  Energy Calories Required: not meeting needs  Protein Required: not meeting needs  Comments: LBM 10/26  Tolerance: tolerating  % Intake of Estimated Energy Needs: 0 - 25 %  % Meal Intake: 0 - 25 %    Nutrition Risk    Level of Risk/Frequency of Follow-up: low     Monitor and Evaluation    Food and Nutrient Intake: energy intake, food and beverage intake  Food and Nutrient Adminstration: diet order, enteral " and parenteral nutrition administration  Physical Activity and Function: nutrition-related ADLs and IADLs  Anthropometric Measurements: height/length, weight, weight change, body mass index  Biochemical Data, Medical Tests and Procedures: electrolyte and renal panel, gastrointestinal profile, glucose/endocrine profile, inflammatory profile, lipid profile  Nutrition-Focused Physical Findings: overall appearance     Nutrition Follow-Up    RD Follow-up?: Yes

## 2022-10-27 NOTE — ASSESSMENT & PLAN NOTE
Patient with documented history of permanent Afib s/p watchman, no longer on AC or rate controlling agents.  [id number] 5   HAS-BLED 4      Plan:   - Patient rate controlled, no indication for further agents at this time

## 2022-10-27 NOTE — ASSESSMENT & PLAN NOTE
9/24 ANNE: The left ventricle is normal in size with low normal systolic function. The estimated ejection fraction is 50%.There is abnormal septal wall motion consistent with right ventricular pacing. Mild right ventricular enlargement with mildly reduced right ventricular systolic function. BNP:544     Plan:  Supplemental oxygen as needed for comfort

## 2022-10-27 NOTE — PLAN OF CARE
[facility]  Discharge Final Note    Primary Care Provider: Matty Garsia MD    Expected Discharge Date: 10/28/2022    Patient will be discharged to [facility] inpatient hospice.   Transport arranged through the [facility] by stretcher for 5:15PM.  Bedside RN given the number to call report via secure chat.     Final Discharge Note (most recent)       Final Note - 10/27/22 1613          Final Note    Assessment Type Final Discharge Note     Anticipated Discharge Disposition Hospice/Medical Facility        Post-Acute Status    Post-Acute Authorization Hospice     Hospice Status Set-up Complete/Auth obtained                   Sydnee Lawrence RN, CM   Ext: [phone]

## 2022-10-27 NOTE — PLAN OF CARE
Per patient's daughter, she would like inpatient hospice for her father.  CM sent referrals to [facility].     10/27/22 1033   Post-Acute Status   Post-Acute Authorization Hospice   Hospice Status Referrals Sent     Sydnee Lawrence RN, CM   Ext: [phone]

## 2022-10-27 NOTE — PROGRESS NOTES
[facility] Medicine  Progress Note    Patient Name: Jacques Arellano  MRN: [id number]  Patient Class: IP- Inpatient   Admission Date: 10/14/2022  Length of Stay: 13 days  Attending Physician: Nemesio Pearson MD  Primary Care Provider: Matty Garsia MD        Subjective:     Principal Problem:C. difficile colitis        HPI:  81 yo M with PMhx of CHF, AFib, Watchman, pacer, expansile brain lesion, pacemaker presents to from [facility] for diarrhea, copious black stool and acute altered mental status. Pt reports he has 3-5 profuse watery BM for the last few days noting a little bit of blood. Pt was recently admitted to [facility] ED on 10/12 for abdominal pain and coffee ground emesis, EGD revealed grade D esophagitis and gastritis with no overt GI bleeding and was subsequently discharged with Protonix. Pt was recently admitted to [facility] ICU on 9/23 for sepsis thought to be secondary to E coli. UTI vs cirrhosis and was treated with antibiotic and pressors . Inpatient workup revealed new dx of cirrhosis with ascites no spb, aortic aneurysm, and expansile Calvarial lesion, pt was DC on 10/13 back to [facility].      Today pt H & H stable. CTA abd shows colitis, cirrhosis/ascites, and new potential HCC. BNP elevated at 544 however XCR and PE unconcerning for CHF exacerbation. Mentation improved while in ED - CT head stable.       Overview/Hospital Course:  CTA A/P was performed urgently, without evidence of contrast extravasation though showing aforementioned liver lesion and rectosigmoid colitis. Stool cultures returned C diff +ve and he was placed on PO vancomycin therapy. He underwent paracentesis and analysis of ascitic fluid was not indicative of SBP. Cultures and cytology are pending. He has been on IV PPI and octreotide gtt (since discontinued) with initial plans to scope, however, with stabilization of blood counts and CDI, this is no longer indicated. Hypotensive requiring fluid  boluses and albumin with slight recovery of BP. Interval development of UTI, now starting cefepime. Now more encephalopathic      Interval History: Hypotensive with MAP high 50s overnight; given albumin and LR without improvement, NGT placed yesterday for medications; midodrine given around midnight wihtout improvement. Patients daughter called regarding GOC. Discussed patients condition and requirement for pressors; daughter denied and would like comfort measures only. DNR/DNI/comfort measures. SW/CM notified.     Review of Systems   Unable to perform ROS: Mental status change   Objective:     Vital Signs (Most Recent):  Temp: 97.7 °F (36.5 °C) (10/27/22 0735)  Pulse: 70 (10/27/22 0735)  Resp: 12 (10/27/22 0735)  BP: (!) 69/43 (10/27/22 0735)  SpO2: 97 % (10/27/22 0735)   Vital Signs (24h Range):  Temp:  [94.8 °F (34.9 °C)-98.6 °F (37 °C)] 97.7 °F (36.5 °C)  Pulse:  [69-72] 70  Resp:  [12-24] 12  SpO2:  [90 %-99 %] 97 %  BP: ()/(38-60) 69/43     Weight: 83.5 kg (184 lb)  Body mass index is 26.4 kg/m².    Intake/Output Summary (Last 24 hours) at 10/27/2022 0837  Last data filed at 10/26/2022 1402  Gross per 24 hour   Intake 49.92 ml   Output 200 ml   Net -150.08 ml      Physical Exam  Constitutional:       Appearance: He is ill-appearing.   HENT:      Head:      Comments: Subcutaneous lesion hard to palpation located on right frontal skull.   Eyes:      General: No scleral icterus.     Conjunctiva/sclera: Conjunctivae normal.   Cardiovascular:      Rate and Rhythm: Normal rate and regular rhythm.      Pulses: Normal pulses.      Heart sounds: Normal heart sounds.   Pulmonary:      Effort: Pulmonary effort is normal. No respiratory distress.      Breath sounds: Normal breath sounds.   Chest:      Comments: Pacemaker in place in right upper chest wall.   Abdominal:      General: Bowel sounds are normal. There is distension.      Palpations: Abdomen is soft.      Tenderness: There is no abdominal tenderness.    Musculoskeletal:      Right lower leg: Edema present.      Left lower leg: Edema present.   Skin:     General: Skin is warm and dry.      Findings: No bruising or rash.   Neurological:      GCS: GCS eye subscore is 3. GCS verbal subscore is 1. GCS motor subscore is 5.      Comments: Disoriented, obtunded        Significant Labs: All pertinent labs within the past 24 hours have been reviewed.    Significant Imaging: I have reviewed all pertinent imaging results/findings within the past 24 hours.      Assessment/Plan:      * C. difficile colitis  Pt endorses profuse watery diarrhea for the past few days. Concerning for possible C diff with recent aggressive antibiotic regimen     Comfort measures    UTI (urinary tract infection)  UA consistent with UTI and patient now more encephalopathic.     Comfort measures      GI bleed  xPt endorses melena. CT scan with no evidence of contrast extravasation to suggest active GI bleed at this time. Stable 3 cm enhancing lesion at the hepatic dome with evidence of liver cirrhosis, circumferential wall thickening of the rectum and sigmoid colon concern for colitis, and pancreatic hypodensities measuring up to 2.0 cm. Considering liver disease, suspicion for ruptured esophogeal varcies. Recent EGD at [facility] reviewed, will discuss with GI     CBC stable  Comfort measures    Other cirrhosis of liver  9/23: Ct A/P:Cirrhotic morphology of the liver with sequelae of portal hypertension as evidence by splenomegaly, intra-abdominal ascites, and possible portal hypertensive enteropathy. Pt denies hx of alcohol abuse.  R preformed diagnostic paracentetics on 9/27 to r/o SBP: which was negative 130 WBC and 10%     Comfort measures    Chronic gout          Type 2 diabetes mellitus without complication, without long-term current use of insulin  Last A1c 6.1 3 months ago. Hypoglycemic on admission.        Mixed hyperlipidemia          Primary hypertension  Held in setting of hypotension       Chronic diastolic heart failure  9/24 ANNE: The left ventricle is normal in size with low normal systolic function. The estimated ejection fraction is 50%.There is abnormal septal wall motion consistent with right ventricular pacing. Mild right ventricular enlargement with mildly reduced right ventricular systolic function. BNP:544     Plan:  Supplemental oxygen as needed for comfort        Permanent atrial fibrillation  Patient with documented history of permanent Afib s/p watchman, no longer on AC or rate controlling agents.  [id number] 5   HAS-BLED 4      Plan:   - Patient rate controlled, no indication for further agents at this time        VTE Risk Mitigation (From admission, onward)         Ordered     IP VTE HIGH RISK PATIENT  Once         10/14/22 1826                Discharge Planning   ELMER: 10/31/2022     Code Status: DNR   Is the patient medically ready for discharge?: No    Reason for patient still in hospital (select all that apply): Pending disposition  Discharge Plan A: Return to nursing home   Discharge Delays: None known at this time              Kristy Patricio MD  Department of Hospital Medicine   [facility]

## 2022-10-27 NOTE — ASSESSMENT & PLAN NOTE
9/23: Ct A/P:Cirrhotic morphology of the liver with sequelae of portal hypertension as evidence by splenomegaly, intra-abdominal ascites, and possible portal hypertensive enteropathy. Pt denies hx of alcohol abuse.  R preformed diagnostic paracentetics on 9/27 to r/o SBP: which was negative 130 WBC and 10%     Comfort measures

## 2022-10-27 NOTE — PT/OT/SLP PROGRESS
Speech Language Pathology/Discontinuation of Orders      Jacques Arellano  MRN: [id number]    Patient not seen today secondary to  (orders d/c'd by team 2/2 pt transitioning to comfort care).

## 2022-10-27 NOTE — NURSING
1230: Contacted IM3 for no urine output blasdder scan of 163ml. And hypotensive with a map less than 65 80's/50's-with a map of 59. IM3 Doctor at bedside at this time.

## 2022-10-27 NOTE — PLAN OF CARE
Recommendations     Continue Dysphagia Level 4      Decrease ONS: Boost Plus and Boost Breeze as needed to optimize nutrient intake      Add Manish BID to promote wound healing     If PO intake remains low and TF warranted: Makenzie Farm Standard 1.4 @ 60ml/hr to provide 2016 kcal, 89g protein and 1037ml fluid.      If unable to tolerate PO and EN, initiate TPN: 100g AA, 300g D + IL to provide 1920 kcal, 100g protein. GIR = 2.49mg/kg/min     Goals: Meet % EEN, EPN by RD f/u  Nutrition Goal Status: progressing towards goal  Communication of RD Recs:  (POC)

## 2022-10-28 LAB — POCT GLUCOSE: 116 MG/DL (ref 70–110)

## 2022-10-30 LAB
BACTERIA BLD CULT: NORMAL
BACTERIA BLD CULT: NORMAL

## 2022-11-01 ENCOUNTER — DOCUMENTATION ONLY (OUTPATIENT)
Dept: GASTROENTEROLOGY | Facility: HOSPITAL | Age: 82
End: 2022-11-01
Payer: MEDICARE

## 2022-11-12 NOTE — DISCHARGE SUMMARY
[facility] Medicine  Discharge Summary      Patient Name: Jacques Arellano  MRN: [id number]  ASPEN: [id number]  Patient Class: IP- Inpatient  Admission Date: 10/14/2022  Hospital Length of Stay: 13 days  Discharge Date and Time: 10/27/2022  6:00 PM  Attending Physician: No att. providers found   Discharging Provider: Kristy Patricio MD  Primary Care Provider: Matty Garsia MD  [facility] Medicine Team: [facility] HOSP MED 3 Kristy Patricio MD  Primary Care Team: [facility] HOSP MED 3    HPI:   81 yo M with PMhx of CHF, AFib, Watchman, pacer, expansile brain lesion, pacemaker presents to from [facility] for diarrhea, copious black stool and acute altered mental status. Pt reports he has 3-5 profuse watery BM for the last few days noting a little bit of blood. Pt was recently admitted to [facility] ED on 10/12 for abdominal pain and coffee ground emesis, EGD revealed grade D esophagitis and gastritis with no overt GI bleeding and was subsequently discharged with Protonix. Pt was recently admitted to [facility] ICU on 9/23 for sepsis thought to be secondary to E coli. UTI vs cirrhosis and was treated with antibiotic and pressors . Inpatient workup revealed new dx of cirrhosis with ascites no spb, aortic aneurysm, and expansile Calvarial lesion, pt was DC on 10/13 back to [facility].      Today pt H & H stable. CTA abd shows colitis, cirrhosis/ascites, and new potential HCC. BNP elevated at 544 however XCR and PE unconcerning for CHF exacerbation. Mentation improved while in ED - CT head stable.       Procedure(s) (LRB):  EGD (ESOPHAGOGASTRODUODENOSCOPY) (N/A)  SIGMOIDOSCOPY, FLEXIBLE (N/A)      Hospital Course:   CTA A/P was performed urgently, without evidence of contrast extravasation though showing aforementioned liver lesion and rectosigmoid colitis. Stool cultures returned C diff +ve and he was placed on PO vancomycin therapy. He underwent paracentesis and analysis of ascitic fluid was not indicative of SBP.  Cultures and cytology are pending. He has been on IV PPI and octreotide gtt (since discontinued) with initial plans to scope, however, with stabilization of blood counts and CDI, this is no longer indicated. Hypotensive requiring fluid boluses and albumin with slight recovery of BP. Interval development of UTI, now starting cefepime. Now more encephalopathic. Discussed with POA who decided hospice. Discharged to hospice facility.        Goals of Care Treatment Preferences:  Code Status: DNR    Living Will: Yes              Consults:   Consults (From admission, onward)        Status Ordering Provider     Inpatient consult to Hepatology  Once        Provider:  (Not yet assigned)    Completed ERIKA EDWARDS     Inpatient consult to Midline team  Once        Provider:  (Not yet assigned)    Completed BRITNI RAMIREZ     Inpatient consult to Gastroenterology  Once        Provider:  (Not yet assigned)    Completed MARKELL TELLES          No new Assessment & Plan notes have been filed under this hospital service since the last note was generated.  Service: Hospital Medicine    Final Active Diagnoses:    Diagnosis Date Noted POA    PRINCIPAL PROBLEM:  C. difficile colitis [A04.72] 10/14/2022 Yes    UTI (urinary tract infection) [N39.0] 10/26/2022 No    Dermatitis associated with moisture [L30.8] 10/24/2022 Yes    Severe malnutrition [E43] 10/21/2022 Yes    GI bleed [K92.2] 10/14/2022 Yes    Other cirrhosis of liver [K74.69] 09/25/2022 Yes    Primary hypertension [I10] 07/19/2022 Yes    Presence of Watchman left atrial appendage closure device [Z95.818] 04/08/2022 Yes     Chronic    Chronic anticoagulation [Z79.01] 04/04/2022 Not Applicable    Chronic diastolic heart failure [I50.32] 03/24/2022 Yes    Mixed hyperlipidemia [E78.2] 03/24/2022 Yes     Chronic    Type 2 diabetes mellitus without complication, without long-term current use of insulin [E11.9] 03/24/2022 Yes    Chronic gout [M1A.9XX0]  03/24/2022 Yes     Chronic    Presence of cardiac pacemaker [Z95.0] 03/24/2022 Yes    Permanent atrial fibrillation [I48.21] 02/15/2022 Yes     Chronic      Problems Resolved During this Admission:       Discharged Condition: stable    Disposition: Hospice/Medical Facility    Follow Up:    Patient Instructions:   No discharge procedures on file.    Significant Diagnostic Studies: Labs: BMP: No results for input(s): GLU, NA, K, CL, CO2, BUN, CREATININE, CALCIUM, MG in the last 48 hours., CMP No results for input(s): NA, K, CL, CO2, GLU, BUN, CREATININE, CALCIUM, PROT, ALBUMIN, BILITOT, ALKPHOS, AST, ALT, ANIONGAP, ESTGFRAFRICA, EGFRNONAA in the last 48 hours. and CBC No results for input(s): WBC, HGB, HCT, PLT in the last 48 hours.    Pending Diagnostic Studies:     None         Medications:  Reconciled Home Medications:      Medication List      CONTINUE taking these medications    acetaminophen 325 MG tablet  Commonly known as: TYLENOL  Take 2 tablets (650 mg total) by mouth every 6 (six) hours as needed for Pain (DO NOT EXCEED more than 2000 mg in 1 day).     allopurinoL 100 MG tablet  Commonly known as: ZYLOPRIM  Take 1 tablet (100 mg total) by mouth once daily.     atorvastatin 40 MG tablet  Commonly known as: LIPITOR  Take 40 mg by mouth once daily.     EScitalopram oxalate 10 MG tablet  Commonly known as: LEXAPRO  Take 1 tablet (10 mg total) by mouth once daily.     folic acid 1 MG tablet  Commonly known as: FOLVITE  Take 1 tablet (1 mg total) by mouth once daily.     furosemide 20 MG tablet  Commonly known as: LASIX  TAKE 1 TABLET(20 MG) BY MOUTH TWICE DAILY     LIDOcaine 5 %  Commonly known as: LIDODERM  Place 1 patch onto the skin once daily. Remove & Discard patch within 12 hours or as directed by MD. Apply to right ribs.            Indwelling Lines/Drains at time of discharge:   Lines/Drains/Airways     None                 Time spent on the discharge of patient: 20 minutes         Kristy Patricio  MD  Department of Hospital Medicine  [facility]

## 2023-01-06 ENCOUNTER — PATIENT OUTREACH (OUTPATIENT)
Dept: ADMINISTRATIVE | Facility: OTHER | Age: 83
End: 2023-01-06
Payer: MEDICARE

## 2023-01-06 NOTE — PROGRESS NOTES
CHW - Initial Contact    This Community Health Worker updated the Social Determinant of Health questionnaire with caregiver via telephone today.    Pt identified barriers of most importance are: none at this time   Referrals to community agencies completed with patient/caregiver consent outside of [facility] include:  no  Referrals were put through [facility] - no:   Support and Services: No support & services have been documented.  Other information discussed the patient needs / wants help with: n/a   Follow up required: no  No future outreach task assigned

## 2023-01-23 PROBLEM — K92.2 GI BLEED: Status: RESOLVED | Noted: 2022-10-14 | Resolved: 2023-01-23

## 2023-01-30 PROBLEM — N39.0 UTI (URINARY TRACT INFECTION): Status: RESOLVED | Noted: 2022-10-26 | Resolved: 2023-01-30

## 2023-04-19 ENCOUNTER — PATIENT MESSAGE (OUTPATIENT)
Dept: ADMINISTRATIVE | Facility: HOSPITAL | Age: 83
End: 2023-04-19
Payer: MEDICARE

## 2023-06-06 ENCOUNTER — PATIENT MESSAGE (OUTPATIENT)
Dept: ADMINISTRATIVE | Facility: HOSPITAL | Age: 83
End: 2023-06-06
Payer: MEDICARE

## 2023-06-06 ENCOUNTER — PATIENT OUTREACH (OUTPATIENT)
Dept: ADMINISTRATIVE | Facility: HOSPITAL | Age: 83
End: 2023-06-06
Payer: MEDICARE

## 2023-06-13 ENCOUNTER — PATIENT OUTREACH (OUTPATIENT)
Dept: ADMINISTRATIVE | Facility: HOSPITAL | Age: 83
End: 2023-06-13
Payer: MEDICARE

## 2023-07-14 ENCOUNTER — EXTERNAL HOME HEALTH (OUTPATIENT)
Dept: HOME HEALTH SERVICES | Facility: HOSPITAL | Age: 83
End: 2023-07-14
Payer: MEDICARE

## 2023-11-09 NOTE — PROGRESS NOTES
[facility] Medicine  Progress Note    Patient Name: Jacques Arellano  MRN: [id number]  Patient Class: IP- Inpatient   Admission Date: 10/14/2022  Length of Stay: 9 days  Attending Physician: Nemesio Pearson MD  Primary Care Provider: Matty Garsia MD        Subjective:     Principal Problem:C. difficile colitis        HPI:  83 yo M with PMhx of CHF, AFib, Watchman, pacer, expansile brain lesion, pacemaker presents to from [facility] for diarrhea, copious black stool and acute altered mental status. Pt reports he has 3-5 profuse watery BM for the last few days noting a little bit of blood. Pt was recently admitted to [facility] ED on 10/12 for abdominal pain and coffee ground emesis, EGD revealed grade D esophagitis and gastritis with no overt GI bleeding and was subsequently discharged with Protonix. Pt was recently admitted to [facility] ICU on 9/23 for sepsis thought to be secondary to E coli. UTI vs cirrhosis and was treated with antibiotic and pressors . Inpatient workup revealed new dx of cirrhosis with ascites no spb, aortic aneurysm, and expansile Calvarial lesion, pt was DC on 10/13 back to [facility].      Today pt H & H stable. CTA abd shows colitis, cirrhosis/ascites, and new potential HCC. BNP elevated at 544 however XCR and PE unconcerning for CHF exacerbation. Mentation improved while in ED - CT head stable.       Overview/Hospital Course:  CTA A/P was performed urgently, without evidence of contrast extravasation though showing aforementioned liver lesion and rectosigmoid colitis. Stool cultures returned C diff +ve and he was placed on PO vancomycin therapy. He underwent paracentesis and analysis of ascitic fluid was not indicative of SBP. Cultures and cytology are pending. He has been on IV PPI and octreotide gtt (since discontinued) with initial plans to scope, however, with stabilization of blood counts and CDI, this is no longer indicated. More awake today and conversant.  This is a chronic stable condition.  Patient is on a healthy diet.           Interval History: NAEON. Pt endorses feeling better, BM have decreased on volume. Able to tolerate lq diet.     Review of Systems   Unable to perform ROS: Mental status change   Objective:     Vital Signs (Most Recent):  Temp: 97.7 °F (36.5 °C) (10/23/22 0848)  Pulse: 78 (10/23/22 0848)  Resp: 18 (10/23/22 0848)  BP: (!) 93/57 (10/23/22 0848)  SpO2: 97 % (10/23/22 0848)   Vital Signs (24h Range):  Temp:  [97.5 °F (36.4 °C)-98.6 °F (37 °C)] 97.7 °F (36.5 °C)  Pulse:  [68-78] 78  Resp:  [14-18] 18  SpO2:  [92 %-97 %] 97 %  BP: ()/(52-60) 93/57     Weight: 83.5 kg (184 lb)  Body mass index is 26.4 kg/m².    Intake/Output Summary (Last 24 hours) at 10/23/2022 1102  Last data filed at 10/22/2022 1715  Gross per 24 hour   Intake 240 ml   Output --   Net 240 ml        Physical Exam  Constitutional:       Appearance: He is not ill-appearing.   HENT:      Head:      Comments: Subcutaneous lesion hard to palpation located on right frontal skull.   Eyes:      General: No scleral icterus.     Conjunctiva/sclera: Conjunctivae normal.   Cardiovascular:      Rate and Rhythm: Normal rate and regular rhythm.      Pulses: Normal pulses.      Heart sounds: Normal heart sounds.   Pulmonary:      Effort: Pulmonary effort is normal. No respiratory distress.      Breath sounds: Normal breath sounds.   Chest:      Comments: Pacemaker in place in right upper chest wall.   Abdominal:      General: Bowel sounds are normal. There is distension.      Palpations: Abdomen is soft.      Tenderness: There is no abdominal tenderness.   Musculoskeletal:      Right lower leg: Edema present.      Left lower leg: Edema present.   Skin:     General: Skin is warm and dry.      Findings: No bruising or rash.   Neurological:      GCS: GCS eye subscore is 4. GCS verbal subscore is 3. GCS motor subscore is 5.       Significant Labs: All pertinent labs within the past 24 hours have been reviewed.  A1C:   Recent Labs   Lab 07/19/22  1149   HGBA1C  6.1*       ABGs: No results for input(s): PH, PCO2, HCO3, POCSATURATED, BE, TOTALHB, COHB, METHB, O2HB, POCFIO2, PO2 in the last 48 hours.  Bilirubin:   Recent Labs   Lab 10/19/22  0545 10/20/22  0614 10/21/22  0449 10/22/22  0817 10/23/22  0657   BILITOT 0.8 0.6 0.7 1.0 1.0       Blood Culture: No results for input(s): LABBLOO in the last 48 hours.  BMP:   Recent Labs   Lab 10/23/22  0657   *   *   K 4.1      CO2 17*   BUN 24*   CREATININE 1.3   CALCIUM 8.1*   MG 1.6       CBC:   Recent Labs   Lab 10/22/22  0817   WBC 19.96*   HGB 10.2*   HCT 32.1*   *       CMP:   Recent Labs   Lab 10/22/22  0817 10/23/22  0657   * 133*   K 3.4* 4.1    109   CO2 15* 17*   * 134*   BUN 24* 24*   CREATININE 1.2 1.3   CALCIUM 7.9* 8.1*   PROT 4.4* 4.5*   ALBUMIN 2.1* 2.0*   BILITOT 1.0 1.0   ALKPHOS 121 123   AST 23 27   ALT 13 12   ANIONGAP 9 7*       Cardiac Markers: No results for input(s): CKMB, MYOGLOBIN, BNP, TROPISTAT in the last 48 hours.  Coagulation: No results for input(s): PT, INR, APTT in the last 48 hours.  Lactic Acid: No results for input(s): LACTATE in the last 48 hours.  Lipase: No results for input(s): LIPASE in the last 48 hours.  Lipid Panel: No results for input(s): CHOL, HDL, LDLCALC, TRIG, CHOLHDL in the last 48 hours.  Magnesium:   Recent Labs   Lab 10/22/22  0817 10/23/22  0657   MG 1.6 1.6       Pathology Results  (Last 10 years)      None          POCT Glucose: No results for input(s): POCTGLUCOSE in the last 48 hours.  Prealbumin: No results for input(s): PREALBUMIN in the last 48 hours.  Respiratory Culture: No results for input(s): GSRESP, RESPIRATORYC in the last 48 hours.  Troponin: No results for input(s): TROPONINI, TROPONINIHS in the last 48 hours.  TSH:   Recent Labs   Lab 07/19/22  1149   TSH 0.555       Urine Culture: No results for input(s): LABURIN in the last 48 hours.  Urine Studies: No results for input(s): COLORU, APPEARANCEUA, PHUR, SPECGRAV,  PROTEINUA, GLUCUA, KETONESU, BILIRUBINUA, OCCULTUA, NITRITE, UROBILINOGEN, LEUKOCYTESUR, RBCUA, WBCUA, BACTERIA, SQUAMEPITHEL, HYALINECASTS in the last 48 hours.    Invalid input(s): ROMERO    Significant Imaging: I have reviewed all pertinent imaging results/findings within the past 24 hours.      Assessment/Plan:      * C. difficile colitis  Pt endorses profuse watery diarrhea for the past few days. Concerning for possible C diff with recent aggressive antibiotic regimen     Plan:  - Stool studies pending   - C diff positive; starting Po Vancomycin, Adding flagyl  10/19  - Per Hepatology, added Rifaximin BID.   - WBC up trending, KUB to r/o toxic megacolon     GI bleed  Pt endorses melena. CT scan with no evidence of contrast extravasation to suggest active GI bleed at this time. Stable 3 cm enhancing lesion at the hepatic dome with evidence of liver cirrhosis, circumferential wall thickening of the rectum and sigmoid colon concern for colitis, and pancreatic hypodensities measuring up to 2.0 cm. Considering liver disease, suspicion for ruptured esophogeal varcies. Recent EGD at [facility] reviewed, will discuss with GI     Plan:  - CBC q12, trend H&H  - GI planning for endoscopy & sigmoidoscopy deferred due to C.diff and stable HH  - PPI 40mg BID   - CTX for sbp ppx    Other cirrhosis of liver  9/23: Ct A/P:Cirrhotic morphology of the liver with sequelae of portal hypertension as evidence by splenomegaly, intra-abdominal ascites, and possible portal hypertensive enteropathy. Pt denies hx of alcohol abuse.  R preformed diagnostic paracentetics on 9/27 to r/o SBP: which was negative 130 WBC and 10%     Plan:    - Daily CMP   - Hepatology following; paracentesis with IR with 2.4L with cytology collected  - Started on Rifaximin BID per hepatology recommendations  - Cytology from para collected, negative for malignancy  - Albumin 25g to optimize fluid status.      Chronic gout  Plan:  Continue home allopurinol.          Type 2 diabetes mellitus without complication, without long-term current use of insulin  Last A1c 6.1 3 months ago. Hypoglycemic on admission.      Plan:  - No home medications  Noted    - POCT glucose ACHS    - Low threshold to start LDSSI     Mixed hyperlipidemia  Plan:  - Continue home statin         Primary hypertension  Home medications: triamterene-HCTZ, Lasix     Plan:  - Hold home medications s/o hypotension      Chronic diastolic heart failure  9/24 ANNE: The left ventricle is normal in size with low normal systolic function. The estimated ejection fraction is 50%.There is abnormal septal wall motion consistent with right ventricular pacing. Mild right ventricular enlargement with mildly reduced right ventricular systolic function. BNP:544     Plan:  - Low threshold for Lasix as CXR/lung sounds clear while saturating well on RA.   - 20 mg today in setting of tachypnea and pulm congestion         Permanent atrial fibrillation  Patient with documented history of permanent Afib s/p watchman, no longer on AC or rate controlling agents.  [id number]   HAS-BLED 4      Plan:   - Patient rate controlled, no indication for further agents at this time  - No full AC required s/p Watchman; will hold AC ppx and APT s/o active bleeding from abrasion sites  - Cardiac telemetry  - Maintain K > 4, Mg > 2, Ca wnl; replete PRN     cardiology consult if hemodynamic instability for DCCV evaluation      VTE Risk Mitigation (From admission, onward)         Ordered     heparin (porcine) injection 5,000 Units  Every 8 hours         10/22/22 1447     IP VTE HIGH RISK PATIENT  Once         10/14/22 1826     Place sequential compression device  Until discontinued         10/14/22 1826                Discharge Planning   ELMER: 10/26/2022     Code Status: DNR   Is the patient medically ready for discharge?: No    Reason for patient still in hospital (select all that apply): Patient trending condition  Discharge Plan A: Return to  nursing home   Discharge Delays: None known at this time              Starr Hodges DO  Department of Hospital Medicine   [facility]

## (undated) DEVICE — SPLINT PLASTER FAST SET 5X30IN

## (undated) DEVICE — PACK UPPER EXTREMITY BAPTIST

## (undated) DEVICE — BUCKET PLASTER DISPOSABLE

## (undated) DEVICE — DRESSING XEROFORM FOIL PK 1X8

## (undated) DEVICE — PAD UNDERPAD 30X30

## (undated) DEVICE — SCREW PEG SMOOTH 2.0 X 18MM
Type: IMPLANTABLE DEVICE | Site: WRIST | Status: NON-FUNCTIONAL
Removed: 2022-05-17

## (undated) DEVICE — NDL HYPO REG 25G X 1 1/2

## (undated) DEVICE — TOURNIQUET SB QC DP 18X4IN

## (undated) DEVICE — BANDAGE COBAN COLOR ASSORT 3X5

## (undated) DEVICE — SUT MONOCRYL PLUS 4-0 P3

## (undated) DEVICE — CORD BIPOLAR 12 FOOT

## (undated) DEVICE — SCREW CORTICAL 3.5 X 12
Type: IMPLANTABLE DEVICE | Site: WRIST | Status: NON-FUNCTIONAL
Removed: 2022-05-17

## (undated) DEVICE — UNDERGLOVES BIOGEL PI SIZE 8

## (undated) DEVICE — APPLICATOR CHLORAPREP ORN 26ML

## (undated) DEVICE — SYR B-D DISP CONTROL 10CC100/C

## (undated) DEVICE — BIT DRILL FAST 2 MM

## (undated) DEVICE — PEG SUPPORT SUBCHONDRAL 2.0X22
Type: IMPLANTABLE DEVICE | Site: WRIST | Status: NON-FUNCTIONAL
Removed: 2022-05-17

## (undated) DEVICE — DRAPE C-ARM MINI DISP

## (undated) DEVICE — SOL ALCOHOL ISO 70% WNTGR 16OZ

## (undated) DEVICE — BIT DRILL DISTAL RADIUS 2.5 MM

## (undated) DEVICE — PAD CAST SPECIALIST STRL 4

## (undated) DEVICE — SEE L#120831

## (undated) DEVICE — BLADE SURG STAINLESS STEEL #15

## (undated) DEVICE — GLOVE BIOGEL SKINSENSE PI 7.5

## (undated) DEVICE — SPONGE COTTON TRAY 4X4IN